# Patient Record
Sex: FEMALE | Race: WHITE | NOT HISPANIC OR LATINO
[De-identification: names, ages, dates, MRNs, and addresses within clinical notes are randomized per-mention and may not be internally consistent; named-entity substitution may affect disease eponyms.]

---

## 2017-07-18 ENCOUNTER — RESULT REVIEW (OUTPATIENT)
Age: 52
End: 2017-07-18

## 2017-07-20 ENCOUNTER — OUTPATIENT (OUTPATIENT)
Dept: OUTPATIENT SERVICES | Facility: HOSPITAL | Age: 52
LOS: 1 days | End: 2017-07-20
Payer: MEDICARE

## 2017-07-20 DIAGNOSIS — K08.9 DISORDER OF TEETH AND SUPPORTING STRUCTURES, UNSPECIFIED: ICD-10-CM

## 2017-07-20 PROCEDURE — D1110: CPT

## 2017-07-20 PROCEDURE — D0274: CPT

## 2017-07-25 DIAGNOSIS — Z01.20 ENCOUNTER FOR DENTAL EXAMINATION AND CLEANING WITHOUT ABNORMAL FINDINGS: ICD-10-CM

## 2017-08-22 ENCOUNTER — OUTPATIENT (OUTPATIENT)
Dept: OUTPATIENT SERVICES | Facility: HOSPITAL | Age: 52
LOS: 1 days | End: 2017-08-22
Payer: MEDICARE

## 2017-08-22 DIAGNOSIS — K08.9 DISORDER OF TEETH AND SUPPORTING STRUCTURES, UNSPECIFIED: ICD-10-CM

## 2017-08-22 PROCEDURE — D0150: CPT

## 2017-08-23 DIAGNOSIS — Z01.20 ENCOUNTER FOR DENTAL EXAMINATION AND CLEANING WITHOUT ABNORMAL FINDINGS: ICD-10-CM

## 2017-10-10 ENCOUNTER — INPATIENT (INPATIENT)
Facility: HOSPITAL | Age: 52
LOS: 27 days | Discharge: HOME | DRG: 885 | End: 2017-11-07
Attending: PSYCHIATRY & NEUROLOGY | Admitting: PSYCHIATRY & NEUROLOGY
Payer: MEDICARE

## 2017-10-10 VITALS
DIASTOLIC BLOOD PRESSURE: 75 MMHG | TEMPERATURE: 98 F | OXYGEN SATURATION: 98 % | WEIGHT: 214.07 LBS | SYSTOLIC BLOOD PRESSURE: 106 MMHG | HEIGHT: 63 IN | HEART RATE: 118 BPM | RESPIRATION RATE: 16 BRPM

## 2017-10-10 DIAGNOSIS — F32.3 MAJOR DEPRESSIVE DISORDER, SINGLE EPISODE, SEVERE WITH PSYCHOTIC FEATURES: ICD-10-CM

## 2017-10-10 DIAGNOSIS — F25.1 SCHIZOAFFECTIVE DISORDER, DEPRESSIVE TYPE: ICD-10-CM

## 2017-10-10 LAB
ALBUMIN SERPL ELPH-MCNC: 3.8 G/DL — SIGNIFICANT CHANGE UP (ref 3.3–5)
ALP SERPL-CCNC: 73 U/L — SIGNIFICANT CHANGE UP (ref 30–120)
ALT FLD-CCNC: 55 U/L DA — SIGNIFICANT CHANGE UP (ref 10–60)
AMPHET UR-MCNC: NEGATIVE — SIGNIFICANT CHANGE UP
ANION GAP SERPL CALC-SCNC: 9 MMOL/L — SIGNIFICANT CHANGE UP (ref 5–17)
APAP SERPL-MCNC: <1 UG/ML — LOW (ref 10–30)
APPEARANCE UR: CLEAR — SIGNIFICANT CHANGE UP
AST SERPL-CCNC: 25 U/L — SIGNIFICANT CHANGE UP (ref 10–40)
BARBITURATES UR SCN-MCNC: NEGATIVE — SIGNIFICANT CHANGE UP
BASOPHILS # BLD AUTO: 0.1 K/UL — SIGNIFICANT CHANGE UP (ref 0–0.2)
BASOPHILS NFR BLD AUTO: 1.3 % — SIGNIFICANT CHANGE UP (ref 0–2)
BENZODIAZ UR-MCNC: NEGATIVE — SIGNIFICANT CHANGE UP
BILIRUB SERPL-MCNC: 0.3 MG/DL — SIGNIFICANT CHANGE UP (ref 0.2–1.2)
BILIRUB UR-MCNC: NEGATIVE — SIGNIFICANT CHANGE UP
BUN SERPL-MCNC: 16 MG/DL — SIGNIFICANT CHANGE UP (ref 7–23)
CALCIUM SERPL-MCNC: 9.5 MG/DL — SIGNIFICANT CHANGE UP (ref 8.4–10.5)
CHLORIDE SERPL-SCNC: 101 MMOL/L — SIGNIFICANT CHANGE UP (ref 96–108)
CO2 SERPL-SCNC: 27 MMOL/L — SIGNIFICANT CHANGE UP (ref 22–31)
COCAINE METAB.OTHER UR-MCNC: NEGATIVE — SIGNIFICANT CHANGE UP
COLOR SPEC: YELLOW — SIGNIFICANT CHANGE UP
CREAT SERPL-MCNC: 1.03 MG/DL — SIGNIFICANT CHANGE UP (ref 0.5–1.3)
DIFF PNL FLD: ABNORMAL
EOSINOPHIL # BLD AUTO: 0 K/UL — SIGNIFICANT CHANGE UP (ref 0–0.5)
EOSINOPHIL NFR BLD AUTO: 0.5 % — SIGNIFICANT CHANGE UP (ref 0–6)
EPI CELLS # UR: SIGNIFICANT CHANGE UP
GLUCOSE BLDC GLUCOMTR-MCNC: 252 MG/DL — HIGH (ref 70–99)
GLUCOSE BLDC GLUCOMTR-MCNC: 291 MG/DL — HIGH (ref 70–99)
GLUCOSE SERPL-MCNC: 262 MG/DL — HIGH (ref 70–99)
GLUCOSE UR QL: 1000 MG/DL
HCG UR QL: NEGATIVE — SIGNIFICANT CHANGE UP
HCT VFR BLD CALC: 44.1 % — SIGNIFICANT CHANGE UP (ref 34.5–45)
HGB BLD-MCNC: 13.7 G/DL — SIGNIFICANT CHANGE UP (ref 11.5–15.5)
KETONES UR-MCNC: NEGATIVE — SIGNIFICANT CHANGE UP
LEUKOCYTE ESTERASE UR-ACNC: ABNORMAL
LYMPHOCYTES # BLD AUTO: 2.5 K/UL — SIGNIFICANT CHANGE UP (ref 1–3.3)
LYMPHOCYTES # BLD AUTO: 29.2 % — SIGNIFICANT CHANGE UP (ref 13–44)
MCHC RBC-ENTMCNC: 29.5 PG — SIGNIFICANT CHANGE UP (ref 27–34)
MCHC RBC-ENTMCNC: 31 GM/DL — LOW (ref 32–36)
MCV RBC AUTO: 95.1 FL — SIGNIFICANT CHANGE UP (ref 80–100)
METHADONE UR-MCNC: NEGATIVE — SIGNIFICANT CHANGE UP
MONOCYTES # BLD AUTO: 0.5 K/UL — SIGNIFICANT CHANGE UP (ref 0–0.9)
MONOCYTES NFR BLD AUTO: 5.8 % — SIGNIFICANT CHANGE UP (ref 2–14)
NEUTROPHILS # BLD AUTO: 5.5 K/UL — SIGNIFICANT CHANGE UP (ref 1.8–7.4)
NEUTROPHILS NFR BLD AUTO: 63.2 % — SIGNIFICANT CHANGE UP (ref 43–77)
NITRITE UR-MCNC: NEGATIVE — SIGNIFICANT CHANGE UP
OPIATES UR-MCNC: NEGATIVE — SIGNIFICANT CHANGE UP
PCP SPEC-MCNC: SIGNIFICANT CHANGE UP
PCP UR-MCNC: NEGATIVE — SIGNIFICANT CHANGE UP
PH UR: 6 — SIGNIFICANT CHANGE UP (ref 5–8)
PLATELET # BLD AUTO: 369 K/UL — SIGNIFICANT CHANGE UP (ref 150–400)
POTASSIUM SERPL-MCNC: 4.1 MMOL/L — SIGNIFICANT CHANGE UP (ref 3.5–5.3)
POTASSIUM SERPL-SCNC: 4.1 MMOL/L — SIGNIFICANT CHANGE UP (ref 3.5–5.3)
PROT SERPL-MCNC: 7.9 G/DL — SIGNIFICANT CHANGE UP (ref 6–8.3)
PROT UR-MCNC: 15 MG/DL
RBC # BLD: 4.64 M/UL — SIGNIFICANT CHANGE UP (ref 3.8–5.2)
RBC # FLD: 12.2 % — SIGNIFICANT CHANGE UP (ref 10.3–14.5)
RBC CASTS # UR COMP ASSIST: SIGNIFICANT CHANGE UP /HPF (ref 0–4)
SALICYLATES SERPL-MCNC: <3 MG/DL — SIGNIFICANT CHANGE UP (ref 2.8–20)
SODIUM SERPL-SCNC: 137 MMOL/L — SIGNIFICANT CHANGE UP (ref 135–145)
SP GR SPEC: 1 — LOW (ref 1.01–1.02)
THC UR QL: NEGATIVE — SIGNIFICANT CHANGE UP
UROBILINOGEN FLD QL: NEGATIVE MG/DL — SIGNIFICANT CHANGE UP
WBC # BLD: 8.7 K/UL — SIGNIFICANT CHANGE UP (ref 3.8–10.5)
WBC # FLD AUTO: 8.7 K/UL — SIGNIFICANT CHANGE UP (ref 3.8–10.5)
WBC UR QL: SIGNIFICANT CHANGE UP

## 2017-10-10 PROCEDURE — 93010 ELECTROCARDIOGRAM REPORT: CPT

## 2017-10-10 PROCEDURE — 99285 EMERGENCY DEPT VISIT HI MDM: CPT

## 2017-10-10 RX ORDER — DEXTROSE 50 % IN WATER 50 %
25 SYRINGE (ML) INTRAVENOUS ONCE
Qty: 0 | Refills: 0 | Status: DISCONTINUED | OUTPATIENT
Start: 2017-10-10 | End: 2017-11-07

## 2017-10-10 RX ORDER — DEXTROSE 50 % IN WATER 50 %
12.5 SYRINGE (ML) INTRAVENOUS ONCE
Qty: 0 | Refills: 0 | Status: DISCONTINUED | OUTPATIENT
Start: 2017-10-10 | End: 2017-11-07

## 2017-10-10 RX ORDER — ALBUTEROL 90 UG/1
2 AEROSOL, METERED ORAL EVERY 4 HOURS
Qty: 0 | Refills: 0 | Status: DISCONTINUED | OUTPATIENT
Start: 2017-10-10 | End: 2017-11-07

## 2017-10-10 RX ORDER — SODIUM CHLORIDE 9 MG/ML
1000 INJECTION, SOLUTION INTRAVENOUS
Qty: 0 | Refills: 0 | Status: DISCONTINUED | OUTPATIENT
Start: 2017-10-10 | End: 2017-11-07

## 2017-10-10 RX ORDER — DESVENLAFAXINE 50 MG/1
1 TABLET, EXTENDED RELEASE ORAL
Qty: 0 | Refills: 0 | COMMUNITY

## 2017-10-10 RX ORDER — GLUCAGON INJECTION, SOLUTION 0.5 MG/.1ML
1 INJECTION, SOLUTION SUBCUTANEOUS ONCE
Qty: 0 | Refills: 0 | Status: DISCONTINUED | OUTPATIENT
Start: 2017-10-10 | End: 2017-11-07

## 2017-10-10 RX ORDER — ATORVASTATIN CALCIUM 80 MG/1
40 TABLET, FILM COATED ORAL AT BEDTIME
Qty: 0 | Refills: 0 | Status: DISCONTINUED | OUTPATIENT
Start: 2017-10-10 | End: 2017-11-07

## 2017-10-10 RX ORDER — INSULIN LISPRO 100/ML
VIAL (ML) SUBCUTANEOUS AT BEDTIME
Qty: 0 | Refills: 0 | Status: DISCONTINUED | OUTPATIENT
Start: 2017-10-10 | End: 2017-11-07

## 2017-10-10 RX ORDER — ARIPIPRAZOLE 15 MG/1
5 TABLET ORAL ONCE
Qty: 0 | Refills: 0 | Status: COMPLETED | OUTPATIENT
Start: 2017-10-10 | End: 2017-10-10

## 2017-10-10 RX ORDER — DEXTROSE 50 % IN WATER 50 %
1 SYRINGE (ML) INTRAVENOUS ONCE
Qty: 0 | Refills: 0 | Status: DISCONTINUED | OUTPATIENT
Start: 2017-10-10 | End: 2017-11-07

## 2017-10-10 RX ORDER — VALSARTAN 80 MG/1
320 TABLET ORAL DAILY
Qty: 0 | Refills: 0 | Status: DISCONTINUED | OUTPATIENT
Start: 2017-10-10 | End: 2017-11-07

## 2017-10-10 RX ORDER — ARIPIPRAZOLE 15 MG/1
5 TABLET ORAL ONCE
Qty: 0 | Refills: 0 | Status: DISCONTINUED | OUTPATIENT
Start: 2017-10-10 | End: 2017-10-10

## 2017-10-10 RX ORDER — INSULIN LISPRO 100/ML
VIAL (ML) SUBCUTANEOUS
Qty: 0 | Refills: 0 | Status: DISCONTINUED | OUTPATIENT
Start: 2017-10-10 | End: 2017-11-07

## 2017-10-10 RX ADMIN — ATORVASTATIN CALCIUM 40 MILLIGRAM(S): 80 TABLET, FILM COATED ORAL at 21:47

## 2017-10-10 RX ADMIN — ARIPIPRAZOLE 5 MILLIGRAM(S): 15 TABLET ORAL at 16:24

## 2017-10-10 RX ADMIN — Medication: at 21:48

## 2017-10-10 NOTE — ED BEHAVIORAL HEALTH ASSESSMENT NOTE - HPI (INCLUDE ILLNESS QUALITY, SEVERITY, DURATION, TIMING, CONTEXT, MODIFYING FACTORS, ASSOCIATED SIGNS AND SYMPTOMS)
52 y/o SWF, with five prior psychiatric hospitalizations, history of schizoaffective disorder who presented to the ED on her own after she became increasingly depressed over the last few days. Patient admits being stressed out by a recent move to a new housing place and she feels overwhelmed. She reported worsening sleep, worsened interest, worsened energy, worsened concentration, worsened appetite and suicidal ideation - patient was thinking about overdose.

## 2017-10-10 NOTE — ED ADULT NURSE REASSESSMENT NOTE - NS ED NURSE REASSESS COMMENT FT1
1900 Report given to PERICO Velasco. To [address] via wheelchair.
[id number] Seen by Psych MD Sen, pt admitted, bed assignment pending. No agitation noted. Calm and cooperative. 1:1 Observation maintained.

## 2017-10-10 NOTE — ED BEHAVIORAL HEALTH ASSESSMENT NOTE - OTHER PAST PSYCHIATRIC HISTORY (INCLUDE DETAILS REGARDING ONSET, COURSE OF ILLNESS, INPATIENT/OUTPATIENT TREATMENT)
Five prior psychiatric hospitalizations, currently in [facility] under care of a therapist and NP

## 2017-10-10 NOTE — ED BEHAVIORAL HEALTH ASSESSMENT NOTE - SUICIDE PROTECTIVE FACTORS
Identifies reasons for living/Future oriented/Positive therapeutic relationships/Responsibility to family and others

## 2017-10-10 NOTE — ED PROVIDER NOTE - OBJECTIVE STATEMENT
50 y/o F pt w/ PMHx of schizoaffective disorder, asthma, DM and sleep apnea presents to the ED bib ambulance c/o depression, frustration and SI. Pt states she has been feeling depressed, waxing and waning, close to a year. Pt lives in a housing program and sees a NP for her depression... had an appointment this morning for medication adjustment where she expressed to the NP that she has been having intermittent SI for the past week and was sent to [facility] to be further evaluated. Pt states she does not have a plan to hurt herself and expresses that she currently has "a lot on her plate" and is feeling anxious, depressed and frustrated. Pt states that when she is feeling this way she is often non-compliant with her medication. Pt denies any somatic complaints at this time.

## 2017-10-10 NOTE — ED BEHAVIORAL HEALTH ASSESSMENT NOTE - DESCRIPTION
Patient at this time continues to express suicidal ideation and requests inpatient admission. Asthma, DM Lives in [facility] assisted housing

## 2017-10-11 LAB
GLUCOSE BLDC GLUCOMTR-MCNC: 245 MG/DL — HIGH (ref 70–99)
GLUCOSE BLDC GLUCOMTR-MCNC: 246 MG/DL — HIGH (ref 70–99)
GLUCOSE BLDC GLUCOMTR-MCNC: 282 MG/DL — HIGH (ref 70–99)
GLUCOSE BLDC GLUCOMTR-MCNC: 304 MG/DL — HIGH (ref 70–99)
TSH SERPL-MCNC: 0.74 UIU/ML — SIGNIFICANT CHANGE UP (ref 0.27–4.2)

## 2017-10-11 PROCEDURE — 99231 SBSQ HOSP IP/OBS SF/LOW 25: CPT

## 2017-10-11 RX ORDER — BENZTROPINE MESYLATE 1 MG
0.5 TABLET ORAL
Qty: 0 | Refills: 0 | Status: DISCONTINUED | OUTPATIENT
Start: 2017-10-11 | End: 2017-11-07

## 2017-10-11 RX ORDER — FLUPHENAZINE HYDROCHLORIDE 1 MG/1
5 TABLET, FILM COATED ORAL AT BEDTIME
Qty: 0 | Refills: 0 | Status: DISCONTINUED | OUTPATIENT
Start: 2017-10-11 | End: 2017-10-12

## 2017-10-11 RX ORDER — FLUPHENAZINE HYDROCHLORIDE 1 MG/1
2.5 TABLET, FILM COATED ORAL AT BEDTIME
Qty: 0 | Refills: 0 | Status: DISCONTINUED | OUTPATIENT
Start: 2017-10-11 | End: 2017-10-11

## 2017-10-11 RX ORDER — ACETAMINOPHEN 500 MG
650 TABLET ORAL EVERY 6 HOURS
Qty: 0 | Refills: 0 | Status: DISCONTINUED | OUTPATIENT
Start: 2017-10-11 | End: 2017-11-07

## 2017-10-11 RX ORDER — METFORMIN HYDROCHLORIDE 850 MG/1
1000 TABLET ORAL
Qty: 0 | Refills: 0 | Status: DISCONTINUED | OUTPATIENT
Start: 2017-10-11 | End: 2017-11-07

## 2017-10-11 RX ORDER — ASPIRIN/CALCIUM CARB/MAGNESIUM 324 MG
81 TABLET ORAL DAILY
Qty: 0 | Refills: 0 | Status: DISCONTINUED | OUTPATIENT
Start: 2017-10-11 | End: 2017-11-07

## 2017-10-11 RX ORDER — LAMOTRIGINE 25 MG/1
25 TABLET, ORALLY DISINTEGRATING ORAL DAILY
Qty: 0 | Refills: 0 | Status: DISCONTINUED | OUTPATIENT
Start: 2017-10-11 | End: 2017-10-23

## 2017-10-11 RX ORDER — BUDESONIDE AND FORMOTEROL FUMARATE DIHYDRATE 160; 4.5 UG/1; UG/1
2 AEROSOL RESPIRATORY (INHALATION)
Qty: 0 | Refills: 0 | Status: DISCONTINUED | OUTPATIENT
Start: 2017-10-11 | End: 2017-11-07

## 2017-10-11 RX ORDER — GABAPENTIN 400 MG/1
100 CAPSULE ORAL
Qty: 0 | Refills: 0 | Status: DISCONTINUED | OUTPATIENT
Start: 2017-10-11 | End: 2017-11-07

## 2017-10-11 RX ORDER — FLUOXETINE HCL 10 MG
10 CAPSULE ORAL DAILY
Qty: 0 | Refills: 0 | Status: DISCONTINUED | OUTPATIENT
Start: 2017-10-11 | End: 2017-10-17

## 2017-10-11 RX ORDER — GLIMEPIRIDE 1 MG
4 TABLET ORAL
Qty: 0 | Refills: 0 | Status: DISCONTINUED | OUTPATIENT
Start: 2017-10-11 | End: 2017-10-12

## 2017-10-11 RX ADMIN — BUDESONIDE AND FORMOTEROL FUMARATE DIHYDRATE 2 PUFF(S): 160; 4.5 AEROSOL RESPIRATORY (INHALATION) at 20:22

## 2017-10-11 RX ADMIN — Medication 4: at 17:24

## 2017-10-11 RX ADMIN — ATORVASTATIN CALCIUM 40 MILLIGRAM(S): 80 TABLET, FILM COATED ORAL at 20:19

## 2017-10-11 RX ADMIN — GABAPENTIN 100 MILLIGRAM(S): 400 CAPSULE ORAL at 20:19

## 2017-10-11 RX ADMIN — Medication 4: at 12:13

## 2017-10-11 RX ADMIN — METFORMIN HYDROCHLORIDE 1000 MILLIGRAM(S): 850 TABLET ORAL at 17:25

## 2017-10-11 RX ADMIN — Medication 2: at 20:18

## 2017-10-11 RX ADMIN — VALSARTAN 320 MILLIGRAM(S): 80 TABLET ORAL at 08:26

## 2017-10-11 RX ADMIN — Medication 8: at 08:26

## 2017-10-11 RX ADMIN — FLUPHENAZINE HYDROCHLORIDE 5 MILLIGRAM(S): 1 TABLET, FILM COATED ORAL at 20:19

## 2017-10-12 LAB
CHOLEST SERPL-MCNC: 152 MG/DL — SIGNIFICANT CHANGE UP (ref 10–199)
GLUCOSE BLDC GLUCOMTR-MCNC: 203 MG/DL — HIGH (ref 70–99)
GLUCOSE BLDC GLUCOMTR-MCNC: 237 MG/DL — HIGH (ref 70–99)
GLUCOSE BLDC GLUCOMTR-MCNC: 245 MG/DL — HIGH (ref 70–99)
GLUCOSE BLDC GLUCOMTR-MCNC: 300 MG/DL — HIGH (ref 70–99)
HBA1C BLD-MCNC: 9.4 % — HIGH (ref 4–5.6)
HDLC SERPL-MCNC: 46 MG/DL — SIGNIFICANT CHANGE UP (ref 40–125)
LIPID PNL WITH DIRECT LDL SERPL: 53 MG/DL — SIGNIFICANT CHANGE UP
T PALLIDUM AB TITR SER: NEGATIVE — SIGNIFICANT CHANGE UP
TOTAL CHOLESTEROL/HDL RATIO MEASUREMENT: 3.3 RATIO — SIGNIFICANT CHANGE UP (ref 3.3–7.1)
TRIGL SERPL-MCNC: 264 MG/DL — HIGH (ref 10–149)

## 2017-10-12 PROCEDURE — 99231 SBSQ HOSP IP/OBS SF/LOW 25: CPT

## 2017-10-12 RX ORDER — SODIUM CHLORIDE 0.65 %
1 AEROSOL, SPRAY (ML) NASAL
Qty: 0 | Refills: 0 | Status: DISCONTINUED | OUTPATIENT
Start: 2017-10-12 | End: 2017-11-07

## 2017-10-12 RX ORDER — GLIMEPIRIDE 1 MG
4 TABLET ORAL
Qty: 0 | Refills: 0 | Status: DISCONTINUED | OUTPATIENT
Start: 2017-10-12 | End: 2017-10-13

## 2017-10-12 RX ORDER — FLUPHENAZINE HYDROCHLORIDE 1 MG/1
5 TABLET, FILM COATED ORAL
Qty: 0 | Refills: 0 | Status: DISCONTINUED | OUTPATIENT
Start: 2017-10-12 | End: 2017-10-17

## 2017-10-12 RX ADMIN — Medication 10 MILLIGRAM(S): at 08:59

## 2017-10-12 RX ADMIN — Medication 4 MILLIGRAM(S): at 17:03

## 2017-10-12 RX ADMIN — Medication 81 MILLIGRAM(S): at 08:58

## 2017-10-12 RX ADMIN — Medication 0.5 MILLIGRAM(S): at 18:32

## 2017-10-12 RX ADMIN — BUDESONIDE AND FORMOTEROL FUMARATE DIHYDRATE 2 PUFF(S): 160; 4.5 AEROSOL RESPIRATORY (INHALATION) at 18:32

## 2017-10-12 RX ADMIN — METFORMIN HYDROCHLORIDE 1000 MILLIGRAM(S): 850 TABLET ORAL at 08:01

## 2017-10-12 RX ADMIN — LAMOTRIGINE 25 MILLIGRAM(S): 25 TABLET, ORALLY DISINTEGRATING ORAL at 08:58

## 2017-10-12 RX ADMIN — ATORVASTATIN CALCIUM 40 MILLIGRAM(S): 80 TABLET, FILM COATED ORAL at 20:22

## 2017-10-12 RX ADMIN — Medication 4: at 17:04

## 2017-10-12 RX ADMIN — FLUPHENAZINE HYDROCHLORIDE 5 MILLIGRAM(S): 1 TABLET, FILM COATED ORAL at 20:22

## 2017-10-12 RX ADMIN — METFORMIN HYDROCHLORIDE 1000 MILLIGRAM(S): 850 TABLET ORAL at 17:03

## 2017-10-12 RX ADMIN — Medication 4: at 12:21

## 2017-10-12 RX ADMIN — Medication 6: at 07:58

## 2017-10-12 RX ADMIN — BUDESONIDE AND FORMOTEROL FUMARATE DIHYDRATE 2 PUFF(S): 160; 4.5 AEROSOL RESPIRATORY (INHALATION) at 08:05

## 2017-10-12 RX ADMIN — Medication 4 MILLIGRAM(S): at 08:00

## 2017-10-12 RX ADMIN — GABAPENTIN 100 MILLIGRAM(S): 400 CAPSULE ORAL at 20:22

## 2017-10-12 RX ADMIN — VALSARTAN 320 MILLIGRAM(S): 80 TABLET ORAL at 08:58

## 2017-10-12 RX ADMIN — GABAPENTIN 100 MILLIGRAM(S): 400 CAPSULE ORAL at 08:59

## 2017-10-13 LAB
GLUCOSE BLDC GLUCOMTR-MCNC: 161 MG/DL — HIGH (ref 70–99)
GLUCOSE BLDC GLUCOMTR-MCNC: 241 MG/DL — HIGH (ref 70–99)
GLUCOSE BLDC GLUCOMTR-MCNC: 257 MG/DL — HIGH (ref 70–99)
GLUCOSE BLDC GLUCOMTR-MCNC: 301 MG/DL — HIGH (ref 70–99)

## 2017-10-13 PROCEDURE — 99231 SBSQ HOSP IP/OBS SF/LOW 25: CPT

## 2017-10-13 RX ORDER — INSULIN GLARGINE 100 [IU]/ML
10 INJECTION, SOLUTION SUBCUTANEOUS AT BEDTIME
Qty: 0 | Refills: 0 | Status: DISCONTINUED | OUTPATIENT
Start: 2017-10-13 | End: 2017-10-16

## 2017-10-13 RX ORDER — INSULIN LISPRO 100/ML
3 VIAL (ML) SUBCUTANEOUS ONCE
Qty: 0 | Refills: 0 | Status: COMPLETED | OUTPATIENT
Start: 2017-10-13 | End: 2017-10-13

## 2017-10-13 RX ORDER — INSULIN LISPRO 100/ML
3 VIAL (ML) SUBCUTANEOUS
Qty: 0 | Refills: 0 | Status: DISCONTINUED | OUTPATIENT
Start: 2017-10-13 | End: 2017-10-16

## 2017-10-13 RX ADMIN — FLUPHENAZINE HYDROCHLORIDE 5 MILLIGRAM(S): 1 TABLET, FILM COATED ORAL at 09:37

## 2017-10-13 RX ADMIN — METFORMIN HYDROCHLORIDE 1000 MILLIGRAM(S): 850 TABLET ORAL at 08:05

## 2017-10-13 RX ADMIN — Medication: at 12:30

## 2017-10-13 RX ADMIN — METFORMIN HYDROCHLORIDE 1000 MILLIGRAM(S): 850 TABLET ORAL at 18:21

## 2017-10-13 RX ADMIN — BUDESONIDE AND FORMOTEROL FUMARATE DIHYDRATE 2 PUFF(S): 160; 4.5 AEROSOL RESPIRATORY (INHALATION) at 21:09

## 2017-10-13 RX ADMIN — Medication 3 UNIT(S): at 12:30

## 2017-10-13 RX ADMIN — LAMOTRIGINE 25 MILLIGRAM(S): 25 TABLET, ORALLY DISINTEGRATING ORAL at 09:37

## 2017-10-13 RX ADMIN — INSULIN GLARGINE 10 UNIT(S): 100 INJECTION, SOLUTION SUBCUTANEOUS at 21:10

## 2017-10-13 RX ADMIN — Medication 81 MILLIGRAM(S): at 09:37

## 2017-10-13 RX ADMIN — GABAPENTIN 100 MILLIGRAM(S): 400 CAPSULE ORAL at 09:37

## 2017-10-13 RX ADMIN — Medication 6: at 08:08

## 2017-10-13 RX ADMIN — BUDESONIDE AND FORMOTEROL FUMARATE DIHYDRATE 2 PUFF(S): 160; 4.5 AEROSOL RESPIRATORY (INHALATION) at 09:37

## 2017-10-13 RX ADMIN — Medication 4 MILLIGRAM(S): at 08:06

## 2017-10-13 RX ADMIN — Medication 2: at 17:11

## 2017-10-13 RX ADMIN — Medication 3 UNIT(S): at 17:11

## 2017-10-13 RX ADMIN — Medication 10 MILLIGRAM(S): at 09:37

## 2017-10-13 RX ADMIN — Medication 1 SPRAY(S): at 21:09

## 2017-10-13 RX ADMIN — GABAPENTIN 100 MILLIGRAM(S): 400 CAPSULE ORAL at 21:14

## 2017-10-13 RX ADMIN — Medication 650 MILLIGRAM(S): at 09:37

## 2017-10-13 RX ADMIN — FLUPHENAZINE HYDROCHLORIDE 5 MILLIGRAM(S): 1 TABLET, FILM COATED ORAL at 21:13

## 2017-10-13 RX ADMIN — ATORVASTATIN CALCIUM 40 MILLIGRAM(S): 80 TABLET, FILM COATED ORAL at 21:13

## 2017-10-14 LAB
GLUCOSE BLDC GLUCOMTR-MCNC: 127 MG/DL — HIGH (ref 70–99)
GLUCOSE BLDC GLUCOMTR-MCNC: 222 MG/DL — HIGH (ref 70–99)
GLUCOSE BLDC GLUCOMTR-MCNC: 241 MG/DL — HIGH (ref 70–99)
GLUCOSE BLDC GLUCOMTR-MCNC: 271 MG/DL — HIGH (ref 70–99)

## 2017-10-14 RX ORDER — NYSTATIN/TRIAMCINOLONE ACET
1 OINTMENT (GRAM) TOPICAL
Qty: 0 | Refills: 0 | Status: COMPLETED | OUTPATIENT
Start: 2017-10-14 | End: 2017-10-19

## 2017-10-14 RX ADMIN — BUDESONIDE AND FORMOTEROL FUMARATE DIHYDRATE 2 PUFF(S): 160; 4.5 AEROSOL RESPIRATORY (INHALATION) at 21:10

## 2017-10-14 RX ADMIN — Medication 4: at 08:19

## 2017-10-14 RX ADMIN — Medication 81 MILLIGRAM(S): at 09:13

## 2017-10-14 RX ADMIN — Medication 4: at 12:20

## 2017-10-14 RX ADMIN — Medication 1 APPLICATION(S): at 21:02

## 2017-10-14 RX ADMIN — Medication 3 UNIT(S): at 17:06

## 2017-10-14 RX ADMIN — BUDESONIDE AND FORMOTEROL FUMARATE DIHYDRATE 2 PUFF(S): 160; 4.5 AEROSOL RESPIRATORY (INHALATION) at 08:23

## 2017-10-14 RX ADMIN — FLUPHENAZINE HYDROCHLORIDE 5 MILLIGRAM(S): 1 TABLET, FILM COATED ORAL at 21:01

## 2017-10-14 RX ADMIN — METFORMIN HYDROCHLORIDE 1000 MILLIGRAM(S): 850 TABLET ORAL at 17:05

## 2017-10-14 RX ADMIN — ATORVASTATIN CALCIUM 40 MILLIGRAM(S): 80 TABLET, FILM COATED ORAL at 21:02

## 2017-10-14 RX ADMIN — Medication 10 MILLIGRAM(S): at 09:13

## 2017-10-14 RX ADMIN — GABAPENTIN 100 MILLIGRAM(S): 400 CAPSULE ORAL at 21:02

## 2017-10-14 RX ADMIN — Medication 3 UNIT(S): at 12:21

## 2017-10-14 RX ADMIN — Medication 6: at 17:05

## 2017-10-14 RX ADMIN — VALSARTAN 320 MILLIGRAM(S): 80 TABLET ORAL at 09:13

## 2017-10-14 RX ADMIN — LAMOTRIGINE 25 MILLIGRAM(S): 25 TABLET, ORALLY DISINTEGRATING ORAL at 09:13

## 2017-10-14 RX ADMIN — GABAPENTIN 100 MILLIGRAM(S): 400 CAPSULE ORAL at 09:13

## 2017-10-14 RX ADMIN — Medication 3 UNIT(S): at 08:20

## 2017-10-14 RX ADMIN — METFORMIN HYDROCHLORIDE 1000 MILLIGRAM(S): 850 TABLET ORAL at 08:23

## 2017-10-14 RX ADMIN — INSULIN GLARGINE 10 UNIT(S): 100 INJECTION, SOLUTION SUBCUTANEOUS at 21:02

## 2017-10-14 RX ADMIN — FLUPHENAZINE HYDROCHLORIDE 5 MILLIGRAM(S): 1 TABLET, FILM COATED ORAL at 09:12

## 2017-10-15 LAB
GLUCOSE BLDC GLUCOMTR-MCNC: 183 MG/DL — HIGH (ref 70–99)
GLUCOSE BLDC GLUCOMTR-MCNC: 207 MG/DL — HIGH (ref 70–99)
GLUCOSE BLDC GLUCOMTR-MCNC: 225 MG/DL — HIGH (ref 70–99)
GLUCOSE BLDC GLUCOMTR-MCNC: 247 MG/DL — HIGH (ref 70–99)

## 2017-10-15 RX ADMIN — BUDESONIDE AND FORMOTEROL FUMARATE DIHYDRATE 2 PUFF(S): 160; 4.5 AEROSOL RESPIRATORY (INHALATION) at 21:28

## 2017-10-15 RX ADMIN — GABAPENTIN 100 MILLIGRAM(S): 400 CAPSULE ORAL at 08:30

## 2017-10-15 RX ADMIN — Medication 4: at 08:30

## 2017-10-15 RX ADMIN — Medication 1 APPLICATION(S): at 08:31

## 2017-10-15 RX ADMIN — LAMOTRIGINE 25 MILLIGRAM(S): 25 TABLET, ORALLY DISINTEGRATING ORAL at 08:30

## 2017-10-15 RX ADMIN — FLUPHENAZINE HYDROCHLORIDE 5 MILLIGRAM(S): 1 TABLET, FILM COATED ORAL at 21:28

## 2017-10-15 RX ADMIN — INSULIN GLARGINE 10 UNIT(S): 100 INJECTION, SOLUTION SUBCUTANEOUS at 21:28

## 2017-10-15 RX ADMIN — METFORMIN HYDROCHLORIDE 1000 MILLIGRAM(S): 850 TABLET ORAL at 17:26

## 2017-10-15 RX ADMIN — Medication 3 UNIT(S): at 17:26

## 2017-10-15 RX ADMIN — VALSARTAN 320 MILLIGRAM(S): 80 TABLET ORAL at 08:31

## 2017-10-15 RX ADMIN — Medication 3 UNIT(S): at 12:08

## 2017-10-15 RX ADMIN — Medication 4: at 12:07

## 2017-10-15 RX ADMIN — Medication 1 APPLICATION(S): at 21:27

## 2017-10-15 RX ADMIN — BUDESONIDE AND FORMOTEROL FUMARATE DIHYDRATE 2 PUFF(S): 160; 4.5 AEROSOL RESPIRATORY (INHALATION) at 08:31

## 2017-10-15 RX ADMIN — Medication 3 UNIT(S): at 08:00

## 2017-10-15 RX ADMIN — METFORMIN HYDROCHLORIDE 1000 MILLIGRAM(S): 850 TABLET ORAL at 08:30

## 2017-10-15 RX ADMIN — GABAPENTIN 100 MILLIGRAM(S): 400 CAPSULE ORAL at 21:29

## 2017-10-15 RX ADMIN — ATORVASTATIN CALCIUM 40 MILLIGRAM(S): 80 TABLET, FILM COATED ORAL at 21:32

## 2017-10-15 RX ADMIN — FLUPHENAZINE HYDROCHLORIDE 5 MILLIGRAM(S): 1 TABLET, FILM COATED ORAL at 08:30

## 2017-10-15 RX ADMIN — Medication 10 MILLIGRAM(S): at 08:30

## 2017-10-15 RX ADMIN — Medication 81 MILLIGRAM(S): at 08:30

## 2017-10-16 LAB
GLUCOSE BLDC GLUCOMTR-MCNC: 136 MG/DL — HIGH (ref 70–99)
GLUCOSE BLDC GLUCOMTR-MCNC: 171 MG/DL — HIGH (ref 70–99)
GLUCOSE BLDC GLUCOMTR-MCNC: 235 MG/DL — HIGH (ref 70–99)
GLUCOSE BLDC GLUCOMTR-MCNC: 256 MG/DL — HIGH (ref 70–99)

## 2017-10-16 PROCEDURE — 99231 SBSQ HOSP IP/OBS SF/LOW 25: CPT

## 2017-10-16 RX ORDER — INSULIN GLARGINE 100 [IU]/ML
15 INJECTION, SOLUTION SUBCUTANEOUS AT BEDTIME
Qty: 0 | Refills: 0 | Status: DISCONTINUED | OUTPATIENT
Start: 2017-10-16 | End: 2017-11-07

## 2017-10-16 RX ORDER — INSULIN LISPRO 100/ML
5 VIAL (ML) SUBCUTANEOUS
Qty: 0 | Refills: 0 | Status: DISCONTINUED | OUTPATIENT
Start: 2017-10-16 | End: 2017-11-07

## 2017-10-16 RX ADMIN — Medication 3 UNIT(S): at 08:14

## 2017-10-16 RX ADMIN — Medication 1 APPLICATION(S): at 20:45

## 2017-10-16 RX ADMIN — LAMOTRIGINE 25 MILLIGRAM(S): 25 TABLET, ORALLY DISINTEGRATING ORAL at 08:17

## 2017-10-16 RX ADMIN — GABAPENTIN 100 MILLIGRAM(S): 400 CAPSULE ORAL at 20:46

## 2017-10-16 RX ADMIN — BUDESONIDE AND FORMOTEROL FUMARATE DIHYDRATE 2 PUFF(S): 160; 4.5 AEROSOL RESPIRATORY (INHALATION) at 08:16

## 2017-10-16 RX ADMIN — Medication 2: at 17:19

## 2017-10-16 RX ADMIN — INSULIN GLARGINE 15 UNIT(S): 100 INJECTION, SOLUTION SUBCUTANEOUS at 20:45

## 2017-10-16 RX ADMIN — GABAPENTIN 100 MILLIGRAM(S): 400 CAPSULE ORAL at 08:17

## 2017-10-16 RX ADMIN — Medication 10 MILLIGRAM(S): at 08:17

## 2017-10-16 RX ADMIN — ATORVASTATIN CALCIUM 40 MILLIGRAM(S): 80 TABLET, FILM COATED ORAL at 20:46

## 2017-10-16 RX ADMIN — FLUPHENAZINE HYDROCHLORIDE 5 MILLIGRAM(S): 1 TABLET, FILM COATED ORAL at 08:16

## 2017-10-16 RX ADMIN — VALSARTAN 320 MILLIGRAM(S): 80 TABLET ORAL at 08:17

## 2017-10-16 RX ADMIN — Medication 0.5 MILLIGRAM(S): at 08:17

## 2017-10-16 RX ADMIN — Medication 5 UNIT(S): at 12:24

## 2017-10-16 RX ADMIN — Medication 5 UNIT(S): at 17:19

## 2017-10-16 RX ADMIN — Medication 81 MILLIGRAM(S): at 08:17

## 2017-10-16 RX ADMIN — BUDESONIDE AND FORMOTEROL FUMARATE DIHYDRATE 2 PUFF(S): 160; 4.5 AEROSOL RESPIRATORY (INHALATION) at 20:47

## 2017-10-16 RX ADMIN — FLUPHENAZINE HYDROCHLORIDE 5 MILLIGRAM(S): 1 TABLET, FILM COATED ORAL at 20:46

## 2017-10-16 RX ADMIN — METFORMIN HYDROCHLORIDE 1000 MILLIGRAM(S): 850 TABLET ORAL at 17:20

## 2017-10-16 RX ADMIN — Medication 1 APPLICATION(S): at 08:16

## 2017-10-16 RX ADMIN — Medication 4: at 08:14

## 2017-10-16 RX ADMIN — METFORMIN HYDROCHLORIDE 1000 MILLIGRAM(S): 850 TABLET ORAL at 08:14

## 2017-10-16 RX ADMIN — Medication 4: at 12:23

## 2017-10-17 LAB
GLUCOSE BLDC GLUCOMTR-MCNC: 174 MG/DL — HIGH (ref 70–99)
GLUCOSE BLDC GLUCOMTR-MCNC: 182 MG/DL — HIGH (ref 70–99)
GLUCOSE BLDC GLUCOMTR-MCNC: 198 MG/DL — HIGH (ref 70–99)
GLUCOSE BLDC GLUCOMTR-MCNC: 222 MG/DL — HIGH (ref 70–99)

## 2017-10-17 PROCEDURE — 99231 SBSQ HOSP IP/OBS SF/LOW 25: CPT

## 2017-10-17 RX ORDER — FLUOXETINE HCL 10 MG
20 CAPSULE ORAL DAILY
Qty: 0 | Refills: 0 | Status: DISCONTINUED | OUTPATIENT
Start: 2017-10-17 | End: 2017-10-25

## 2017-10-17 RX ORDER — FLUPHENAZINE HYDROCHLORIDE 1 MG/1
10 TABLET, FILM COATED ORAL AT BEDTIME
Qty: 0 | Refills: 0 | Status: DISCONTINUED | OUTPATIENT
Start: 2017-10-17 | End: 2017-11-07

## 2017-10-17 RX ORDER — FLUPHENAZINE HYDROCHLORIDE 1 MG/1
5 TABLET, FILM COATED ORAL DAILY
Qty: 0 | Refills: 0 | Status: DISCONTINUED | OUTPATIENT
Start: 2017-10-17 | End: 2017-11-07

## 2017-10-17 RX ADMIN — Medication 1 APPLICATION(S): at 08:28

## 2017-10-17 RX ADMIN — Medication 5 UNIT(S): at 16:56

## 2017-10-17 RX ADMIN — LAMOTRIGINE 25 MILLIGRAM(S): 25 TABLET, ORALLY DISINTEGRATING ORAL at 08:28

## 2017-10-17 RX ADMIN — FLUPHENAZINE HYDROCHLORIDE 5 MILLIGRAM(S): 1 TABLET, FILM COATED ORAL at 08:28

## 2017-10-17 RX ADMIN — VALSARTAN 320 MILLIGRAM(S): 80 TABLET ORAL at 08:28

## 2017-10-17 RX ADMIN — Medication 1 SPRAY(S): at 08:40

## 2017-10-17 RX ADMIN — Medication 81 MILLIGRAM(S): at 08:28

## 2017-10-17 RX ADMIN — FLUPHENAZINE HYDROCHLORIDE 10 MILLIGRAM(S): 1 TABLET, FILM COATED ORAL at 20:27

## 2017-10-17 RX ADMIN — ATORVASTATIN CALCIUM 40 MILLIGRAM(S): 80 TABLET, FILM COATED ORAL at 20:27

## 2017-10-17 RX ADMIN — Medication 10 MILLIGRAM(S): at 08:28

## 2017-10-17 RX ADMIN — METFORMIN HYDROCHLORIDE 1000 MILLIGRAM(S): 850 TABLET ORAL at 16:56

## 2017-10-17 RX ADMIN — Medication 2: at 16:56

## 2017-10-17 RX ADMIN — BUDESONIDE AND FORMOTEROL FUMARATE DIHYDRATE 2 PUFF(S): 160; 4.5 AEROSOL RESPIRATORY (INHALATION) at 08:06

## 2017-10-17 RX ADMIN — Medication 2: at 08:09

## 2017-10-17 RX ADMIN — Medication 5 UNIT(S): at 08:07

## 2017-10-17 RX ADMIN — Medication 4: at 12:25

## 2017-10-17 RX ADMIN — INSULIN GLARGINE 15 UNIT(S): 100 INJECTION, SOLUTION SUBCUTANEOUS at 20:27

## 2017-10-17 RX ADMIN — GABAPENTIN 100 MILLIGRAM(S): 400 CAPSULE ORAL at 20:27

## 2017-10-17 RX ADMIN — Medication 5 UNIT(S): at 12:26

## 2017-10-17 RX ADMIN — BUDESONIDE AND FORMOTEROL FUMARATE DIHYDRATE 2 PUFF(S): 160; 4.5 AEROSOL RESPIRATORY (INHALATION) at 20:26

## 2017-10-17 RX ADMIN — METFORMIN HYDROCHLORIDE 1000 MILLIGRAM(S): 850 TABLET ORAL at 08:06

## 2017-10-17 RX ADMIN — GABAPENTIN 100 MILLIGRAM(S): 400 CAPSULE ORAL at 08:28

## 2017-10-18 LAB
GLUCOSE BLDC GLUCOMTR-MCNC: 115 MG/DL — HIGH (ref 70–99)
GLUCOSE BLDC GLUCOMTR-MCNC: 168 MG/DL — HIGH (ref 70–99)
GLUCOSE BLDC GLUCOMTR-MCNC: 205 MG/DL — HIGH (ref 70–99)
GLUCOSE BLDC GLUCOMTR-MCNC: 205 MG/DL — HIGH (ref 70–99)

## 2017-10-18 PROCEDURE — 99231 SBSQ HOSP IP/OBS SF/LOW 25: CPT

## 2017-10-18 RX ADMIN — GABAPENTIN 100 MILLIGRAM(S): 400 CAPSULE ORAL at 20:15

## 2017-10-18 RX ADMIN — BUDESONIDE AND FORMOTEROL FUMARATE DIHYDRATE 2 PUFF(S): 160; 4.5 AEROSOL RESPIRATORY (INHALATION) at 20:15

## 2017-10-18 RX ADMIN — METFORMIN HYDROCHLORIDE 1000 MILLIGRAM(S): 850 TABLET ORAL at 16:59

## 2017-10-18 RX ADMIN — LAMOTRIGINE 25 MILLIGRAM(S): 25 TABLET, ORALLY DISINTEGRATING ORAL at 08:14

## 2017-10-18 RX ADMIN — Medication 4: at 16:59

## 2017-10-18 RX ADMIN — GABAPENTIN 100 MILLIGRAM(S): 400 CAPSULE ORAL at 08:14

## 2017-10-18 RX ADMIN — Medication 5 UNIT(S): at 17:00

## 2017-10-18 RX ADMIN — Medication 20 MILLIGRAM(S): at 08:13

## 2017-10-18 RX ADMIN — Medication 5 UNIT(S): at 08:12

## 2017-10-18 RX ADMIN — Medication 5 UNIT(S): at 12:20

## 2017-10-18 RX ADMIN — INSULIN GLARGINE 15 UNIT(S): 100 INJECTION, SOLUTION SUBCUTANEOUS at 20:15

## 2017-10-18 RX ADMIN — BUDESONIDE AND FORMOTEROL FUMARATE DIHYDRATE 2 PUFF(S): 160; 4.5 AEROSOL RESPIRATORY (INHALATION) at 08:13

## 2017-10-18 RX ADMIN — FLUPHENAZINE HYDROCHLORIDE 10 MILLIGRAM(S): 1 TABLET, FILM COATED ORAL at 20:15

## 2017-10-18 RX ADMIN — ATORVASTATIN CALCIUM 40 MILLIGRAM(S): 80 TABLET, FILM COATED ORAL at 20:15

## 2017-10-18 RX ADMIN — Medication 81 MILLIGRAM(S): at 08:13

## 2017-10-18 RX ADMIN — Medication 1 APPLICATION(S): at 08:13

## 2017-10-18 RX ADMIN — VALSARTAN 320 MILLIGRAM(S): 80 TABLET ORAL at 08:13

## 2017-10-18 RX ADMIN — FLUPHENAZINE HYDROCHLORIDE 5 MILLIGRAM(S): 1 TABLET, FILM COATED ORAL at 08:13

## 2017-10-18 RX ADMIN — Medication 4: at 08:12

## 2017-10-18 RX ADMIN — METFORMIN HYDROCHLORIDE 1000 MILLIGRAM(S): 850 TABLET ORAL at 08:13

## 2017-10-18 RX ADMIN — Medication 2: at 12:19

## 2017-10-19 LAB
GLUCOSE BLDC GLUCOMTR-MCNC: 115 MG/DL — HIGH (ref 70–99)
GLUCOSE BLDC GLUCOMTR-MCNC: 165 MG/DL — HIGH (ref 70–99)
GLUCOSE BLDC GLUCOMTR-MCNC: 173 MG/DL — HIGH (ref 70–99)
GLUCOSE BLDC GLUCOMTR-MCNC: 185 MG/DL — HIGH (ref 70–99)

## 2017-10-19 PROCEDURE — 99231 SBSQ HOSP IP/OBS SF/LOW 25: CPT

## 2017-10-19 RX ADMIN — Medication 81 MILLIGRAM(S): at 08:23

## 2017-10-19 RX ADMIN — FLUPHENAZINE HYDROCHLORIDE 5 MILLIGRAM(S): 1 TABLET, FILM COATED ORAL at 08:24

## 2017-10-19 RX ADMIN — VALSARTAN 320 MILLIGRAM(S): 80 TABLET ORAL at 08:23

## 2017-10-19 RX ADMIN — Medication 5 UNIT(S): at 12:37

## 2017-10-19 RX ADMIN — FLUPHENAZINE HYDROCHLORIDE 10 MILLIGRAM(S): 1 TABLET, FILM COATED ORAL at 20:27

## 2017-10-19 RX ADMIN — Medication 20 MILLIGRAM(S): at 08:24

## 2017-10-19 RX ADMIN — BUDESONIDE AND FORMOTEROL FUMARATE DIHYDRATE 2 PUFF(S): 160; 4.5 AEROSOL RESPIRATORY (INHALATION) at 08:27

## 2017-10-19 RX ADMIN — LAMOTRIGINE 25 MILLIGRAM(S): 25 TABLET, ORALLY DISINTEGRATING ORAL at 08:24

## 2017-10-19 RX ADMIN — METFORMIN HYDROCHLORIDE 1000 MILLIGRAM(S): 850 TABLET ORAL at 17:08

## 2017-10-19 RX ADMIN — BUDESONIDE AND FORMOTEROL FUMARATE DIHYDRATE 2 PUFF(S): 160; 4.5 AEROSOL RESPIRATORY (INHALATION) at 20:27

## 2017-10-19 RX ADMIN — Medication 2: at 08:23

## 2017-10-19 RX ADMIN — Medication 5 UNIT(S): at 17:08

## 2017-10-19 RX ADMIN — GABAPENTIN 100 MILLIGRAM(S): 400 CAPSULE ORAL at 08:24

## 2017-10-19 RX ADMIN — Medication 1 APPLICATION(S): at 08:26

## 2017-10-19 RX ADMIN — Medication 5 UNIT(S): at 08:23

## 2017-10-19 RX ADMIN — Medication 2: at 17:08

## 2017-10-19 RX ADMIN — GABAPENTIN 100 MILLIGRAM(S): 400 CAPSULE ORAL at 20:27

## 2017-10-19 RX ADMIN — ATORVASTATIN CALCIUM 40 MILLIGRAM(S): 80 TABLET, FILM COATED ORAL at 20:27

## 2017-10-19 RX ADMIN — INSULIN GLARGINE 15 UNIT(S): 100 INJECTION, SOLUTION SUBCUTANEOUS at 20:27

## 2017-10-19 RX ADMIN — METFORMIN HYDROCHLORIDE 1000 MILLIGRAM(S): 850 TABLET ORAL at 08:24

## 2017-10-19 RX ADMIN — Medication: at 12:36

## 2017-10-20 LAB
GLUCOSE BLDC GLUCOMTR-MCNC: 115 MG/DL — HIGH (ref 70–99)
GLUCOSE BLDC GLUCOMTR-MCNC: 204 MG/DL — HIGH (ref 70–99)
GLUCOSE BLDC GLUCOMTR-MCNC: 213 MG/DL — HIGH (ref 70–99)
GLUCOSE BLDC GLUCOMTR-MCNC: 235 MG/DL — HIGH (ref 70–99)

## 2017-10-20 PROCEDURE — 99231 SBSQ HOSP IP/OBS SF/LOW 25: CPT

## 2017-10-20 RX ADMIN — GABAPENTIN 100 MILLIGRAM(S): 400 CAPSULE ORAL at 08:55

## 2017-10-20 RX ADMIN — Medication 4: at 08:16

## 2017-10-20 RX ADMIN — Medication 5 UNIT(S): at 12:13

## 2017-10-20 RX ADMIN — Medication 4: at 12:12

## 2017-10-20 RX ADMIN — Medication 5 UNIT(S): at 08:17

## 2017-10-20 RX ADMIN — FLUPHENAZINE HYDROCHLORIDE 10 MILLIGRAM(S): 1 TABLET, FILM COATED ORAL at 20:29

## 2017-10-20 RX ADMIN — FLUPHENAZINE HYDROCHLORIDE 5 MILLIGRAM(S): 1 TABLET, FILM COATED ORAL at 08:55

## 2017-10-20 RX ADMIN — Medication 20 MILLIGRAM(S): at 08:55

## 2017-10-20 RX ADMIN — Medication 81 MILLIGRAM(S): at 08:55

## 2017-10-20 RX ADMIN — GABAPENTIN 100 MILLIGRAM(S): 400 CAPSULE ORAL at 20:29

## 2017-10-20 RX ADMIN — VALSARTAN 320 MILLIGRAM(S): 80 TABLET ORAL at 08:55

## 2017-10-20 RX ADMIN — ATORVASTATIN CALCIUM 40 MILLIGRAM(S): 80 TABLET, FILM COATED ORAL at 20:29

## 2017-10-20 RX ADMIN — METFORMIN HYDROCHLORIDE 1000 MILLIGRAM(S): 850 TABLET ORAL at 08:16

## 2017-10-20 RX ADMIN — INSULIN GLARGINE 15 UNIT(S): 100 INJECTION, SOLUTION SUBCUTANEOUS at 20:29

## 2017-10-20 RX ADMIN — BUDESONIDE AND FORMOTEROL FUMARATE DIHYDRATE 2 PUFF(S): 160; 4.5 AEROSOL RESPIRATORY (INHALATION) at 20:30

## 2017-10-20 RX ADMIN — LAMOTRIGINE 25 MILLIGRAM(S): 25 TABLET, ORALLY DISINTEGRATING ORAL at 08:55

## 2017-10-20 RX ADMIN — METFORMIN HYDROCHLORIDE 1000 MILLIGRAM(S): 850 TABLET ORAL at 16:57

## 2017-10-20 RX ADMIN — Medication 5 UNIT(S): at 16:57

## 2017-10-21 LAB
GLUCOSE BLDC GLUCOMTR-MCNC: 197 MG/DL — HIGH (ref 70–99)
GLUCOSE BLDC GLUCOMTR-MCNC: 206 MG/DL — HIGH (ref 70–99)
GLUCOSE BLDC GLUCOMTR-MCNC: 92 MG/DL — SIGNIFICANT CHANGE UP (ref 70–99)
GLUCOSE BLDC GLUCOMTR-MCNC: 95 MG/DL — SIGNIFICANT CHANGE UP (ref 70–99)

## 2017-10-21 RX ADMIN — BUDESONIDE AND FORMOTEROL FUMARATE DIHYDRATE 2 PUFF(S): 160; 4.5 AEROSOL RESPIRATORY (INHALATION) at 20:13

## 2017-10-21 RX ADMIN — GABAPENTIN 100 MILLIGRAM(S): 400 CAPSULE ORAL at 20:13

## 2017-10-21 RX ADMIN — Medication 5 UNIT(S): at 16:59

## 2017-10-21 RX ADMIN — FLUPHENAZINE HYDROCHLORIDE 5 MILLIGRAM(S): 1 TABLET, FILM COATED ORAL at 08:26

## 2017-10-21 RX ADMIN — Medication 81 MILLIGRAM(S): at 08:26

## 2017-10-21 RX ADMIN — Medication 5 UNIT(S): at 12:01

## 2017-10-21 RX ADMIN — METFORMIN HYDROCHLORIDE 1000 MILLIGRAM(S): 850 TABLET ORAL at 08:27

## 2017-10-21 RX ADMIN — METFORMIN HYDROCHLORIDE 1000 MILLIGRAM(S): 850 TABLET ORAL at 16:59

## 2017-10-21 RX ADMIN — Medication 2: at 12:01

## 2017-10-21 RX ADMIN — Medication 5 UNIT(S): at 08:28

## 2017-10-21 RX ADMIN — Medication 20 MILLIGRAM(S): at 08:27

## 2017-10-21 RX ADMIN — FLUPHENAZINE HYDROCHLORIDE 10 MILLIGRAM(S): 1 TABLET, FILM COATED ORAL at 20:13

## 2017-10-21 RX ADMIN — LAMOTRIGINE 25 MILLIGRAM(S): 25 TABLET, ORALLY DISINTEGRATING ORAL at 08:26

## 2017-10-21 RX ADMIN — VALSARTAN 320 MILLIGRAM(S): 80 TABLET ORAL at 08:26

## 2017-10-21 RX ADMIN — GABAPENTIN 100 MILLIGRAM(S): 400 CAPSULE ORAL at 08:26

## 2017-10-21 RX ADMIN — INSULIN GLARGINE 15 UNIT(S): 100 INJECTION, SOLUTION SUBCUTANEOUS at 20:13

## 2017-10-21 RX ADMIN — Medication 4: at 08:28

## 2017-10-21 RX ADMIN — ATORVASTATIN CALCIUM 40 MILLIGRAM(S): 80 TABLET, FILM COATED ORAL at 20:13

## 2017-10-22 LAB
GLUCOSE BLDC GLUCOMTR-MCNC: 107 MG/DL — HIGH (ref 70–99)
GLUCOSE BLDC GLUCOMTR-MCNC: 124 MG/DL — HIGH (ref 70–99)
GLUCOSE BLDC GLUCOMTR-MCNC: 158 MG/DL — HIGH (ref 70–99)
GLUCOSE BLDC GLUCOMTR-MCNC: 196 MG/DL — HIGH (ref 70–99)

## 2017-10-22 RX ADMIN — BUDESONIDE AND FORMOTEROL FUMARATE DIHYDRATE 2 PUFF(S): 160; 4.5 AEROSOL RESPIRATORY (INHALATION) at 20:43

## 2017-10-22 RX ADMIN — LAMOTRIGINE 25 MILLIGRAM(S): 25 TABLET, ORALLY DISINTEGRATING ORAL at 08:34

## 2017-10-22 RX ADMIN — Medication 650 MILLIGRAM(S): at 19:28

## 2017-10-22 RX ADMIN — VALSARTAN 320 MILLIGRAM(S): 80 TABLET ORAL at 08:33

## 2017-10-22 RX ADMIN — ATORVASTATIN CALCIUM 40 MILLIGRAM(S): 80 TABLET, FILM COATED ORAL at 20:44

## 2017-10-22 RX ADMIN — Medication 5 UNIT(S): at 12:14

## 2017-10-22 RX ADMIN — Medication 5 UNIT(S): at 08:35

## 2017-10-22 RX ADMIN — FLUPHENAZINE HYDROCHLORIDE 5 MILLIGRAM(S): 1 TABLET, FILM COATED ORAL at 08:33

## 2017-10-22 RX ADMIN — GABAPENTIN 100 MILLIGRAM(S): 400 CAPSULE ORAL at 20:44

## 2017-10-22 RX ADMIN — Medication 5 UNIT(S): at 16:00

## 2017-10-22 RX ADMIN — METFORMIN HYDROCHLORIDE 1000 MILLIGRAM(S): 850 TABLET ORAL at 17:13

## 2017-10-22 RX ADMIN — METFORMIN HYDROCHLORIDE 1000 MILLIGRAM(S): 850 TABLET ORAL at 08:32

## 2017-10-22 RX ADMIN — BUDESONIDE AND FORMOTEROL FUMARATE DIHYDRATE 2 PUFF(S): 160; 4.5 AEROSOL RESPIRATORY (INHALATION) at 08:35

## 2017-10-22 RX ADMIN — Medication 81 MILLIGRAM(S): at 12:14

## 2017-10-22 RX ADMIN — Medication 20 MILLIGRAM(S): at 08:33

## 2017-10-22 RX ADMIN — INSULIN GLARGINE 15 UNIT(S): 100 INJECTION, SOLUTION SUBCUTANEOUS at 20:44

## 2017-10-22 RX ADMIN — FLUPHENAZINE HYDROCHLORIDE 10 MILLIGRAM(S): 1 TABLET, FILM COATED ORAL at 20:43

## 2017-10-22 RX ADMIN — Medication 2: at 17:14

## 2017-10-22 RX ADMIN — GABAPENTIN 100 MILLIGRAM(S): 400 CAPSULE ORAL at 08:33

## 2017-10-23 LAB
GLUCOSE BLDC GLUCOMTR-MCNC: 116 MG/DL — HIGH (ref 70–99)
GLUCOSE BLDC GLUCOMTR-MCNC: 174 MG/DL — HIGH (ref 70–99)
GLUCOSE BLDC GLUCOMTR-MCNC: 197 MG/DL — HIGH (ref 70–99)
GLUCOSE BLDC GLUCOMTR-MCNC: 200 MG/DL — HIGH (ref 70–99)

## 2017-10-23 PROCEDURE — 99231 SBSQ HOSP IP/OBS SF/LOW 25: CPT

## 2017-10-23 RX ORDER — LAMOTRIGINE 25 MG/1
50 TABLET, ORALLY DISINTEGRATING ORAL DAILY
Qty: 0 | Refills: 0 | Status: DISCONTINUED | OUTPATIENT
Start: 2017-10-23 | End: 2017-11-07

## 2017-10-23 RX ADMIN — FLUPHENAZINE HYDROCHLORIDE 5 MILLIGRAM(S): 1 TABLET, FILM COATED ORAL at 08:31

## 2017-10-23 RX ADMIN — METFORMIN HYDROCHLORIDE 1000 MILLIGRAM(S): 850 TABLET ORAL at 17:14

## 2017-10-23 RX ADMIN — Medication 5 UNIT(S): at 12:28

## 2017-10-23 RX ADMIN — FLUPHENAZINE HYDROCHLORIDE 10 MILLIGRAM(S): 1 TABLET, FILM COATED ORAL at 20:27

## 2017-10-23 RX ADMIN — Medication 5 UNIT(S): at 17:13

## 2017-10-23 RX ADMIN — INSULIN GLARGINE 15 UNIT(S): 100 INJECTION, SOLUTION SUBCUTANEOUS at 20:27

## 2017-10-23 RX ADMIN — BUDESONIDE AND FORMOTEROL FUMARATE DIHYDRATE 2 PUFF(S): 160; 4.5 AEROSOL RESPIRATORY (INHALATION) at 20:27

## 2017-10-23 RX ADMIN — Medication 81 MILLIGRAM(S): at 08:30

## 2017-10-23 RX ADMIN — GABAPENTIN 100 MILLIGRAM(S): 400 CAPSULE ORAL at 08:31

## 2017-10-23 RX ADMIN — LAMOTRIGINE 25 MILLIGRAM(S): 25 TABLET, ORALLY DISINTEGRATING ORAL at 08:31

## 2017-10-23 RX ADMIN — VALSARTAN 320 MILLIGRAM(S): 80 TABLET ORAL at 08:30

## 2017-10-23 RX ADMIN — ATORVASTATIN CALCIUM 40 MILLIGRAM(S): 80 TABLET, FILM COATED ORAL at 20:27

## 2017-10-23 RX ADMIN — GABAPENTIN 100 MILLIGRAM(S): 400 CAPSULE ORAL at 20:27

## 2017-10-23 RX ADMIN — Medication 2: at 12:28

## 2017-10-23 RX ADMIN — Medication 5 UNIT(S): at 08:30

## 2017-10-23 RX ADMIN — Medication 20 MILLIGRAM(S): at 08:31

## 2017-10-23 RX ADMIN — METFORMIN HYDROCHLORIDE 1000 MILLIGRAM(S): 850 TABLET ORAL at 08:31

## 2017-10-23 RX ADMIN — Medication 2: at 08:30

## 2017-10-23 RX ADMIN — Medication 2: at 17:13

## 2017-10-23 RX ADMIN — BUDESONIDE AND FORMOTEROL FUMARATE DIHYDRATE 2 PUFF(S): 160; 4.5 AEROSOL RESPIRATORY (INHALATION) at 08:31

## 2017-10-24 LAB
GLUCOSE BLDC GLUCOMTR-MCNC: 150 MG/DL — HIGH (ref 70–99)
GLUCOSE BLDC GLUCOMTR-MCNC: 167 MG/DL — HIGH (ref 70–99)
GLUCOSE BLDC GLUCOMTR-MCNC: 190 MG/DL — HIGH (ref 70–99)
GLUCOSE BLDC GLUCOMTR-MCNC: 77 MG/DL — SIGNIFICANT CHANGE UP (ref 70–99)

## 2017-10-24 PROCEDURE — 99231 SBSQ HOSP IP/OBS SF/LOW 25: CPT

## 2017-10-24 RX ADMIN — Medication 5 UNIT(S): at 17:19

## 2017-10-24 RX ADMIN — GABAPENTIN 100 MILLIGRAM(S): 400 CAPSULE ORAL at 20:16

## 2017-10-24 RX ADMIN — Medication 20 MILLIGRAM(S): at 08:21

## 2017-10-24 RX ADMIN — METFORMIN HYDROCHLORIDE 1000 MILLIGRAM(S): 850 TABLET ORAL at 17:19

## 2017-10-24 RX ADMIN — FLUPHENAZINE HYDROCHLORIDE 10 MILLIGRAM(S): 1 TABLET, FILM COATED ORAL at 20:16

## 2017-10-24 RX ADMIN — LAMOTRIGINE 50 MILLIGRAM(S): 25 TABLET, ORALLY DISINTEGRATING ORAL at 08:22

## 2017-10-24 RX ADMIN — Medication 5 UNIT(S): at 08:21

## 2017-10-24 RX ADMIN — METFORMIN HYDROCHLORIDE 1000 MILLIGRAM(S): 850 TABLET ORAL at 08:21

## 2017-10-24 RX ADMIN — BUDESONIDE AND FORMOTEROL FUMARATE DIHYDRATE 2 PUFF(S): 160; 4.5 AEROSOL RESPIRATORY (INHALATION) at 20:16

## 2017-10-24 RX ADMIN — FLUPHENAZINE HYDROCHLORIDE 5 MILLIGRAM(S): 1 TABLET, FILM COATED ORAL at 08:22

## 2017-10-24 RX ADMIN — INSULIN GLARGINE 15 UNIT(S): 100 INJECTION, SOLUTION SUBCUTANEOUS at 20:17

## 2017-10-24 RX ADMIN — Medication 81 MILLIGRAM(S): at 08:22

## 2017-10-24 RX ADMIN — GABAPENTIN 100 MILLIGRAM(S): 400 CAPSULE ORAL at 08:21

## 2017-10-24 RX ADMIN — Medication 2: at 17:19

## 2017-10-24 RX ADMIN — ATORVASTATIN CALCIUM 40 MILLIGRAM(S): 80 TABLET, FILM COATED ORAL at 20:16

## 2017-10-24 RX ADMIN — Medication 2: at 12:17

## 2017-10-24 RX ADMIN — BUDESONIDE AND FORMOTEROL FUMARATE DIHYDRATE 2 PUFF(S): 160; 4.5 AEROSOL RESPIRATORY (INHALATION) at 08:21

## 2017-10-24 RX ADMIN — Medication 5 UNIT(S): at 12:18

## 2017-10-25 LAB
GLUCOSE BLDC GLUCOMTR-MCNC: 106 MG/DL — HIGH (ref 70–99)
GLUCOSE BLDC GLUCOMTR-MCNC: 171 MG/DL — HIGH (ref 70–99)
GLUCOSE BLDC GLUCOMTR-MCNC: 177 MG/DL — HIGH (ref 70–99)
GLUCOSE BLDC GLUCOMTR-MCNC: 240 MG/DL — HIGH (ref 70–99)

## 2017-10-25 PROCEDURE — 99231 SBSQ HOSP IP/OBS SF/LOW 25: CPT

## 2017-10-25 RX ORDER — FLUOXETINE HCL 10 MG
40 CAPSULE ORAL DAILY
Qty: 0 | Refills: 0 | Status: DISCONTINUED | OUTPATIENT
Start: 2017-10-25 | End: 2017-11-07

## 2017-10-25 RX ADMIN — Medication 81 MILLIGRAM(S): at 08:19

## 2017-10-25 RX ADMIN — INSULIN GLARGINE 15 UNIT(S): 100 INJECTION, SOLUTION SUBCUTANEOUS at 20:18

## 2017-10-25 RX ADMIN — ATORVASTATIN CALCIUM 40 MILLIGRAM(S): 80 TABLET, FILM COATED ORAL at 20:20

## 2017-10-25 RX ADMIN — Medication 20 MILLIGRAM(S): at 08:20

## 2017-10-25 RX ADMIN — Medication 1 SPRAY(S): at 20:22

## 2017-10-25 RX ADMIN — GABAPENTIN 100 MILLIGRAM(S): 400 CAPSULE ORAL at 08:19

## 2017-10-25 RX ADMIN — Medication 5 UNIT(S): at 12:57

## 2017-10-25 RX ADMIN — Medication 5 UNIT(S): at 08:16

## 2017-10-25 RX ADMIN — BUDESONIDE AND FORMOTEROL FUMARATE DIHYDRATE 2 PUFF(S): 160; 4.5 AEROSOL RESPIRATORY (INHALATION) at 08:19

## 2017-10-25 RX ADMIN — Medication 2: at 08:16

## 2017-10-25 RX ADMIN — LAMOTRIGINE 50 MILLIGRAM(S): 25 TABLET, ORALLY DISINTEGRATING ORAL at 08:19

## 2017-10-25 RX ADMIN — Medication 1 SPRAY(S): at 08:19

## 2017-10-25 RX ADMIN — BUDESONIDE AND FORMOTEROL FUMARATE DIHYDRATE 2 PUFF(S): 160; 4.5 AEROSOL RESPIRATORY (INHALATION) at 20:17

## 2017-10-25 RX ADMIN — Medication 650 MILLIGRAM(S): at 13:07

## 2017-10-25 RX ADMIN — FLUPHENAZINE HYDROCHLORIDE 5 MILLIGRAM(S): 1 TABLET, FILM COATED ORAL at 08:19

## 2017-10-25 RX ADMIN — FLUPHENAZINE HYDROCHLORIDE 10 MILLIGRAM(S): 1 TABLET, FILM COATED ORAL at 20:20

## 2017-10-25 RX ADMIN — METFORMIN HYDROCHLORIDE 1000 MILLIGRAM(S): 850 TABLET ORAL at 17:25

## 2017-10-25 RX ADMIN — Medication 5 UNIT(S): at 17:20

## 2017-10-25 RX ADMIN — GABAPENTIN 100 MILLIGRAM(S): 400 CAPSULE ORAL at 20:21

## 2017-10-25 RX ADMIN — METFORMIN HYDROCHLORIDE 1000 MILLIGRAM(S): 850 TABLET ORAL at 08:19

## 2017-10-25 RX ADMIN — Medication 4: at 17:19

## 2017-10-26 LAB
GLUCOSE BLDC GLUCOMTR-MCNC: 104 MG/DL — HIGH (ref 70–99)
GLUCOSE BLDC GLUCOMTR-MCNC: 147 MG/DL — HIGH (ref 70–99)
GLUCOSE BLDC GLUCOMTR-MCNC: 166 MG/DL — HIGH (ref 70–99)
GLUCOSE BLDC GLUCOMTR-MCNC: 82 MG/DL — SIGNIFICANT CHANGE UP (ref 70–99)

## 2017-10-26 PROCEDURE — 99231 SBSQ HOSP IP/OBS SF/LOW 25: CPT

## 2017-10-26 RX ADMIN — FLUPHENAZINE HYDROCHLORIDE 5 MILLIGRAM(S): 1 TABLET, FILM COATED ORAL at 09:07

## 2017-10-26 RX ADMIN — GABAPENTIN 100 MILLIGRAM(S): 400 CAPSULE ORAL at 20:36

## 2017-10-26 RX ADMIN — Medication 1 SPRAY(S): at 20:35

## 2017-10-26 RX ADMIN — METFORMIN HYDROCHLORIDE 1000 MILLIGRAM(S): 850 TABLET ORAL at 16:58

## 2017-10-26 RX ADMIN — INSULIN GLARGINE 15 UNIT(S): 100 INJECTION, SOLUTION SUBCUTANEOUS at 20:57

## 2017-10-26 RX ADMIN — LAMOTRIGINE 50 MILLIGRAM(S): 25 TABLET, ORALLY DISINTEGRATING ORAL at 09:08

## 2017-10-26 RX ADMIN — Medication 5 UNIT(S): at 12:59

## 2017-10-26 RX ADMIN — FLUPHENAZINE HYDROCHLORIDE 10 MILLIGRAM(S): 1 TABLET, FILM COATED ORAL at 20:36

## 2017-10-26 RX ADMIN — Medication 81 MILLIGRAM(S): at 09:07

## 2017-10-26 RX ADMIN — Medication 5 UNIT(S): at 08:17

## 2017-10-26 RX ADMIN — GABAPENTIN 100 MILLIGRAM(S): 400 CAPSULE ORAL at 09:09

## 2017-10-26 RX ADMIN — Medication 40 MILLIGRAM(S): at 09:08

## 2017-10-26 RX ADMIN — Medication 2: at 08:17

## 2017-10-26 RX ADMIN — Medication 5 UNIT(S): at 16:59

## 2017-10-26 RX ADMIN — METFORMIN HYDROCHLORIDE 1000 MILLIGRAM(S): 850 TABLET ORAL at 08:16

## 2017-10-26 RX ADMIN — ATORVASTATIN CALCIUM 40 MILLIGRAM(S): 80 TABLET, FILM COATED ORAL at 20:36

## 2017-10-26 RX ADMIN — BUDESONIDE AND FORMOTEROL FUMARATE DIHYDRATE 2 PUFF(S): 160; 4.5 AEROSOL RESPIRATORY (INHALATION) at 20:35

## 2017-10-26 RX ADMIN — VALSARTAN 320 MILLIGRAM(S): 80 TABLET ORAL at 09:07

## 2017-10-27 LAB
GLUCOSE BLDC GLUCOMTR-MCNC: 137 MG/DL — HIGH (ref 70–99)
GLUCOSE BLDC GLUCOMTR-MCNC: 165 MG/DL — HIGH (ref 70–99)
GLUCOSE BLDC GLUCOMTR-MCNC: 182 MG/DL — HIGH (ref 70–99)
GLUCOSE BLDC GLUCOMTR-MCNC: 216 MG/DL — HIGH (ref 70–99)
GLUCOSE BLDC GLUCOMTR-MCNC: 90 MG/DL — SIGNIFICANT CHANGE UP (ref 70–99)

## 2017-10-27 PROCEDURE — 99231 SBSQ HOSP IP/OBS SF/LOW 25: CPT

## 2017-10-27 RX ADMIN — METFORMIN HYDROCHLORIDE 1000 MILLIGRAM(S): 850 TABLET ORAL at 17:11

## 2017-10-27 RX ADMIN — GABAPENTIN 100 MILLIGRAM(S): 400 CAPSULE ORAL at 20:21

## 2017-10-27 RX ADMIN — INSULIN GLARGINE 15 UNIT(S): 100 INJECTION, SOLUTION SUBCUTANEOUS at 20:21

## 2017-10-27 RX ADMIN — BUDESONIDE AND FORMOTEROL FUMARATE DIHYDRATE 2 PUFF(S): 160; 4.5 AEROSOL RESPIRATORY (INHALATION) at 20:20

## 2017-10-27 RX ADMIN — BUDESONIDE AND FORMOTEROL FUMARATE DIHYDRATE 2 PUFF(S): 160; 4.5 AEROSOL RESPIRATORY (INHALATION) at 09:47

## 2017-10-27 RX ADMIN — Medication 5 UNIT(S): at 17:12

## 2017-10-27 RX ADMIN — LAMOTRIGINE 50 MILLIGRAM(S): 25 TABLET, ORALLY DISINTEGRATING ORAL at 09:40

## 2017-10-27 RX ADMIN — Medication 5 UNIT(S): at 08:21

## 2017-10-27 RX ADMIN — FLUPHENAZINE HYDROCHLORIDE 10 MILLIGRAM(S): 1 TABLET, FILM COATED ORAL at 20:20

## 2017-10-27 RX ADMIN — Medication 81 MILLIGRAM(S): at 09:41

## 2017-10-27 RX ADMIN — Medication 5 UNIT(S): at 13:01

## 2017-10-27 RX ADMIN — FLUPHENAZINE HYDROCHLORIDE 5 MILLIGRAM(S): 1 TABLET, FILM COATED ORAL at 09:41

## 2017-10-27 RX ADMIN — GABAPENTIN 100 MILLIGRAM(S): 400 CAPSULE ORAL at 09:41

## 2017-10-27 RX ADMIN — Medication 2: at 08:20

## 2017-10-27 RX ADMIN — ATORVASTATIN CALCIUM 40 MILLIGRAM(S): 80 TABLET, FILM COATED ORAL at 20:20

## 2017-10-27 RX ADMIN — METFORMIN HYDROCHLORIDE 1000 MILLIGRAM(S): 850 TABLET ORAL at 08:33

## 2017-10-27 RX ADMIN — Medication 4: at 17:12

## 2017-10-27 RX ADMIN — Medication 40 MILLIGRAM(S): at 09:41

## 2017-10-28 LAB
GLUCOSE BLDC GLUCOMTR-MCNC: 151 MG/DL — HIGH (ref 70–99)
GLUCOSE BLDC GLUCOMTR-MCNC: 157 MG/DL — HIGH (ref 70–99)
GLUCOSE BLDC GLUCOMTR-MCNC: 176 MG/DL — HIGH (ref 70–99)
GLUCOSE BLDC GLUCOMTR-MCNC: 203 MG/DL — HIGH (ref 70–99)

## 2017-10-28 RX ADMIN — BUDESONIDE AND FORMOTEROL FUMARATE DIHYDRATE 2 PUFF(S): 160; 4.5 AEROSOL RESPIRATORY (INHALATION) at 09:15

## 2017-10-28 RX ADMIN — METFORMIN HYDROCHLORIDE 1000 MILLIGRAM(S): 850 TABLET ORAL at 09:13

## 2017-10-28 RX ADMIN — FLUPHENAZINE HYDROCHLORIDE 10 MILLIGRAM(S): 1 TABLET, FILM COATED ORAL at 20:17

## 2017-10-28 RX ADMIN — Medication 5 UNIT(S): at 16:55

## 2017-10-28 RX ADMIN — Medication 5 UNIT(S): at 08:20

## 2017-10-28 RX ADMIN — Medication 4: at 16:54

## 2017-10-28 RX ADMIN — VALSARTAN 320 MILLIGRAM(S): 80 TABLET ORAL at 09:13

## 2017-10-28 RX ADMIN — Medication 2: at 08:19

## 2017-10-28 RX ADMIN — Medication 5 UNIT(S): at 12:07

## 2017-10-28 RX ADMIN — Medication 40 MILLIGRAM(S): at 09:13

## 2017-10-28 RX ADMIN — ATORVASTATIN CALCIUM 40 MILLIGRAM(S): 80 TABLET, FILM COATED ORAL at 20:17

## 2017-10-28 RX ADMIN — Medication 81 MILLIGRAM(S): at 09:13

## 2017-10-28 RX ADMIN — METFORMIN HYDROCHLORIDE 1000 MILLIGRAM(S): 850 TABLET ORAL at 16:55

## 2017-10-28 RX ADMIN — GABAPENTIN 100 MILLIGRAM(S): 400 CAPSULE ORAL at 20:17

## 2017-10-28 RX ADMIN — LAMOTRIGINE 50 MILLIGRAM(S): 25 TABLET, ORALLY DISINTEGRATING ORAL at 09:12

## 2017-10-28 RX ADMIN — GABAPENTIN 100 MILLIGRAM(S): 400 CAPSULE ORAL at 09:13

## 2017-10-28 RX ADMIN — BUDESONIDE AND FORMOTEROL FUMARATE DIHYDRATE 2 PUFF(S): 160; 4.5 AEROSOL RESPIRATORY (INHALATION) at 20:20

## 2017-10-28 RX ADMIN — FLUPHENAZINE HYDROCHLORIDE 5 MILLIGRAM(S): 1 TABLET, FILM COATED ORAL at 09:13

## 2017-10-28 RX ADMIN — INSULIN GLARGINE 15 UNIT(S): 100 INJECTION, SOLUTION SUBCUTANEOUS at 20:17

## 2017-10-29 LAB
GLUCOSE BLDC GLUCOMTR-MCNC: 140 MG/DL — HIGH (ref 70–99)
GLUCOSE BLDC GLUCOMTR-MCNC: 154 MG/DL — HIGH (ref 70–99)
GLUCOSE BLDC GLUCOMTR-MCNC: 176 MG/DL — HIGH (ref 70–99)
GLUCOSE BLDC GLUCOMTR-MCNC: 97 MG/DL — SIGNIFICANT CHANGE UP (ref 70–99)

## 2017-10-29 RX ADMIN — Medication 6: at 12:14

## 2017-10-29 RX ADMIN — Medication 5 UNIT(S): at 07:11

## 2017-10-29 RX ADMIN — GABAPENTIN 100 MILLIGRAM(S): 400 CAPSULE ORAL at 08:24

## 2017-10-29 RX ADMIN — VALSARTAN 320 MILLIGRAM(S): 80 TABLET ORAL at 08:24

## 2017-10-29 RX ADMIN — Medication 40 MILLIGRAM(S): at 08:24

## 2017-10-29 RX ADMIN — Medication 650 MILLIGRAM(S): at 17:23

## 2017-10-29 RX ADMIN — INSULIN GLARGINE 15 UNIT(S): 100 INJECTION, SOLUTION SUBCUTANEOUS at 20:03

## 2017-10-29 RX ADMIN — METFORMIN HYDROCHLORIDE 1000 MILLIGRAM(S): 850 TABLET ORAL at 08:12

## 2017-10-29 RX ADMIN — Medication 5 UNIT(S): at 16:17

## 2017-10-29 RX ADMIN — Medication 5 UNIT(S): at 12:14

## 2017-10-29 RX ADMIN — GABAPENTIN 100 MILLIGRAM(S): 400 CAPSULE ORAL at 20:02

## 2017-10-29 RX ADMIN — Medication 81 MILLIGRAM(S): at 08:24

## 2017-10-29 RX ADMIN — LAMOTRIGINE 50 MILLIGRAM(S): 25 TABLET, ORALLY DISINTEGRATING ORAL at 08:24

## 2017-10-29 RX ADMIN — FLUPHENAZINE HYDROCHLORIDE 5 MILLIGRAM(S): 1 TABLET, FILM COATED ORAL at 08:24

## 2017-10-29 RX ADMIN — Medication: at 16:16

## 2017-10-29 RX ADMIN — BUDESONIDE AND FORMOTEROL FUMARATE DIHYDRATE 2 PUFF(S): 160; 4.5 AEROSOL RESPIRATORY (INHALATION) at 20:02

## 2017-10-29 RX ADMIN — FLUPHENAZINE HYDROCHLORIDE 10 MILLIGRAM(S): 1 TABLET, FILM COATED ORAL at 20:02

## 2017-10-29 RX ADMIN — ATORVASTATIN CALCIUM 40 MILLIGRAM(S): 80 TABLET, FILM COATED ORAL at 20:02

## 2017-10-29 RX ADMIN — METFORMIN HYDROCHLORIDE 1000 MILLIGRAM(S): 850 TABLET ORAL at 16:17

## 2017-10-30 LAB
GLUCOSE BLDC GLUCOMTR-MCNC: 193 MG/DL — HIGH (ref 70–99)
GLUCOSE BLDC GLUCOMTR-MCNC: 199 MG/DL — HIGH (ref 70–99)
GLUCOSE BLDC GLUCOMTR-MCNC: 207 MG/DL — HIGH (ref 70–99)
GLUCOSE BLDC GLUCOMTR-MCNC: 80 MG/DL — SIGNIFICANT CHANGE UP (ref 70–99)

## 2017-10-30 PROCEDURE — 99231 SBSQ HOSP IP/OBS SF/LOW 25: CPT

## 2017-10-30 RX ADMIN — ATORVASTATIN CALCIUM 40 MILLIGRAM(S): 80 TABLET, FILM COATED ORAL at 20:28

## 2017-10-30 RX ADMIN — FLUPHENAZINE HYDROCHLORIDE 5 MILLIGRAM(S): 1 TABLET, FILM COATED ORAL at 08:35

## 2017-10-30 RX ADMIN — Medication 5 UNIT(S): at 16:54

## 2017-10-30 RX ADMIN — METFORMIN HYDROCHLORIDE 1000 MILLIGRAM(S): 850 TABLET ORAL at 08:35

## 2017-10-30 RX ADMIN — METFORMIN HYDROCHLORIDE 1000 MILLIGRAM(S): 850 TABLET ORAL at 16:54

## 2017-10-30 RX ADMIN — GABAPENTIN 100 MILLIGRAM(S): 400 CAPSULE ORAL at 20:28

## 2017-10-30 RX ADMIN — Medication 2: at 12:30

## 2017-10-30 RX ADMIN — Medication 4: at 16:53

## 2017-10-30 RX ADMIN — Medication 2: at 08:34

## 2017-10-30 RX ADMIN — Medication 5 UNIT(S): at 12:30

## 2017-10-30 RX ADMIN — FLUPHENAZINE HYDROCHLORIDE 10 MILLIGRAM(S): 1 TABLET, FILM COATED ORAL at 20:28

## 2017-10-30 RX ADMIN — BUDESONIDE AND FORMOTEROL FUMARATE DIHYDRATE 2 PUFF(S): 160; 4.5 AEROSOL RESPIRATORY (INHALATION) at 08:33

## 2017-10-30 RX ADMIN — Medication 81 MILLIGRAM(S): at 08:35

## 2017-10-30 RX ADMIN — LAMOTRIGINE 50 MILLIGRAM(S): 25 TABLET, ORALLY DISINTEGRATING ORAL at 08:35

## 2017-10-30 RX ADMIN — GABAPENTIN 100 MILLIGRAM(S): 400 CAPSULE ORAL at 08:35

## 2017-10-30 RX ADMIN — INSULIN GLARGINE 15 UNIT(S): 100 INJECTION, SOLUTION SUBCUTANEOUS at 20:28

## 2017-10-30 RX ADMIN — Medication 40 MILLIGRAM(S): at 08:35

## 2017-10-30 RX ADMIN — Medication 5 UNIT(S): at 08:34

## 2017-10-31 LAB
GLUCOSE BLDC GLUCOMTR-MCNC: 112 MG/DL — HIGH (ref 70–99)
GLUCOSE BLDC GLUCOMTR-MCNC: 164 MG/DL — HIGH (ref 70–99)
GLUCOSE BLDC GLUCOMTR-MCNC: 178 MG/DL — HIGH (ref 70–99)
GLUCOSE BLDC GLUCOMTR-MCNC: 215 MG/DL — HIGH (ref 70–99)

## 2017-10-31 PROCEDURE — 99231 SBSQ HOSP IP/OBS SF/LOW 25: CPT

## 2017-10-31 RX ORDER — INFLUENZA VIRUS VACCINE 15; 15; 15; 15 UG/.5ML; UG/.5ML; UG/.5ML; UG/.5ML
0.5 SUSPENSION INTRAMUSCULAR ONCE
Qty: 0 | Refills: 0 | Status: COMPLETED | OUTPATIENT
Start: 2017-10-31 | End: 2017-10-31

## 2017-10-31 RX ADMIN — Medication 40 MILLIGRAM(S): at 08:25

## 2017-10-31 RX ADMIN — METFORMIN HYDROCHLORIDE 1000 MILLIGRAM(S): 850 TABLET ORAL at 17:08

## 2017-10-31 RX ADMIN — INSULIN GLARGINE 15 UNIT(S): 100 INJECTION, SOLUTION SUBCUTANEOUS at 21:12

## 2017-10-31 RX ADMIN — Medication 2: at 08:02

## 2017-10-31 RX ADMIN — BUDESONIDE AND FORMOTEROL FUMARATE DIHYDRATE 2 PUFF(S): 160; 4.5 AEROSOL RESPIRATORY (INHALATION) at 21:12

## 2017-10-31 RX ADMIN — Medication 5 UNIT(S): at 17:09

## 2017-10-31 RX ADMIN — Medication 81 MILLIGRAM(S): at 08:25

## 2017-10-31 RX ADMIN — ATORVASTATIN CALCIUM 40 MILLIGRAM(S): 80 TABLET, FILM COATED ORAL at 21:12

## 2017-10-31 RX ADMIN — Medication 4: at 17:08

## 2017-10-31 RX ADMIN — INFLUENZA VIRUS VACCINE 0.5 MILLILITER(S): 15; 15; 15; 15 SUSPENSION INTRAMUSCULAR at 11:48

## 2017-10-31 RX ADMIN — GABAPENTIN 100 MILLIGRAM(S): 400 CAPSULE ORAL at 08:25

## 2017-10-31 RX ADMIN — GABAPENTIN 100 MILLIGRAM(S): 400 CAPSULE ORAL at 21:12

## 2017-10-31 RX ADMIN — FLUPHENAZINE HYDROCHLORIDE 10 MILLIGRAM(S): 1 TABLET, FILM COATED ORAL at 21:12

## 2017-10-31 RX ADMIN — VALSARTAN 320 MILLIGRAM(S): 80 TABLET ORAL at 08:25

## 2017-10-31 RX ADMIN — FLUPHENAZINE HYDROCHLORIDE 5 MILLIGRAM(S): 1 TABLET, FILM COATED ORAL at 08:25

## 2017-10-31 RX ADMIN — BUDESONIDE AND FORMOTEROL FUMARATE DIHYDRATE 2 PUFF(S): 160; 4.5 AEROSOL RESPIRATORY (INHALATION) at 07:48

## 2017-10-31 RX ADMIN — Medication 5 UNIT(S): at 08:02

## 2017-10-31 RX ADMIN — METFORMIN HYDROCHLORIDE 1000 MILLIGRAM(S): 850 TABLET ORAL at 07:46

## 2017-10-31 RX ADMIN — Medication 5 UNIT(S): at 12:24

## 2017-10-31 RX ADMIN — LAMOTRIGINE 50 MILLIGRAM(S): 25 TABLET, ORALLY DISINTEGRATING ORAL at 08:25

## 2017-11-01 LAB
GLUCOSE BLDC GLUCOMTR-MCNC: 155 MG/DL — HIGH (ref 70–99)
GLUCOSE BLDC GLUCOMTR-MCNC: 156 MG/DL — HIGH (ref 70–99)
GLUCOSE BLDC GLUCOMTR-MCNC: 161 MG/DL — HIGH (ref 70–99)
GLUCOSE BLDC GLUCOMTR-MCNC: 97 MG/DL — SIGNIFICANT CHANGE UP (ref 70–99)

## 2017-11-01 PROCEDURE — 99231 SBSQ HOSP IP/OBS SF/LOW 25: CPT

## 2017-11-01 RX ORDER — TRAZODONE HCL 50 MG
50 TABLET ORAL AT BEDTIME
Qty: 0 | Refills: 0 | Status: DISCONTINUED | OUTPATIENT
Start: 2017-11-01 | End: 2017-11-07

## 2017-11-01 RX ADMIN — BUDESONIDE AND FORMOTEROL FUMARATE DIHYDRATE 2 PUFF(S): 160; 4.5 AEROSOL RESPIRATORY (INHALATION) at 09:08

## 2017-11-01 RX ADMIN — Medication 5 UNIT(S): at 08:28

## 2017-11-01 RX ADMIN — LAMOTRIGINE 50 MILLIGRAM(S): 25 TABLET, ORALLY DISINTEGRATING ORAL at 08:27

## 2017-11-01 RX ADMIN — Medication 81 MILLIGRAM(S): at 08:27

## 2017-11-01 RX ADMIN — BUDESONIDE AND FORMOTEROL FUMARATE DIHYDRATE 2 PUFF(S): 160; 4.5 AEROSOL RESPIRATORY (INHALATION) at 20:16

## 2017-11-01 RX ADMIN — ATORVASTATIN CALCIUM 40 MILLIGRAM(S): 80 TABLET, FILM COATED ORAL at 20:17

## 2017-11-01 RX ADMIN — METFORMIN HYDROCHLORIDE 1000 MILLIGRAM(S): 850 TABLET ORAL at 08:27

## 2017-11-01 RX ADMIN — Medication 5 UNIT(S): at 16:59

## 2017-11-01 RX ADMIN — GABAPENTIN 100 MILLIGRAM(S): 400 CAPSULE ORAL at 08:32

## 2017-11-01 RX ADMIN — GABAPENTIN 100 MILLIGRAM(S): 400 CAPSULE ORAL at 20:17

## 2017-11-01 RX ADMIN — Medication 5 UNIT(S): at 13:38

## 2017-11-01 RX ADMIN — METFORMIN HYDROCHLORIDE 1000 MILLIGRAM(S): 850 TABLET ORAL at 16:58

## 2017-11-01 RX ADMIN — Medication 2: at 08:32

## 2017-11-01 RX ADMIN — FLUPHENAZINE HYDROCHLORIDE 10 MILLIGRAM(S): 1 TABLET, FILM COATED ORAL at 20:17

## 2017-11-01 RX ADMIN — INSULIN GLARGINE 15 UNIT(S): 100 INJECTION, SOLUTION SUBCUTANEOUS at 20:16

## 2017-11-01 RX ADMIN — Medication 2: at 16:58

## 2017-11-01 RX ADMIN — FLUPHENAZINE HYDROCHLORIDE 5 MILLIGRAM(S): 1 TABLET, FILM COATED ORAL at 08:26

## 2017-11-01 RX ADMIN — Medication 40 MILLIGRAM(S): at 08:26

## 2017-11-02 LAB
GLUCOSE BLDC GLUCOMTR-MCNC: 171 MG/DL — HIGH (ref 70–99)
GLUCOSE BLDC GLUCOMTR-MCNC: 206 MG/DL — HIGH (ref 70–99)
GLUCOSE BLDC GLUCOMTR-MCNC: 65 MG/DL — LOW (ref 70–99)
GLUCOSE BLDC GLUCOMTR-MCNC: 75 MG/DL — SIGNIFICANT CHANGE UP (ref 70–99)
GLUCOSE BLDC GLUCOMTR-MCNC: 99 MG/DL — SIGNIFICANT CHANGE UP (ref 70–99)

## 2017-11-02 PROCEDURE — 99231 SBSQ HOSP IP/OBS SF/LOW 25: CPT

## 2017-11-02 RX ADMIN — Medication 40 MILLIGRAM(S): at 08:30

## 2017-11-02 RX ADMIN — GABAPENTIN 100 MILLIGRAM(S): 400 CAPSULE ORAL at 20:05

## 2017-11-02 RX ADMIN — FLUPHENAZINE HYDROCHLORIDE 5 MILLIGRAM(S): 1 TABLET, FILM COATED ORAL at 08:30

## 2017-11-02 RX ADMIN — Medication 2: at 08:30

## 2017-11-02 RX ADMIN — ATORVASTATIN CALCIUM 40 MILLIGRAM(S): 80 TABLET, FILM COATED ORAL at 20:05

## 2017-11-02 RX ADMIN — Medication 5 UNIT(S): at 08:31

## 2017-11-02 RX ADMIN — BUDESONIDE AND FORMOTEROL FUMARATE DIHYDRATE 2 PUFF(S): 160; 4.5 AEROSOL RESPIRATORY (INHALATION) at 20:03

## 2017-11-02 RX ADMIN — Medication 4: at 17:09

## 2017-11-02 RX ADMIN — METFORMIN HYDROCHLORIDE 1000 MILLIGRAM(S): 850 TABLET ORAL at 08:29

## 2017-11-02 RX ADMIN — FLUPHENAZINE HYDROCHLORIDE 10 MILLIGRAM(S): 1 TABLET, FILM COATED ORAL at 20:04

## 2017-11-02 RX ADMIN — Medication 5 UNIT(S): at 17:30

## 2017-11-02 RX ADMIN — GABAPENTIN 100 MILLIGRAM(S): 400 CAPSULE ORAL at 08:30

## 2017-11-02 RX ADMIN — Medication 81 MILLIGRAM(S): at 08:29

## 2017-11-02 RX ADMIN — Medication 1 SPRAY(S): at 12:50

## 2017-11-02 RX ADMIN — Medication 5 UNIT(S): at 12:02

## 2017-11-02 RX ADMIN — METFORMIN HYDROCHLORIDE 1000 MILLIGRAM(S): 850 TABLET ORAL at 17:30

## 2017-11-02 RX ADMIN — INSULIN GLARGINE 15 UNIT(S): 100 INJECTION, SOLUTION SUBCUTANEOUS at 20:03

## 2017-11-02 RX ADMIN — Medication 650 MILLIGRAM(S): at 08:50

## 2017-11-02 RX ADMIN — LAMOTRIGINE 50 MILLIGRAM(S): 25 TABLET, ORALLY DISINTEGRATING ORAL at 08:30

## 2017-11-03 DIAGNOSIS — F25.9 SCHIZOAFFECTIVE DISORDER, UNSPECIFIED: ICD-10-CM

## 2017-11-03 LAB
GLUCOSE BLDC GLUCOMTR-MCNC: 126 MG/DL — HIGH (ref 70–99)
GLUCOSE BLDC GLUCOMTR-MCNC: 132 MG/DL — HIGH (ref 70–99)
GLUCOSE BLDC GLUCOMTR-MCNC: 147 MG/DL — HIGH (ref 70–99)
GLUCOSE BLDC GLUCOMTR-MCNC: 161 MG/DL — HIGH (ref 70–99)

## 2017-11-03 PROCEDURE — 99233 SBSQ HOSP IP/OBS HIGH 50: CPT

## 2017-11-03 RX ADMIN — FLUPHENAZINE HYDROCHLORIDE 10 MILLIGRAM(S): 1 TABLET, FILM COATED ORAL at 20:20

## 2017-11-03 RX ADMIN — Medication 5 UNIT(S): at 17:00

## 2017-11-03 RX ADMIN — BUDESONIDE AND FORMOTEROL FUMARATE DIHYDRATE 2 PUFF(S): 160; 4.5 AEROSOL RESPIRATORY (INHALATION) at 08:28

## 2017-11-03 RX ADMIN — Medication 5 UNIT(S): at 12:27

## 2017-11-03 RX ADMIN — GABAPENTIN 100 MILLIGRAM(S): 400 CAPSULE ORAL at 20:20

## 2017-11-03 RX ADMIN — Medication 650 MILLIGRAM(S): at 19:09

## 2017-11-03 RX ADMIN — GABAPENTIN 100 MILLIGRAM(S): 400 CAPSULE ORAL at 08:25

## 2017-11-03 RX ADMIN — METFORMIN HYDROCHLORIDE 1000 MILLIGRAM(S): 850 TABLET ORAL at 17:00

## 2017-11-03 RX ADMIN — FLUPHENAZINE HYDROCHLORIDE 5 MILLIGRAM(S): 1 TABLET, FILM COATED ORAL at 08:25

## 2017-11-03 RX ADMIN — LAMOTRIGINE 50 MILLIGRAM(S): 25 TABLET, ORALLY DISINTEGRATING ORAL at 08:25

## 2017-11-03 RX ADMIN — Medication 1 SPRAY(S): at 20:19

## 2017-11-03 RX ADMIN — Medication 2: at 08:26

## 2017-11-03 RX ADMIN — Medication 650 MILLIGRAM(S): at 20:09

## 2017-11-03 RX ADMIN — METFORMIN HYDROCHLORIDE 1000 MILLIGRAM(S): 850 TABLET ORAL at 08:26

## 2017-11-03 RX ADMIN — Medication 5 UNIT(S): at 08:26

## 2017-11-03 RX ADMIN — Medication 40 MILLIGRAM(S): at 08:25

## 2017-11-03 RX ADMIN — Medication 81 MILLIGRAM(S): at 08:25

## 2017-11-03 RX ADMIN — BUDESONIDE AND FORMOTEROL FUMARATE DIHYDRATE 2 PUFF(S): 160; 4.5 AEROSOL RESPIRATORY (INHALATION) at 20:18

## 2017-11-03 RX ADMIN — INSULIN GLARGINE 15 UNIT(S): 100 INJECTION, SOLUTION SUBCUTANEOUS at 20:17

## 2017-11-03 RX ADMIN — ATORVASTATIN CALCIUM 40 MILLIGRAM(S): 80 TABLET, FILM COATED ORAL at 20:20

## 2017-11-04 LAB
GLUCOSE BLDC GLUCOMTR-MCNC: 129 MG/DL — HIGH (ref 70–99)
GLUCOSE BLDC GLUCOMTR-MCNC: 146 MG/DL — HIGH (ref 70–99)
GLUCOSE BLDC GLUCOMTR-MCNC: 169 MG/DL — HIGH (ref 70–99)
GLUCOSE BLDC GLUCOMTR-MCNC: 205 MG/DL — HIGH (ref 70–99)
GLUCOSE BLDC GLUCOMTR-MCNC: 66 MG/DL — LOW (ref 70–99)
GLUCOSE BLDC GLUCOMTR-MCNC: 67 MG/DL — LOW (ref 70–99)
GLUCOSE BLDC GLUCOMTR-MCNC: 94 MG/DL — SIGNIFICANT CHANGE UP (ref 70–99)

## 2017-11-04 RX ADMIN — Medication 5 UNIT(S): at 11:30

## 2017-11-04 RX ADMIN — Medication 81 MILLIGRAM(S): at 08:15

## 2017-11-04 RX ADMIN — Medication 4: at 17:13

## 2017-11-04 RX ADMIN — Medication 40 MILLIGRAM(S): at 08:15

## 2017-11-04 RX ADMIN — METFORMIN HYDROCHLORIDE 1000 MILLIGRAM(S): 850 TABLET ORAL at 08:15

## 2017-11-04 RX ADMIN — GABAPENTIN 100 MILLIGRAM(S): 400 CAPSULE ORAL at 21:06

## 2017-11-04 RX ADMIN — GABAPENTIN 100 MILLIGRAM(S): 400 CAPSULE ORAL at 08:15

## 2017-11-04 RX ADMIN — Medication: at 08:19

## 2017-11-04 RX ADMIN — VALSARTAN 320 MILLIGRAM(S): 80 TABLET ORAL at 08:15

## 2017-11-04 RX ADMIN — METFORMIN HYDROCHLORIDE 1000 MILLIGRAM(S): 850 TABLET ORAL at 17:15

## 2017-11-04 RX ADMIN — BUDESONIDE AND FORMOTEROL FUMARATE DIHYDRATE 2 PUFF(S): 160; 4.5 AEROSOL RESPIRATORY (INHALATION) at 21:05

## 2017-11-04 RX ADMIN — Medication 5 UNIT(S): at 17:14

## 2017-11-04 RX ADMIN — BUDESONIDE AND FORMOTEROL FUMARATE DIHYDRATE 2 PUFF(S): 160; 4.5 AEROSOL RESPIRATORY (INHALATION) at 08:14

## 2017-11-04 RX ADMIN — INSULIN GLARGINE 15 UNIT(S): 100 INJECTION, SOLUTION SUBCUTANEOUS at 21:06

## 2017-11-04 RX ADMIN — ATORVASTATIN CALCIUM 40 MILLIGRAM(S): 80 TABLET, FILM COATED ORAL at 21:06

## 2017-11-04 RX ADMIN — Medication 5 UNIT(S): at 08:16

## 2017-11-04 RX ADMIN — LAMOTRIGINE 50 MILLIGRAM(S): 25 TABLET, ORALLY DISINTEGRATING ORAL at 08:15

## 2017-11-04 RX ADMIN — FLUPHENAZINE HYDROCHLORIDE 10 MILLIGRAM(S): 1 TABLET, FILM COATED ORAL at 21:06

## 2017-11-04 RX ADMIN — FLUPHENAZINE HYDROCHLORIDE 5 MILLIGRAM(S): 1 TABLET, FILM COATED ORAL at 08:15

## 2017-11-05 LAB
GLUCOSE BLDC GLUCOMTR-MCNC: 108 MG/DL — HIGH (ref 70–99)
GLUCOSE BLDC GLUCOMTR-MCNC: 147 MG/DL — HIGH (ref 70–99)
GLUCOSE BLDC GLUCOMTR-MCNC: 172 MG/DL — HIGH (ref 70–99)
GLUCOSE BLDC GLUCOMTR-MCNC: 174 MG/DL — HIGH (ref 70–99)

## 2017-11-05 RX ADMIN — INSULIN GLARGINE 15 UNIT(S): 100 INJECTION, SOLUTION SUBCUTANEOUS at 20:31

## 2017-11-05 RX ADMIN — GABAPENTIN 100 MILLIGRAM(S): 400 CAPSULE ORAL at 08:25

## 2017-11-05 RX ADMIN — BUDESONIDE AND FORMOTEROL FUMARATE DIHYDRATE 2 PUFF(S): 160; 4.5 AEROSOL RESPIRATORY (INHALATION) at 20:31

## 2017-11-05 RX ADMIN — Medication 40 MILLIGRAM(S): at 08:25

## 2017-11-05 RX ADMIN — Medication 5 UNIT(S): at 17:03

## 2017-11-05 RX ADMIN — LAMOTRIGINE 50 MILLIGRAM(S): 25 TABLET, ORALLY DISINTEGRATING ORAL at 08:25

## 2017-11-05 RX ADMIN — FLUPHENAZINE HYDROCHLORIDE 10 MILLIGRAM(S): 1 TABLET, FILM COATED ORAL at 20:32

## 2017-11-05 RX ADMIN — Medication 5 UNIT(S): at 12:27

## 2017-11-05 RX ADMIN — Medication 81 MILLIGRAM(S): at 08:25

## 2017-11-05 RX ADMIN — METFORMIN HYDROCHLORIDE 1000 MILLIGRAM(S): 850 TABLET ORAL at 08:25

## 2017-11-05 RX ADMIN — Medication 5 UNIT(S): at 08:27

## 2017-11-05 RX ADMIN — GABAPENTIN 100 MILLIGRAM(S): 400 CAPSULE ORAL at 20:32

## 2017-11-05 RX ADMIN — Medication 2: at 08:26

## 2017-11-05 RX ADMIN — Medication 650 MILLIGRAM(S): at 09:25

## 2017-11-05 RX ADMIN — FLUPHENAZINE HYDROCHLORIDE 5 MILLIGRAM(S): 1 TABLET, FILM COATED ORAL at 08:25

## 2017-11-05 RX ADMIN — VALSARTAN 320 MILLIGRAM(S): 80 TABLET ORAL at 08:26

## 2017-11-05 RX ADMIN — Medication: at 17:05

## 2017-11-05 RX ADMIN — METFORMIN HYDROCHLORIDE 1000 MILLIGRAM(S): 850 TABLET ORAL at 17:03

## 2017-11-05 RX ADMIN — ATORVASTATIN CALCIUM 40 MILLIGRAM(S): 80 TABLET, FILM COATED ORAL at 20:32

## 2017-11-05 RX ADMIN — BUDESONIDE AND FORMOTEROL FUMARATE DIHYDRATE 2 PUFF(S): 160; 4.5 AEROSOL RESPIRATORY (INHALATION) at 08:26

## 2017-11-06 VITALS — OXYGEN SATURATION: 96 % | HEART RATE: 85 BPM

## 2017-11-06 LAB
GLUCOSE BLDC GLUCOMTR-MCNC: 106 MG/DL — HIGH (ref 70–99)
GLUCOSE BLDC GLUCOMTR-MCNC: 122 MG/DL — HIGH (ref 70–99)
GLUCOSE BLDC GLUCOMTR-MCNC: 153 MG/DL — HIGH (ref 70–99)
GLUCOSE BLDC GLUCOMTR-MCNC: 158 MG/DL — HIGH (ref 70–99)

## 2017-11-06 PROCEDURE — 99232 SBSQ HOSP IP/OBS MODERATE 35: CPT

## 2017-11-06 RX ADMIN — METFORMIN HYDROCHLORIDE 1000 MILLIGRAM(S): 850 TABLET ORAL at 16:54

## 2017-11-06 RX ADMIN — FLUPHENAZINE HYDROCHLORIDE 10 MILLIGRAM(S): 1 TABLET, FILM COATED ORAL at 20:23

## 2017-11-06 RX ADMIN — Medication 81 MILLIGRAM(S): at 08:08

## 2017-11-06 RX ADMIN — GABAPENTIN 100 MILLIGRAM(S): 400 CAPSULE ORAL at 08:08

## 2017-11-06 RX ADMIN — BUDESONIDE AND FORMOTEROL FUMARATE DIHYDRATE 2 PUFF(S): 160; 4.5 AEROSOL RESPIRATORY (INHALATION) at 20:23

## 2017-11-06 RX ADMIN — METFORMIN HYDROCHLORIDE 1000 MILLIGRAM(S): 850 TABLET ORAL at 08:08

## 2017-11-06 RX ADMIN — Medication 40 MILLIGRAM(S): at 08:08

## 2017-11-06 RX ADMIN — GABAPENTIN 100 MILLIGRAM(S): 400 CAPSULE ORAL at 20:25

## 2017-11-06 RX ADMIN — Medication 5 UNIT(S): at 12:14

## 2017-11-06 RX ADMIN — Medication 650 MILLIGRAM(S): at 21:48

## 2017-11-06 RX ADMIN — LAMOTRIGINE 50 MILLIGRAM(S): 25 TABLET, ORALLY DISINTEGRATING ORAL at 08:08

## 2017-11-06 RX ADMIN — BUDESONIDE AND FORMOTEROL FUMARATE DIHYDRATE 2 PUFF(S): 160; 4.5 AEROSOL RESPIRATORY (INHALATION) at 08:07

## 2017-11-06 RX ADMIN — Medication 650 MILLIGRAM(S): at 16:54

## 2017-11-06 RX ADMIN — INSULIN GLARGINE 15 UNIT(S): 100 INJECTION, SOLUTION SUBCUTANEOUS at 20:22

## 2017-11-06 RX ADMIN — Medication 1 SPRAY(S): at 12:52

## 2017-11-06 RX ADMIN — Medication 5 UNIT(S): at 16:54

## 2017-11-06 RX ADMIN — Medication 2: at 12:13

## 2017-11-06 RX ADMIN — FLUPHENAZINE HYDROCHLORIDE 5 MILLIGRAM(S): 1 TABLET, FILM COATED ORAL at 08:08

## 2017-11-06 RX ADMIN — Medication 2: at 08:07

## 2017-11-06 RX ADMIN — Medication 5 UNIT(S): at 08:08

## 2017-11-06 RX ADMIN — ATORVASTATIN CALCIUM 40 MILLIGRAM(S): 80 TABLET, FILM COATED ORAL at 20:23

## 2017-11-07 LAB
GLUCOSE BLDC GLUCOMTR-MCNC: 126 MG/DL — HIGH (ref 70–99)
GLUCOSE BLDC GLUCOMTR-MCNC: 155 MG/DL — HIGH (ref 70–99)
GLUCOSE BLDC GLUCOMTR-MCNC: 198 MG/DL — HIGH (ref 70–99)

## 2017-11-07 PROCEDURE — 99233 SBSQ HOSP IP/OBS HIGH 50: CPT

## 2017-11-07 RX ORDER — VALSARTAN 80 MG/1
1 TABLET ORAL
Qty: 0 | Refills: 0 | COMMUNITY
Start: 2017-11-07

## 2017-11-07 RX ORDER — ASPIRIN/CALCIUM CARB/MAGNESIUM 324 MG
1 TABLET ORAL
Qty: 0 | Refills: 0 | COMMUNITY
Start: 2017-11-07

## 2017-11-07 RX ORDER — GABAPENTIN 400 MG/1
1 CAPSULE ORAL
Qty: 0 | Refills: 0 | COMMUNITY
Start: 2017-11-07

## 2017-11-07 RX ORDER — FLUPHENAZINE HYDROCHLORIDE 1 MG/1
1 TABLET, FILM COATED ORAL
Qty: 30 | Refills: 0 | OUTPATIENT
Start: 2017-11-07 | End: 2017-12-07

## 2017-11-07 RX ORDER — BUDESONIDE AND FORMOTEROL FUMARATE DIHYDRATE 160; 4.5 UG/1; UG/1
1 AEROSOL RESPIRATORY (INHALATION)
Qty: 0 | Refills: 0 | COMMUNITY
Start: 2017-11-07

## 2017-11-07 RX ORDER — GABAPENTIN 400 MG/1
0 CAPSULE ORAL
Qty: 0 | Refills: 0 | COMMUNITY

## 2017-11-07 RX ORDER — LAMOTRIGINE 25 MG/1
0 TABLET, ORALLY DISINTEGRATING ORAL
Qty: 0 | Refills: 0 | COMMUNITY

## 2017-11-07 RX ORDER — LAMOTRIGINE 25 MG/1
2 TABLET, ORALLY DISINTEGRATING ORAL
Qty: 0 | Refills: 0 | COMMUNITY
Start: 2017-11-07

## 2017-11-07 RX ORDER — ARIPIPRAZOLE 15 MG/1
0 TABLET ORAL
Qty: 0 | Refills: 0 | COMMUNITY

## 2017-11-07 RX ORDER — ROSUVASTATIN CALCIUM 5 MG/1
1 TABLET ORAL
Qty: 0 | Refills: 0 | COMMUNITY

## 2017-11-07 RX ORDER — FLUPHENAZINE HYDROCHLORIDE 1 MG/1
1 TABLET, FILM COATED ORAL
Qty: 0 | Refills: 0 | COMMUNITY
Start: 2017-11-07

## 2017-11-07 RX ORDER — ATORVASTATIN CALCIUM 80 MG/1
1 TABLET, FILM COATED ORAL
Qty: 0 | Refills: 0 | COMMUNITY
Start: 2017-11-07

## 2017-11-07 RX ORDER — DULAGLUTIDE 4.5 MG/.5ML
0 INJECTION, SOLUTION SUBCUTANEOUS
Qty: 0 | Refills: 0 | COMMUNITY

## 2017-11-07 RX ORDER — ALBUTEROL 90 UG/1
2 AEROSOL, METERED ORAL
Qty: 1 | Refills: 0 | OUTPATIENT
Start: 2017-11-07 | End: 2017-12-07

## 2017-11-07 RX ORDER — FLUOXETINE HCL 10 MG
1 CAPSULE ORAL
Qty: 0 | Refills: 0 | COMMUNITY
Start: 2017-11-07

## 2017-11-07 RX ORDER — BUDESONIDE AND FORMOTEROL FUMARATE DIHYDRATE 160; 4.5 UG/1; UG/1
2 AEROSOL RESPIRATORY (INHALATION)
Qty: 1 | Refills: 0 | OUTPATIENT
Start: 2017-11-07 | End: 2017-12-07

## 2017-11-07 RX ORDER — INSULIN GLARGINE 100 [IU]/ML
15 INJECTION, SOLUTION SUBCUTANEOUS
Qty: 0 | Refills: 0 | COMMUNITY
Start: 2017-11-07

## 2017-11-07 RX ORDER — ALBUTEROL 90 UG/1
2 AEROSOL, METERED ORAL
Qty: 0 | Refills: 0 | COMMUNITY
Start: 2017-11-07

## 2017-11-07 RX ORDER — FLUOXETINE HCL 10 MG
1 CAPSULE ORAL
Qty: 30 | Refills: 0 | OUTPATIENT
Start: 2017-11-07 | End: 2017-12-07

## 2017-11-07 RX ORDER — GLIMEPIRIDE 1 MG
32 TABLET ORAL
Qty: 0 | Refills: 0 | COMMUNITY

## 2017-11-07 RX ORDER — METFORMIN HYDROCHLORIDE 850 MG/1
1 TABLET ORAL
Qty: 60 | Refills: 0 | OUTPATIENT
Start: 2017-11-07 | End: 2017-12-07

## 2017-11-07 RX ORDER — ASPIRIN/CALCIUM CARB/MAGNESIUM 324 MG
1 TABLET ORAL
Qty: 30 | Refills: 0 | OUTPATIENT
Start: 2017-11-07 | End: 2017-12-07

## 2017-11-07 RX ORDER — LAMOTRIGINE 25 MG/1
2 TABLET, ORALLY DISINTEGRATING ORAL
Qty: 60 | Refills: 0 | OUTPATIENT
Start: 2017-11-07 | End: 2017-12-07

## 2017-11-07 RX ORDER — DESVENLAFAXINE 50 MG/1
0 TABLET, EXTENDED RELEASE ORAL
Qty: 0 | Refills: 0 | COMMUNITY

## 2017-11-07 RX ORDER — ATORVASTATIN CALCIUM 80 MG/1
1 TABLET, FILM COATED ORAL
Qty: 30 | Refills: 0 | OUTPATIENT
Start: 2017-11-07 | End: 2017-12-07

## 2017-11-07 RX ORDER — GABAPENTIN 400 MG/1
1 CAPSULE ORAL
Qty: 60 | Refills: 0 | OUTPATIENT
Start: 2017-11-07 | End: 2017-12-07

## 2017-11-07 RX ORDER — EZETIMIBE 10 MG/1
1 TABLET ORAL
Qty: 0 | Refills: 0 | COMMUNITY

## 2017-11-07 RX ORDER — ENOXAPARIN SODIUM 100 MG/ML
25 INJECTION SUBCUTANEOUS
Qty: 0 | Refills: 0 | COMMUNITY
Start: 2017-11-07 | End: 2017-12-07

## 2017-11-07 RX ORDER — METFORMIN HYDROCHLORIDE 850 MG/1
0 TABLET ORAL
Qty: 0 | Refills: 0 | COMMUNITY

## 2017-11-07 RX ORDER — METFORMIN HYDROCHLORIDE 850 MG/1
1 TABLET ORAL
Qty: 0 | Refills: 0 | COMMUNITY
Start: 2017-11-07

## 2017-11-07 RX ORDER — INSULIN LISPRO 100/ML
5 VIAL (ML) SUBCUTANEOUS
Qty: 0 | Refills: 0 | COMMUNITY
Start: 2017-11-07

## 2017-11-07 RX ORDER — ARIPIPRAZOLE 15 MG/1
1 TABLET ORAL
Qty: 0 | Refills: 0 | COMMUNITY

## 2017-11-07 RX ORDER — INSULIN LISPRO 100/ML
0 VIAL (ML) SUBCUTANEOUS
Qty: 0 | Refills: 0 | COMMUNITY
Start: 2017-11-07

## 2017-11-07 RX ORDER — ALBUTEROL 90 UG/1
2 AEROSOL, METERED ORAL
Qty: 0 | Refills: 0 | COMMUNITY

## 2017-11-07 RX ORDER — ENOXAPARIN SODIUM 100 MG/ML
15 INJECTION SUBCUTANEOUS
Qty: 4 | Refills: 0 | OUTPATIENT
Start: 2017-11-07 | End: 2017-12-07

## 2017-11-07 RX ORDER — DULAGLUTIDE 4.5 MG/.5ML
1.5 INJECTION, SOLUTION SUBCUTANEOUS
Qty: 4 | Refills: 0
Start: 2017-11-07 | End: 2017-12-07

## 2017-11-07 RX ORDER — VALSARTAN 80 MG/1
1 TABLET ORAL
Qty: 30 | Refills: 0 | OUTPATIENT
Start: 2017-11-07 | End: 2017-12-07

## 2017-11-07 RX ORDER — GABAPENTIN 400 MG/1
1 CAPSULE ORAL
Qty: 0 | Refills: 0 | COMMUNITY
Start: 2017-11-07 | End: 2017-12-07

## 2017-11-07 RX ORDER — BUDESONIDE AND FORMOTEROL FUMARATE DIHYDRATE 160; 4.5 UG/1; UG/1
2 AEROSOL RESPIRATORY (INHALATION)
Qty: 0 | Refills: 0 | COMMUNITY
Start: 2017-11-07

## 2017-11-07 RX ORDER — VALSARTAN 80 MG/1
1 TABLET ORAL
Qty: 0 | Refills: 0 | COMMUNITY

## 2017-11-07 RX ADMIN — GABAPENTIN 100 MILLIGRAM(S): 400 CAPSULE ORAL at 08:38

## 2017-11-07 RX ADMIN — METFORMIN HYDROCHLORIDE 1000 MILLIGRAM(S): 850 TABLET ORAL at 16:53

## 2017-11-07 RX ADMIN — FLUPHENAZINE HYDROCHLORIDE 5 MILLIGRAM(S): 1 TABLET, FILM COATED ORAL at 08:38

## 2017-11-07 RX ADMIN — Medication 2: at 08:36

## 2017-11-07 RX ADMIN — Medication 2: at 12:22

## 2017-11-07 RX ADMIN — Medication 40 MILLIGRAM(S): at 08:38

## 2017-11-07 RX ADMIN — Medication 5 UNIT(S): at 12:23

## 2017-11-07 RX ADMIN — METFORMIN HYDROCHLORIDE 1000 MILLIGRAM(S): 850 TABLET ORAL at 08:38

## 2017-11-07 RX ADMIN — BUDESONIDE AND FORMOTEROL FUMARATE DIHYDRATE 2 PUFF(S): 160; 4.5 AEROSOL RESPIRATORY (INHALATION) at 08:37

## 2017-11-07 RX ADMIN — LAMOTRIGINE 50 MILLIGRAM(S): 25 TABLET, ORALLY DISINTEGRATING ORAL at 08:38

## 2017-11-07 RX ADMIN — Medication 81 MILLIGRAM(S): at 08:38

## 2017-11-07 RX ADMIN — Medication 5 UNIT(S): at 08:37

## 2017-11-07 NOTE — PROGRESS NOTE BEHAVIORAL HEALTH - NSBHADMITCOUNSEL_PSY_A_CORE
diagnostic results/impressions and/or recommended studies/importance of adherence to chosen treatment
instructions for management, treatment and follow up/risk factor reduction/risks and benefits of treatment options/importance of adherence to chosen treatment
risk factor reduction/diagnostic results/impressions and/or recommended studies/risks and benefits of treatment options

## 2017-11-07 NOTE — PROGRESS NOTE BEHAVIORAL HEALTH - NSBHFUPINTERVALHXFT_PSY_A_CORE
Patient looks forward to discharge next week and reports that she feels much better since initial admission. Patient's only complaint is feeling like the BiPAP machine is different then the one she has at home in that she has less sleep on it and feels more tired during the day. This has been ongoing and it is not overtly bothersome. No other complaints. Patient reports medication compliance and denies adverse side effects.
No significant interval events over the weekend. Patient reports euthymic mood, good appetite and regular sleep. takes her medications, denies adverse side effects and has no complaints. She looks forward to discharge this week.
Patient remains calm, cooperative, polite and pleasant. Medication and treatment complaint, attends groups and participates. Patient does her ADLs well, sleeping and eating well, and is visible on the Unit including socializing with peers. Patient endorses stable euthymic mood, denies any symptoms of hypomania/carla/psychosis/major depression/ anxiety/panic. Denies any active or passive suicidal or homicidal ideation. Names protective factors (philomena; family; hope for future). Endorses medication compliance. Denies adverse medication side effects. Denies access to weapons at home. She plans on continuing her medications at home and plans on attending day program.

## 2017-11-07 NOTE — PROGRESS NOTE BEHAVIORAL HEALTH - RISK ASSESSMENT
Chronic risk factors: single, primary psychotic disorder. Protective factors: age < 65yrs, female gender, no chronic pain; medication and treatment compliant; no hx of suicide attempts; no hx of self-injurious behavior; no hx of aggression/violence; no legal issues; motivated for help; denies substance/drugs use, able top verbalize needs. access to health services. No acute risk factors identified - mood stabilized; target symptoms subsided, denies suicidal ideation.
Chronic risk factors: single, primary psychotic disorder. Protective factors: age < 65yrs, female gender, no chronic pain; medication and treatment compliant; no hx of suicide attempts; no hx of self-injurious behavior; no hx of aggression/violence; no legal issues; motivated for help; denies substance/drugs use, able top verbalize needs. access to health services. No acute risk factors identified - mood stabilized; target symptoms subsided

## 2017-11-07 NOTE — PROGRESS NOTE BEHAVIORAL HEALTH - SUMMARY
50 yo female with Schizoaffective Disorder, multiple prior inpatient admissions, no hx of substance use, no history of suicide attempts/aggression/violence/legal issues who was admitted for a depressive episode in the context of psychosocial stressors (recent move to a new housing place, felt overwhelmed)  and suicidal ideation (was thinking about overdose without intent) who responded well to treatment, with target symptoms reduction and is now appropriate for less restrictive environment (ie. outpatient level of care). Patient is hopeful, future oriented, motivated to continue her treatment, able to contract for safety, able to participate in safety planning, denies access to weapons, denies using drugs/substances, denies any active or passive suicidal or homicidal ideation. Names protective factors (philomena; family; hope for future). Discharge.
52 yo female with Schizoaffective Disorder admitted for a depressive episode, responding well to treatment with planned discharge this week.
50 yo female with Schizoaffective Disorder admitted for a depressive episode, responding well to treatment with planned discharge next week.

## 2017-12-19 PROCEDURE — 81001 URINALYSIS AUTO W/SCOPE: CPT

## 2017-12-19 PROCEDURE — 82962 GLUCOSE BLOOD TEST: CPT

## 2017-12-19 PROCEDURE — 83036 HEMOGLOBIN GLYCOSYLATED A1C: CPT

## 2017-12-19 PROCEDURE — 85027 COMPLETE CBC AUTOMATED: CPT

## 2017-12-19 PROCEDURE — 93005 ELECTROCARDIOGRAM TRACING: CPT

## 2017-12-19 PROCEDURE — 86780 TREPONEMA PALLIDUM: CPT

## 2017-12-19 PROCEDURE — 99285 EMERGENCY DEPT VISIT HI MDM: CPT | Mod: 25

## 2017-12-19 PROCEDURE — 36415 COLL VENOUS BLD VENIPUNCTURE: CPT

## 2017-12-19 PROCEDURE — 80061 LIPID PANEL: CPT

## 2017-12-19 PROCEDURE — 81025 URINE PREGNANCY TEST: CPT

## 2017-12-19 PROCEDURE — 80053 COMPREHEN METABOLIC PANEL: CPT

## 2017-12-19 PROCEDURE — 84443 ASSAY THYROID STIM HORMONE: CPT

## 2017-12-19 PROCEDURE — 80307 DRUG TEST PRSMV CHEM ANLYZR: CPT

## 2017-12-19 PROCEDURE — 90686 IIV4 VACC NO PRSV 0.5 ML IM: CPT

## 2017-12-19 PROCEDURE — 94660 CPAP INITIATION&MGMT: CPT

## 2017-12-19 PROCEDURE — 94640 AIRWAY INHALATION TREATMENT: CPT

## 2018-07-26 ENCOUNTER — APPOINTMENT (OUTPATIENT)
Dept: RHEUMATOLOGY | Facility: CLINIC | Age: 53
End: 2018-07-26
Payer: MEDICARE

## 2018-07-26 ENCOUNTER — LABORATORY RESULT (OUTPATIENT)
Age: 53
End: 2018-07-26

## 2018-07-26 VITALS
TEMPERATURE: 97.3 F | WEIGHT: 230 LBS | SYSTOLIC BLOOD PRESSURE: 115 MMHG | HEART RATE: 94 BPM | HEIGHT: 63 IN | BODY MASS INDEX: 40.75 KG/M2 | DIASTOLIC BLOOD PRESSURE: 75 MMHG | OXYGEN SATURATION: 98 %

## 2018-07-26 DIAGNOSIS — Z86.39 PERSONAL HISTORY OF OTHER ENDOCRINE, NUTRITIONAL AND METABOLIC DISEASE: ICD-10-CM

## 2018-07-26 DIAGNOSIS — Z82.61 FAMILY HISTORY OF ARTHRITIS: ICD-10-CM

## 2018-07-26 DIAGNOSIS — Z82.0 FAMILY HISTORY OF EPILEPSY AND OTHER DISEASES OF THE NERVOUS SYSTEM: ICD-10-CM

## 2018-07-26 DIAGNOSIS — Z86.69 PERSONAL HISTORY OF OTHER DISEASES OF THE NERVOUS SYSTEM AND SENSE ORGANS: ICD-10-CM

## 2018-07-26 DIAGNOSIS — Z87.09 PERSONAL HISTORY OF OTHER DISEASES OF THE RESPIRATORY SYSTEM: ICD-10-CM

## 2018-07-26 DIAGNOSIS — Z78.9 OTHER SPECIFIED HEALTH STATUS: ICD-10-CM

## 2018-07-26 LAB — ERYTHROCYTE [SEDIMENTATION RATE] IN BLOOD BY WESTERGREN METHOD: 49 MM/HR

## 2018-07-26 PROCEDURE — 99204 OFFICE O/P NEW MOD 45 MIN: CPT

## 2018-07-26 RX ORDER — FLUOXETINE HCL 10 MG
TABLET ORAL
Refills: 0 | Status: ACTIVE | COMMUNITY

## 2018-07-26 RX ORDER — MULTIVITAMIN
TABLET ORAL
Refills: 0 | Status: ACTIVE | COMMUNITY

## 2018-07-26 RX ORDER — BUDESONIDE AND FORMOTEROL FUMARATE DIHYDRATE 160; 4.5 UG/1; UG/1
AEROSOL RESPIRATORY (INHALATION)
Refills: 0 | Status: ACTIVE | COMMUNITY

## 2018-07-26 RX ORDER — VITAMIN E (DL,TOCOPHERYL ACET) 180 MG
CAPSULE ORAL
Refills: 0 | Status: ACTIVE | COMMUNITY

## 2018-07-27 LAB
CRP SERPL-MCNC: 0.49 MG/DL
DEPRECATED KAPPA LC FREE/LAMBDA SER: 1.04 RATIO
ENA SS-A AB SER IA-ACNC: <0.2 AL
ENA SS-B AB SER IA-ACNC: <0.2 AL
FERRITIN SERPL-MCNC: 25 NG/ML
HCYS SERPL-MCNC: 5.5 UMOL/L
IGA SER QL IEP: 194 MG/DL
IGG SER QL IEP: 856 MG/DL
IGM SER QL IEP: 48 MG/DL
KAPPA LC CSF-MCNC: 1.41 MG/DL
KAPPA LC SERPL-MCNC: 1.47 MG/DL
MPO AB + PR3 PNL SER: NORMAL
RHEUMATOID FACT SER QL: <10 IU/ML

## 2018-07-30 LAB
ANA SER IF-ACNC: NEGATIVE
CCP AB SER IA-ACNC: <8 UNITS
RF+CCP IGG SER-IMP: NEGATIVE

## 2018-08-01 ENCOUNTER — RESULT REVIEW (OUTPATIENT)
Age: 53
End: 2018-08-01

## 2018-09-04 ENCOUNTER — RESULT REVIEW (OUTPATIENT)
Age: 53
End: 2018-09-04

## 2018-10-11 ENCOUNTER — OUTPATIENT (OUTPATIENT)
Dept: OUTPATIENT SERVICES | Facility: HOSPITAL | Age: 53
LOS: 1 days | End: 2018-10-11
Payer: MEDICARE

## 2018-10-11 VITALS
RESPIRATION RATE: 20 BRPM | HEART RATE: 87 BPM | DIASTOLIC BLOOD PRESSURE: 83 MMHG | SYSTOLIC BLOOD PRESSURE: 123 MMHG | OXYGEN SATURATION: 97 % | WEIGHT: 225.97 LBS | HEIGHT: 63 IN | TEMPERATURE: 98 F

## 2018-10-11 DIAGNOSIS — Z01.818 ENCOUNTER FOR OTHER PREPROCEDURAL EXAMINATION: ICD-10-CM

## 2018-10-11 DIAGNOSIS — N84.0 POLYP OF CORPUS UTERI: ICD-10-CM

## 2018-10-11 DIAGNOSIS — Z98.890 OTHER SPECIFIED POSTPROCEDURAL STATES: Chronic | ICD-10-CM

## 2018-10-11 DIAGNOSIS — E11.9 TYPE 2 DIABETES MELLITUS WITHOUT COMPLICATIONS: ICD-10-CM

## 2018-10-11 LAB
ANION GAP SERPL CALC-SCNC: 14 MMOL/L — SIGNIFICANT CHANGE UP (ref 5–17)
BUN SERPL-MCNC: 10 MG/DL — SIGNIFICANT CHANGE UP (ref 7–23)
CALCIUM SERPL-MCNC: 10 MG/DL — SIGNIFICANT CHANGE UP (ref 8.4–10.5)
CHLORIDE SERPL-SCNC: 99 MMOL/L — SIGNIFICANT CHANGE UP (ref 96–108)
CO2 SERPL-SCNC: 23 MMOL/L — SIGNIFICANT CHANGE UP (ref 22–31)
CREAT SERPL-MCNC: 1 MG/DL — SIGNIFICANT CHANGE UP (ref 0.5–1.3)
GLUCOSE SERPL-MCNC: 153 MG/DL — HIGH (ref 70–99)
HBA1C BLD-MCNC: 8.8 % — HIGH (ref 4–5.6)
HCT VFR BLD CALC: 38.1 % — SIGNIFICANT CHANGE UP (ref 34.5–45)
HGB BLD-MCNC: 12.8 G/DL — SIGNIFICANT CHANGE UP (ref 11.5–15.5)
MCHC RBC-ENTMCNC: 29.9 PG — SIGNIFICANT CHANGE UP (ref 27–34)
MCHC RBC-ENTMCNC: 33.6 GM/DL — SIGNIFICANT CHANGE UP (ref 32–36)
MCV RBC AUTO: 89 FL — SIGNIFICANT CHANGE UP (ref 80–100)
PLATELET # BLD AUTO: 368 K/UL — SIGNIFICANT CHANGE UP (ref 150–400)
POTASSIUM SERPL-MCNC: 4.3 MMOL/L — SIGNIFICANT CHANGE UP (ref 3.5–5.3)
POTASSIUM SERPL-SCNC: 4.3 MMOL/L — SIGNIFICANT CHANGE UP (ref 3.5–5.3)
RBC # BLD: 4.28 M/UL — SIGNIFICANT CHANGE UP (ref 3.8–5.2)
RBC # FLD: 13.5 % — SIGNIFICANT CHANGE UP (ref 10.3–14.5)
SODIUM SERPL-SCNC: 136 MMOL/L — SIGNIFICANT CHANGE UP (ref 135–145)
WBC # BLD: 7.65 K/UL — SIGNIFICANT CHANGE UP (ref 3.8–10.5)
WBC # FLD AUTO: 7.65 K/UL — SIGNIFICANT CHANGE UP (ref 3.8–10.5)

## 2018-10-11 PROCEDURE — 80048 BASIC METABOLIC PNL TOTAL CA: CPT

## 2018-10-11 PROCEDURE — 85027 COMPLETE CBC AUTOMATED: CPT

## 2018-10-11 PROCEDURE — G0463: CPT

## 2018-10-11 PROCEDURE — 83036 HEMOGLOBIN GLYCOSYLATED A1C: CPT

## 2018-10-11 RX ORDER — SODIUM CHLORIDE 9 MG/ML
3 INJECTION INTRAMUSCULAR; INTRAVENOUS; SUBCUTANEOUS EVERY 8 HOURS
Qty: 0 | Refills: 0 | Status: DISCONTINUED | OUTPATIENT
Start: 2018-10-22 | End: 2018-11-06

## 2018-10-11 RX ORDER — ACETAMINOPHEN 500 MG
1000 TABLET ORAL ONCE
Qty: 0 | Refills: 0 | Status: COMPLETED | OUTPATIENT
Start: 2018-10-22 | End: 2018-10-22

## 2018-10-11 RX ORDER — CELECOXIB 200 MG/1
200 CAPSULE ORAL ONCE
Qty: 0 | Refills: 0 | Status: COMPLETED | OUTPATIENT
Start: 2018-10-22 | End: 2018-10-22

## 2018-10-11 RX ORDER — LIDOCAINE HCL 20 MG/ML
0.2 VIAL (ML) INJECTION ONCE
Qty: 0 | Refills: 0 | Status: DISCONTINUED | OUTPATIENT
Start: 2018-10-22 | End: 2018-11-06

## 2018-10-11 NOTE — H&P PST ADULT - PROBLEM SELECTOR PLAN 2
Instructed to hold glimepiride the morning of surgery, half the dose of Lantus the night before and take Trulicity as usual

## 2018-10-11 NOTE — H&P PST ADULT - HISTORY OF PRESENT ILLNESS
51 y/o F PMH found to have uterine polyp on routine pelvic exam, denies any abnormal bleeding or pain.  Presents today for dilation and curettage, operative hysteroscopy, polypectomy. 53 y/o F PMH well controlled asthma, no recent steroid use, found to have uterine polyp on routine pelvic exam, denies any abnormal bleeding or pain.  Presents today for dilation and curettage, operative hysteroscopy, polypectomy.

## 2018-10-11 NOTE — H&P PST ADULT - ATTENDING COMMENTS
Pt histories reviewed by me. NO change in medical histories other than a medication change (instead of Atorvastatin pt is now on Iberstatin).  The risks and benefits of the procedure were reviewed with the patient including but not limited to bleeding (she is accepting of blood products), infection, injury to surrounding organs (bowel, bladder, uterus, tubes, ovaries, cervix, vagina), incomplete removal of the polyp and need for repeat procedure or further procedure. Pt verbally expressed understanding. Consents were reviewed and signed.     Strict post op precautions were reviewed with patient as well.      LEO rFias MD   [phone]

## 2018-10-11 NOTE — H&P PST ADULT - PMH
No pertinent past medical history Anxiety    Asthma    Depression    Diabetes    HLD (hyperlipidemia)    HTN (hypertension)    Migraine

## 2018-10-21 ENCOUNTER — TRANSCRIPTION ENCOUNTER (OUTPATIENT)
Age: 53
End: 2018-10-21

## 2018-10-22 ENCOUNTER — RESULT REVIEW (OUTPATIENT)
Age: 53
End: 2018-10-22

## 2018-10-22 ENCOUNTER — OUTPATIENT (OUTPATIENT)
Dept: OUTPATIENT SERVICES | Facility: HOSPITAL | Age: 53
LOS: 1 days | End: 2018-10-22
Payer: MEDICARE

## 2018-10-22 VITALS
DIASTOLIC BLOOD PRESSURE: 64 MMHG | TEMPERATURE: 99 F | OXYGEN SATURATION: 97 % | SYSTOLIC BLOOD PRESSURE: 109 MMHG | HEART RATE: 86 BPM | RESPIRATION RATE: 18 BRPM

## 2018-10-22 VITALS
RESPIRATION RATE: 16 BRPM | WEIGHT: 225.97 LBS | OXYGEN SATURATION: 97 % | SYSTOLIC BLOOD PRESSURE: 99 MMHG | DIASTOLIC BLOOD PRESSURE: 66 MMHG | HEIGHT: 63 IN | TEMPERATURE: 98 F | HEART RATE: 84 BPM

## 2018-10-22 DIAGNOSIS — N84.0 POLYP OF CORPUS UTERI: ICD-10-CM

## 2018-10-22 DIAGNOSIS — Z98.890 OTHER SPECIFIED POSTPROCEDURAL STATES: Chronic | ICD-10-CM

## 2018-10-22 PROCEDURE — 88305 TISSUE EXAM BY PATHOLOGIST: CPT

## 2018-10-22 PROCEDURE — 58558 HYSTEROSCOPY BIOPSY: CPT

## 2018-10-22 PROCEDURE — 82962 GLUCOSE BLOOD TEST: CPT

## 2018-10-22 PROCEDURE — 88305 TISSUE EXAM BY PATHOLOGIST: CPT | Mod: 26

## 2018-10-22 RX ORDER — SODIUM CHLORIDE 9 MG/ML
1000 INJECTION, SOLUTION INTRAVENOUS
Qty: 0 | Refills: 0 | Status: DISCONTINUED | OUTPATIENT
Start: 2018-10-22 | End: 2018-10-26

## 2018-10-22 RX ORDER — CELECOXIB 200 MG/1
200 CAPSULE ORAL ONCE
Qty: 0 | Refills: 0 | Status: COMPLETED | OUTPATIENT
Start: 2018-10-22 | End: 2018-10-22

## 2018-10-22 RX ORDER — OXYCODONE HYDROCHLORIDE 5 MG/1
5 TABLET ORAL ONCE
Qty: 0 | Refills: 0 | Status: DISCONTINUED | OUTPATIENT
Start: 2018-10-22 | End: 2018-10-22

## 2018-10-22 RX ORDER — ONDANSETRON 8 MG/1
4 TABLET, FILM COATED ORAL ONCE
Qty: 0 | Refills: 0 | Status: COMPLETED | OUTPATIENT
Start: 2018-10-22 | End: 2018-10-22

## 2018-10-22 RX ADMIN — CELECOXIB 200 MILLIGRAM(S): 200 CAPSULE ORAL at 11:17

## 2018-10-22 RX ADMIN — Medication 1000 MILLIGRAM(S): at 08:10

## 2018-10-22 RX ADMIN — CELECOXIB 200 MILLIGRAM(S): 200 CAPSULE ORAL at 08:11

## 2018-10-22 RX ADMIN — ONDANSETRON 4 MILLIGRAM(S): 8 TABLET, FILM COATED ORAL at 11:17

## 2018-10-22 RX ADMIN — OXYCODONE HYDROCHLORIDE 5 MILLIGRAM(S): 5 TABLET ORAL at 11:17

## 2018-10-22 NOTE — ASU PATIENT PROFILE, ADULT - PMH
Anxiety    Asthma    Depression    Diabetes    HLD (hyperlipidemia)    HTN (hypertension)    Migraine

## 2018-10-22 NOTE — ASU DISCHARGE PLAN (ADULT/PEDIATRIC). - NOTIFY
Unable to Urinate/Pain not relieved by Medications/Persistent Nausea and Vomiting/Excessive Diarrhea/Inability to Tolerate Liquids or Foods/Bleeding that does not stop/GYN Fever>100.4

## 2018-10-22 NOTE — BRIEF OPERATIVE NOTE - PROCEDURE
<<-----Click on this checkbox to enter Procedure Hysteroscopy with biopsy or polypectomy  10/22/2018  Also fractional D&C  Active  [id number]

## 2018-10-22 NOTE — PRE-ANESTHESIA EVALUATION ADULT - NSANTHASARD_GEN_ALL_CORE
1When: *** Where: *** Reason for visit: ***. Have you been to the ER, urgent care clinic since your last visit? Hospitalized since your last visit? No    2. Have you seen or consulted any other health care providers outside of the [address] [place] [address] since your last visit? Include 12- pap smears or colon screening. Yes When: 12- Where:  [facility] Reason for visit: cough 3

## 2018-10-22 NOTE — ASU DISCHARGE PLAN (ADULT/PEDIATRIC). - ACTIVITY LEVEL
no douching/no intercourse/quiet play/nothing per rectum/no tampons/no sports/gym/no tub baths/weight bearing as tolerated/no heavy lifting/nothing per vagina
head injury/Other:

## 2018-10-22 NOTE — BRIEF OPERATIVE NOTE - POST-OP DX
Endocervical polyp  10/22/2018    Active  Lainey Frias  Endometrium, polyp  10/22/2018    Active  Lainey Frias

## 2018-10-22 NOTE — ASU DISCHARGE PLAN (ADULT/PEDIATRIC). - ASU FOLLOWUP
[facility] ([facility]): 911 or go to the nearest Emergency Room

## 2018-12-10 ENCOUNTER — OUTPATIENT (OUTPATIENT)
Dept: OUTPATIENT SERVICES | Facility: HOSPITAL | Age: 53
LOS: 1 days | End: 2018-12-10
Payer: MEDICARE

## 2018-12-10 DIAGNOSIS — Z98.890 OTHER SPECIFIED POSTPROCEDURAL STATES: Chronic | ICD-10-CM

## 2018-12-10 DIAGNOSIS — K08.9 DISORDER OF TEETH AND SUPPORTING STRUCTURES, UNSPECIFIED: ICD-10-CM

## 2018-12-10 PROBLEM — G43.909 MIGRAINE, UNSPECIFIED, NOT INTRACTABLE, WITHOUT STATUS MIGRAINOSUS: Chronic | Status: ACTIVE | Noted: 2018-10-11

## 2018-12-10 PROBLEM — I10 ESSENTIAL (PRIMARY) HYPERTENSION: Chronic | Status: ACTIVE | Noted: 2018-10-11

## 2018-12-10 PROBLEM — E78.5 HYPERLIPIDEMIA, UNSPECIFIED: Chronic | Status: ACTIVE | Noted: 2018-10-11

## 2018-12-10 PROBLEM — F41.9 ANXIETY DISORDER, UNSPECIFIED: Chronic | Status: ACTIVE | Noted: 2018-10-11

## 2018-12-10 PROBLEM — E11.9 TYPE 2 DIABETES MELLITUS WITHOUT COMPLICATIONS: Chronic | Status: ACTIVE | Noted: 2018-10-11

## 2018-12-10 PROBLEM — F32.9 MAJOR DEPRESSIVE DISORDER, SINGLE EPISODE, UNSPECIFIED: Chronic | Status: ACTIVE | Noted: 2018-10-11

## 2018-12-10 PROBLEM — J45.909 UNSPECIFIED ASTHMA, UNCOMPLICATED: Chronic | Status: ACTIVE | Noted: 2018-10-11

## 2018-12-10 PROCEDURE — D0210: CPT

## 2018-12-10 PROCEDURE — D0120: CPT

## 2018-12-11 DIAGNOSIS — Z01.20 ENCOUNTER FOR DENTAL EXAMINATION AND CLEANING WITHOUT ABNORMAL FINDINGS: ICD-10-CM

## 2019-01-14 ENCOUNTER — OUTPATIENT (OUTPATIENT)
Dept: OUTPATIENT SERVICES | Facility: HOSPITAL | Age: 54
LOS: 1 days | End: 2019-01-14

## 2019-01-14 DIAGNOSIS — Z98.890 OTHER SPECIFIED POSTPROCEDURAL STATES: Chronic | ICD-10-CM

## 2019-01-14 DIAGNOSIS — K08.9 DISORDER OF TEETH AND SUPPORTING STRUCTURES, UNSPECIFIED: ICD-10-CM

## 2019-04-08 ENCOUNTER — OUTPATIENT (OUTPATIENT)
Dept: OUTPATIENT SERVICES | Facility: HOSPITAL | Age: 54
LOS: 1 days | End: 2019-04-08

## 2019-04-08 DIAGNOSIS — K08.9 DISORDER OF TEETH AND SUPPORTING STRUCTURES, UNSPECIFIED: ICD-10-CM

## 2019-04-08 DIAGNOSIS — Z98.890 OTHER SPECIFIED POSTPROCEDURAL STATES: Chronic | ICD-10-CM

## 2019-04-15 ENCOUNTER — INPATIENT (INPATIENT)
Facility: HOSPITAL | Age: 54
LOS: 15 days | Discharge: HOME | DRG: 885 | End: 2019-05-01
Attending: PSYCHIATRY & NEUROLOGY | Admitting: PSYCHIATRY & NEUROLOGY
Payer: MEDICARE

## 2019-04-15 DIAGNOSIS — Z98.890 OTHER SPECIFIED POSTPROCEDURAL STATES: Chronic | ICD-10-CM

## 2019-04-15 DIAGNOSIS — F25.1 SCHIZOAFFECTIVE DISORDER, DEPRESSIVE TYPE: ICD-10-CM

## 2019-04-15 LAB — GLUCOSE BLDC GLUCOMTR-MCNC: 167 MG/DL — HIGH (ref 70–99)

## 2019-04-15 PROCEDURE — 76705 ECHO EXAM OF ABDOMEN: CPT | Mod: 26

## 2019-04-15 RX ORDER — PANTOPRAZOLE SODIUM 20 MG/1
40 TABLET, DELAYED RELEASE ORAL
Qty: 0 | Refills: 0 | Status: DISCONTINUED | OUTPATIENT
Start: 2019-04-15 | End: 2019-05-01

## 2019-04-15 RX ORDER — FLUOXETINE HCL 10 MG
40 CAPSULE ORAL DAILY
Qty: 0 | Refills: 0 | Status: DISCONTINUED | OUTPATIENT
Start: 2019-04-15 | End: 2019-05-01

## 2019-04-15 RX ORDER — LAMOTRIGINE 25 MG/1
150 TABLET, ORALLY DISINTEGRATING ORAL
Qty: 0 | Refills: 0 | Status: DISCONTINUED | OUTPATIENT
Start: 2019-04-15 | End: 2019-04-18

## 2019-04-15 RX ORDER — GABAPENTIN 400 MG/1
100 CAPSULE ORAL THREE TIMES A DAY
Qty: 0 | Refills: 0 | Status: DISCONTINUED | OUTPATIENT
Start: 2019-04-15 | End: 2019-04-20

## 2019-04-15 RX ORDER — GLUCAGON INJECTION, SOLUTION 0.5 MG/.1ML
1 INJECTION, SOLUTION SUBCUTANEOUS ONCE
Qty: 0 | Refills: 0 | Status: DISCONTINUED | OUTPATIENT
Start: 2019-04-15 | End: 2019-05-01

## 2019-04-15 RX ORDER — GLIMEPIRIDE 1 MG
4 TABLET ORAL
Qty: 0 | Refills: 0 | Status: DISCONTINUED | OUTPATIENT
Start: 2019-04-15 | End: 2019-05-01

## 2019-04-15 RX ORDER — INSULIN LISPRO 100/ML
VIAL (ML) SUBCUTANEOUS
Qty: 0 | Refills: 0 | Status: DISCONTINUED | OUTPATIENT
Start: 2019-04-15 | End: 2019-05-01

## 2019-04-15 RX ORDER — DEXTROSE 50 % IN WATER 50 %
25 SYRINGE (ML) INTRAVENOUS ONCE
Qty: 0 | Refills: 0 | Status: DISCONTINUED | OUTPATIENT
Start: 2019-04-15 | End: 2019-05-01

## 2019-04-15 RX ORDER — ATORVASTATIN CALCIUM 80 MG/1
80 TABLET, FILM COATED ORAL AT BEDTIME
Qty: 0 | Refills: 0 | Status: DISCONTINUED | OUTPATIENT
Start: 2019-04-15 | End: 2019-05-01

## 2019-04-15 RX ORDER — DEXTROSE 50 % IN WATER 50 %
12.5 SYRINGE (ML) INTRAVENOUS ONCE
Qty: 0 | Refills: 0 | Status: DISCONTINUED | OUTPATIENT
Start: 2019-04-15 | End: 2019-05-01

## 2019-04-15 RX ORDER — MONTELUKAST 4 MG/1
10 TABLET, CHEWABLE ORAL DAILY
Qty: 0 | Refills: 0 | Status: DISCONTINUED | OUTPATIENT
Start: 2019-04-15 | End: 2019-05-01

## 2019-04-15 RX ORDER — LOSARTAN POTASSIUM 100 MG/1
100 TABLET, FILM COATED ORAL DAILY
Qty: 0 | Refills: 0 | Status: DISCONTINUED | OUTPATIENT
Start: 2019-04-15 | End: 2019-05-01

## 2019-04-15 RX ORDER — ARIPIPRAZOLE 15 MG/1
15 TABLET ORAL DAILY
Qty: 0 | Refills: 0 | Status: DISCONTINUED | OUTPATIENT
Start: 2019-04-15 | End: 2019-04-22

## 2019-04-15 RX ORDER — SODIUM CHLORIDE 9 MG/ML
1000 INJECTION, SOLUTION INTRAVENOUS
Qty: 0 | Refills: 0 | Status: DISCONTINUED | OUTPATIENT
Start: 2019-04-15 | End: 2019-05-01

## 2019-04-15 RX ORDER — DEXTROSE 50 % IN WATER 50 %
15 SYRINGE (ML) INTRAVENOUS ONCE
Qty: 0 | Refills: 0 | Status: DISCONTINUED | OUTPATIENT
Start: 2019-04-15 | End: 2019-05-01

## 2019-04-15 RX ADMIN — ATORVASTATIN CALCIUM 80 MILLIGRAM(S): 80 TABLET, FILM COATED ORAL at 21:30

## 2019-04-15 RX ADMIN — GABAPENTIN 100 MILLIGRAM(S): 400 CAPSULE ORAL at 21:30

## 2019-04-15 RX ADMIN — LAMOTRIGINE 150 MILLIGRAM(S): 25 TABLET, ORALLY DISINTEGRATING ORAL at 21:31

## 2019-04-15 RX ADMIN — Medication 1 MILLIGRAM(S): at 23:06

## 2019-04-15 RX ADMIN — MONTELUKAST 10 MILLIGRAM(S): 4 TABLET, CHEWABLE ORAL at 21:31

## 2019-04-16 LAB
GLUCOSE BLDC GLUCOMTR-MCNC: 194 MG/DL — HIGH (ref 70–99)
GLUCOSE BLDC GLUCOMTR-MCNC: 202 MG/DL — HIGH (ref 70–99)
GLUCOSE BLDC GLUCOMTR-MCNC: 225 MG/DL — HIGH (ref 70–99)
GLUCOSE BLDC GLUCOMTR-MCNC: 301 MG/DL — HIGH (ref 70–99)

## 2019-04-16 PROCEDURE — 99222 1ST HOSP IP/OBS MODERATE 55: CPT

## 2019-04-16 RX ORDER — NYSTATIN/TRIAMCINOLONE ACET
1 OINTMENT (GRAM) TOPICAL
Qty: 0 | Refills: 0 | Status: DISCONTINUED | OUTPATIENT
Start: 2019-04-16 | End: 2019-04-18

## 2019-04-16 RX ORDER — ASPIRIN/CALCIUM CARB/MAGNESIUM 324 MG
81 TABLET ORAL DAILY
Qty: 0 | Refills: 0 | Status: DISCONTINUED | OUTPATIENT
Start: 2019-04-16 | End: 2019-05-01

## 2019-04-16 RX ORDER — ACETAMINOPHEN 500 MG
650 TABLET ORAL EVERY 6 HOURS
Qty: 0 | Refills: 0 | Status: DISCONTINUED | OUTPATIENT
Start: 2019-04-16 | End: 2019-05-01

## 2019-04-16 RX ORDER — BENZOCAINE AND MENTHOL 5; 1 G/100ML; G/100ML
1 LIQUID ORAL EVERY 4 HOURS
Qty: 0 | Refills: 0 | Status: DISCONTINUED | OUTPATIENT
Start: 2019-04-16 | End: 2019-05-01

## 2019-04-16 RX ADMIN — GABAPENTIN 100 MILLIGRAM(S): 400 CAPSULE ORAL at 10:40

## 2019-04-16 RX ADMIN — LOSARTAN POTASSIUM 100 MILLIGRAM(S): 100 TABLET, FILM COATED ORAL at 10:42

## 2019-04-16 RX ADMIN — Medication 4 MILLIGRAM(S): at 09:37

## 2019-04-16 RX ADMIN — Medication 40 MILLIGRAM(S): at 10:50

## 2019-04-16 RX ADMIN — LAMOTRIGINE 150 MILLIGRAM(S): 25 TABLET, ORALLY DISINTEGRATING ORAL at 20:44

## 2019-04-16 RX ADMIN — PANTOPRAZOLE SODIUM 40 MILLIGRAM(S): 20 TABLET, DELAYED RELEASE ORAL at 09:37

## 2019-04-16 RX ADMIN — ARIPIPRAZOLE 15 MILLIGRAM(S): 15 TABLET ORAL at 10:41

## 2019-04-16 RX ADMIN — ATORVASTATIN CALCIUM 80 MILLIGRAM(S): 80 TABLET, FILM COATED ORAL at 20:43

## 2019-04-16 RX ADMIN — MONTELUKAST 10 MILLIGRAM(S): 4 TABLET, CHEWABLE ORAL at 10:42

## 2019-04-16 RX ADMIN — Medication 1 MILLIGRAM(S): at 03:29

## 2019-04-16 RX ADMIN — Medication 4: at 12:23

## 2019-04-16 RX ADMIN — GABAPENTIN 100 MILLIGRAM(S): 400 CAPSULE ORAL at 20:43

## 2019-04-16 RX ADMIN — LAMOTRIGINE 150 MILLIGRAM(S): 25 TABLET, ORALLY DISINTEGRATING ORAL at 10:42

## 2019-04-16 RX ADMIN — GABAPENTIN 100 MILLIGRAM(S): 400 CAPSULE ORAL at 13:58

## 2019-04-16 RX ADMIN — Medication 2: at 08:25

## 2019-04-16 RX ADMIN — Medication 1: at 17:20

## 2019-04-16 RX ADMIN — Medication 1 APPLICATION(S): at 20:44

## 2019-04-16 NOTE — BEHAVIORAL HEALTH ASSESSMENT NOTE - DETAILS
Grandmother and aunt had psychiatric problems HTN DM; Obese See HPI [facility] Dr. Guerrero Al

## 2019-04-16 NOTE — BEHAVIORAL HEALTH ASSESSMENT NOTE - HPI (INCLUDE ILLNESS QUALITY, SEVERITY, DURATION, TIMING, CONTEXT, MODIFYING FACTORS, ASSOCIATED SIGNS AND SYMPTOMS)
54 y/o single  female, no children,, with multiple prior psychiatric hospitalizations, history of schizoaffective disorder, no prior SA, denied drug/legal issues, who presented to the ED at [facility] BIB/EMS activated by her HHA after she became increasingly depressed/disorganized over the last few days.    Patient was admitted at [facility], but endorsed that she was at [facility] in the past and would like to have her treatment at [facility].  Attending decided to discharge and admit the patient at [facility] for further treatment, and was later admitted for stability at [facility]. Patient has trouble expressing herself, she starts and half-way through her answer stops, feels anxious, huffs, and then tries to start again. She added that I'm stressed and continues to do so for each and every question. She was not able to have a single complete conversation. She later admitted that she stopped taking her meds, was on Abilify, does not remember her dosages. She endorsed that she hears voices, voices of a man telling her to do something or commit suicide or hurt herself. She later added that she is stressed due to her sister does not listen to her, and plans to sold the house. She remains confused, disorganized, and inappropriate and also delusional that she was raped by staff in the ambulance. She is also depressed, with increased sleep, poor appetite.    Medically has Sleep Apnea and uses BIPAP Machine; DM; Obese and HTn.

## 2019-04-16 NOTE — BEHAVIORAL HEALTH ASSESSMENT NOTE - RISK ASSESSMENT
At this time patient unable to contract for safety, will be admitted to an inpatient psychiatric hospital

## 2019-04-17 LAB
ALBUMIN SERPL ELPH-MCNC: 3.1 G/DL — LOW (ref 3.3–5)
ALP SERPL-CCNC: 74 U/L — SIGNIFICANT CHANGE UP (ref 30–120)
ALT FLD-CCNC: 474 U/L DA — HIGH (ref 10–60)
AMPHET UR-MCNC: NEGATIVE — SIGNIFICANT CHANGE UP
ANION GAP SERPL CALC-SCNC: 10 MMOL/L — SIGNIFICANT CHANGE UP (ref 5–17)
APPEARANCE UR: CLEAR — SIGNIFICANT CHANGE UP
APPEARANCE UR: CLEAR — SIGNIFICANT CHANGE UP
AST SERPL-CCNC: 493 U/L — HIGH (ref 10–40)
BACTERIA # UR AUTO: ABNORMAL
BARBITURATES UR SCN-MCNC: NEGATIVE — SIGNIFICANT CHANGE UP
BENZODIAZ UR-MCNC: NEGATIVE — SIGNIFICANT CHANGE UP
BILIRUB SERPL-MCNC: 0.5 MG/DL — SIGNIFICANT CHANGE UP (ref 0.2–1.2)
BILIRUB UR-MCNC: NEGATIVE — SIGNIFICANT CHANGE UP
BILIRUB UR-MCNC: NEGATIVE — SIGNIFICANT CHANGE UP
BUN SERPL-MCNC: 23 MG/DL — SIGNIFICANT CHANGE UP (ref 7–23)
CALCIUM SERPL-MCNC: 9.3 MG/DL — SIGNIFICANT CHANGE UP (ref 8.4–10.5)
CHLORIDE SERPL-SCNC: 103 MMOL/L — SIGNIFICANT CHANGE UP (ref 96–108)
CHOLEST SERPL-MCNC: 202 MG/DL — HIGH (ref 10–199)
CHOLEST SERPL-MCNC: 202 MG/DL — HIGH (ref 10–199)
CO2 SERPL-SCNC: 29 MMOL/L — SIGNIFICANT CHANGE UP (ref 22–31)
COCAINE METAB.OTHER UR-MCNC: NEGATIVE — SIGNIFICANT CHANGE UP
COLOR SPEC: YELLOW — SIGNIFICANT CHANGE UP
COLOR SPEC: YELLOW — SIGNIFICANT CHANGE UP
CREAT SERPL-MCNC: 0.92 MG/DL — SIGNIFICANT CHANGE UP (ref 0.5–1.3)
DIFF PNL FLD: ABNORMAL
DIFF PNL FLD: ABNORMAL
EPI CELLS # UR: ABNORMAL
GLUCOSE BLDC GLUCOMTR-MCNC: 204 MG/DL — HIGH (ref 70–99)
GLUCOSE BLDC GLUCOMTR-MCNC: 206 MG/DL — HIGH (ref 70–99)
GLUCOSE BLDC GLUCOMTR-MCNC: 226 MG/DL — HIGH (ref 70–99)
GLUCOSE BLDC GLUCOMTR-MCNC: 242 MG/DL — HIGH (ref 70–99)
GLUCOSE SERPL-MCNC: 210 MG/DL — HIGH (ref 70–99)
GLUCOSE UR QL: 1000 MG/DL
GLUCOSE UR QL: 1000 MG/DL
HAV IGM SER-ACNC: SIGNIFICANT CHANGE UP
HBA1C BLD-MCNC: 8.7 % — HIGH (ref 4–5.6)
HBV CORE AB SER-ACNC: SIGNIFICANT CHANGE UP
HBV SURFACE AB SER-ACNC: SIGNIFICANT CHANGE UP
HBV SURFACE AG SER-ACNC: SIGNIFICANT CHANGE UP
HCT VFR BLD CALC: 38.5 % — SIGNIFICANT CHANGE UP (ref 34.5–45)
HCV AB S/CO SERPL IA: 0.17 S/CO — SIGNIFICANT CHANGE UP (ref 0–0.99)
HCV AB SERPL-IMP: SIGNIFICANT CHANGE UP
HDLC SERPL-MCNC: 49 MG/DL — LOW
HDLC SERPL-MCNC: 52 MG/DL — SIGNIFICANT CHANGE UP
HGB BLD-MCNC: 12.6 G/DL — SIGNIFICANT CHANGE UP (ref 11.5–15.5)
KETONES UR-MCNC: ABNORMAL
KETONES UR-MCNC: NEGATIVE — SIGNIFICANT CHANGE UP
LEUKOCYTE ESTERASE UR-ACNC: ABNORMAL
LEUKOCYTE ESTERASE UR-ACNC: ABNORMAL
LIPID PNL WITH DIRECT LDL SERPL: 108 MG/DL — HIGH
LIPID PNL WITH DIRECT LDL SERPL: 113 MG/DL — HIGH
MCHC RBC-ENTMCNC: 30.2 PG — SIGNIFICANT CHANGE UP (ref 27–34)
MCHC RBC-ENTMCNC: 32.7 GM/DL — SIGNIFICANT CHANGE UP (ref 32–36)
MCV RBC AUTO: 92.3 FL — SIGNIFICANT CHANGE UP (ref 80–100)
METHADONE UR-MCNC: NEGATIVE — SIGNIFICANT CHANGE UP
NITRITE UR-MCNC: NEGATIVE — SIGNIFICANT CHANGE UP
NITRITE UR-MCNC: NEGATIVE — SIGNIFICANT CHANGE UP
NRBC # BLD: 0 /100 WBCS — SIGNIFICANT CHANGE UP (ref 0–0)
OPIATES UR-MCNC: NEGATIVE — SIGNIFICANT CHANGE UP
PCP SPEC-MCNC: SIGNIFICANT CHANGE UP
PCP UR-MCNC: NEGATIVE — SIGNIFICANT CHANGE UP
PH UR: 6 — SIGNIFICANT CHANGE UP (ref 5–8)
PH UR: 6 — SIGNIFICANT CHANGE UP (ref 5–8)
PLATELET # BLD AUTO: 346 K/UL — SIGNIFICANT CHANGE UP (ref 150–400)
POTASSIUM SERPL-MCNC: 3.7 MMOL/L — SIGNIFICANT CHANGE UP (ref 3.5–5.3)
POTASSIUM SERPL-SCNC: 3.7 MMOL/L — SIGNIFICANT CHANGE UP (ref 3.5–5.3)
PROT SERPL-MCNC: 7.1 G/DL — SIGNIFICANT CHANGE UP (ref 6–8.3)
PROT UR-MCNC: 100 MG/DL
PROT UR-MCNC: 30 MG/DL
RBC # BLD: 4.17 M/UL — SIGNIFICANT CHANGE UP (ref 3.8–5.2)
RBC # FLD: 12.4 % — SIGNIFICANT CHANGE UP (ref 10.3–14.5)
RBC CASTS # UR COMP ASSIST: ABNORMAL /HPF (ref 0–4)
SODIUM SERPL-SCNC: 142 MMOL/L — SIGNIFICANT CHANGE UP (ref 135–145)
SP GR SPEC: 1.01 — SIGNIFICANT CHANGE UP (ref 1.01–1.02)
SP GR SPEC: 1.01 — SIGNIFICANT CHANGE UP (ref 1.01–1.02)
T PALLIDUM AB TITR SER: NEGATIVE — SIGNIFICANT CHANGE UP
THC UR QL: NEGATIVE — SIGNIFICANT CHANGE UP
TOTAL CHOLESTEROL/HDL RATIO MEASUREMENT: 3.9 RATIO — SIGNIFICANT CHANGE UP (ref 3.3–7.1)
TOTAL CHOLESTEROL/HDL RATIO MEASUREMENT: 4.1 RATIO — SIGNIFICANT CHANGE UP (ref 3.3–7.1)
TRIGL SERPL-MCNC: 202 MG/DL — HIGH (ref 10–149)
TRIGL SERPL-MCNC: 211 MG/DL — HIGH (ref 10–149)
TSH SERPL-MCNC: 1.54 UIU/ML — SIGNIFICANT CHANGE UP (ref 0.27–4.2)
UROBILINOGEN FLD QL: NEGATIVE MG/DL — SIGNIFICANT CHANGE UP
UROBILINOGEN FLD QL: NEGATIVE MG/DL — SIGNIFICANT CHANGE UP
WBC # BLD: 6.75 K/UL — SIGNIFICANT CHANGE UP (ref 3.8–10.5)
WBC # FLD AUTO: 6.75 K/UL — SIGNIFICANT CHANGE UP (ref 3.8–10.5)
WBC UR QL: ABNORMAL

## 2019-04-17 PROCEDURE — 99231 SBSQ HOSP IP/OBS SF/LOW 25: CPT

## 2019-04-17 PROCEDURE — 93010 ELECTROCARDIOGRAM REPORT: CPT

## 2019-04-17 RX ORDER — ALBUTEROL 90 UG/1
2 AEROSOL, METERED ORAL EVERY 6 HOURS
Qty: 0 | Refills: 0 | Status: DISCONTINUED | OUTPATIENT
Start: 2019-04-17 | End: 2019-05-01

## 2019-04-17 RX ORDER — FLUTICASONE PROPIONATE 50 MCG
1 SPRAY, SUSPENSION NASAL
Qty: 0 | Refills: 0 | Status: DISCONTINUED | OUTPATIENT
Start: 2019-04-17 | End: 2019-05-01

## 2019-04-17 RX ADMIN — ARIPIPRAZOLE 15 MILLIGRAM(S): 15 TABLET ORAL at 09:12

## 2019-04-17 RX ADMIN — GABAPENTIN 100 MILLIGRAM(S): 400 CAPSULE ORAL at 13:07

## 2019-04-17 RX ADMIN — Medication 4 MILLIGRAM(S): at 09:12

## 2019-04-17 RX ADMIN — LAMOTRIGINE 150 MILLIGRAM(S): 25 TABLET, ORALLY DISINTEGRATING ORAL at 09:13

## 2019-04-17 RX ADMIN — Medication 1 APPLICATION(S): at 20:44

## 2019-04-17 RX ADMIN — Medication 2: at 12:59

## 2019-04-17 RX ADMIN — BENZOCAINE AND MENTHOL 1 LOZENGE: 5; 1 LIQUID ORAL at 21:34

## 2019-04-17 RX ADMIN — PANTOPRAZOLE SODIUM 40 MILLIGRAM(S): 20 TABLET, DELAYED RELEASE ORAL at 06:32

## 2019-04-17 RX ADMIN — ATORVASTATIN CALCIUM 80 MILLIGRAM(S): 80 TABLET, FILM COATED ORAL at 20:45

## 2019-04-17 RX ADMIN — Medication 40 MILLIGRAM(S): at 09:13

## 2019-04-17 RX ADMIN — Medication 1 APPLICATION(S): at 15:33

## 2019-04-17 RX ADMIN — LAMOTRIGINE 150 MILLIGRAM(S): 25 TABLET, ORALLY DISINTEGRATING ORAL at 20:45

## 2019-04-17 RX ADMIN — Medication 81 MILLIGRAM(S): at 09:12

## 2019-04-17 RX ADMIN — GABAPENTIN 100 MILLIGRAM(S): 400 CAPSULE ORAL at 20:44

## 2019-04-17 RX ADMIN — MONTELUKAST 10 MILLIGRAM(S): 4 TABLET, CHEWABLE ORAL at 09:13

## 2019-04-17 RX ADMIN — LOSARTAN POTASSIUM 100 MILLIGRAM(S): 100 TABLET, FILM COATED ORAL at 09:13

## 2019-04-17 RX ADMIN — Medication 1 SPRAY(S): at 20:45

## 2019-04-17 RX ADMIN — Medication 1 MILLIGRAM(S): at 01:27

## 2019-04-17 RX ADMIN — GABAPENTIN 100 MILLIGRAM(S): 400 CAPSULE ORAL at 09:12

## 2019-04-18 LAB
ALBUMIN SERPL ELPH-MCNC: 3.1 G/DL — LOW (ref 3.3–5)
ALP SERPL-CCNC: 77 U/L — SIGNIFICANT CHANGE UP (ref 30–120)
ALT FLD-CCNC: 407 U/L DA — HIGH (ref 10–60)
AST SERPL-CCNC: 257 U/L — HIGH (ref 10–40)
BILIRUB DIRECT SERPL-MCNC: 0.1 MG/DL — SIGNIFICANT CHANGE UP (ref 0–0.2)
BILIRUB INDIRECT FLD-MCNC: 0.3 MG/DL — SIGNIFICANT CHANGE UP (ref 0.2–1)
BILIRUB SERPL-MCNC: 0.4 MG/DL — SIGNIFICANT CHANGE UP (ref 0.2–1.2)
FERRITIN SERPL-MCNC: 210 NG/ML — HIGH (ref 15–150)
GLUCOSE BLDC GLUCOMTR-MCNC: 197 MG/DL — HIGH (ref 70–99)
GLUCOSE BLDC GLUCOMTR-MCNC: 217 MG/DL — HIGH (ref 70–99)
GLUCOSE BLDC GLUCOMTR-MCNC: 237 MG/DL — HIGH (ref 70–99)
GLUCOSE BLDC GLUCOMTR-MCNC: 251 MG/DL — HIGH (ref 70–99)
PROT SERPL-MCNC: 7 G/DL — SIGNIFICANT CHANGE UP (ref 6–8.3)

## 2019-04-18 PROCEDURE — 99231 SBSQ HOSP IP/OBS SF/LOW 25: CPT

## 2019-04-18 RX ORDER — ACYCLOVIR 50 MG/G
1 OINTMENT TOPICAL
Qty: 0 | Refills: 0 | Status: COMPLETED | OUTPATIENT
Start: 2019-04-18 | End: 2019-04-23

## 2019-04-18 RX ORDER — NYSTATIN/TRIAMCINOLONE ACET
1 OINTMENT (GRAM) TOPICAL
Qty: 0 | Refills: 0 | Status: DISCONTINUED | OUTPATIENT
Start: 2019-04-18 | End: 2019-05-01

## 2019-04-18 RX ORDER — LAMOTRIGINE 25 MG/1
150 TABLET, ORALLY DISINTEGRATING ORAL
Qty: 0 | Refills: 0 | Status: DISCONTINUED | OUTPATIENT
Start: 2019-04-18 | End: 2019-05-01

## 2019-04-18 RX ADMIN — MONTELUKAST 10 MILLIGRAM(S): 4 TABLET, CHEWABLE ORAL at 10:10

## 2019-04-18 RX ADMIN — Medication 1: at 17:13

## 2019-04-18 RX ADMIN — LOSARTAN POTASSIUM 100 MILLIGRAM(S): 100 TABLET, FILM COATED ORAL at 09:06

## 2019-04-18 RX ADMIN — Medication 4 MILLIGRAM(S): at 09:06

## 2019-04-18 RX ADMIN — BENZOCAINE AND MENTHOL 1 LOZENGE: 5; 1 LIQUID ORAL at 12:27

## 2019-04-18 RX ADMIN — LAMOTRIGINE 150 MILLIGRAM(S): 25 TABLET, ORALLY DISINTEGRATING ORAL at 09:07

## 2019-04-18 RX ADMIN — LAMOTRIGINE 150 MILLIGRAM(S): 25 TABLET, ORALLY DISINTEGRATING ORAL at 20:35

## 2019-04-18 RX ADMIN — Medication 2: at 08:00

## 2019-04-18 RX ADMIN — Medication 1 SPRAY(S): at 20:25

## 2019-04-18 RX ADMIN — Medication 1 APPLICATION(S): at 20:27

## 2019-04-18 RX ADMIN — Medication 1 APPLICATION(S): at 09:10

## 2019-04-18 RX ADMIN — ATORVASTATIN CALCIUM 80 MILLIGRAM(S): 80 TABLET, FILM COATED ORAL at 20:26

## 2019-04-18 RX ADMIN — Medication 81 MILLIGRAM(S): at 09:06

## 2019-04-18 RX ADMIN — Medication 1 SPRAY(S): at 12:07

## 2019-04-18 RX ADMIN — GABAPENTIN 100 MILLIGRAM(S): 400 CAPSULE ORAL at 09:06

## 2019-04-18 RX ADMIN — Medication 40 MILLIGRAM(S): at 09:07

## 2019-04-18 RX ADMIN — GABAPENTIN 100 MILLIGRAM(S): 400 CAPSULE ORAL at 20:27

## 2019-04-18 RX ADMIN — GABAPENTIN 100 MILLIGRAM(S): 400 CAPSULE ORAL at 12:29

## 2019-04-18 RX ADMIN — PANTOPRAZOLE SODIUM 40 MILLIGRAM(S): 20 TABLET, DELAYED RELEASE ORAL at 09:07

## 2019-04-18 RX ADMIN — Medication 2: at 14:37

## 2019-04-18 RX ADMIN — Medication 2: at 12:24

## 2019-04-18 RX ADMIN — ARIPIPRAZOLE 15 MILLIGRAM(S): 15 TABLET ORAL at 09:07

## 2019-04-18 RX ADMIN — ACYCLOVIR 1 APPLICATION(S): 50 OINTMENT TOPICAL at 20:28

## 2019-04-18 NOTE — PROGRESS NOTE ADULT - ASSESSMENT
Lft elevation w/o nausea -vomiting or post prandial pain   ruq sono   send metabolic and autoimmune markers for liver inflammation   avoid hepatotoxic medications   further recommendations to follow

## 2019-04-18 NOTE — PROGRESS NOTE ADULT - SUBJECTIVE AND OBJECTIVE BOX
Chief Complaint:  Patient is a 53y old  Female who presents with a chief complaint of   Asthma  Anxiety  Depression  Migraine  HLD (hyperlipidemia)  HTN (hypertension)  Diabetes  No pertinent past medical history  H/O oral surgery  No significant past surgical history     HPI:    asked to evaluate re lft elevation. alt/ast in 400 range   normal bili and alk phos   no reported chronic liver dz. no pain or n/v  No Known Allergies      acetaminophen   Tablet .. 650 milliGRAM(s) Oral every 6 hours PRN  ALBUTerol    90 MICROgram(s) HFA Inhaler 2 Puff(s) Inhalation every 6 hours PRN  ARIPiprazole 15 milliGRAM(s) Oral daily  aspirin enteric coated 81 milliGRAM(s) Oral daily  atorvastatin 80 milliGRAM(s) Oral at bedtime  benzocaine 15 mG/menthol 3.6 mG Lozenge 1 Lozenge Oral every 4 hours PRN  dextrose 40% Gel 15 Gram(s) Oral once PRN  dextrose 5%. 1000 milliLiter(s) IV Continuous <Continuous>  dextrose 50% Injectable 12.5 Gram(s) IV Push once  dextrose 50% Injectable 25 Gram(s) IV Push once  dextrose 50% Injectable 25 Gram(s) IV Push once  FLUoxetine 40 milliGRAM(s) Oral daily  fluticasone propionate 50 MICROgram(s)/spray Nasal Spray 1 Spray(s) Both Nostrils two times a day  gabapentin 100 milliGRAM(s) Oral three times a day  glimepiride. 4 milliGRAM(s) Oral with breakfast  glucagon  Injectable 1 milliGRAM(s) IntraMuscular once PRN  insulin lispro (HumaLOG) corrective regimen sliding scale   SubCutaneous three times a day before meals  lamoTRIgine 150 milliGRAM(s) Oral two times a day  LORazepam     Tablet 1 milliGRAM(s) Oral every 4 hours PRN  losartan 100 milliGRAM(s) Oral daily  montelukast 10 milliGRAM(s) Oral daily  nystatin/triamcinolone Cream 1 Application(s) Topical two times a day  pantoprazole    Tablet 40 milliGRAM(s) Oral before breakfast          Review of Systems:    General:  No wt loss, fevers, chills, night sweats,fatigue,   Eyes:  Good vision, no reported pain  ENT:  No sore throat, pain, runny nose, dysphagia  CV:  No pain, palpitatioins, hypo/hypertension  Resp:  No dyspnea, cough, tachypnea, wheezing  GI:  No pain, No nausea, No vomiting, No diarrhea, No constipatiion, No weight loss, No fever, No pruritis, No rectal bleeding, No tarry stools, No dysphagia,  :  No pain, bleeding, incontinence, nocturia  Endocrine:  No polyuria, polydypsia, cold/heat intolerance  Heme:  No petechiae, ecchymosis, easy bruisability  Skin:  No rash, tattoos, scars, ed      Physical Exam:      General:  Appears stated age, well-groomed, well-nourished, no distress  HEENT:  NC/AT,  conjunctivae clear and pink, no thyromegaly, nodules, adenopathy, no JVD  Chest:  Full & symmetric excursion, no increased effort, breath sounds clear  Cardiovascular:  Regular rhythm, S1, S2, no murmur/rub/S3/S4, no abdominal bruit, no edema  Abdomen:  Soft, non-tender, non-distended, normoactive bowel sounds,  no masses ,no hepatosplenomeagaly, no signs of chronic liver disease  Extremities:  no cyanosis,clubbing or edema  Skin:  No rash/erythema/ecchymoses/petechiae/wounds/abscess/warm/dry  Neuro/Psych:  Alert, oriented, no asterixis, no tremor, no encephalopathy    Laboratory:                            12.6   6.75  )-----------( 346      ( 2019 08:26 )             38.5     -    142  |  103  |  23  ----------------------------<  210<H>  3.7   |  29  |  0.92    Ca    9.3      2019 08:29    TPro  7.0  /  Alb  3.1<L>  /  TBili  0.4  /  DBili  0.1  /  AST  257<H>  /  ALT  407<H>  /  AlkPhos  77  04-18    LIVER FUNCTIONS - ( 2019 07:32 )  Alb: 3.1 g/dL / Pro: 7.0 g/dL / ALK PHOS: 77 U/L / ALT: 407 U/L DA / AST: 257 U/L / GGT: x             Urinalysis Basic - ( 2019 08:27 )    Color: Yellow / Appearance: Clear / S.015 / pH: x  Gluc: x / Ketone: Moderate  / Bili: Negative / Urobili: Negative mg/dL   Blood: x / Protein: 100 mg/dL / Nitrite: Negative   Leuk Esterase: Small / RBC: 6-10 /HPF / WBC 6-10   Sq Epi: x / Non Sq Epi: Moderate / Bacteria: Moderate        Imaging:      Assessment:      Plan:

## 2019-04-19 LAB
CULTURE RESULTS: SIGNIFICANT CHANGE UP
GLUCOSE BLDC GLUCOMTR-MCNC: 151 MG/DL — HIGH (ref 70–99)
GLUCOSE BLDC GLUCOMTR-MCNC: 229 MG/DL — HIGH (ref 70–99)
GLUCOSE BLDC GLUCOMTR-MCNC: 238 MG/DL — HIGH (ref 70–99)
GLUCOSE BLDC GLUCOMTR-MCNC: 241 MG/DL — HIGH (ref 70–99)
GLUCOSE BLDC GLUCOMTR-MCNC: 346 MG/DL — HIGH (ref 70–99)
MITOCHONDRIA AB SER-ACNC: SIGNIFICANT CHANGE UP
SMOOTH MUSCLE AB SER-ACNC: SIGNIFICANT CHANGE UP
SPECIMEN SOURCE: SIGNIFICANT CHANGE UP

## 2019-04-19 PROCEDURE — 99232 SBSQ HOSP IP/OBS MODERATE 35: CPT

## 2019-04-19 RX ADMIN — LOSARTAN POTASSIUM 100 MILLIGRAM(S): 100 TABLET, FILM COATED ORAL at 09:27

## 2019-04-19 RX ADMIN — ATORVASTATIN CALCIUM 80 MILLIGRAM(S): 80 TABLET, FILM COATED ORAL at 20:53

## 2019-04-19 RX ADMIN — Medication 650 MILLIGRAM(S): at 17:59

## 2019-04-19 RX ADMIN — ACYCLOVIR 1 APPLICATION(S): 50 OINTMENT TOPICAL at 20:53

## 2019-04-19 RX ADMIN — ARIPIPRAZOLE 15 MILLIGRAM(S): 15 TABLET ORAL at 09:35

## 2019-04-19 RX ADMIN — BENZOCAINE AND MENTHOL 1 LOZENGE: 5; 1 LIQUID ORAL at 14:47

## 2019-04-19 RX ADMIN — ACYCLOVIR 1 APPLICATION(S): 50 OINTMENT TOPICAL at 09:35

## 2019-04-19 RX ADMIN — Medication 40 MILLIGRAM(S): at 09:26

## 2019-04-19 RX ADMIN — Medication 1 APPLICATION(S): at 20:53

## 2019-04-19 RX ADMIN — Medication 4 MILLIGRAM(S): at 09:27

## 2019-04-19 RX ADMIN — Medication 2: at 17:00

## 2019-04-19 RX ADMIN — PANTOPRAZOLE SODIUM 40 MILLIGRAM(S): 20 TABLET, DELAYED RELEASE ORAL at 09:27

## 2019-04-19 RX ADMIN — Medication 1 APPLICATION(S): at 09:37

## 2019-04-19 RX ADMIN — Medication 4: at 09:41

## 2019-04-19 RX ADMIN — Medication 2: at 12:28

## 2019-04-19 RX ADMIN — MONTELUKAST 10 MILLIGRAM(S): 4 TABLET, CHEWABLE ORAL at 09:27

## 2019-04-19 RX ADMIN — LAMOTRIGINE 25 MILLIGRAM(S): 25 TABLET, ORALLY DISINTEGRATING ORAL at 09:21

## 2019-04-19 RX ADMIN — GABAPENTIN 100 MILLIGRAM(S): 400 CAPSULE ORAL at 13:16

## 2019-04-19 RX ADMIN — Medication 1 SPRAY(S): at 20:51

## 2019-04-19 RX ADMIN — GABAPENTIN 100 MILLIGRAM(S): 400 CAPSULE ORAL at 09:35

## 2019-04-19 RX ADMIN — GABAPENTIN 100 MILLIGRAM(S): 400 CAPSULE ORAL at 20:53

## 2019-04-19 RX ADMIN — Medication 81 MILLIGRAM(S): at 09:27

## 2019-04-19 RX ADMIN — LAMOTRIGINE 150 MILLIGRAM(S): 25 TABLET, ORALLY DISINTEGRATING ORAL at 20:52

## 2019-04-19 RX ADMIN — Medication 1 SPRAY(S): at 09:35

## 2019-04-20 LAB
GLUCOSE BLDC GLUCOMTR-MCNC: 181 MG/DL — HIGH (ref 70–99)
GLUCOSE BLDC GLUCOMTR-MCNC: 201 MG/DL — HIGH (ref 70–99)
GLUCOSE BLDC GLUCOMTR-MCNC: 254 MG/DL — HIGH (ref 70–99)
GLUCOSE BLDC GLUCOMTR-MCNC: 269 MG/DL — HIGH (ref 70–99)

## 2019-04-20 PROCEDURE — 99232 SBSQ HOSP IP/OBS MODERATE 35: CPT

## 2019-04-20 RX ORDER — GABAPENTIN 400 MG/1
300 CAPSULE ORAL THREE TIMES A DAY
Qty: 0 | Refills: 0 | Status: DISCONTINUED | OUTPATIENT
Start: 2019-04-20 | End: 2019-05-01

## 2019-04-20 RX ORDER — DOCUSATE SODIUM 100 MG
100 CAPSULE ORAL THREE TIMES A DAY
Qty: 0 | Refills: 0 | Status: DISCONTINUED | OUTPATIENT
Start: 2019-04-20 | End: 2019-05-01

## 2019-04-20 RX ORDER — GABAPENTIN 400 MG/1
100 CAPSULE ORAL ONCE
Qty: 0 | Refills: 0 | Status: COMPLETED | OUTPATIENT
Start: 2019-04-20 | End: 2019-04-20

## 2019-04-20 RX ADMIN — LAMOTRIGINE 150 MILLIGRAM(S): 25 TABLET, ORALLY DISINTEGRATING ORAL at 08:40

## 2019-04-20 RX ADMIN — Medication 1 SPRAY(S): at 20:22

## 2019-04-20 RX ADMIN — GABAPENTIN 100 MILLIGRAM(S): 400 CAPSULE ORAL at 10:00

## 2019-04-20 RX ADMIN — ARIPIPRAZOLE 15 MILLIGRAM(S): 15 TABLET ORAL at 09:59

## 2019-04-20 RX ADMIN — ACYCLOVIR 1 APPLICATION(S): 50 OINTMENT TOPICAL at 08:41

## 2019-04-20 RX ADMIN — Medication 4 MILLIGRAM(S): at 08:39

## 2019-04-20 RX ADMIN — Medication 3: at 12:07

## 2019-04-20 RX ADMIN — ATORVASTATIN CALCIUM 80 MILLIGRAM(S): 80 TABLET, FILM COATED ORAL at 20:23

## 2019-04-20 RX ADMIN — Medication 1 APPLICATION(S): at 08:42

## 2019-04-20 RX ADMIN — Medication 100 MILLIGRAM(S): at 12:58

## 2019-04-20 RX ADMIN — GABAPENTIN 300 MILLIGRAM(S): 400 CAPSULE ORAL at 20:23

## 2019-04-20 RX ADMIN — LAMOTRIGINE 150 MILLIGRAM(S): 25 TABLET, ORALLY DISINTEGRATING ORAL at 20:23

## 2019-04-20 RX ADMIN — ACYCLOVIR 1 APPLICATION(S): 50 OINTMENT TOPICAL at 20:22

## 2019-04-20 RX ADMIN — Medication 100 MILLIGRAM(S): at 20:23

## 2019-04-20 RX ADMIN — Medication 1 APPLICATION(S): at 20:22

## 2019-04-20 RX ADMIN — Medication 3: at 08:16

## 2019-04-20 RX ADMIN — Medication 2: at 17:12

## 2019-04-20 RX ADMIN — PANTOPRAZOLE SODIUM 40 MILLIGRAM(S): 20 TABLET, DELAYED RELEASE ORAL at 08:17

## 2019-04-20 RX ADMIN — GABAPENTIN 300 MILLIGRAM(S): 400 CAPSULE ORAL at 12:58

## 2019-04-20 RX ADMIN — GABAPENTIN 100 MILLIGRAM(S): 400 CAPSULE ORAL at 08:40

## 2019-04-20 RX ADMIN — Medication 1 SPRAY(S): at 08:41

## 2019-04-20 RX ADMIN — MONTELUKAST 10 MILLIGRAM(S): 4 TABLET, CHEWABLE ORAL at 08:39

## 2019-04-20 RX ADMIN — LOSARTAN POTASSIUM 100 MILLIGRAM(S): 100 TABLET, FILM COATED ORAL at 08:40

## 2019-04-20 RX ADMIN — Medication 81 MILLIGRAM(S): at 08:39

## 2019-04-20 RX ADMIN — Medication 40 MILLIGRAM(S): at 10:00

## 2019-04-21 LAB
GLUCOSE BLDC GLUCOMTR-MCNC: 182 MG/DL — HIGH (ref 70–99)
GLUCOSE BLDC GLUCOMTR-MCNC: 228 MG/DL — HIGH (ref 70–99)
GLUCOSE BLDC GLUCOMTR-MCNC: 240 MG/DL — HIGH (ref 70–99)
GLUCOSE BLDC GLUCOMTR-MCNC: 247 MG/DL — HIGH (ref 70–99)

## 2019-04-21 PROCEDURE — 99232 SBSQ HOSP IP/OBS MODERATE 35: CPT

## 2019-04-21 RX ADMIN — GABAPENTIN 300 MILLIGRAM(S): 400 CAPSULE ORAL at 12:25

## 2019-04-21 RX ADMIN — Medication 81 MILLIGRAM(S): at 09:27

## 2019-04-21 RX ADMIN — LOSARTAN POTASSIUM 100 MILLIGRAM(S): 100 TABLET, FILM COATED ORAL at 09:26

## 2019-04-21 RX ADMIN — MONTELUKAST 10 MILLIGRAM(S): 4 TABLET, CHEWABLE ORAL at 09:27

## 2019-04-21 RX ADMIN — Medication 1: at 17:15

## 2019-04-21 RX ADMIN — Medication 100 MILLIGRAM(S): at 09:27

## 2019-04-21 RX ADMIN — Medication 2: at 08:13

## 2019-04-21 RX ADMIN — LAMOTRIGINE 150 MILLIGRAM(S): 25 TABLET, ORALLY DISINTEGRATING ORAL at 09:27

## 2019-04-21 RX ADMIN — GABAPENTIN 300 MILLIGRAM(S): 400 CAPSULE ORAL at 21:41

## 2019-04-21 RX ADMIN — Medication 100 MILLIGRAM(S): at 12:25

## 2019-04-21 RX ADMIN — ATORVASTATIN CALCIUM 80 MILLIGRAM(S): 80 TABLET, FILM COATED ORAL at 21:41

## 2019-04-21 RX ADMIN — ARIPIPRAZOLE 15 MILLIGRAM(S): 15 TABLET ORAL at 09:27

## 2019-04-21 RX ADMIN — LAMOTRIGINE 150 MILLIGRAM(S): 25 TABLET, ORALLY DISINTEGRATING ORAL at 21:41

## 2019-04-21 RX ADMIN — Medication 4 MILLIGRAM(S): at 08:14

## 2019-04-21 RX ADMIN — Medication 1 APPLICATION(S): at 09:27

## 2019-04-21 RX ADMIN — ACYCLOVIR 1 APPLICATION(S): 50 OINTMENT TOPICAL at 09:28

## 2019-04-21 RX ADMIN — GABAPENTIN 300 MILLIGRAM(S): 400 CAPSULE ORAL at 09:27

## 2019-04-21 RX ADMIN — Medication 2: at 12:25

## 2019-04-21 RX ADMIN — Medication 1 APPLICATION(S): at 21:42

## 2019-04-21 RX ADMIN — Medication 1 SPRAY(S): at 09:27

## 2019-04-21 RX ADMIN — Medication 1 SPRAY(S): at 21:42

## 2019-04-21 RX ADMIN — Medication 40 MILLIGRAM(S): at 09:26

## 2019-04-21 RX ADMIN — Medication 100 MILLIGRAM(S): at 21:41

## 2019-04-21 RX ADMIN — ACYCLOVIR 1 APPLICATION(S): 50 OINTMENT TOPICAL at 21:42

## 2019-04-21 RX ADMIN — PANTOPRAZOLE SODIUM 40 MILLIGRAM(S): 20 TABLET, DELAYED RELEASE ORAL at 08:14

## 2019-04-22 LAB
GLUCOSE BLDC GLUCOMTR-MCNC: 191 MG/DL — HIGH (ref 70–99)
GLUCOSE BLDC GLUCOMTR-MCNC: 244 MG/DL — HIGH (ref 70–99)
GLUCOSE BLDC GLUCOMTR-MCNC: 251 MG/DL — HIGH (ref 70–99)
GLUCOSE BLDC GLUCOMTR-MCNC: 269 MG/DL — HIGH (ref 70–99)

## 2019-04-22 PROCEDURE — 99231 SBSQ HOSP IP/OBS SF/LOW 25: CPT

## 2019-04-22 RX ORDER — ARIPIPRAZOLE 15 MG/1
20 TABLET ORAL DAILY
Qty: 0 | Refills: 0 | Status: DISCONTINUED | OUTPATIENT
Start: 2019-04-23 | End: 2019-04-25

## 2019-04-22 RX ADMIN — Medication 2: at 07:53

## 2019-04-22 RX ADMIN — LOSARTAN POTASSIUM 100 MILLIGRAM(S): 100 TABLET, FILM COATED ORAL at 08:41

## 2019-04-22 RX ADMIN — GABAPENTIN 300 MILLIGRAM(S): 400 CAPSULE ORAL at 08:40

## 2019-04-22 RX ADMIN — Medication 1 SPRAY(S): at 08:40

## 2019-04-22 RX ADMIN — Medication 4 MILLIGRAM(S): at 07:52

## 2019-04-22 RX ADMIN — Medication 1 MILLIGRAM(S): at 02:29

## 2019-04-22 RX ADMIN — Medication 40 MILLIGRAM(S): at 08:39

## 2019-04-22 RX ADMIN — MONTELUKAST 10 MILLIGRAM(S): 4 TABLET, CHEWABLE ORAL at 08:41

## 2019-04-22 RX ADMIN — ATORVASTATIN CALCIUM 80 MILLIGRAM(S): 80 TABLET, FILM COATED ORAL at 20:33

## 2019-04-22 RX ADMIN — Medication 100 MILLIGRAM(S): at 20:33

## 2019-04-22 RX ADMIN — ARIPIPRAZOLE 15 MILLIGRAM(S): 15 TABLET ORAL at 08:38

## 2019-04-22 RX ADMIN — Medication 1 APPLICATION(S): at 09:31

## 2019-04-22 RX ADMIN — Medication 100 MILLIGRAM(S): at 08:39

## 2019-04-22 RX ADMIN — GABAPENTIN 300 MILLIGRAM(S): 400 CAPSULE ORAL at 20:33

## 2019-04-22 RX ADMIN — Medication 100 MILLIGRAM(S): at 14:23

## 2019-04-22 RX ADMIN — Medication 81 MILLIGRAM(S): at 08:39

## 2019-04-22 RX ADMIN — Medication 1 APPLICATION(S): at 20:34

## 2019-04-22 RX ADMIN — Medication 3: at 12:40

## 2019-04-22 RX ADMIN — LAMOTRIGINE 150 MILLIGRAM(S): 25 TABLET, ORALLY DISINTEGRATING ORAL at 08:41

## 2019-04-22 RX ADMIN — PANTOPRAZOLE SODIUM 40 MILLIGRAM(S): 20 TABLET, DELAYED RELEASE ORAL at 07:52

## 2019-04-22 RX ADMIN — ACYCLOVIR 1 APPLICATION(S): 50 OINTMENT TOPICAL at 08:39

## 2019-04-22 RX ADMIN — LAMOTRIGINE 150 MILLIGRAM(S): 25 TABLET, ORALLY DISINTEGRATING ORAL at 20:33

## 2019-04-22 RX ADMIN — GABAPENTIN 300 MILLIGRAM(S): 400 CAPSULE ORAL at 14:24

## 2019-04-22 RX ADMIN — Medication 1 SPRAY(S): at 20:34

## 2019-04-23 VITALS — HEIGHT: 63 IN | WEIGHT: 214.29 LBS

## 2019-04-23 DIAGNOSIS — E11.65 TYPE 2 DIABETES MELLITUS WITH HYPERGLYCEMIA: ICD-10-CM

## 2019-04-23 LAB
ENDOMYSIUM IGA TITR SER IF: NEGATIVE — SIGNIFICANT CHANGE UP
ENDOMYSIUM IGA TITR SER: SIGNIFICANT CHANGE UP
GLIADIN PEPTIDE IGA SER-ACNC: 5.2 UNITS — SIGNIFICANT CHANGE UP
GLIADIN PEPTIDE IGA SER-ACNC: NEGATIVE — SIGNIFICANT CHANGE UP
GLIADIN PEPTIDE IGG SER-ACNC: <5 UNITS — SIGNIFICANT CHANGE UP
GLIADIN PEPTIDE IGG SER-ACNC: NEGATIVE — SIGNIFICANT CHANGE UP
GLUCOSE BLDC GLUCOMTR-MCNC: 159 MG/DL — HIGH (ref 70–99)
GLUCOSE BLDC GLUCOMTR-MCNC: 260 MG/DL — HIGH (ref 70–99)
GLUCOSE BLDC GLUCOMTR-MCNC: 270 MG/DL — HIGH (ref 70–99)
GLUCOSE BLDC GLUCOMTR-MCNC: 271 MG/DL — HIGH (ref 70–99)
LAB BILL ONLY ITEM: SIGNIFICANT CHANGE UP
TTG IGA SER-ACNC: <1.2 U/ML — SIGNIFICANT CHANGE UP
TTG IGA SER-ACNC: NEGATIVE — SIGNIFICANT CHANGE UP

## 2019-04-23 PROCEDURE — 99231 SBSQ HOSP IP/OBS SF/LOW 25: CPT

## 2019-04-23 RX ORDER — INSULIN GLARGINE 100 [IU]/ML
15 INJECTION, SOLUTION SUBCUTANEOUS AT BEDTIME
Qty: 0 | Refills: 0 | Status: DISCONTINUED | OUTPATIENT
Start: 2019-04-23 | End: 2019-05-01

## 2019-04-23 RX ORDER — METFORMIN HYDROCHLORIDE 850 MG/1
1000 TABLET ORAL DAILY
Qty: 0 | Refills: 0 | Status: DISCONTINUED | OUTPATIENT
Start: 2019-04-24 | End: 2019-05-01

## 2019-04-23 RX ADMIN — Medication 1 SPRAY(S): at 20:22

## 2019-04-23 RX ADMIN — LAMOTRIGINE 150 MILLIGRAM(S): 25 TABLET, ORALLY DISINTEGRATING ORAL at 08:53

## 2019-04-23 RX ADMIN — Medication 3: at 12:26

## 2019-04-23 RX ADMIN — Medication 1 SPRAY(S): at 08:53

## 2019-04-23 RX ADMIN — LAMOTRIGINE 150 MILLIGRAM(S): 25 TABLET, ORALLY DISINTEGRATING ORAL at 20:21

## 2019-04-23 RX ADMIN — Medication 1 APPLICATION(S): at 20:22

## 2019-04-23 RX ADMIN — LOSARTAN POTASSIUM 100 MILLIGRAM(S): 100 TABLET, FILM COATED ORAL at 08:53

## 2019-04-23 RX ADMIN — INSULIN GLARGINE 15 UNIT(S): 100 INJECTION, SOLUTION SUBCUTANEOUS at 20:21

## 2019-04-23 RX ADMIN — GABAPENTIN 300 MILLIGRAM(S): 400 CAPSULE ORAL at 20:21

## 2019-04-23 RX ADMIN — Medication 81 MILLIGRAM(S): at 08:53

## 2019-04-23 RX ADMIN — BENZOCAINE AND MENTHOL 1 LOZENGE: 5; 1 LIQUID ORAL at 17:55

## 2019-04-23 RX ADMIN — ACYCLOVIR 1 APPLICATION(S): 50 OINTMENT TOPICAL at 08:54

## 2019-04-23 RX ADMIN — GABAPENTIN 300 MILLIGRAM(S): 400 CAPSULE ORAL at 12:26

## 2019-04-23 RX ADMIN — Medication 100 MILLIGRAM(S): at 20:21

## 2019-04-23 RX ADMIN — GABAPENTIN 300 MILLIGRAM(S): 400 CAPSULE ORAL at 08:53

## 2019-04-23 RX ADMIN — Medication 3: at 08:15

## 2019-04-23 RX ADMIN — ATORVASTATIN CALCIUM 80 MILLIGRAM(S): 80 TABLET, FILM COATED ORAL at 20:21

## 2019-04-23 RX ADMIN — PANTOPRAZOLE SODIUM 40 MILLIGRAM(S): 20 TABLET, DELAYED RELEASE ORAL at 08:17

## 2019-04-23 RX ADMIN — Medication 1: at 17:32

## 2019-04-23 RX ADMIN — Medication 40 MILLIGRAM(S): at 08:53

## 2019-04-23 RX ADMIN — Medication 100 MILLIGRAM(S): at 12:26

## 2019-04-23 RX ADMIN — MONTELUKAST 10 MILLIGRAM(S): 4 TABLET, CHEWABLE ORAL at 08:53

## 2019-04-23 RX ADMIN — ARIPIPRAZOLE 20 MILLIGRAM(S): 15 TABLET ORAL at 12:27

## 2019-04-23 RX ADMIN — Medication 1 APPLICATION(S): at 08:54

## 2019-04-23 RX ADMIN — Medication 4 MILLIGRAM(S): at 08:17

## 2019-04-23 RX ADMIN — Medication 100 MILLIGRAM(S): at 08:53

## 2019-04-23 NOTE — CONSULT NOTE ADULT - PROBLEM SELECTOR RECOMMENDATION 9
discontinue Glimiperide  add lantus  add lispro 5 units ac  monitor glucose trends  Goal range 100 to 180mg/dl

## 2019-04-23 NOTE — CONSULT NOTE ADULT - SUBJECTIVE AND OBJECTIVE BOX
Patient is a 53y old  Female who presents with a chief complaint of 1n (18 Apr 2019 09:27)      ROS:  Cardiovascular: No chest Pain, palpitations  Respiratory No SOB, No cough  GI: No nausea,Vomiting,abdominal pain  Endocrine: no polyuria,polydipsia    Daily     Daily     Vital Signs Last 24 Hrs    T(F): --98.2  HR: --104  BP: --128/82  RR: --17    Physical Exam:  General:NA well developed  HEENT: normocephalic,   Cardiovascular Regular rate and rhythm  REspiratory: easy and unlabored  Psych: Alert and oriented x3 inability to focus        PAST MEDICAL & SURGICAL HISTORY:  Asthma  Anxiety  Depression  Migraine  HLD (hyperlipidemia)  HTN (hypertension)  Diabetes Type 2  H/O oral surgery        No Known Allergies      MEDICATIONS  (STANDING):  ARIPiprazole 20 milliGRAM(s) Oral daily  aspirin enteric coated 81 milliGRAM(s) Oral daily  atorvastatin 80 milliGRAM(s) Oral at bedtime  dextrose 5%. 1000 milliLiter(s) (50 mL/Hr) IV Continuous <Continuous>  dextrose 50% Injectable 12.5 Gram(s) IV Push once  dextrose 50% Injectable 25 Gram(s) IV Push once  dextrose 50% Injectable 25 Gram(s) IV Push once  docusate sodium 100 milliGRAM(s) Oral three times a day  FLUoxetine 40 milliGRAM(s) Oral daily  fluticasone propionate 50 MICROgram(s)/spray Nasal Spray 1 Spray(s) Both Nostrils two times a day  gabapentin 300 milliGRAM(s) Oral three times a day  glimepiride. 4 milliGRAM(s) Oral with breakfast  insulin lispro (HumaLOG) corrective regimen sliding scale   SubCutaneous three times a day before meals  lamoTRIgine 150 milliGRAM(s) Oral two times a day  losartan 100 milliGRAM(s) Oral daily  montelukast 10 milliGRAM(s) Oral daily  nystatin/triamcinolone Cream 1 Application(s) Topical two times a day  pantoprazole    Tablet 40 milliGRAM(s) Oral before breakfast      Home Medications:  albuterol 90 mcg/inh inhalation aerosol: 2 puff(s) inhaled every 4 hours, As needed, Shortness of Breath and/or Wheezing (22 Oct 2018 07:59)  aspirin 81 mg oral delayed release tablet: 1 tab(s) orally once a day (22 Oct 2018 07:59)  budesonide-formoterol 80 mcg-4.5 mcg/inh inhalation aerosol: 1 puff(s) inhaled 2 times a day (22 Oct 2018 07:59)  FLUoxetine 20 mg oral capsule: 2 cap(s) orally 2 times a day (22 Oct 2018 07:59)  gabapentin 100 mg oral capsule: 1 cap(s) orally once a day (at bedtime) (22 Oct 2018 07:59)  lamoTRIgine 25 mg oral tablet: 2 tab(s) orally once a day (22 Oct 2018 07:59)  Lantus Solostar Pen 100 units/mL subcutaneous solution: 25 unit(s) subcutaneous once a day (at bedtime) (22 Oct 2018 07:59)  valsartan 320 mg oral tablet: 1 tab(s) orally once a day (22 Oct 2018 07:59)      Diet: Consistant Carbohydrates    A1c: 8.7%    POCT Blood Glucose.: 271 mg/dL (23 Apr 2019 07:47)  POCT Blood Glucose.: 191 mg/dL (22 Apr 2019 20:14)  POCT Blood Glucose.: 251 mg/dL (22 Apr 2019 16:46)  POCT Blood Glucose.: 269 mg/dL (22 Apr 2019 12:35)  POCT Blood Glucose.: 244 mg/dL (22 Apr 2019 07:24)  POCT Blood Glucose.: 247 mg/dL (21 Apr 2019 19:59)  POCT Blood Glucose.: 182 mg/dL (21 Apr 2019 16:50)  POCT Blood Glucose.: 228 mg/dL (21 Apr 2019 12:12)

## 2019-04-23 NOTE — CONSULT NOTE ADULT - ASSESSMENT
53 yr old female with history of type 2 DM, on  Lantus 28 units sq hs , Invokana , glimepiride and  Trulicity at home,  follows with  Dr LUISA Oliver at home and has improved her A1c from the > 9% to 8.7%.  Patient has diabetes support and education in the past  unable to manage her diabetes when  she becomes psychologically unstable.  She  has a home glucose monitor and diabetes medication supplies, needs  test strips at discharge.

## 2019-04-24 LAB
GLUCOSE BLDC GLUCOMTR-MCNC: 139 MG/DL — HIGH (ref 70–99)
GLUCOSE BLDC GLUCOMTR-MCNC: 185 MG/DL — HIGH (ref 70–99)
GLUCOSE BLDC GLUCOMTR-MCNC: 245 MG/DL — HIGH (ref 70–99)

## 2019-04-24 PROCEDURE — 99231 SBSQ HOSP IP/OBS SF/LOW 25: CPT

## 2019-04-24 RX ORDER — INSULIN LISPRO 100/ML
5 VIAL (ML) SUBCUTANEOUS
Qty: 0 | Refills: 0 | Status: DISCONTINUED | OUTPATIENT
Start: 2019-04-24 | End: 2019-05-01

## 2019-04-24 RX ADMIN — GABAPENTIN 300 MILLIGRAM(S): 400 CAPSULE ORAL at 13:08

## 2019-04-24 RX ADMIN — Medication 100 MILLIGRAM(S): at 13:08

## 2019-04-24 RX ADMIN — Medication 4 MILLIGRAM(S): at 08:05

## 2019-04-24 RX ADMIN — Medication 2: at 08:04

## 2019-04-24 RX ADMIN — Medication 40 MILLIGRAM(S): at 08:34

## 2019-04-24 RX ADMIN — Medication 1 APPLICATION(S): at 08:33

## 2019-04-24 RX ADMIN — Medication 81 MILLIGRAM(S): at 08:35

## 2019-04-24 RX ADMIN — LOSARTAN POTASSIUM 100 MILLIGRAM(S): 100 TABLET, FILM COATED ORAL at 08:37

## 2019-04-24 RX ADMIN — INSULIN GLARGINE 15 UNIT(S): 100 INJECTION, SOLUTION SUBCUTANEOUS at 20:42

## 2019-04-24 RX ADMIN — METFORMIN HYDROCHLORIDE 1000 MILLIGRAM(S): 850 TABLET ORAL at 08:34

## 2019-04-24 RX ADMIN — Medication 100 MILLIGRAM(S): at 20:43

## 2019-04-24 RX ADMIN — Medication 5 UNIT(S): at 16:55

## 2019-04-24 RX ADMIN — PANTOPRAZOLE SODIUM 40 MILLIGRAM(S): 20 TABLET, DELAYED RELEASE ORAL at 08:05

## 2019-04-24 RX ADMIN — Medication 1 SPRAY(S): at 08:38

## 2019-04-24 RX ADMIN — GABAPENTIN 300 MILLIGRAM(S): 400 CAPSULE ORAL at 20:43

## 2019-04-24 RX ADMIN — Medication 100 MILLIGRAM(S): at 08:34

## 2019-04-24 RX ADMIN — Medication 1 SPRAY(S): at 20:42

## 2019-04-24 RX ADMIN — ARIPIPRAZOLE 20 MILLIGRAM(S): 15 TABLET ORAL at 08:35

## 2019-04-24 RX ADMIN — LAMOTRIGINE 150 MILLIGRAM(S): 25 TABLET, ORALLY DISINTEGRATING ORAL at 08:36

## 2019-04-24 RX ADMIN — GABAPENTIN 300 MILLIGRAM(S): 400 CAPSULE ORAL at 08:34

## 2019-04-24 RX ADMIN — ATORVASTATIN CALCIUM 80 MILLIGRAM(S): 80 TABLET, FILM COATED ORAL at 20:43

## 2019-04-24 RX ADMIN — MONTELUKAST 10 MILLIGRAM(S): 4 TABLET, CHEWABLE ORAL at 08:35

## 2019-04-24 RX ADMIN — Medication 1 APPLICATION(S): at 21:17

## 2019-04-24 RX ADMIN — LAMOTRIGINE 150 MILLIGRAM(S): 25 TABLET, ORALLY DISINTEGRATING ORAL at 20:43

## 2019-04-25 LAB
GLUCOSE BLDC GLUCOMTR-MCNC: 123 MG/DL — HIGH (ref 70–99)
GLUCOSE BLDC GLUCOMTR-MCNC: 148 MG/DL — HIGH (ref 70–99)
GLUCOSE BLDC GLUCOMTR-MCNC: 154 MG/DL — HIGH (ref 70–99)
GLUCOSE BLDC GLUCOMTR-MCNC: 270 MG/DL — HIGH (ref 70–99)

## 2019-04-25 PROCEDURE — 99231 SBSQ HOSP IP/OBS SF/LOW 25: CPT

## 2019-04-25 RX ORDER — ARIPIPRAZOLE 15 MG/1
25 TABLET ORAL DAILY
Qty: 0 | Refills: 0 | Status: DISCONTINUED | OUTPATIENT
Start: 2019-04-25 | End: 2019-04-26

## 2019-04-25 RX ORDER — BUDESONIDE AND FORMOTEROL FUMARATE DIHYDRATE 160; 4.5 UG/1; UG/1
2 AEROSOL RESPIRATORY (INHALATION)
Qty: 0 | Refills: 0 | Status: DISCONTINUED | OUTPATIENT
Start: 2019-04-25 | End: 2019-05-01

## 2019-04-25 RX ADMIN — Medication 81 MILLIGRAM(S): at 08:53

## 2019-04-25 RX ADMIN — Medication 4 MILLIGRAM(S): at 08:05

## 2019-04-25 RX ADMIN — GABAPENTIN 300 MILLIGRAM(S): 400 CAPSULE ORAL at 08:53

## 2019-04-25 RX ADMIN — Medication 1 SPRAY(S): at 08:55

## 2019-04-25 RX ADMIN — BUDESONIDE AND FORMOTEROL FUMARATE DIHYDRATE 2 PUFF(S): 160; 4.5 AEROSOL RESPIRATORY (INHALATION) at 20:28

## 2019-04-25 RX ADMIN — Medication 1 SPRAY(S): at 20:28

## 2019-04-25 RX ADMIN — Medication 1 APPLICATION(S): at 20:29

## 2019-04-25 RX ADMIN — LAMOTRIGINE 150 MILLIGRAM(S): 25 TABLET, ORALLY DISINTEGRATING ORAL at 08:54

## 2019-04-25 RX ADMIN — Medication 40 MILLIGRAM(S): at 08:54

## 2019-04-25 RX ADMIN — Medication 100 MILLIGRAM(S): at 08:53

## 2019-04-25 RX ADMIN — LOSARTAN POTASSIUM 100 MILLIGRAM(S): 100 TABLET, FILM COATED ORAL at 08:53

## 2019-04-25 RX ADMIN — Medication 3: at 08:06

## 2019-04-25 RX ADMIN — INSULIN GLARGINE 15 UNIT(S): 100 INJECTION, SOLUTION SUBCUTANEOUS at 20:29

## 2019-04-25 RX ADMIN — Medication 100 MILLIGRAM(S): at 20:30

## 2019-04-25 RX ADMIN — LAMOTRIGINE 150 MILLIGRAM(S): 25 TABLET, ORALLY DISINTEGRATING ORAL at 20:30

## 2019-04-25 RX ADMIN — Medication 5 UNIT(S): at 08:06

## 2019-04-25 RX ADMIN — GABAPENTIN 300 MILLIGRAM(S): 400 CAPSULE ORAL at 20:30

## 2019-04-25 RX ADMIN — Medication 1 APPLICATION(S): at 08:52

## 2019-04-25 RX ADMIN — METFORMIN HYDROCHLORIDE 1000 MILLIGRAM(S): 850 TABLET ORAL at 08:05

## 2019-04-25 RX ADMIN — PANTOPRAZOLE SODIUM 40 MILLIGRAM(S): 20 TABLET, DELAYED RELEASE ORAL at 08:05

## 2019-04-25 RX ADMIN — ATORVASTATIN CALCIUM 80 MILLIGRAM(S): 80 TABLET, FILM COATED ORAL at 20:30

## 2019-04-25 RX ADMIN — Medication 5 UNIT(S): at 12:20

## 2019-04-25 RX ADMIN — Medication 100 MILLIGRAM(S): at 13:21

## 2019-04-25 RX ADMIN — GABAPENTIN 300 MILLIGRAM(S): 400 CAPSULE ORAL at 13:21

## 2019-04-25 RX ADMIN — ARIPIPRAZOLE 20 MILLIGRAM(S): 15 TABLET ORAL at 08:54

## 2019-04-25 RX ADMIN — MONTELUKAST 10 MILLIGRAM(S): 4 TABLET, CHEWABLE ORAL at 08:55

## 2019-04-26 LAB
GLUCOSE BLDC GLUCOMTR-MCNC: 129 MG/DL — HIGH (ref 70–99)
GLUCOSE BLDC GLUCOMTR-MCNC: 164 MG/DL — HIGH (ref 70–99)
GLUCOSE BLDC GLUCOMTR-MCNC: 190 MG/DL — HIGH (ref 70–99)
GLUCOSE BLDC GLUCOMTR-MCNC: 232 MG/DL — HIGH (ref 70–99)
GLUCOSE BLDC GLUCOMTR-MCNC: 88 MG/DL — SIGNIFICANT CHANGE UP (ref 70–99)

## 2019-04-26 PROCEDURE — 99231 SBSQ HOSP IP/OBS SF/LOW 25: CPT

## 2019-04-26 RX ORDER — ARIPIPRAZOLE 15 MG/1
30 TABLET ORAL DAILY
Qty: 0 | Refills: 0 | Status: DISCONTINUED | OUTPATIENT
Start: 2019-04-27 | End: 2019-05-01

## 2019-04-26 RX ADMIN — Medication 5 UNIT(S): at 17:12

## 2019-04-26 RX ADMIN — GABAPENTIN 300 MILLIGRAM(S): 400 CAPSULE ORAL at 09:12

## 2019-04-26 RX ADMIN — Medication 4 MILLIGRAM(S): at 07:54

## 2019-04-26 RX ADMIN — Medication 40 MILLIGRAM(S): at 09:12

## 2019-04-26 RX ADMIN — INSULIN GLARGINE 15 UNIT(S): 100 INJECTION, SOLUTION SUBCUTANEOUS at 20:32

## 2019-04-26 RX ADMIN — MONTELUKAST 10 MILLIGRAM(S): 4 TABLET, CHEWABLE ORAL at 09:12

## 2019-04-26 RX ADMIN — GABAPENTIN 300 MILLIGRAM(S): 400 CAPSULE ORAL at 12:52

## 2019-04-26 RX ADMIN — Medication 100 MILLIGRAM(S): at 09:12

## 2019-04-26 RX ADMIN — ATORVASTATIN CALCIUM 80 MILLIGRAM(S): 80 TABLET, FILM COATED ORAL at 20:34

## 2019-04-26 RX ADMIN — Medication 1: at 12:23

## 2019-04-26 RX ADMIN — GABAPENTIN 300 MILLIGRAM(S): 400 CAPSULE ORAL at 20:34

## 2019-04-26 RX ADMIN — LOSARTAN POTASSIUM 100 MILLIGRAM(S): 100 TABLET, FILM COATED ORAL at 09:12

## 2019-04-26 RX ADMIN — Medication 81 MILLIGRAM(S): at 09:12

## 2019-04-26 RX ADMIN — Medication 5 UNIT(S): at 12:24

## 2019-04-26 RX ADMIN — Medication 1 SPRAY(S): at 09:17

## 2019-04-26 RX ADMIN — BUDESONIDE AND FORMOTEROL FUMARATE DIHYDRATE 2 PUFF(S): 160; 4.5 AEROSOL RESPIRATORY (INHALATION) at 20:32

## 2019-04-26 RX ADMIN — ARIPIPRAZOLE 25 MILLIGRAM(S): 15 TABLET ORAL at 09:12

## 2019-04-26 RX ADMIN — LAMOTRIGINE 150 MILLIGRAM(S): 25 TABLET, ORALLY DISINTEGRATING ORAL at 20:34

## 2019-04-26 RX ADMIN — Medication 2: at 07:54

## 2019-04-26 RX ADMIN — METFORMIN HYDROCHLORIDE 1000 MILLIGRAM(S): 850 TABLET ORAL at 09:12

## 2019-04-26 RX ADMIN — Medication 100 MILLIGRAM(S): at 20:34

## 2019-04-26 RX ADMIN — Medication 1 APPLICATION(S): at 20:34

## 2019-04-26 RX ADMIN — PANTOPRAZOLE SODIUM 40 MILLIGRAM(S): 20 TABLET, DELAYED RELEASE ORAL at 07:54

## 2019-04-26 RX ADMIN — LAMOTRIGINE 150 MILLIGRAM(S): 25 TABLET, ORALLY DISINTEGRATING ORAL at 09:13

## 2019-04-26 RX ADMIN — Medication 5 UNIT(S): at 07:55

## 2019-04-26 RX ADMIN — BUDESONIDE AND FORMOTEROL FUMARATE DIHYDRATE 2 PUFF(S): 160; 4.5 AEROSOL RESPIRATORY (INHALATION) at 07:54

## 2019-04-26 RX ADMIN — Medication 1 SPRAY(S): at 20:31

## 2019-04-26 RX ADMIN — Medication 100 MILLIGRAM(S): at 12:52

## 2019-04-27 LAB
GLUCOSE BLDC GLUCOMTR-MCNC: 141 MG/DL — HIGH (ref 70–99)
GLUCOSE BLDC GLUCOMTR-MCNC: 164 MG/DL — HIGH (ref 70–99)
GLUCOSE BLDC GLUCOMTR-MCNC: 233 MG/DL — HIGH (ref 70–99)
GLUCOSE BLDC GLUCOMTR-MCNC: 78 MG/DL — SIGNIFICANT CHANGE UP (ref 70–99)

## 2019-04-27 RX ADMIN — GABAPENTIN 300 MILLIGRAM(S): 400 CAPSULE ORAL at 20:35

## 2019-04-27 RX ADMIN — Medication 2: at 07:53

## 2019-04-27 RX ADMIN — Medication 1 APPLICATION(S): at 20:36

## 2019-04-27 RX ADMIN — METFORMIN HYDROCHLORIDE 1000 MILLIGRAM(S): 850 TABLET ORAL at 08:25

## 2019-04-27 RX ADMIN — BUDESONIDE AND FORMOTEROL FUMARATE DIHYDRATE 2 PUFF(S): 160; 4.5 AEROSOL RESPIRATORY (INHALATION) at 06:29

## 2019-04-27 RX ADMIN — Medication 5 UNIT(S): at 07:54

## 2019-04-27 RX ADMIN — GABAPENTIN 300 MILLIGRAM(S): 400 CAPSULE ORAL at 12:50

## 2019-04-27 RX ADMIN — LAMOTRIGINE 150 MILLIGRAM(S): 25 TABLET, ORALLY DISINTEGRATING ORAL at 08:26

## 2019-04-27 RX ADMIN — Medication 1 SPRAY(S): at 20:35

## 2019-04-27 RX ADMIN — ATORVASTATIN CALCIUM 80 MILLIGRAM(S): 80 TABLET, FILM COATED ORAL at 20:35

## 2019-04-27 RX ADMIN — PANTOPRAZOLE SODIUM 40 MILLIGRAM(S): 20 TABLET, DELAYED RELEASE ORAL at 06:27

## 2019-04-27 RX ADMIN — Medication 100 MILLIGRAM(S): at 20:35

## 2019-04-27 RX ADMIN — ARIPIPRAZOLE 30 MILLIGRAM(S): 15 TABLET ORAL at 08:25

## 2019-04-27 RX ADMIN — Medication 81 MILLIGRAM(S): at 08:26

## 2019-04-27 RX ADMIN — GABAPENTIN 300 MILLIGRAM(S): 400 CAPSULE ORAL at 08:26

## 2019-04-27 RX ADMIN — Medication 100 MILLIGRAM(S): at 12:50

## 2019-04-27 RX ADMIN — INSULIN GLARGINE 15 UNIT(S): 100 INJECTION, SOLUTION SUBCUTANEOUS at 20:34

## 2019-04-27 RX ADMIN — LOSARTAN POTASSIUM 100 MILLIGRAM(S): 100 TABLET, FILM COATED ORAL at 08:25

## 2019-04-27 RX ADMIN — Medication 5 UNIT(S): at 12:23

## 2019-04-27 RX ADMIN — Medication 5 UNIT(S): at 17:06

## 2019-04-27 RX ADMIN — Medication 100 MILLIGRAM(S): at 08:25

## 2019-04-27 RX ADMIN — Medication 4 MILLIGRAM(S): at 08:26

## 2019-04-27 RX ADMIN — MONTELUKAST 10 MILLIGRAM(S): 4 TABLET, CHEWABLE ORAL at 08:25

## 2019-04-27 RX ADMIN — Medication 40 MILLIGRAM(S): at 08:26

## 2019-04-27 RX ADMIN — Medication 1 APPLICATION(S): at 08:26

## 2019-04-27 RX ADMIN — LAMOTRIGINE 150 MILLIGRAM(S): 25 TABLET, ORALLY DISINTEGRATING ORAL at 20:35

## 2019-04-27 RX ADMIN — Medication 1 SPRAY(S): at 08:24

## 2019-04-28 LAB
GLUCOSE BLDC GLUCOMTR-MCNC: 154 MG/DL — HIGH (ref 70–99)
GLUCOSE BLDC GLUCOMTR-MCNC: 189 MG/DL — HIGH (ref 70–99)
GLUCOSE BLDC GLUCOMTR-MCNC: 204 MG/DL — HIGH (ref 70–99)
GLUCOSE BLDC GLUCOMTR-MCNC: 229 MG/DL — HIGH (ref 70–99)
GLUCOSE BLDC GLUCOMTR-MCNC: 73 MG/DL — SIGNIFICANT CHANGE UP (ref 70–99)

## 2019-04-28 RX ADMIN — Medication 100 MILLIGRAM(S): at 09:16

## 2019-04-28 RX ADMIN — Medication 100 MILLIGRAM(S): at 12:23

## 2019-04-28 RX ADMIN — Medication 1 SPRAY(S): at 20:25

## 2019-04-28 RX ADMIN — Medication 100 MILLIGRAM(S): at 20:26

## 2019-04-28 RX ADMIN — Medication 2: at 08:14

## 2019-04-28 RX ADMIN — INSULIN GLARGINE 15 UNIT(S): 100 INJECTION, SOLUTION SUBCUTANEOUS at 20:26

## 2019-04-28 RX ADMIN — GABAPENTIN 300 MILLIGRAM(S): 400 CAPSULE ORAL at 20:26

## 2019-04-28 RX ADMIN — PANTOPRAZOLE SODIUM 40 MILLIGRAM(S): 20 TABLET, DELAYED RELEASE ORAL at 08:14

## 2019-04-28 RX ADMIN — Medication 5 UNIT(S): at 17:15

## 2019-04-28 RX ADMIN — Medication 40 MILLIGRAM(S): at 09:14

## 2019-04-28 RX ADMIN — ATORVASTATIN CALCIUM 80 MILLIGRAM(S): 80 TABLET, FILM COATED ORAL at 20:26

## 2019-04-28 RX ADMIN — Medication 5 UNIT(S): at 08:15

## 2019-04-28 RX ADMIN — Medication 81 MILLIGRAM(S): at 09:16

## 2019-04-28 RX ADMIN — Medication 5 UNIT(S): at 12:23

## 2019-04-28 RX ADMIN — MONTELUKAST 10 MILLIGRAM(S): 4 TABLET, CHEWABLE ORAL at 09:14

## 2019-04-28 RX ADMIN — GABAPENTIN 300 MILLIGRAM(S): 400 CAPSULE ORAL at 09:16

## 2019-04-28 RX ADMIN — ARIPIPRAZOLE 30 MILLIGRAM(S): 15 TABLET ORAL at 09:14

## 2019-04-28 RX ADMIN — Medication 1 MILLIGRAM(S): at 03:58

## 2019-04-28 RX ADMIN — LOSARTAN POTASSIUM 100 MILLIGRAM(S): 100 TABLET, FILM COATED ORAL at 09:16

## 2019-04-28 RX ADMIN — LAMOTRIGINE 150 MILLIGRAM(S): 25 TABLET, ORALLY DISINTEGRATING ORAL at 09:14

## 2019-04-28 RX ADMIN — BUDESONIDE AND FORMOTEROL FUMARATE DIHYDRATE 2 PUFF(S): 160; 4.5 AEROSOL RESPIRATORY (INHALATION) at 09:12

## 2019-04-28 RX ADMIN — LAMOTRIGINE 150 MILLIGRAM(S): 25 TABLET, ORALLY DISINTEGRATING ORAL at 20:26

## 2019-04-28 RX ADMIN — Medication 2: at 17:15

## 2019-04-28 RX ADMIN — METFORMIN HYDROCHLORIDE 1000 MILLIGRAM(S): 850 TABLET ORAL at 09:16

## 2019-04-28 RX ADMIN — GABAPENTIN 300 MILLIGRAM(S): 400 CAPSULE ORAL at 12:22

## 2019-04-28 RX ADMIN — Medication 1: at 12:23

## 2019-04-28 RX ADMIN — Medication 4 MILLIGRAM(S): at 08:14

## 2019-04-28 RX ADMIN — Medication 1 SPRAY(S): at 09:13

## 2019-04-29 LAB
GLUCOSE BLDC GLUCOMTR-MCNC: 119 MG/DL — HIGH (ref 70–99)
GLUCOSE BLDC GLUCOMTR-MCNC: 169 MG/DL — HIGH (ref 70–99)
GLUCOSE BLDC GLUCOMTR-MCNC: 171 MG/DL — HIGH (ref 70–99)
GLUCOSE BLDC GLUCOMTR-MCNC: 203 MG/DL — HIGH (ref 70–99)

## 2019-04-29 PROCEDURE — 99231 SBSQ HOSP IP/OBS SF/LOW 25: CPT

## 2019-04-29 RX ORDER — FLUPHENAZINE HYDROCHLORIDE 1 MG/1
2.5 TABLET, FILM COATED ORAL AT BEDTIME
Qty: 0 | Refills: 0 | Status: DISCONTINUED | OUTPATIENT
Start: 2019-04-29 | End: 2019-04-30

## 2019-04-29 RX ADMIN — FLUPHENAZINE HYDROCHLORIDE 2.5 MILLIGRAM(S): 1 TABLET, FILM COATED ORAL at 20:39

## 2019-04-29 RX ADMIN — Medication 81 MILLIGRAM(S): at 08:10

## 2019-04-29 RX ADMIN — ARIPIPRAZOLE 30 MILLIGRAM(S): 15 TABLET ORAL at 08:10

## 2019-04-29 RX ADMIN — MONTELUKAST 10 MILLIGRAM(S): 4 TABLET, CHEWABLE ORAL at 08:10

## 2019-04-29 RX ADMIN — Medication 2: at 08:05

## 2019-04-29 RX ADMIN — GABAPENTIN 300 MILLIGRAM(S): 400 CAPSULE ORAL at 12:25

## 2019-04-29 RX ADMIN — PANTOPRAZOLE SODIUM 40 MILLIGRAM(S): 20 TABLET, DELAYED RELEASE ORAL at 08:08

## 2019-04-29 RX ADMIN — INSULIN GLARGINE 15 UNIT(S): 100 INJECTION, SOLUTION SUBCUTANEOUS at 20:41

## 2019-04-29 RX ADMIN — Medication 5 UNIT(S): at 17:09

## 2019-04-29 RX ADMIN — GABAPENTIN 300 MILLIGRAM(S): 400 CAPSULE ORAL at 08:10

## 2019-04-29 RX ADMIN — Medication 1 SPRAY(S): at 08:08

## 2019-04-29 RX ADMIN — Medication 5 UNIT(S): at 12:26

## 2019-04-29 RX ADMIN — Medication 40 MILLIGRAM(S): at 08:10

## 2019-04-29 RX ADMIN — Medication 1: at 12:26

## 2019-04-29 RX ADMIN — Medication 100 MILLIGRAM(S): at 08:13

## 2019-04-29 RX ADMIN — Medication 100 MILLIGRAM(S): at 20:39

## 2019-04-29 RX ADMIN — BUDESONIDE AND FORMOTEROL FUMARATE DIHYDRATE 2 PUFF(S): 160; 4.5 AEROSOL RESPIRATORY (INHALATION) at 19:10

## 2019-04-29 RX ADMIN — LOSARTAN POTASSIUM 100 MILLIGRAM(S): 100 TABLET, FILM COATED ORAL at 08:09

## 2019-04-29 RX ADMIN — Medication 100 MILLIGRAM(S): at 12:25

## 2019-04-29 RX ADMIN — GABAPENTIN 300 MILLIGRAM(S): 400 CAPSULE ORAL at 20:40

## 2019-04-29 RX ADMIN — METFORMIN HYDROCHLORIDE 1000 MILLIGRAM(S): 850 TABLET ORAL at 08:09

## 2019-04-29 RX ADMIN — Medication 5 UNIT(S): at 08:05

## 2019-04-29 RX ADMIN — BUDESONIDE AND FORMOTEROL FUMARATE DIHYDRATE 2 PUFF(S): 160; 4.5 AEROSOL RESPIRATORY (INHALATION) at 08:09

## 2019-04-29 RX ADMIN — Medication 1 APPLICATION(S): at 20:40

## 2019-04-29 RX ADMIN — Medication 1 APPLICATION(S): at 09:37

## 2019-04-29 RX ADMIN — LAMOTRIGINE 150 MILLIGRAM(S): 25 TABLET, ORALLY DISINTEGRATING ORAL at 20:39

## 2019-04-29 RX ADMIN — LAMOTRIGINE 150 MILLIGRAM(S): 25 TABLET, ORALLY DISINTEGRATING ORAL at 08:09

## 2019-04-29 RX ADMIN — Medication 4 MILLIGRAM(S): at 08:09

## 2019-04-29 RX ADMIN — Medication 1 SPRAY(S): at 20:40

## 2019-04-29 RX ADMIN — ATORVASTATIN CALCIUM 80 MILLIGRAM(S): 80 TABLET, FILM COATED ORAL at 20:40

## 2019-04-30 LAB
GLUCOSE BLDC GLUCOMTR-MCNC: 196 MG/DL — HIGH (ref 70–99)
GLUCOSE BLDC GLUCOMTR-MCNC: 210 MG/DL — HIGH (ref 70–99)
GLUCOSE BLDC GLUCOMTR-MCNC: 80 MG/DL — SIGNIFICANT CHANGE UP (ref 70–99)
GLUCOSE BLDC GLUCOMTR-MCNC: 95 MG/DL — SIGNIFICANT CHANGE UP (ref 70–99)

## 2019-04-30 PROCEDURE — 99231 SBSQ HOSP IP/OBS SF/LOW 25: CPT

## 2019-04-30 RX ORDER — FLUPHENAZINE HYDROCHLORIDE 1 MG/1
5 TABLET, FILM COATED ORAL AT BEDTIME
Qty: 0 | Refills: 0 | Status: DISCONTINUED | OUTPATIENT
Start: 2019-04-30 | End: 2019-05-01

## 2019-04-30 RX ADMIN — Medication 2: at 08:00

## 2019-04-30 RX ADMIN — Medication 100 MILLIGRAM(S): at 21:06

## 2019-04-30 RX ADMIN — BUDESONIDE AND FORMOTEROL FUMARATE DIHYDRATE 2 PUFF(S): 160; 4.5 AEROSOL RESPIRATORY (INHALATION) at 21:04

## 2019-04-30 RX ADMIN — Medication 4 MILLIGRAM(S): at 08:26

## 2019-04-30 RX ADMIN — GABAPENTIN 300 MILLIGRAM(S): 400 CAPSULE ORAL at 13:11

## 2019-04-30 RX ADMIN — Medication 1 APPLICATION(S): at 21:06

## 2019-04-30 RX ADMIN — Medication 100 MILLIGRAM(S): at 08:25

## 2019-04-30 RX ADMIN — ARIPIPRAZOLE 30 MILLIGRAM(S): 15 TABLET ORAL at 08:35

## 2019-04-30 RX ADMIN — GABAPENTIN 300 MILLIGRAM(S): 400 CAPSULE ORAL at 21:07

## 2019-04-30 RX ADMIN — Medication 5 UNIT(S): at 17:17

## 2019-04-30 RX ADMIN — LOSARTAN POTASSIUM 100 MILLIGRAM(S): 100 TABLET, FILM COATED ORAL at 08:25

## 2019-04-30 RX ADMIN — Medication 1: at 12:24

## 2019-04-30 RX ADMIN — Medication 1 APPLICATION(S): at 08:39

## 2019-04-30 RX ADMIN — ATORVASTATIN CALCIUM 80 MILLIGRAM(S): 80 TABLET, FILM COATED ORAL at 21:06

## 2019-04-30 RX ADMIN — INSULIN GLARGINE 15 UNIT(S): 100 INJECTION, SOLUTION SUBCUTANEOUS at 21:05

## 2019-04-30 RX ADMIN — Medication 100 MILLIGRAM(S): at 13:11

## 2019-04-30 RX ADMIN — PANTOPRAZOLE SODIUM 40 MILLIGRAM(S): 20 TABLET, DELAYED RELEASE ORAL at 06:28

## 2019-04-30 RX ADMIN — Medication 1 SPRAY(S): at 08:35

## 2019-04-30 RX ADMIN — LAMOTRIGINE 150 MILLIGRAM(S): 25 TABLET, ORALLY DISINTEGRATING ORAL at 21:07

## 2019-04-30 RX ADMIN — BUDESONIDE AND FORMOTEROL FUMARATE DIHYDRATE 2 PUFF(S): 160; 4.5 AEROSOL RESPIRATORY (INHALATION) at 06:28

## 2019-04-30 RX ADMIN — Medication 5 UNIT(S): at 12:24

## 2019-04-30 RX ADMIN — Medication 81 MILLIGRAM(S): at 08:26

## 2019-04-30 RX ADMIN — MONTELUKAST 10 MILLIGRAM(S): 4 TABLET, CHEWABLE ORAL at 08:25

## 2019-04-30 RX ADMIN — GABAPENTIN 300 MILLIGRAM(S): 400 CAPSULE ORAL at 08:25

## 2019-04-30 RX ADMIN — Medication 5 UNIT(S): at 08:00

## 2019-04-30 RX ADMIN — LAMOTRIGINE 150 MILLIGRAM(S): 25 TABLET, ORALLY DISINTEGRATING ORAL at 08:25

## 2019-04-30 RX ADMIN — FLUPHENAZINE HYDROCHLORIDE 5 MILLIGRAM(S): 1 TABLET, FILM COATED ORAL at 21:07

## 2019-04-30 RX ADMIN — METFORMIN HYDROCHLORIDE 1000 MILLIGRAM(S): 850 TABLET ORAL at 08:39

## 2019-04-30 RX ADMIN — Medication 1 SPRAY(S): at 21:03

## 2019-04-30 RX ADMIN — Medication 40 MILLIGRAM(S): at 08:35

## 2019-04-30 NOTE — PROGRESS NOTE BEHAVIORAL HEALTH - MOOD
Depressed/Anxious
Anxious/Depressed
Depressed/Anxious
Anxious/Depressed
Anxious/Depressed
Depressed/Anxious
Anxious/Depressed
Depressed/Anxious

## 2019-04-30 NOTE — PROGRESS NOTE BEHAVIORAL HEALTH - NSBHCHARTREVIEWLAB_PSY_A_CORE FT
TPro  7.0  /  Alb  3.1<L>  /  TBili  0.4  /  DBili  0.1  /  AST  257<H>  /  ALT  407<H>  /  AlkPhos  77  04-18

## 2019-04-30 NOTE — PROGRESS NOTE BEHAVIORAL HEALTH - NSBHPTASSESSDT_PSY_A_CORE
17-Apr-2019 16:05
18-Apr-2019 14:18
20-Apr-2019 12:03
22-Apr-2019 15:12
23-Apr-2019 15:09
24-Apr-2019 16:24
25-Apr-2019 14:19
30-Apr-2019 14:42
29-Apr-2019 14:24
19-Apr-2019 17:28
26-Apr-2019 14:13
21-Apr-2019 14:06

## 2019-04-30 NOTE — PROGRESS NOTE BEHAVIORAL HEALTH - NSBHCHARTREVIEWINVESTIGATE_PSY_A_CORE FT
Ventricular Rate 94 BPM  Atrial Rate 94 BPM  P-R Interval 122 ms    QRS Duration 82 ms    Q-T Interval 382 ms    QTC Calculation(Bezet) 477 ms    P Axis 44 degrees    R Axis 8 degrees    T Axis 24 degrees    Diagnosis Line Normal sinus rhythm  Low voltage QRS  Incomplete right bundle branch block  Borderline ECG  When compared with ECG of 17-APR-2019 10:14, (Unconfirmed)  No significant change was found  Confirmed by Palla MD, Nitin ([id number]) on 4/18/2019 1:19:44 PM

## 2019-04-30 NOTE — PROGRESS NOTE BEHAVIORAL HEALTH - NSBHADMITMEDEDUDETAILS_A_CORE FT
Discussed risks/benefits/s-e

## 2019-04-30 NOTE — PROGRESS NOTE BEHAVIORAL HEALTH - THOUGHT CONTENT
Suicidality/Delusions
Delusions/Suicidality
Suicidality/Delusions
Suicidality/Delusions
Delusions/Suicidality
Suicidality/Delusions
Delusions/Suicidality
Suicidality/Delusions

## 2019-04-30 NOTE — PROGRESS NOTE BEHAVIORAL HEALTH - AFFECT RANGE
Constricted/Blunted
Blunted/Constricted
Constricted/Blunted
Constricted/Blunted
Blunted/Constricted
Constricted/Blunted

## 2019-04-30 NOTE — PROGRESS NOTE BEHAVIORAL HEALTH - NSBHFUPINTERVALHXFT_PSY_A_CORE
Patient a 52 y/o single  female, no children,, with multiple prior psychiatric hospitalizations, history of schizoaffective disorder, no prior SA, denied drug/legal issues, who presented to the ED at [facility] BIB/EMS activated by her HHA after she became increasingly depressed/disorganized over the last few days. Patient mentions that she hears voices, on a lower intensity, scores 3/4 for her AH, with 10 being the highest sore. Her sleep/appetite improved. Agreed to have further increased dosage of Abilify to 25 mg. If no improvement, to increase to 30 mg of Abilify and later to add Prolixin as she took Prolixin in the past with good effect.
Patient a 52 y/o single  female, no children,, with multiple prior psychiatric hospitalizations, history of schizoaffective disorder, no prior SA, denied drug/legal issues, who presented to the ED at [facility] BIB/EMS activated by her HHA after she became increasingly depressed/disorganized over the last few days. Patient was seen, and endorsed lower intensity of A/H, currently rating 2 on a scale of 10. Her affect is getting brighter and has fair to good sleep/appetite. Prolixin 5 mg added at HS for further stability.   Discharge planning to go home tomorrow.
Patient a 52 y/o single  female, no children,, with multiple prior psychiatric hospitalizations, history of schizoaffective disorder, no prior SA, denied drug/legal issues, who presented to the ED at [facility] BIB/EMS activated by her HHA after she became increasingly depressed/disorganized over the last few days.  Pt continues to have difficulty with concentrating and focusing on the conversation, though she is trying to attend meetings and groups on the unit. She is asking for medication to help with anxiety but does not want Ativan.  She agreed to increase her Neurontin to help with her anxiety/distress.    Patient is compliant with meds, overall doing well, improved, but has residual A/H with some queer feelings, was advised to continue the meds as ordered as things will improve as time passes. No PRN meds needed, fair to good sleep/appetite.
Patient a 52 y/o single  female, no children,, with multiple prior psychiatric hospitalizations, history of schizoaffective disorder, no prior SA, denied drug/legal issues, who presented to the ED at [facility] BIB/EMS activated by her HHA after she became increasingly depressed/disorganized over the last few days.    Patient is compliant with meds, overall doing well, improved, but has residual A/H with some queer feelings, was advised to continue the meds as ordered as things will improve as time passes. No PRN meds needed, fair to good sleep/appetite.
Patient a 52 y/o single  female, no children,, with multiple prior psychiatric hospitalizations, history of schizoaffective disorder, no prior SA, denied drug/legal issues, who presented to the ED at [facility] BIB/EMS activated by her HHA after she became increasingly depressed/disorganized over the last few days. Patient continues to have residual A/H, at times and seems annoyed with it, has trouble focusing or concentrating and seen or more visible in unit. Not S/H and agreed to have Abilify dosage increased to 20 mg for better effect overall.
Patient a 54 y/o single  female, no children,, with multiple prior psychiatric hospitalizations, history of schizoaffective disorder, no prior SA, denied drug/legal issues, who presented to the ED at [facility] BIB/EMS activated by her HHA after she became increasingly depressed/disorganized over the last few days.    Patient is compliant with meds now, on Abilify 15 mg daily now, had some sleep last night, endorses that her voices are less and feels a little better. To speak with her NP tomorrow. To continue meds as ordered. Not S/H at this time. Still in hospital gown, looks disheveled, but less anxious and able to finish her sentences today.
Patient a 54 y/o single  female, no children,, with multiple prior psychiatric hospitalizations, history of schizoaffective disorder, no prior SA, denied drug/legal issues, who presented to the ED at [facility] BIB/EMS activated by her HHA after she became increasingly depressed/disorganized over the last few days. Patient on Abilify 20 mg/day, mood seems elevated, had a smile today AM, limited A/h at times, still has trouble focusing and concentrating. Had a bad day yesterday. Today still looks good. To continue meds as ordered.
Patient a 52 y/o single  female, no children,, with multiple prior psychiatric hospitalizations, history of schizoaffective disorder, no prior SA, denied drug/legal issues, who presented to the ED at [facility] BIB/EMS activated by her HHA after she became increasingly depressed/disorganized over the last few days. Patient continues to have A/H on a lower intensity, Abilify increased to 30 mg daily and she continues to endorse her A/h with the same intensity as last week. She agreed to have some Prolixin 2.5 mg for further benefit for stability. Her affect looks brighter and to be discharged later this week.
Patient a 52 y/o single  female, no children,, with multiple prior psychiatric hospitalizations, history of schizoaffective disorder, no prior SA, denied drug/legal issues, who presented to the ED at [facility] BIB/EMS activated by her HHA after she became increasingly depressed/disorganized over the last few days.  Pt continues to have difficulty with concentrating and focusing on the conversation, though she is trying to attend meetings and groups on the unit.     Patient is compliant with meds, overall doing well, improved, but has residual A/H with some queer feelings, was advised to continue the meds as ordered as things will improve as time passes. No PRN meds needed, fair to good sleep/appetite.
Patient a 52 y/o single  female, no children,, with multiple prior psychiatric hospitalizations, history of schizoaffective disorder, no prior SA, denied drug/legal issues, who presented to the ED at [facility] BIB/EMS activated by her HHA after she became increasingly depressed/disorganized over the last few days. To continue with Abilify 20 mg for now, still c/o A/H, but was not able to clarify. To continue with meds as ordered for now, more visible in community and able to participate in groups and other unit activities.
Patient a 52 y/o single  female, no children,, with multiple prior psychiatric hospitalizations, history of schizoaffective disorder, no prior SA, denied drug/legal issues, who presented to the ED at [facility] BIB/EMS activated by her HHA after she became increasingly depressed/disorganized over the last few days. Patient has A/H, and endorsing that her voices are less intensive than in the past, she is able to tolerate the voices to some extent. She received increasing dosages of Abilify, current dosages of Abilify was 25 mg and she still heard the voices in a lower intensity. Abilify further increased to Abilify 30 mg for now. To F/U on Monday for Increasing dosages of Abilify and A/H connection
Patient a 54 y/o single  female, no children,, with multiple prior psychiatric hospitalizations, history of schizoaffective disorder, no prior SA, denied drug/legal issues, who presented to the ED at [facility] BIB/EMS activated by her HHA after she became increasingly depressed/disorganized over the last few days.  Pt continues to have difficulty with concentrating and focusing on the conversation, though she is trying to attend meetings and groups on the unit. She is asking for medication to help with anxiety but does not want Ativan.  She agreed to increase her Neurontin to help with her anxiety/distress.    Patient is compliant with meds, overall doing well, improved, and says she knows that she has been under much stress since both her mother passed away two years ago and father passed away last month.  She is aware that the AH are her own illness and not real.  She is able to speak more clearly without much thought blocking.

## 2019-04-30 NOTE — PROGRESS NOTE BEHAVIORAL HEALTH - AFFECT QUALITY
Irritable/Depressed
Depressed/Irritable
Depressed/Irritable
Irritable/Depressed
Depressed/Irritable
Irritable/Depressed
Depressed/Irritable
Depressed/Irritable

## 2019-04-30 NOTE — PROGRESS NOTE BEHAVIORAL HEALTH - SUMMARY
54 y/o single  female, no children,, with multiple prior psychiatric hospitalizations, history of schizoaffective disorder, no prior SA, denied drug/legal issues, who presented to the ED at [facility] BIB/EMS activated by her HHA after she became increasingly depressed/disorganized over the last few days.    Patient was admitted at [facility], but endorsed that she was at [facility] in the past and would like to have her treatment at [facility].  Attending decided to discharge and admit the patient at [facility] for further treatment, and was later admitted for stability at [facility]. She later admitted that she stopped taking her meds, was on Abilify, does not remember her dosages. She endorsed that she hears voices, voices of a man telling her to do something or commit suicide or hurt herself.  She remains confused, disorganized, and inappropriate and also delusional that she was raped by staff in the ambulance. She is also depressed, with increased sleep, poor appetite.  Medically has Sleep Apnea and uses BIPAP Machine; DM; Obese and HTN.  Plan:   May increase the Neurontin to 300mg TID   Continue  Abilify 15mg daily  Lamictal 150mg po BID  Prozac 40mg daily
54 y/o single  female, no children,, with multiple prior psychiatric hospitalizations, history of schizoaffective disorder, no prior SA, denied drug/legal issues, who presented to the ED at [facility] BIB/EMS activated by her HHA after she became increasingly depressed/disorganized over the last few days.    Patient was admitted at [facility], but endorsed that she was at [facility] in the past and would like to have her treatment at [facility].  Attending decided to discharge and admit the patient at [facility] for further treatment, and was later admitted for stability at [facility]. She later admitted that she stopped taking her meds, was on Abilify, does not remember her dosages. She endorsed that she hears voices, voices of a man telling her to do something or commit suicide or hurt herself.  She remains confused, disorganized, and inappropriate and also delusional that she was raped by staff in the ambulance. She is also depressed, with increased sleep, poor appetite.  Medically has Sleep Apnea and uses BIPAP Machine; DM; Obese and HTN.  Plan:   May increase the Neurontin to 300mg TID   Continue  Abilify 15mg daily  Lamictal 150mg po BID  Prozac 40mg daily
52 y/o single  female, no children,, with multiple prior psychiatric hospitalizations, history of schizoaffective disorder, no prior SA, denied drug/legal issues, who presented to the ED at [facility] BIB/EMS activated by her HHA after she became increasingly depressed/disorganized over the last few days.    Patient was admitted at [facility], but endorsed that she was at [facility] in the past and would like to have her treatment at [facility].  Attending decided to discharge and admit the patient at [facility] for further treatment, and was later admitted for stability at [facility]. She later admitted that she stopped taking her meds, was on Abilify, does not remember her dosages. She endorsed that she hears voices, voices of a man telling her to do something or commit suicide or hurt herself.  She remains confused, disorganized, and inappropriate and also delusional that she was raped by staff in the ambulance. She is also depressed, with increased sleep, poor appetite.  Medically has Sleep Apnea and uses BIPAP Machine; DM; Obese and HTN.  Plan:   May increase the Neurontin to 300mg TID   Continue  Abilify 15mg daily  Lamictal 150mg po BID  Prozac 40mg daily
52 y/o single  female, no children,, with multiple prior psychiatric hospitalizations, history of schizoaffective disorder, no prior SA, denied drug/legal issues, who presented to the ED at [facility] BIB/EMS activated by her HHA after she became increasingly depressed/disorganized over the last few days.    Patient was admitted at [facility], but endorsed that she was at [facility] in the past and would like to have her treatment at [facility].  Attending decided to discharge and admit the patient at [facility] for further treatment, and was later admitted for stability at [facility]. She later admitted that she stopped taking her meds, was on Abilify, does not remember her dosages. She endorsed that she hears voices, voices of a man telling her to do something or commit suicide or hurt herself.  She remains confused, disorganized, and inappropriate and also delusional that she was raped by staff in the ambulance. She is also depressed, with increased sleep, poor appetite.  Medically has Sleep Apnea and uses BIPAP Machine; DM; Obese and HTN.  Plan:   May increase the Neurontin to 300mg TID   Continue  Abilify 15mg daily  Lamictal 150mg po BID  Prozac 40mg daily
54 y/o single  female, no children,, with multiple prior psychiatric hospitalizations, history of schizoaffective disorder, no prior SA, denied drug/legal issues, who presented to the ED at [facility] BIB/EMS activated by her HHA after she became increasingly depressed/disorganized over the last few days.    Patient was admitted at [facility], but endorsed that she was at [facility] in the past and would like to have her treatment at [facility].  Attending decided to discharge and admit the patient at [facility] for further treatment, and was later admitted for stability at [facility]. She later admitted that she stopped taking her meds, was on Abilify, does not remember her dosages. She endorsed that she hears voices, voices of a man telling her to do something or commit suicide or hurt herself.  She remains confused, disorganized, and inappropriate and also delusional that she was raped by staff in the ambulance. She is also depressed, with increased sleep, poor appetite.  Medically has Sleep Apnea and uses BIPAP Machine; DM; Obese and HTN.  Plan:   May increase the Neurontin to 300mg TID   Continue  Abilify 15mg daily  Lamictal 150mg po BID  Prozac 40mg daily
52 y/o single  female, no children,, with multiple prior psychiatric hospitalizations, history of schizoaffective disorder, no prior SA, denied drug/legal issues, who presented to the ED at [facility] BIB/EMS activated by her HHA after she became increasingly depressed/disorganized over the last few days.    Patient was admitted at [facility], but endorsed that she was at [facility] in the past and would like to have her treatment at [facility].  Attending decided to discharge and admit the patient at [facility] for further treatment, and was later admitted for stability at [facility]. Patient has trouble expressing herself, she starts and half-way through her answer stops, feels anxious, huffs, and then tries to start again. She added that I'm stressed and continues to do so for each and every question. She was not able to have a single complete conversation. She later admitted that she stopped taking her meds, was on Abilify, does not remember her dosages. She endorsed that she hears voices, voices of a man telling her to do something or commit suicide or hurt herself. She later added that she is stressed due to her sister does not listen to her, and plans to sold the house. She remains confused, disorganized, and inappropriate and also delusional that she was raped by staff in the ambulance. She is also depressed, with increased sleep, poor appetite.    Medically has Sleep Apnea and uses BIPAP Machine; DM; Obese and HTN.    Plan: Needs In-patient hospitalization for stability and meds adjustment
54 y/o single  female, no children,, with multiple prior psychiatric hospitalizations, history of schizoaffective disorder, no prior SA, denied drug/legal issues, who presented to the ED at [facility] BIB/EMS activated by her HHA after she became increasingly depressed/disorganized over the last few days.    Patient was admitted at [facility], but endorsed that she was at [facility] in the past and would like to have her treatment at [facility].  Attending decided to discharge and admit the patient at [facility] for further treatment, and was later admitted for stability at [facility]. Patient has trouble expressing herself, she starts and half-way through her answer stops, feels anxious, huffs, and then tries to start again. She added that I'm stressed and continues to do so for each and every question. She was not able to have a single complete conversation. She later admitted that she stopped taking her meds, was on Abilify, does not remember her dosages. She endorsed that she hears voices, voices of a man telling her to do something or commit suicide or hurt herself. She later added that she is stressed due to her sister does not listen to her, and plans to sold the house. She remains confused, disorganized, and inappropriate and also delusional that she was raped by staff in the ambulance. She is also depressed, with increased sleep, poor appetite.    Medically has Sleep Apnea and uses BIPAP Machine; DM; Obese and HTN.    Plan: Needs In-patient hospitalization for stability and meds adjustment
52 y/o single  female, no children,, with multiple prior psychiatric hospitalizations, history of schizoaffective disorder, no prior SA, denied drug/legal issues, who presented to the ED at [facility] BIB/EMS activated by her HHA after she became increasingly depressed/disorganized over the last few days.    Patient was admitted at [facility], but endorsed that she was at [facility] in the past and would like to have her treatment at [facility].  Attending decided to discharge and admit the patient at [facility] for further treatment, and was later admitted for stability at [facility]. Patient has trouble expressing herself, she starts and half-way through her answer stops, feels anxious, huffs, and then tries to start again. She added that I'm stressed and continues to do so for each and every question. She was not able to have a single complete conversation. She later admitted that she stopped taking her meds, was on Abilify, does not remember her dosages. She endorsed that she hears voices, voices of a man telling her to do something or commit suicide or hurt herself. She later added that she is stressed due to her sister does not listen to her, and plans to sold the house. She remains confused, disorganized, and inappropriate and also delusional that she was raped by staff in the ambulance. She is also depressed, with increased sleep, poor appetite.    Medically has Sleep Apnea and uses BIPAP Machine; DM; Obese and HTN.  Plan:   Continue  Abilify 15mg daily  Lamictal 150mg po BID  Prozac 40mg daily
52 y/o single  female, no children,, with multiple prior psychiatric hospitalizations, history of schizoaffective disorder, no prior SA, denied drug/legal issues, who presented to the ED at [facility] BIB/EMS activated by her HHA after she became increasingly depressed/disorganized over the last few days.    Patient was admitted at [facility], but endorsed that she was at [facility] in the past and would like to have her treatment at [facility].  Attending decided to discharge and admit the patient at [facility] for further treatment, and was later admitted for stability at [facility]. She later admitted that she stopped taking her meds, was on Abilify, does not remember her dosages. She endorsed that she hears voices, voices of a man telling her to do something or commit suicide or hurt herself.  She remains confused, disorganized, and inappropriate and also delusional that she was raped by staff in the ambulance. She is also depressed, with increased sleep, poor appetite.  Medically has Sleep Apnea and uses BIPAP Machine; DM; Obese and HTN.  Plan:   May increase the Neurontin to 300mg TID   Continue  Abilify 15mg daily  Lamictal 150mg po BID  Prozac 40mg daily
54 y/o single  female, no children,, with multiple prior psychiatric hospitalizations, history of schizoaffective disorder, no prior SA, denied drug/legal issues, who presented to the ED at [facility] BIB/EMS activated by her HHA after she became increasingly depressed/disorganized over the last few days.    Patient was admitted at [facility], but endorsed that she was at [facility] in the past and would like to have her treatment at [facility].  Attending decided to discharge and admit the patient at [facility] for further treatment, and was later admitted for stability at [facility]. She later admitted that she stopped taking her meds, was on Abilify, does not remember her dosages. She endorsed that she hears voices, voices of a man telling her to do something or commit suicide or hurt herself.  She remains confused, disorganized, and inappropriate and also delusional that she was raped by staff in the ambulance. She is also depressed, with increased sleep, poor appetite.  Medically has Sleep Apnea and uses BIPAP Machine; DM; Obese and HTN.  Plan:   May increase the Neurontin to 300mg TID   Continue  Abilify 15mg daily  Lamictal 150mg po BID  Prozac 40mg daily
54 y/o single  female, no children,, with multiple prior psychiatric hospitalizations, history of schizoaffective disorder, no prior SA, denied drug/legal issues, who presented to the ED at [facility] BIB/EMS activated by her HHA after she became increasingly depressed/disorganized over the last few days.    Patient was admitted at [facility], but endorsed that she was at [facility] in the past and would like to have her treatment at [facility].  Attending decided to discharge and admit the patient at [facility] for further treatment, and was later admitted for stability at [facility]. She later admitted that she stopped taking her meds, was on Abilify, does not remember her dosages. She endorsed that she hears voices, voices of a man telling her to do something or commit suicide or hurt herself.  She remains confused, disorganized, and inappropriate and also delusional that she was raped by staff in the ambulance. She is also depressed, with increased sleep, poor appetite.  Medically has Sleep Apnea and uses BIPAP Machine; DM; Obese and HTN.  Plan:   May increase the Neurontin to 300mg TID   Continue  Abilify 15mg daily  Lamictal 150mg po BID  Prozac 40mg daily
54 y/o single  female, no children,, with multiple prior psychiatric hospitalizations, history of schizoaffective disorder, no prior SA, denied drug/legal issues, who presented to the ED at [facility] BIB/EMS activated by her HHA after she became increasingly depressed/disorganized over the last few days.    Patient was admitted at [facility], but endorsed that she was at [facility] in the past and would like to have her treatment at [facility].  Attending decided to discharge and admit the patient at [facility] for further treatment, and was later admitted for stability at [facility]. She later admitted that she stopped taking her meds, was on Abilify, does not remember her dosages. She endorsed that she hears voices, voices of a man telling her to do something or commit suicide or hurt herself.  She remains confused, disorganized, and inappropriate and also delusional that she was raped by staff in the ambulance. She is also depressed, with increased sleep, poor appetite.  Medically has Sleep Apnea and uses BIPAP Machine; DM; Obese and HTN.  Plan:   May increase the Neurontin to 300mg TID   Continue  Abilify 15mg daily  Lamictal 150mg po BID  Prozac 40mg daily

## 2019-04-30 NOTE — PROGRESS NOTE BEHAVIORAL HEALTH - BODY HABITUS
Overweight

## 2019-04-30 NOTE — PROGRESS NOTE BEHAVIORAL HEALTH - PRIMARY DX
Schizoaffective disorder, depressive type

## 2019-04-30 NOTE — PROGRESS NOTE BEHAVIORAL HEALTH - THOUGHT PROCESS
Illogical/Disorganized/Circumstantial/Overinclusive
Illogical/Overinclusive/Circumstantial/Disorganized
Overinclusive/Circumstantial/Disorganized/Illogical
Illogical/Disorganized/Circumstantial/Overinclusive
Circumstantial/Disorganized/Overinclusive/Illogical
Disorganized/Overinclusive/Circumstantial/Illogical
Disorganized/Overinclusive/Circumstantial/Illogical
Overinclusive/Disorganized/Circumstantial/Illogical
Overinclusive/Illogical/Circumstantial/Disorganized
Overinclusive/Illogical/Disorganized/Circumstantial
Disorganized/Overinclusive/Circumstantial/Illogical
Disorganized/Overinclusive/Circumstantial/Illogical

## 2019-04-30 NOTE — PROGRESS NOTE BEHAVIORAL HEALTH - NSBHADMITIPOBSFT_PSY_A_CORE
Routine Checks

## 2019-04-30 NOTE — PROGRESS NOTE BEHAVIORAL HEALTH - NSBHFUPINTERVALCCFT_PSY_A_CORE
" She c/o hearing voices on a lower intensity and able to tolerate to some extent "
" She still c/o some residual A/H, and no change since the last time meds were increased ."
" She still c/o some residual A/H, but was not clear "."
"I feel focused, but has limited voices still now."
"I feel focused, but has limited voices still now."
"I need something for anxiety."
I'm still distressed "
I'm still distressed "
" She c/o hearing voices on a lower intensity and able to tolerate to some extent "
I'm still having trouble with concentration."
" She c/o hearing voices on a lower intensity and able to tolerate to some extent "
"I feel more focused now and I know that the voices I'm hearing are not real."

## 2019-04-30 NOTE — PROGRESS NOTE BEHAVIORAL HEALTH - PERCEPTIONS
Auditory hallucinations

## 2019-04-30 NOTE — PROGRESS NOTE BEHAVIORAL HEALTH - AXIS III
DM, Asthma

## 2019-05-01 ENCOUNTER — TRANSCRIPTION ENCOUNTER (OUTPATIENT)
Age: 54
End: 2019-05-01

## 2019-05-01 LAB
ALBUMIN SERPL ELPH-MCNC: 3.3 G/DL — SIGNIFICANT CHANGE UP (ref 3.3–5)
ALP SERPL-CCNC: 101 U/L — SIGNIFICANT CHANGE UP (ref 30–120)
ALT FLD-CCNC: 74 U/L DA — HIGH (ref 10–60)
AST SERPL-CCNC: 26 U/L — SIGNIFICANT CHANGE UP (ref 10–40)
BILIRUB DIRECT SERPL-MCNC: 0 MG/DL — SIGNIFICANT CHANGE UP (ref 0–0.2)
BILIRUB INDIRECT FLD-MCNC: 0.2 MG/DL — SIGNIFICANT CHANGE UP (ref 0.2–1)
BILIRUB SERPL-MCNC: 0.2 MG/DL — SIGNIFICANT CHANGE UP (ref 0.2–1.2)
GLUCOSE BLDC GLUCOMTR-MCNC: 102 MG/DL — HIGH (ref 70–99)
GLUCOSE BLDC GLUCOMTR-MCNC: 136 MG/DL — HIGH (ref 70–99)
GLUCOSE BLDC GLUCOMTR-MCNC: 248 MG/DL — HIGH (ref 70–99)
PROT SERPL-MCNC: 7.1 G/DL — SIGNIFICANT CHANGE UP (ref 6–8.3)

## 2019-05-01 PROCEDURE — 80053 COMPREHEN METABOLIC PANEL: CPT

## 2019-05-01 PROCEDURE — 86255 FLUORESCENT ANTIBODY SCREEN: CPT

## 2019-05-01 PROCEDURE — 80061 LIPID PANEL: CPT

## 2019-05-01 PROCEDURE — 36415 COLL VENOUS BLD VENIPUNCTURE: CPT

## 2019-05-01 PROCEDURE — 86256 FLUORESCENT ANTIBODY TITER: CPT

## 2019-05-01 PROCEDURE — 86381 MITOCHONDRIAL ANTIBODY EACH: CPT

## 2019-05-01 PROCEDURE — 81001 URINALYSIS AUTO W/SCOPE: CPT

## 2019-05-01 PROCEDURE — 85027 COMPLETE CBC AUTOMATED: CPT

## 2019-05-01 PROCEDURE — 99239 HOSP IP/OBS DSCHRG MGMT >30: CPT

## 2019-05-01 PROCEDURE — 86231 EMA EACH IG CLASS: CPT

## 2019-05-01 PROCEDURE — 80307 DRUG TEST PRSMV CHEM ANLYZR: CPT

## 2019-05-01 PROCEDURE — 86709 HEPATITIS A IGM ANTIBODY: CPT

## 2019-05-01 PROCEDURE — 87086 URINE CULTURE/COLONY COUNT: CPT

## 2019-05-01 PROCEDURE — 80076 HEPATIC FUNCTION PANEL: CPT

## 2019-05-01 PROCEDURE — 86258 DGP ANTIBODY EACH IG CLASS: CPT

## 2019-05-01 PROCEDURE — 93005 ELECTROCARDIOGRAM TRACING: CPT

## 2019-05-01 PROCEDURE — 86364 TISS TRNSGLTMNASE EA IG CLAS: CPT

## 2019-05-01 PROCEDURE — 86704 HEP B CORE ANTIBODY TOTAL: CPT

## 2019-05-01 PROCEDURE — 94640 AIRWAY INHALATION TREATMENT: CPT

## 2019-05-01 PROCEDURE — 82728 ASSAY OF FERRITIN: CPT

## 2019-05-01 PROCEDURE — 83036 HEMOGLOBIN GLYCOSYLATED A1C: CPT

## 2019-05-01 PROCEDURE — 86706 HEP B SURFACE ANTIBODY: CPT

## 2019-05-01 PROCEDURE — 86780 TREPONEMA PALLIDUM: CPT

## 2019-05-01 PROCEDURE — 76705 ECHO EXAM OF ABDOMEN: CPT

## 2019-05-01 PROCEDURE — 84443 ASSAY THYROID STIM HORMONE: CPT

## 2019-05-01 PROCEDURE — 87340 HEPATITIS B SURFACE AG IA: CPT

## 2019-05-01 PROCEDURE — 82962 GLUCOSE BLOOD TEST: CPT

## 2019-05-01 PROCEDURE — 86803 HEPATITIS C AB TEST: CPT

## 2019-05-01 RX ORDER — PANTOPRAZOLE SODIUM 20 MG/1
1 TABLET, DELAYED RELEASE ORAL
Qty: 30 | Refills: 0
Start: 2019-05-01 | End: 2019-05-30

## 2019-05-01 RX ORDER — ALBUTEROL 90 UG/1
2 AEROSOL, METERED ORAL
Qty: 1 | Refills: 0
Start: 2019-05-01 | End: 2019-05-30

## 2019-05-01 RX ORDER — GLIMEPIRIDE 1 MG
1 TABLET ORAL
Qty: 30 | Refills: 0
Start: 2019-05-01 | End: 2019-05-30

## 2019-05-01 RX ORDER — FLUOXETINE HCL 10 MG
2 CAPSULE ORAL
Qty: 0 | Refills: 0 | COMMUNITY

## 2019-05-01 RX ORDER — LAMOTRIGINE 25 MG/1
1 TABLET, ORALLY DISINTEGRATING ORAL
Qty: 60 | Refills: 0
Start: 2019-05-01 | End: 2019-05-30

## 2019-05-01 RX ORDER — GABAPENTIN 400 MG/1
1 CAPSULE ORAL
Qty: 90 | Refills: 0
Start: 2019-05-01 | End: 2019-05-30

## 2019-05-01 RX ORDER — METFORMIN HYDROCHLORIDE 850 MG/1
1 TABLET ORAL
Qty: 30 | Refills: 0
Start: 2019-05-01 | End: 2019-05-30

## 2019-05-01 RX ORDER — FLUOXETINE HCL 10 MG
1 CAPSULE ORAL
Qty: 30 | Refills: 0
Start: 2019-05-01 | End: 2019-05-30

## 2019-05-01 RX ORDER — MONTELUKAST 4 MG/1
1 TABLET, CHEWABLE ORAL
Qty: 30 | Refills: 0
Start: 2019-05-01 | End: 2019-05-30

## 2019-05-01 RX ORDER — ARIPIPRAZOLE 15 MG/1
1 TABLET ORAL
Qty: 30 | Refills: 0
Start: 2019-05-01 | End: 2019-05-30

## 2019-05-01 RX ORDER — NYSTATIN/TRIAMCINOLONE ACET
1 OINTMENT (GRAM) TOPICAL
Qty: 1 | Refills: 0
Start: 2019-05-01 | End: 2019-05-10

## 2019-05-01 RX ORDER — DOCUSATE SODIUM 100 MG
1 CAPSULE ORAL
Qty: 90 | Refills: 0
Start: 2019-05-01 | End: 2019-05-30

## 2019-05-01 RX ORDER — FLUTICASONE PROPIONATE 50 MCG
1 SPRAY, SUSPENSION NASAL
Qty: 1 | Refills: 0
Start: 2019-05-01 | End: 2019-05-10

## 2019-05-01 RX ORDER — FLUPHENAZINE HYDROCHLORIDE 1 MG/1
1 TABLET, FILM COATED ORAL
Qty: 30 | Refills: 0
Start: 2019-05-01 | End: 2019-05-30

## 2019-05-01 RX ORDER — ASPIRIN/CALCIUM CARB/MAGNESIUM 324 MG
1 TABLET ORAL
Qty: 30 | Refills: 0
Start: 2019-05-01 | End: 2019-05-30

## 2019-05-01 RX ORDER — BUDESONIDE AND FORMOTEROL FUMARATE DIHYDRATE 160; 4.5 UG/1; UG/1
2 AEROSOL RESPIRATORY (INHALATION)
Qty: 1 | Refills: 0
Start: 2019-05-01 | End: 2019-05-30

## 2019-05-01 RX ORDER — INSULIN GLARGINE 100 [IU]/ML
15 INJECTION, SOLUTION SUBCUTANEOUS
Qty: 0 | Refills: 0 | DISCHARGE
Start: 2019-05-01

## 2019-05-01 RX ORDER — LOSARTAN POTASSIUM 100 MG/1
1 TABLET, FILM COATED ORAL
Qty: 30 | Refills: 0
Start: 2019-05-01 | End: 2019-05-30

## 2019-05-01 RX ORDER — ATORVASTATIN CALCIUM 80 MG/1
1 TABLET, FILM COATED ORAL
Qty: 30 | Refills: 0
Start: 2019-05-01 | End: 2019-05-30

## 2019-05-01 RX ADMIN — Medication 4 MILLIGRAM(S): at 08:51

## 2019-05-01 RX ADMIN — GABAPENTIN 300 MILLIGRAM(S): 400 CAPSULE ORAL at 08:51

## 2019-05-01 RX ADMIN — Medication 40 MILLIGRAM(S): at 08:51

## 2019-05-01 RX ADMIN — Medication 100 MILLIGRAM(S): at 08:51

## 2019-05-01 RX ADMIN — LOSARTAN POTASSIUM 100 MILLIGRAM(S): 100 TABLET, FILM COATED ORAL at 08:51

## 2019-05-01 RX ADMIN — GABAPENTIN 300 MILLIGRAM(S): 400 CAPSULE ORAL at 13:22

## 2019-05-01 RX ADMIN — MONTELUKAST 10 MILLIGRAM(S): 4 TABLET, CHEWABLE ORAL at 08:50

## 2019-05-01 RX ADMIN — ARIPIPRAZOLE 30 MILLIGRAM(S): 15 TABLET ORAL at 08:50

## 2019-05-01 RX ADMIN — Medication 100 MILLIGRAM(S): at 13:22

## 2019-05-01 RX ADMIN — Medication 2: at 08:14

## 2019-05-01 RX ADMIN — Medication 81 MILLIGRAM(S): at 08:51

## 2019-05-01 RX ADMIN — PANTOPRAZOLE SODIUM 40 MILLIGRAM(S): 20 TABLET, DELAYED RELEASE ORAL at 06:46

## 2019-05-01 RX ADMIN — LAMOTRIGINE 150 MILLIGRAM(S): 25 TABLET, ORALLY DISINTEGRATING ORAL at 08:51

## 2019-05-01 RX ADMIN — METFORMIN HYDROCHLORIDE 1000 MILLIGRAM(S): 850 TABLET ORAL at 08:50

## 2019-05-01 RX ADMIN — Medication 5 UNIT(S): at 08:17

## 2019-05-01 RX ADMIN — BUDESONIDE AND FORMOTEROL FUMARATE DIHYDRATE 2 PUFF(S): 160; 4.5 AEROSOL RESPIRATORY (INHALATION) at 06:46

## 2019-05-01 RX ADMIN — Medication 1 SPRAY(S): at 08:49

## 2019-05-01 RX ADMIN — Medication 1 APPLICATION(S): at 08:51

## 2019-05-01 RX ADMIN — Medication 5 UNIT(S): at 12:16

## 2019-05-01 NOTE — DISCHARGE NOTE PROVIDER - PROVIDER TOKENS
FREE:[LAST:[[facility]],FIRST:[Partial Program],PHONE:[phone]],FAX:[phone]   [phone]    -],ADDRESS:[[address]  Tomorrow--05/02/2019 @ 8:00AM]]

## 2019-05-01 NOTE — DISCHARGE NOTE PROVIDER - HOSPITAL COURSE
Patient a 54 y/o female, domiciled with roommate in a community residence, past psychiatric Hx of Schizoaffective D/O with multiple past Psychiatric admissions, medically has DM; COPD and obesity, was transferred from [facility] as patient expressed to come here for treatment fo her current situation.        She endorsed stopping her meds for an unknown period, has poor sleep, with A/h, and her A/H were telling her to commit suicide. She was initially admitted at [facility], later was discharged from [facility], and was admitted at [facility]. She endorsed that her NP started her on Abilify for her mood and A/h, but was not sure of her dosages. She was started on Abilify 5 mg with eventual titration to Abilify 30 mg, for her voices. Initially she was a wreck, later as time passed, her symptoms was resolving, she continued to have low intensity of A/h, and was rating her A/H @ 3, on a scale of 10 (10 being the highest-Always has A/h). She also endorsed that she was on Prolixin  for her voices, but was not sure pf her dosages of Prolixin. She was given Prolixin 2.5 mg HS initially, the next morning her symptoms was even lower, She was given additional dosages of  5 mg of Prolixin HS. She endorsed that she has limited voices or no voices. She can cope with her symptoms now, and endorsed that her voices are almost gone today. We opted to continue Prolixin 5 mg HS with Abilify 30 mg daily for effective control of voices. She is not S/H at the time of discharge        Medically has DM, HTN, Obese and GERD, but was controlled effectively. Her LFT was higher significantly. As per Hospitalist we repeated S. levels of Hepatic Enzymes and results showed LFT trending down. WE repeated LFT again before discharge. All LFT enzymes are WNL at this time. Hepatitis profile was also checked which came out negative. She received 30 days supply of meds before discharge. LFT trending down, may do better after repeating for LFT once with PCP.

## 2019-05-01 NOTE — DISCHARGE NOTE PROVIDER - NSDCFUADDAPPT_GEN_ALL_CORE_FT
[facility] Hospitalization Program [address] [place] [address] [phone].  Appointment is on Thursday 5/2 at 8am with Tony.

## 2019-05-01 NOTE — DISCHARGE NOTE PROVIDER - CARE PROVIDER_API CALL
[place], Partial Program  [address] [facility]  Tomorrow--05/02/2019 @ 8:00AM  Phone: [phone]  Fax: (   )    -  Follow Up Time:

## 2019-05-01 NOTE — DISCHARGE NOTE NURSING/CASE MANAGEMENT/SOCIAL WORK - NSDCDPATPORTLINK_GEN_ALL_CORE
You can access the [facility], offered by [facility], by registering with the following website: [email or web address]

## 2019-05-01 NOTE — DISCHARGE NOTE NURSING/CASE MANAGEMENT/SOCIAL WORK - NSDCFUADDAPPT_GEN_ALL_CORE_FT
[facility] Hospitalization Program [address] [place] [address] [phone].  Appointment is on Thursday 5/2 at 8am with Tony.

## 2019-05-01 NOTE — DISCHARGE NOTE PROVIDER - NSDCCPCAREPLAN_GEN_ALL_CORE_FT
PRINCIPAL DISCHARGE DIAGNOSIS  Diagnosis: Schizoaffective disorder, depressive type  Assessment and Plan of Treatment: Stable now and to F/U with [facility], Partial Program

## 2019-05-01 NOTE — DISCHARGE NOTE PROVIDER - REASON FOR ADMISSION
Patient stopped taking her meds for an unknown period, she started to Hallucinate, endorsing her to commit suicide and was also depressed and disorganized

## 2019-05-04 ENCOUNTER — TRANSCRIPTION ENCOUNTER (OUTPATIENT)
Age: 54
End: 2019-05-04

## 2019-05-14 ENCOUNTER — OUTPATIENT (OUTPATIENT)
Dept: OUTPATIENT SERVICES | Facility: HOSPITAL | Age: 54
LOS: 1 days | End: 2019-05-14
Payer: MEDICARE

## 2019-05-14 DIAGNOSIS — K08.9 DISORDER OF TEETH AND SUPPORTING STRUCTURES, UNSPECIFIED: ICD-10-CM

## 2019-05-14 DIAGNOSIS — Z98.890 OTHER SPECIFIED POSTPROCEDURAL STATES: Chronic | ICD-10-CM

## 2019-05-14 PROCEDURE — D4341: CPT

## 2019-05-16 DIAGNOSIS — K05.30 CHRONIC PERIODONTITIS, UNSPECIFIED: ICD-10-CM

## 2019-05-16 NOTE — ED PROVIDER NOTE - PSYCHIATRIC [+], MLM
SW received a voice mail message from  RN Case Manager through [facility] (pt's insurance), Yaquelin Bateman [phone], [phone], indicating that she spoke with Admissions at [facility] and [facility] and pt's admit was authorized and requesting that this SW fax her a copy of pt's discharge orders and summary to [phone].  Pt signed a release of information and SW faxed requested documents to Yaquelin Bateman.    SHIVA Aldana  Social Work, 6A  Phone:  [phone]  Pager:  [phone]  5/16/2019       + depression, + frustration, + SI/ANXIETY

## 2019-07-01 ENCOUNTER — OUTPATIENT (OUTPATIENT)
Dept: OUTPATIENT SERVICES | Facility: HOSPITAL | Age: 54
LOS: 1 days | End: 2019-07-01
Payer: MEDICARE

## 2019-07-01 DIAGNOSIS — Z98.890 OTHER SPECIFIED POSTPROCEDURAL STATES: Chronic | ICD-10-CM

## 2019-07-01 DIAGNOSIS — K08.9 DISORDER OF TEETH AND SUPPORTING STRUCTURES, UNSPECIFIED: ICD-10-CM

## 2019-07-01 PROCEDURE — D0220: CPT

## 2019-07-01 PROCEDURE — D0140: CPT

## 2019-07-02 DIAGNOSIS — Z01.20 ENCOUNTER FOR DENTAL EXAMINATION AND CLEANING WITHOUT ABNORMAL FINDINGS: ICD-10-CM

## 2019-07-16 ENCOUNTER — OUTPATIENT (OUTPATIENT)
Dept: OUTPATIENT SERVICES | Facility: HOSPITAL | Age: 54
LOS: 1 days | End: 2019-07-16
Payer: MEDICARE

## 2019-07-16 DIAGNOSIS — K08.9 DISORDER OF TEETH AND SUPPORTING STRUCTURES, UNSPECIFIED: ICD-10-CM

## 2019-07-16 DIAGNOSIS — Z98.890 OTHER SPECIFIED POSTPROCEDURAL STATES: Chronic | ICD-10-CM

## 2019-07-16 PROCEDURE — D4341: CPT

## 2019-07-30 ENCOUNTER — OUTPATIENT (OUTPATIENT)
Dept: OUTPATIENT SERVICES | Facility: HOSPITAL | Age: 54
LOS: 1 days | End: 2019-07-30

## 2019-07-30 DIAGNOSIS — K05.30 CHRONIC PERIODONTITIS, UNSPECIFIED: ICD-10-CM

## 2019-07-30 DIAGNOSIS — Z98.890 OTHER SPECIFIED POSTPROCEDURAL STATES: Chronic | ICD-10-CM

## 2019-08-07 DIAGNOSIS — K08.9 DISORDER OF TEETH AND SUPPORTING STRUCTURES, UNSPECIFIED: ICD-10-CM

## 2019-09-09 ENCOUNTER — OUTPATIENT (OUTPATIENT)
Dept: OUTPATIENT SERVICES | Facility: HOSPITAL | Age: 54
LOS: 1 days | End: 2019-09-09

## 2019-09-09 DIAGNOSIS — Z98.890 OTHER SPECIFIED POSTPROCEDURAL STATES: Chronic | ICD-10-CM

## 2019-09-09 DIAGNOSIS — K08.9 DISORDER OF TEETH AND SUPPORTING STRUCTURES, UNSPECIFIED: ICD-10-CM

## 2019-10-11 ENCOUNTER — TRANSCRIPTION ENCOUNTER (OUTPATIENT)
Age: 54
End: 2019-10-11

## 2019-10-14 ENCOUNTER — OUTPATIENT (OUTPATIENT)
Dept: OUTPATIENT SERVICES | Facility: HOSPITAL | Age: 54
LOS: 1 days | End: 2019-10-14

## 2019-10-14 DIAGNOSIS — Z98.890 OTHER SPECIFIED POSTPROCEDURAL STATES: Chronic | ICD-10-CM

## 2019-10-14 DIAGNOSIS — K08.9 DISORDER OF TEETH AND SUPPORTING STRUCTURES, UNSPECIFIED: ICD-10-CM

## 2019-11-11 ENCOUNTER — OUTPATIENT (OUTPATIENT)
Dept: OUTPATIENT SERVICES | Facility: HOSPITAL | Age: 54
LOS: 1 days | End: 2019-11-11

## 2019-11-11 DIAGNOSIS — Z98.890 OTHER SPECIFIED POSTPROCEDURAL STATES: Chronic | ICD-10-CM

## 2019-11-11 DIAGNOSIS — K08.9 DISORDER OF TEETH AND SUPPORTING STRUCTURES, UNSPECIFIED: ICD-10-CM

## 2020-01-14 ENCOUNTER — TRANSCRIPTION ENCOUNTER (OUTPATIENT)
Age: 55
End: 2020-01-14

## 2020-02-04 ENCOUNTER — OUTPATIENT (OUTPATIENT)
Dept: OUTPATIENT SERVICES | Facility: HOSPITAL | Age: 55
LOS: 1 days | End: 2020-02-04
Payer: MEDICARE

## 2020-02-04 DIAGNOSIS — Z98.890 OTHER SPECIFIED POSTPROCEDURAL STATES: Chronic | ICD-10-CM

## 2020-02-04 DIAGNOSIS — K08.9 DISORDER OF TEETH AND SUPPORTING STRUCTURES, UNSPECIFIED: ICD-10-CM

## 2020-02-04 PROCEDURE — D0230: CPT

## 2020-02-04 PROCEDURE — D0120: CPT

## 2020-02-04 PROCEDURE — D4910: CPT

## 2020-02-04 PROCEDURE — D0274: CPT

## 2020-02-04 PROCEDURE — D0220: CPT

## 2020-02-21 DIAGNOSIS — Z01.20 ENCOUNTER FOR DENTAL EXAMINATION AND CLEANING WITHOUT ABNORMAL FINDINGS: ICD-10-CM

## 2020-05-29 ENCOUNTER — TRANSCRIPTION ENCOUNTER (OUTPATIENT)
Age: 55
End: 2020-05-29

## 2020-08-31 ENCOUNTER — RESULT REVIEW (OUTPATIENT)
Age: 55
End: 2020-08-31

## 2020-09-02 NOTE — INPATIENT CERTIFICATION FOR MEDICARE PATIENTS - CURRENT MEDICAL NEEDS AND CARE PLANS
Possible Home

## 2020-11-03 ENCOUNTER — TRANSCRIPTION ENCOUNTER (OUTPATIENT)
Age: 55
End: 2020-11-03

## 2020-11-24 ENCOUNTER — OUTPATIENT (OUTPATIENT)
Dept: OUTPATIENT SERVICES | Facility: HOSPITAL | Age: 55
LOS: 1 days | End: 2020-11-24
Payer: MEDICARE

## 2020-11-24 DIAGNOSIS — Z98.890 OTHER SPECIFIED POSTPROCEDURAL STATES: Chronic | ICD-10-CM

## 2020-11-24 DIAGNOSIS — K08.9 DISORDER OF TEETH AND SUPPORTING STRUCTURES, UNSPECIFIED: ICD-10-CM

## 2020-11-24 PROCEDURE — D0274: CPT

## 2020-11-24 PROCEDURE — D4910: CPT

## 2020-11-24 PROCEDURE — D0120: CPT

## 2020-11-24 PROCEDURE — D0230: CPT

## 2020-11-24 PROCEDURE — D0220: CPT

## 2020-12-02 DIAGNOSIS — Z01.20 ENCOUNTER FOR DENTAL EXAMINATION AND CLEANING WITHOUT ABNORMAL FINDINGS: ICD-10-CM

## 2021-02-03 ENCOUNTER — APPOINTMENT (OUTPATIENT)
Dept: OBGYN | Facility: CLINIC | Age: 56
End: 2021-02-03

## 2021-04-13 ENCOUNTER — OUTPATIENT (OUTPATIENT)
Dept: OUTPATIENT SERVICES | Facility: HOSPITAL | Age: 56
LOS: 1 days | End: 2021-04-13

## 2021-04-13 DIAGNOSIS — Z98.890 OTHER SPECIFIED POSTPROCEDURAL STATES: Chronic | ICD-10-CM

## 2021-04-13 DIAGNOSIS — K08.9 DISORDER OF TEETH AND SUPPORTING STRUCTURES, UNSPECIFIED: ICD-10-CM

## 2021-04-14 ENCOUNTER — APPOINTMENT (OUTPATIENT)
Dept: OBGYN | Facility: CLINIC | Age: 56
End: 2021-04-14
Payer: MEDICARE

## 2021-04-14 VITALS
DIASTOLIC BLOOD PRESSURE: 76 MMHG | SYSTOLIC BLOOD PRESSURE: 138 MMHG | HEIGHT: 62 IN | BODY MASS INDEX: 43.79 KG/M2 | WEIGHT: 238 LBS

## 2021-04-14 PROCEDURE — 99213 OFFICE O/P EST LOW 20 MIN: CPT | Mod: 25

## 2021-04-15 LAB
C TRACH RRNA SPEC QL NAA+PROBE: NOT DETECTED
CANDIDA VAG CYTO: NOT DETECTED
G VAGINALIS+PREV SP MTYP VAG QL MICRO: NOT DETECTED
N GONORRHOEA RRNA SPEC QL NAA+PROBE: NOT DETECTED
SOURCE AMPLIFICATION: NORMAL
T VAGINALIS VAG QL WET PREP: NOT DETECTED

## 2021-04-27 ENCOUNTER — OUTPATIENT (OUTPATIENT)
Dept: OUTPATIENT SERVICES | Facility: HOSPITAL | Age: 56
LOS: 1 days | End: 2021-04-27

## 2021-04-27 DIAGNOSIS — K08.9 DISORDER OF TEETH AND SUPPORTING STRUCTURES, UNSPECIFIED: ICD-10-CM

## 2021-04-27 DIAGNOSIS — Z98.890 OTHER SPECIFIED POSTPROCEDURAL STATES: Chronic | ICD-10-CM

## 2021-05-05 ENCOUNTER — APPOINTMENT (OUTPATIENT)
Dept: OBGYN | Facility: CLINIC | Age: 56
End: 2021-05-05
Payer: MEDICARE

## 2021-05-05 VITALS — SYSTOLIC BLOOD PRESSURE: 126 MMHG | DIASTOLIC BLOOD PRESSURE: 82 MMHG

## 2021-05-05 PROCEDURE — 99212 OFFICE O/P EST SF 10 MIN: CPT

## 2021-05-05 NOTE — HISTORY OF PRESENT ILLNESS
[FreeTextEntry1] : 05/05/2021. ELVI MAYFIELD 55 year old female presents for a follow-up visit for vulvitis. Pt was seen in our office on 4/14/21 for vulvitis and to r/o yeast vulvitis. Pt finished Lotrisone and Diflucan treatment. Reports all vulvitis sxs resolved. Vaginal cultures on 4/14/21 were negative.

## 2021-05-05 NOTE — PHYSICAL EXAM
[Vulvar Atrophy] : vulvar atrophy [Labia Majora] : normal [Labia Minora] : normal [Atrophy] : atrophy [No Bleeding] : There was no active vaginal bleeding [Normal] : normal [Uterine Adnexae] : normal [FreeTextEntry1] : no whitening areas, no suspicious areas [FreeTextEntry4] : no blood present, no active bleeding

## 2021-05-11 ENCOUNTER — OUTPATIENT (OUTPATIENT)
Dept: OUTPATIENT SERVICES | Facility: HOSPITAL | Age: 56
LOS: 1 days | End: 2021-05-11

## 2021-05-11 DIAGNOSIS — Z98.890 OTHER SPECIFIED POSTPROCEDURAL STATES: Chronic | ICD-10-CM

## 2021-05-11 DIAGNOSIS — K08.9 DISORDER OF TEETH AND SUPPORTING STRUCTURES, UNSPECIFIED: ICD-10-CM

## 2021-05-26 ENCOUNTER — ASOB RESULT (OUTPATIENT)
Age: 56
End: 2021-05-26

## 2021-05-26 ENCOUNTER — APPOINTMENT (OUTPATIENT)
Dept: OBGYN | Facility: CLINIC | Age: 56
End: 2021-05-26
Payer: MEDICARE

## 2021-05-26 PROCEDURE — 76830 TRANSVAGINAL US NON-OB: CPT

## 2021-06-01 ENCOUNTER — NON-APPOINTMENT (OUTPATIENT)
Age: 56
End: 2021-06-01

## 2021-06-01 ENCOUNTER — APPOINTMENT (OUTPATIENT)
Dept: OPHTHALMOLOGY | Facility: CLINIC | Age: 56
End: 2021-06-01
Payer: MEDICARE

## 2021-06-01 PROCEDURE — 92015 DETERMINE REFRACTIVE STATE: CPT | Mod: NC

## 2021-06-01 PROCEDURE — 92004 COMPRE OPH EXAM NEW PT 1/>: CPT

## 2021-07-12 ENCOUNTER — OUTPATIENT (OUTPATIENT)
Dept: OUTPATIENT SERVICES | Facility: HOSPITAL | Age: 56
LOS: 1 days | End: 2021-07-12

## 2021-07-12 DIAGNOSIS — Z98.890 OTHER SPECIFIED POSTPROCEDURAL STATES: Chronic | ICD-10-CM

## 2021-07-12 DIAGNOSIS — K08.9 DISORDER OF TEETH AND SUPPORTING STRUCTURES, UNSPECIFIED: ICD-10-CM

## 2021-08-02 ENCOUNTER — APPOINTMENT (OUTPATIENT)
Dept: ULTRASOUND IMAGING | Facility: CLINIC | Age: 56
End: 2021-08-02
Payer: MEDICARE

## 2021-08-02 ENCOUNTER — RESULT REVIEW (OUTPATIENT)
Age: 56
End: 2021-08-02

## 2021-08-02 ENCOUNTER — APPOINTMENT (OUTPATIENT)
Dept: MAMMOGRAPHY | Facility: CLINIC | Age: 56
End: 2021-08-02
Payer: MEDICARE

## 2021-08-02 PROCEDURE — 76641 ULTRASOUND BREAST COMPLETE: CPT | Mod: 50

## 2021-08-02 PROCEDURE — 77063 BREAST TOMOSYNTHESIS BI: CPT

## 2021-08-02 PROCEDURE — 77067 SCR MAMMO BI INCL CAD: CPT

## 2021-10-20 ENCOUNTER — APPOINTMENT (OUTPATIENT)
Dept: OBGYN | Facility: CLINIC | Age: 56
End: 2021-10-20
Payer: MEDICARE

## 2021-10-20 VITALS
DIASTOLIC BLOOD PRESSURE: 80 MMHG | HEIGHT: 62 IN | SYSTOLIC BLOOD PRESSURE: 124 MMHG | BODY MASS INDEX: 44.16 KG/M2 | WEIGHT: 240 LBS

## 2021-10-20 DIAGNOSIS — N76.2 ACUTE VULVITIS: ICD-10-CM

## 2021-10-20 DIAGNOSIS — N95.2 POSTMENOPAUSAL ATROPHIC VAGINITIS: ICD-10-CM

## 2021-10-20 PROCEDURE — G0101: CPT | Mod: GA

## 2021-10-20 PROCEDURE — G0328 FECAL BLOOD SCRN IMMUNOASSAY: CPT | Mod: QW

## 2021-10-20 NOTE — PHYSICAL EXAM
[Appropriately responsive] : appropriately responsive [Alert] : alert [No Acute Distress] : no acute distress [No Lymphadenopathy] : no lymphadenopathy [Regular Rate Rhythm] : regular rate rhythm [No Murmurs] : no murmurs [Clear to Auscultation B/L] : clear to auscultation bilaterally [Soft] : soft [Non-tender] : non-tender [Non-distended] : non-distended [No HSM] : No HSM [No Lesions] : no lesions [No Mass] : no mass [Oriented x3] : oriented x3 [Examination Of The Breasts] : a normal appearance [No Masses] : no breast masses were palpable [Vulvar Atrophy] : vulvar atrophy [Labia Majora Erythema] : erythema [Labia Minora Erythema] : erythema [Atrophy] : atrophy [Normal] : normal [Uterine Adnexae] : normal [FreeTextEntry1] : Erythema [FreeTextEntry9] : No masses. Guaiac negative

## 2021-10-20 NOTE — HISTORY OF PRESENT ILLNESS
[Patient reported mammogram was normal] : Patient reported mammogram was normal [Patient reported breast sonogram was normal] : Patient reported breast sonogram was normal [Patient reported PAP Smear was normal] : Patient reported PAP Smear was normal [Patient reported bone density results were normal] : Patient reported bone density results were normal [Patient reported colonoscopy was normal] : Patient reported colonoscopy was normal [postmenopausal] : postmenopausal [N] : Patient denies prior pregnancies [FreeTextEntry1] : ELVI MAYFIELD  55 year  old female G0 PM presents for an annual gyn exam, PMH HTN, DM, reflux, asthma, anxiety, depression, fibroid, obesity, sleep apnea\par \par She feels well and offers no complaints. She had f/u visit in 05/2021 for yeast vulvitis, which she was prescribed Lotrisone and Diflucan. Sxs have improved but not completely resolved. She denies VB or abnormal discharge. No urinary complaints. She has normal BM, no bloody stool. She denies abdominal or pelvic pain.\par \par No new medical conditions, medications or surgeries.\par \par Denies FHx of breast, ovarian, uterine or colon cancer.\par  [TextBox_4] : last pelvic US in 05/2021 - normal [Mammogramdate] : 08/21 [BreastSonogramDate] : 08/21 [PapSmeardate] : 08/20 [TextBox_31] : Done today [BoneDensityDate] : 08/20 [ColonoscopyDate] : 2020

## 2021-10-20 NOTE — REVIEW OF SYSTEMS
[Patient Intake Form Reviewed] : Patient intake form was reviewed [Genital Rash/Irritation] : genital rash/irritation [Negative] : Heme/Lymph

## 2021-11-16 ENCOUNTER — OUTPATIENT (OUTPATIENT)
Dept: OUTPATIENT SERVICES | Facility: HOSPITAL | Age: 56
LOS: 1 days | End: 2021-11-16

## 2021-11-16 DIAGNOSIS — Z98.890 OTHER SPECIFIED POSTPROCEDURAL STATES: Chronic | ICD-10-CM

## 2021-11-16 DIAGNOSIS — K08.9 DISORDER OF TEETH AND SUPPORTING STRUCTURES, UNSPECIFIED: ICD-10-CM

## 2021-12-16 ENCOUNTER — OUTPATIENT (OUTPATIENT)
Dept: OUTPATIENT SERVICES | Facility: HOSPITAL | Age: 56
LOS: 1 days | End: 2021-12-16

## 2021-12-16 DIAGNOSIS — K08.9 DISORDER OF TEETH AND SUPPORTING STRUCTURES, UNSPECIFIED: ICD-10-CM

## 2021-12-16 DIAGNOSIS — Z98.890 OTHER SPECIFIED POSTPROCEDURAL STATES: Chronic | ICD-10-CM

## 2022-01-14 ENCOUNTER — OUTPATIENT (OUTPATIENT)
Dept: OUTPATIENT SERVICES | Facility: HOSPITAL | Age: 57
LOS: 1 days | End: 2022-01-14
Payer: MEDICARE

## 2022-01-14 DIAGNOSIS — K08.9 DISORDER OF TEETH AND SUPPORTING STRUCTURES, UNSPECIFIED: ICD-10-CM

## 2022-01-14 DIAGNOSIS — Z98.890 OTHER SPECIFIED POSTPROCEDURAL STATES: Chronic | ICD-10-CM

## 2022-01-14 PROCEDURE — D0220: CPT

## 2022-01-14 PROCEDURE — D0230: CPT

## 2022-01-14 PROCEDURE — D0274: CPT

## 2022-01-14 PROCEDURE — D0120: CPT

## 2022-01-26 DIAGNOSIS — Z01.20 ENCOUNTER FOR DENTAL EXAMINATION AND CLEANING WITHOUT ABNORMAL FINDINGS: ICD-10-CM

## 2022-01-31 ENCOUNTER — OUTPATIENT (OUTPATIENT)
Dept: OUTPATIENT SERVICES | Facility: HOSPITAL | Age: 57
LOS: 1 days | End: 2022-01-31
Payer: MEDICARE

## 2022-01-31 DIAGNOSIS — Z98.890 OTHER SPECIFIED POSTPROCEDURAL STATES: Chronic | ICD-10-CM

## 2022-01-31 PROCEDURE — D1110: CPT

## 2022-02-01 ENCOUNTER — TRANSCRIPTION ENCOUNTER (OUTPATIENT)
Age: 57
End: 2022-02-01

## 2022-02-08 DIAGNOSIS — Z01.20 ENCOUNTER FOR DENTAL EXAMINATION AND CLEANING WITHOUT ABNORMAL FINDINGS: ICD-10-CM

## 2022-02-08 DIAGNOSIS — K08.9 DISORDER OF TEETH AND SUPPORTING STRUCTURES, UNSPECIFIED: ICD-10-CM

## 2022-02-15 ENCOUNTER — OUTPATIENT (OUTPATIENT)
Dept: OUTPATIENT SERVICES | Facility: HOSPITAL | Age: 57
LOS: 1 days | End: 2022-02-15

## 2022-02-15 DIAGNOSIS — K08.9 DISORDER OF TEETH AND SUPPORTING STRUCTURES, UNSPECIFIED: ICD-10-CM

## 2022-02-15 DIAGNOSIS — Z98.890 OTHER SPECIFIED POSTPROCEDURAL STATES: Chronic | ICD-10-CM

## 2022-03-21 NOTE — ED BEHAVIORAL HEALTH ASSESSMENT NOTE - FAMILY HISTORY OF PSYCHIATRIC ILLNESS / SUICIDALITY
Health Maintenance Due   Topic Date Due   • Colorectal Cancer Screen-  Never done   • Shingles Vaccine (1 of 2) Never done   • COVID-19 Vaccine (3 - Booster for Pfizer series) 07/01/2021       Patient is due for topics as listed above patient will check with insurance regarding shingles vaccine coverage,will discuss colonoscopy with provider   Yes

## 2022-03-24 NOTE — PLAN
[FreeTextEntry1] : 55 year old female pt presents for routine gyn exam\par Breast and pelvic exam performed\par \par \par Vaginal atrophy\par Rx ordered Premarin. Samples given\par \par Vulvitis\par Rx ordered Lotrisone\par \par RTO in 1 year or PRN\par 
(0) indicator not present

## 2022-04-26 ENCOUNTER — EMERGENCY (EMERGENCY)
Facility: HOSPITAL | Age: 57
LOS: 1 days | Discharge: HOME | End: 2022-04-26
Attending: EMERGENCY MEDICINE | Admitting: EMERGENCY MEDICINE
Payer: MEDICARE

## 2022-04-26 VITALS
DIASTOLIC BLOOD PRESSURE: 85 MMHG | SYSTOLIC BLOOD PRESSURE: 147 MMHG | WEIGHT: 240.08 LBS | OXYGEN SATURATION: 100 % | TEMPERATURE: 99 F | HEIGHT: 63 IN | RESPIRATION RATE: 18 BRPM | HEART RATE: 94 BPM

## 2022-04-26 VITALS
RESPIRATION RATE: 18 BRPM | SYSTOLIC BLOOD PRESSURE: 135 MMHG | DIASTOLIC BLOOD PRESSURE: 78 MMHG | OXYGEN SATURATION: 99 % | HEART RATE: 87 BPM

## 2022-04-26 DIAGNOSIS — Z98.890 OTHER SPECIFIED POSTPROCEDURAL STATES: Chronic | ICD-10-CM

## 2022-04-26 LAB
ALBUMIN SERPL ELPH-MCNC: 3.4 G/DL — SIGNIFICANT CHANGE UP (ref 3.3–5)
ALP SERPL-CCNC: 99 U/L — SIGNIFICANT CHANGE UP (ref 40–120)
ALT FLD-CCNC: 42 U/L — SIGNIFICANT CHANGE UP (ref 10–45)
ANION GAP SERPL CALC-SCNC: 7 MMOL/L — SIGNIFICANT CHANGE UP (ref 5–17)
APPEARANCE UR: CLEAR — SIGNIFICANT CHANGE UP
AST SERPL-CCNC: 18 U/L — SIGNIFICANT CHANGE UP (ref 10–40)
BASOPHILS # BLD AUTO: 0.05 K/UL — SIGNIFICANT CHANGE UP (ref 0–0.2)
BASOPHILS NFR BLD AUTO: 0.4 % — SIGNIFICANT CHANGE UP (ref 0–2)
BILIRUB SERPL-MCNC: 0.4 MG/DL — SIGNIFICANT CHANGE UP (ref 0.2–1.2)
BILIRUB UR-MCNC: NEGATIVE — SIGNIFICANT CHANGE UP
BUN SERPL-MCNC: 13 MG/DL — SIGNIFICANT CHANGE UP (ref 7–23)
CALCIUM SERPL-MCNC: 9 MG/DL — SIGNIFICANT CHANGE UP (ref 8.4–10.5)
CHLORIDE SERPL-SCNC: 104 MMOL/L — SIGNIFICANT CHANGE UP (ref 96–108)
CO2 SERPL-SCNC: 27 MMOL/L — SIGNIFICANT CHANGE UP (ref 22–31)
COLOR SPEC: YELLOW — SIGNIFICANT CHANGE UP
CREAT SERPL-MCNC: 1.1 MG/DL — SIGNIFICANT CHANGE UP (ref 0.5–1.3)
DIFF PNL FLD: ABNORMAL
EGFR: 59 ML/MIN/1.73M2 — LOW
EOSINOPHIL # BLD AUTO: 0.04 K/UL — SIGNIFICANT CHANGE UP (ref 0–0.5)
EOSINOPHIL NFR BLD AUTO: 0.3 % — SIGNIFICANT CHANGE UP (ref 0–6)
GLUCOSE SERPL-MCNC: 122 MG/DL — HIGH (ref 70–99)
GLUCOSE UR QL: 1000 MG/DL
HCT VFR BLD CALC: 38 % — SIGNIFICANT CHANGE UP (ref 34.5–45)
HGB BLD-MCNC: 12.6 G/DL — SIGNIFICANT CHANGE UP (ref 11.5–15.5)
IMM GRANULOCYTES NFR BLD AUTO: 0.3 % — SIGNIFICANT CHANGE UP (ref 0–1.5)
KETONES UR-MCNC: NEGATIVE — SIGNIFICANT CHANGE UP
LEUKOCYTE ESTERASE UR-ACNC: ABNORMAL
LIDOCAIN IGE QN: 119 U/L — SIGNIFICANT CHANGE UP (ref 73–393)
LYMPHOCYTES # BLD AUTO: 2.47 K/UL — SIGNIFICANT CHANGE UP (ref 1–3.3)
LYMPHOCYTES # BLD AUTO: 20.9 % — SIGNIFICANT CHANGE UP (ref 13–44)
MCHC RBC-ENTMCNC: 29.4 PG — SIGNIFICANT CHANGE UP (ref 27–34)
MCHC RBC-ENTMCNC: 33.2 GM/DL — SIGNIFICANT CHANGE UP (ref 32–36)
MCV RBC AUTO: 88.8 FL — SIGNIFICANT CHANGE UP (ref 80–100)
MONOCYTES # BLD AUTO: 0.84 K/UL — SIGNIFICANT CHANGE UP (ref 0–0.9)
MONOCYTES NFR BLD AUTO: 7.1 % — SIGNIFICANT CHANGE UP (ref 2–14)
NEUTROPHILS # BLD AUTO: 8.37 K/UL — HIGH (ref 1.8–7.4)
NEUTROPHILS NFR BLD AUTO: 71 % — SIGNIFICANT CHANGE UP (ref 43–77)
NITRITE UR-MCNC: POSITIVE
NRBC # BLD: 0 /100 WBCS — SIGNIFICANT CHANGE UP (ref 0–0)
PH UR: 6.5 — SIGNIFICANT CHANGE UP (ref 5–8)
PLATELET # BLD AUTO: 325 K/UL — SIGNIFICANT CHANGE UP (ref 150–400)
POTASSIUM SERPL-MCNC: 3.9 MMOL/L — SIGNIFICANT CHANGE UP (ref 3.5–5.3)
POTASSIUM SERPL-SCNC: 3.9 MMOL/L — SIGNIFICANT CHANGE UP (ref 3.5–5.3)
PROT SERPL-MCNC: 7.5 G/DL — SIGNIFICANT CHANGE UP (ref 6–8.3)
PROT UR-MCNC: 30 MG/DL
RBC # BLD: 4.28 M/UL — SIGNIFICANT CHANGE UP (ref 3.8–5.2)
RBC # FLD: 13.1 % — SIGNIFICANT CHANGE UP (ref 10.3–14.5)
SARS-COV-2 RNA SPEC QL NAA+PROBE: SIGNIFICANT CHANGE UP
SODIUM SERPL-SCNC: 138 MMOL/L — SIGNIFICANT CHANGE UP (ref 135–145)
SP GR SPEC: 1.01 — SIGNIFICANT CHANGE UP (ref 1.01–1.02)
UROBILINOGEN FLD QL: NEGATIVE — SIGNIFICANT CHANGE UP
WBC # BLD: 11.8 K/UL — HIGH (ref 3.8–10.5)
WBC # FLD AUTO: 11.8 K/UL — HIGH (ref 3.8–10.5)

## 2022-04-26 PROCEDURE — 99284 EMERGENCY DEPT VISIT MOD MDM: CPT

## 2022-04-26 PROCEDURE — 83690 ASSAY OF LIPASE: CPT

## 2022-04-26 PROCEDURE — 96367 TX/PROPH/DG ADDL SEQ IV INF: CPT

## 2022-04-26 PROCEDURE — 87635 SARS-COV-2 COVID-19 AMP PRB: CPT

## 2022-04-26 PROCEDURE — 81001 URINALYSIS AUTO W/SCOPE: CPT

## 2022-04-26 PROCEDURE — 99284 EMERGENCY DEPT VISIT MOD MDM: CPT | Mod: 25

## 2022-04-26 PROCEDURE — 96365 THER/PROPH/DIAG IV INF INIT: CPT | Mod: XU

## 2022-04-26 PROCEDURE — 74177 CT ABD & PELVIS W/CONTRAST: CPT | Mod: MA

## 2022-04-26 PROCEDURE — 85025 COMPLETE CBC W/AUTO DIFF WBC: CPT

## 2022-04-26 PROCEDURE — 74177 CT ABD & PELVIS W/CONTRAST: CPT | Mod: 26,MA

## 2022-04-26 PROCEDURE — 96375 TX/PRO/DX INJ NEW DRUG ADDON: CPT

## 2022-04-26 PROCEDURE — 36415 COLL VENOUS BLD VENIPUNCTURE: CPT

## 2022-04-26 PROCEDURE — 87086 URINE CULTURE/COLONY COUNT: CPT

## 2022-04-26 PROCEDURE — 82962 GLUCOSE BLOOD TEST: CPT

## 2022-04-26 PROCEDURE — 80053 COMPREHEN METABOLIC PANEL: CPT

## 2022-04-26 RX ORDER — SODIUM CHLORIDE 9 MG/ML
1000 INJECTION INTRAMUSCULAR; INTRAVENOUS; SUBCUTANEOUS ONCE
Refills: 0 | Status: COMPLETED | OUTPATIENT
Start: 2022-04-26 | End: 2022-04-26

## 2022-04-26 RX ORDER — CEFUROXIME AXETIL 250 MG
1 TABLET ORAL
Qty: 20 | Refills: 0
Start: 2022-04-26 | End: 2022-05-05

## 2022-04-26 RX ORDER — ONDANSETRON 8 MG/1
1 TABLET, FILM COATED ORAL
Qty: 20 | Refills: 0
Start: 2022-04-26

## 2022-04-26 RX ORDER — KETOROLAC TROMETHAMINE 30 MG/ML
30 SYRINGE (ML) INJECTION ONCE
Refills: 0 | Status: DISCONTINUED | OUTPATIENT
Start: 2022-04-26 | End: 2022-04-26

## 2022-04-26 RX ORDER — ONDANSETRON 8 MG/1
4 TABLET, FILM COATED ORAL ONCE
Refills: 0 | Status: COMPLETED | OUTPATIENT
Start: 2022-04-26 | End: 2022-04-26

## 2022-04-26 RX ORDER — FAMOTIDINE 10 MG/ML
20 INJECTION INTRAVENOUS DAILY
Refills: 0 | Status: DISCONTINUED | OUTPATIENT
Start: 2022-04-26 | End: 2022-04-29

## 2022-04-26 RX ORDER — CEFTRIAXONE 500 MG/1
1000 INJECTION, POWDER, FOR SOLUTION INTRAMUSCULAR; INTRAVENOUS ONCE
Refills: 0 | Status: COMPLETED | OUTPATIENT
Start: 2022-04-26 | End: 2022-04-26

## 2022-04-26 RX ADMIN — CEFTRIAXONE 100 MILLIGRAM(S): 500 INJECTION, POWDER, FOR SOLUTION INTRAMUSCULAR; INTRAVENOUS at 04:23

## 2022-04-26 RX ADMIN — CEFTRIAXONE 1000 MILLIGRAM(S): 500 INJECTION, POWDER, FOR SOLUTION INTRAMUSCULAR; INTRAVENOUS at 04:53

## 2022-04-26 RX ADMIN — SODIUM CHLORIDE 1000 MILLILITER(S): 9 INJECTION INTRAMUSCULAR; INTRAVENOUS; SUBCUTANEOUS at 01:43

## 2022-04-26 RX ADMIN — FAMOTIDINE 20 MILLIGRAM(S): 10 INJECTION INTRAVENOUS at 02:20

## 2022-04-26 RX ADMIN — ONDANSETRON 4 MILLIGRAM(S): 8 TABLET, FILM COATED ORAL at 01:43

## 2022-04-26 RX ADMIN — SODIUM CHLORIDE 1000 MILLILITER(S): 9 INJECTION INTRAMUSCULAR; INTRAVENOUS; SUBCUTANEOUS at 02:37

## 2022-04-26 RX ADMIN — FAMOTIDINE 200 MILLIGRAM(S): 10 INJECTION INTRAVENOUS at 01:43

## 2022-04-26 RX ADMIN — Medication 30 MILLIGRAM(S): at 01:43

## 2022-04-26 RX ADMIN — Medication 30 MILLIGRAM(S): at 02:15

## 2022-04-26 NOTE — ED ADULT NURSE REASSESSMENT NOTE - NS ED NURSE REASSESS COMMENT FT1
pt resting comfortably, pain rated 3/10. Adv pt NPO sts as per MD. Pt understood, all other needs met.

## 2022-04-26 NOTE — ED PROVIDER NOTE - NSICDXPASTMEDICALHX_GEN_ALL_CORE_FT
PAST MEDICAL HISTORY:  Anxiety     Asthma     Depression     Diabetes     HLD (hyperlipidemia)     HTN (hypertension)     Migraine

## 2022-04-26 NOTE — ED ADULT NURSE NOTE - OBJECTIVE STATEMENT
Pt BIB EMS for abd pain beginning 4/25 around 1630 after eating [employer]. Pt reports having 2 episodes of vomiting 15 minutes apart around 1830. Pt reports no blood in vomit. Pt also reports nausea but has since subsided throughout the day. Pt denies f/c, dysuria, flank pain, diarrhea. Pt had non invasive procedure earlier in the day. Pt unable to tolerate foods due to fear of vomiting. Pain reported 8/10 as per pt.

## 2022-04-26 NOTE — ED ADULT TRIAGE NOTE - CHIEF COMPLAINT QUOTE
Patient comes to ER for sudden onset of abdominal pain, localized to RLQ. Several episodes of vomiting and nausea. Patient reports feeling better but that episodes are intermittent. Possible fever/chills at home.

## 2022-04-26 NOTE — ED PROVIDER NOTE - CLINICAL SUMMARY MEDICAL DECISION MAKING FREE TEXT BOX
55yo F c n/v epigastric pain today with mild RLQ ttp on exam. concern for appendicitis. possible viral gastroenteritis, gastritis, food poisoning. check labs, ct. zofran, toradol, pepcid for sxs. reassess 55yo F c n/v epigastric pain today with mild RLQ ttp on exam. concern for appendicitis. possible viral gastroenteritis, gastritis, food poisoning. check labs, ct. zofran, toradol, pepcid for sxs. reassess    ct neg for appendicitis. pt feeling better. states pain improved. CT shows R renal lesion, told pt to f/u c pmd. also shows ureteritis /cystitis and possible pyelonephritis. pt now notes mild dysuria. no cvat. no fever. clinically not a significant pyelonephritis, or mild upper uti. ua/ucx sent. will start rocephin. jose armando zaragoza ceftin. 57yo F c n/v epigastric pain today with mild RLQ ttp on exam. concern for appendicitis. possible viral gastroenteritis, gastritis, food poisoning. check labs, ct. zofran, toradol, pepcid for sxs. reassess    ct neg for appendicitis. pt feeling better. states pain improved. CT shows R renal lesion, told pt to f/u c pmd. also shows ureteritis /cystitis and possible pyelonephritis. pt now notes mild dysuria. no cvat. no fever. mild upper uti. ua/ucx sent. will start rocephin. jose armando zaragoza ceftin.

## 2022-04-26 NOTE — ED PROVIDER NOTE - NSFOLLOWUPINSTRUCTIONS_ED_ALL_ED_FT
- take cefuroxime antibiotic twice daily for 10 days  - take zofran as directed for nausea if needed  - take motrin for fever or pain  - see your doctor this week regarding right kidney abnormality seen on catscan    Follow Up in 1-3 Days with your own doctor or with  [address] [address]  Phone: [phone]    Urinary Tract Infection in Women    WHAT YOU NEED TO KNOW:    A urinary tract infection (UTI) is caused by bacteria that get inside your urinary tract. Most bacteria that enter your urinary tract come out when you urinate. If the bacteria stay in your urinary tract, you may get an infection. Your urinary tract includes your kidneys, ureters, bladder, and urethra. Urine is made in your kidneys, and it flows from the ureters to the bladder. Urine leaves the bladder through the urethra. A UTI is more common in your lower urinary tract, which includes your bladder and urethra. Female Urinary System         DISCHARGE INSTRUCTIONS:    Return to the emergency department if:     You are urinating very little or not at all.    You have a high fever with shaking chills.     You have side or back pain that gets worse.    Call your doctor if:     You have a fever.    You do not feel better after 2 days of taking antibiotics.    You are vomiting.     You have questions or concerns about your condition or care.    Medicines:     Antibiotics help fight a bacterial infection. If you have UTIs often (called recurrent UTIs), you may be given antibiotics to take regularly. You will be given directions for when and how to use antibiotics. The goal is to prevent UTIs but not cause antibiotic resistance by using antibiotics too often.    Medicines may be given to decrease pain and burning when you urinate. They will also help decrease the feeling that you need to urinate often. These medicines will make your urine orange or red.    Take your medicine as directed. Contact your healthcare provider if you think your medicine is not helping or if you have side effects. Tell him or her if you are allergic to any medicine. Keep a list of the medicines, vitamins, and herbs you take. Include the amounts, and when and why you take them. Bring the list or the pill bottles to follow-up visits. Carry your medicine list with you in case of an emergency.    Follow up with your healthcare provider as directed: Write down your questions so you remember to ask them during your visits.     Prevent another UTI:     Empty your bladder often. Urinate and empty your bladder as soon as you feel the need. Do not hold your urine for long periods of time.    Wipe from front to back after you urinate or have a bowel movement. This will help prevent germs from getting into your urinary tract through your urethra.    Drink liquids as directed. Ask how much liquid to drink each day and which liquids are best for you. You may need to drink more liquids than usual to help flush out the bacteria. Do not drink alcohol, caffeine, or citrus juices. These can irritate your bladder and increase your symptoms. Your healthcare provider may recommend cranberry juice to help prevent a UTI.    Urinate after you have sex. This can help flush out bacteria passed during sex.    Do not douche or use feminine deodorants. These can change the chemical balance in your vagina.      Change sanitary pads or tampons often. This will help prevent germs from getting into your urinary tract.     Talk to your healthcare provider about your birth control method. You may need to change your method if it is increasing your risk for UTIs.    Wear cotton underwear and clothes that are loose. Tight pants and nylon underwear can trap moisture and cause bacteria to grow.    Vaginal estrogen may be recommended. This medicine helps prevent UTIs in women who have gone through menopause or are in casimiro-menopause.    Do pelvic muscle exercises often. Pelvic muscle exercises may help you start and stop urinating. Strong pelvic muscles may help you empty your bladder easier. Squeeze these muscles tightly for 5 seconds like you are trying to hold back urine. Then relax for 5 seconds. Gradually work up to squeezing for 10 seconds. Do 3 sets of 15 repetitions a day, or as directed.

## 2022-04-26 NOTE — ED PROVIDER NOTE - PATIENT PORTAL LINK FT
You can access the FollowMyHealth Patient Portal offered by [facility] by registering at the following website: [email or web address]. By joining [facility]’s FollowMyHealth portal, you will also be able to view your health information using other applications (apps) compatible with our system.

## 2022-04-26 NOTE — ED PROVIDER NOTE - OBJECTIVE STATEMENT
57yo F p/w vomiting, nbnb, severe since 4pm today after eating burger [employer]. 55yo F p/w vomiting, nbnb, moderate, since 6pm today. assoc c epigastric pain, intermittent. no diarrhea. had normal bm. no n/v now. came to ER because pain got little worse. no fever. no dysuria.

## 2022-04-30 LAB
CULTURE RESULTS: NO GROWTH — SIGNIFICANT CHANGE UP
SPECIMEN SOURCE: SIGNIFICANT CHANGE UP

## 2022-06-06 ENCOUNTER — RESULT CHARGE (OUTPATIENT)
Age: 57
End: 2022-06-06

## 2022-06-06 ENCOUNTER — APPOINTMENT (OUTPATIENT)
Dept: OBGYN | Facility: CLINIC | Age: 57
End: 2022-06-06
Payer: MEDICARE

## 2022-06-06 VITALS — SYSTOLIC BLOOD PRESSURE: 147 MMHG | DIASTOLIC BLOOD PRESSURE: 83 MMHG

## 2022-06-06 DIAGNOSIS — R39.9 UNSPECIFIED SYMPTOMS AND SIGNS INVOLVING THE GENITOURINARY SYSTEM: ICD-10-CM

## 2022-06-06 DIAGNOSIS — N76.0 ACUTE VAGINITIS: ICD-10-CM

## 2022-06-06 LAB
BILIRUB UR QL STRIP: NORMAL
CLARITY UR: CLEAR
COLLECTION METHOD: NORMAL
GLUCOSE UR-MCNC: NORMAL
HCG UR QL: NORMAL EU/DL
HGB UR QL STRIP.AUTO: NORMAL
KETONES UR-MCNC: NORMAL
LEUKOCYTE ESTERASE UR QL STRIP: NORMAL
NITRITE UR QL STRIP: NORMAL
PH UR STRIP: NORMAL
PROT UR STRIP-MCNC: NORMAL
SP GR UR STRIP: NORMAL

## 2022-06-06 PROCEDURE — 99213 OFFICE O/P EST LOW 20 MIN: CPT | Mod: 25

## 2022-06-06 PROCEDURE — 81003 URINALYSIS AUTO W/O SCOPE: CPT | Mod: QW

## 2022-06-06 RX ORDER — ERENUMAB-AOOE 140 MG/ML
140 INJECTION, SOLUTION SUBCUTANEOUS
Qty: 1 | Refills: 0 | Status: COMPLETED | COMMUNITY
Start: 2022-04-15

## 2022-06-06 RX ORDER — ALBUTEROL SULFATE 90 UG/1
108 (90 BASE) INHALANT RESPIRATORY (INHALATION)
Qty: 20 | Refills: 0 | Status: ACTIVE | COMMUNITY
Start: 2022-05-30

## 2022-06-07 DIAGNOSIS — N39.0 URINARY TRACT INFECTION, SITE NOT SPECIFIED: ICD-10-CM

## 2022-06-09 LAB — BACTERIA UR CULT: ABNORMAL

## 2022-06-13 ENCOUNTER — NON-APPOINTMENT (OUTPATIENT)
Age: 57
End: 2022-06-13

## 2022-07-11 ENCOUNTER — NON-APPOINTMENT (OUTPATIENT)
Age: 57
End: 2022-07-11

## 2022-08-02 ENCOUNTER — APPOINTMENT (OUTPATIENT)
Dept: OPHTHALMOLOGY | Facility: CLINIC | Age: 57
End: 2022-08-02

## 2022-09-13 ENCOUNTER — APPOINTMENT (OUTPATIENT)
Dept: MAMMOGRAPHY | Facility: CLINIC | Age: 57
End: 2022-09-13

## 2022-09-13 ENCOUNTER — RESULT REVIEW (OUTPATIENT)
Age: 57
End: 2022-09-13

## 2022-09-13 ENCOUNTER — APPOINTMENT (OUTPATIENT)
Dept: ULTRASOUND IMAGING | Facility: CLINIC | Age: 57
End: 2022-09-13

## 2022-09-13 PROCEDURE — 77063 BREAST TOMOSYNTHESIS BI: CPT

## 2022-09-13 PROCEDURE — 77067 SCR MAMMO BI INCL CAD: CPT

## 2022-09-13 PROCEDURE — 76641 ULTRASOUND BREAST COMPLETE: CPT | Mod: 50

## 2022-09-16 ENCOUNTER — NON-APPOINTMENT (OUTPATIENT)
Age: 57
End: 2022-09-16

## 2022-09-16 DIAGNOSIS — N63.0 UNSPECIFIED LUMP IN UNSPECIFIED BREAST: ICD-10-CM

## 2022-09-16 DIAGNOSIS — R92.2 INCONCLUSIVE MAMMOGRAM: ICD-10-CM

## 2022-09-19 ENCOUNTER — APPOINTMENT (OUTPATIENT)
Dept: MAMMOGRAPHY | Facility: CLINIC | Age: 57
End: 2022-09-19

## 2022-09-28 ENCOUNTER — RESULT REVIEW (OUTPATIENT)
Age: 57
End: 2022-09-28

## 2022-09-28 ENCOUNTER — APPOINTMENT (OUTPATIENT)
Dept: MAMMOGRAPHY | Facility: CLINIC | Age: 57
End: 2022-09-28

## 2022-09-28 PROCEDURE — G0279: CPT

## 2022-09-28 PROCEDURE — 77066 DX MAMMO INCL CAD BI: CPT

## 2022-10-02 ENCOUNTER — NON-APPOINTMENT (OUTPATIENT)
Age: 57
End: 2022-10-02

## 2022-10-06 ENCOUNTER — NON-APPOINTMENT (OUTPATIENT)
Age: 57
End: 2022-10-06

## 2022-10-20 ENCOUNTER — APPOINTMENT (OUTPATIENT)
Dept: NEUROLOGY | Facility: CLINIC | Age: 57
End: 2022-10-20

## 2022-10-20 VITALS
SYSTOLIC BLOOD PRESSURE: 144 MMHG | DIASTOLIC BLOOD PRESSURE: 78 MMHG | BODY MASS INDEX: 44.16 KG/M2 | HEART RATE: 92 BPM | WEIGHT: 240 LBS | HEIGHT: 62 IN

## 2022-10-20 DIAGNOSIS — G56.03 CARPAL TUNNEL SYNDROM,BILATERAL UPPER LIMBS: ICD-10-CM

## 2022-10-20 DIAGNOSIS — Z86.39 PERSONAL HISTORY OF OTHER ENDOCRINE, NUTRITIONAL AND METABOLIC DISEASE: ICD-10-CM

## 2022-10-20 DIAGNOSIS — J45.909 UNSPECIFIED ASTHMA, UNCOMPLICATED: ICD-10-CM

## 2022-10-20 DIAGNOSIS — E11.9 TYPE 2 DIABETES MELLITUS W/OUT COMPLICATIONS: ICD-10-CM

## 2022-10-20 DIAGNOSIS — G47.33 OBSTRUCTIVE SLEEP APNEA (ADULT) (PEDIATRIC): ICD-10-CM

## 2022-10-20 DIAGNOSIS — F25.9 SCHIZOAFFECTIVE DISORDER, UNSPECIFIED: ICD-10-CM

## 2022-10-20 PROCEDURE — 99204 OFFICE O/P NEW MOD 45 MIN: CPT

## 2022-10-20 RX ORDER — FLUOXETINE HYDROCHLORIDE 20 MG/1
20 CAPSULE ORAL
Qty: 90 | Refills: 0 | Status: ACTIVE | COMMUNITY
Start: 2022-08-24

## 2022-10-20 RX ORDER — CHOLECALCIFEROL (VITAMIN D3) 10(400)/ML
32G X 4 MM DROPS ORAL
Qty: 400 | Refills: 0 | Status: ACTIVE | COMMUNITY
Start: 2022-09-23

## 2022-10-20 RX ORDER — INSULIN GLARGINE 100 [IU]/ML
100 INJECTION, SOLUTION SUBCUTANEOUS
Qty: 15 | Refills: 0 | Status: ACTIVE | COMMUNITY
Start: 2022-05-06

## 2022-10-20 RX ORDER — PANTOPRAZOLE 40 MG/1
40 TABLET, DELAYED RELEASE ORAL
Qty: 30 | Refills: 0 | Status: ACTIVE | COMMUNITY
Start: 2022-05-19

## 2022-10-20 RX ORDER — INSULIN ASPART 100 [IU]/ML
100 INJECTION, SOLUTION INTRAVENOUS; SUBCUTANEOUS
Qty: 50 | Refills: 0 | Status: ACTIVE | COMMUNITY
Start: 2022-06-14

## 2022-10-20 RX ORDER — INSULIN PUMP CONTROLLER
EACH MISCELLANEOUS
Qty: 30 | Refills: 0 | Status: ACTIVE | COMMUNITY
Start: 2022-02-14

## 2022-10-20 RX ORDER — DOCUSATE SODIUM 100 MG/1
100 CAPSULE, LIQUID FILLED ORAL
Qty: 90 | Refills: 0 | Status: ACTIVE | COMMUNITY
Start: 2022-08-03

## 2022-10-20 RX ORDER — MUPIROCIN 20 MG/G
2 OINTMENT TOPICAL
Qty: 22 | Refills: 0 | Status: ACTIVE | COMMUNITY
Start: 2022-09-12

## 2022-10-20 RX ORDER — MONTELUKAST 10 MG/1
10 TABLET, FILM COATED ORAL
Qty: 30 | Refills: 0 | Status: ACTIVE | COMMUNITY
Start: 2022-05-11

## 2022-10-20 RX ORDER — ROSUVASTATIN CALCIUM 40 MG/1
40 TABLET, FILM COATED ORAL
Qty: 30 | Refills: 0 | Status: ACTIVE | COMMUNITY
Start: 2022-09-12

## 2022-10-20 RX ORDER — BLOOD SUGAR DIAGNOSTIC
STRIP MISCELLANEOUS
Qty: 400 | Refills: 0 | Status: ACTIVE | COMMUNITY
Start: 2022-06-23

## 2022-10-20 RX ORDER — TIRZEPATIDE 10 MG/.5ML
10 INJECTION, SOLUTION SUBCUTANEOUS
Qty: 2 | Refills: 0 | Status: ACTIVE | COMMUNITY
Start: 2022-09-29

## 2022-10-20 RX ORDER — INSULIN ASPART 100 [IU]/ML
100 INJECTION, SOLUTION INTRAVENOUS; SUBCUTANEOUS
Qty: 15 | Refills: 0 | Status: ACTIVE | COMMUNITY
Start: 2022-09-12

## 2022-10-20 RX ORDER — TIRZEPATIDE 5 MG/.5ML
5 INJECTION, SOLUTION SUBCUTANEOUS
Qty: 6 | Refills: 0 | Status: ACTIVE | COMMUNITY
Start: 2022-07-25

## 2022-10-20 RX ORDER — FLUPHENAZINE HYDROCHLORIDE 5 MG/1
5 TABLET, FILM COATED ORAL
Qty: 30 | Refills: 0 | Status: ACTIVE | COMMUNITY
Start: 2022-08-24

## 2022-10-20 RX ORDER — CANAGLIFLOZIN 100 MG/1
100 TABLET, FILM COATED ORAL
Qty: 30 | Refills: 0 | Status: ACTIVE | COMMUNITY
Start: 2022-05-06

## 2022-10-20 NOTE — ASSESSMENT
[FreeTextEntry1] : Impression: This 56-year-old right-handed female with a history of schizoaffective disorder diabetes hyperlipidemia MARCO ANTONIO asthma presents with chronic migraine headache disorder right greater than left bilateral carpal tunnel syndrome.  Today's neurological exam is unremarkable in this regard.\par \par Recommendations: Nurtec 75 mg ODT to be taken as needed once daily for migraine.  Use of bilateral nighttime wrist splints for carpal tunnel syndrome.  Multiple medications have been reviewed for other medical comorbidities.  Office follow-up in 6 months or else as needed.\par

## 2022-10-20 NOTE — REASON FOR VISIT
[Initial Evaluation] : an initial evaluation [FreeTextEntry1] : Migraine and right greater than left sided carpal tunnel syndrome

## 2022-10-20 NOTE — HISTORY OF PRESENT ILLNESS
[FreeTextEntry1] : This patient is seen for an office consultation.  She is a 56-year-old right-handed female who has a long history of migraine headache disorder.  She has migraine approximately 1 day a month which can be bifrontal and severe with sensitivity to light and sound.  She gets benefit from Nurtec 75 mg ODT.  In addition she describes a daily bifrontal and posterior low-grade headache for which she is much milder than her migraine and she will take Tylenol as needed. \par \par She also has a history of bilateral predominantly right carpal tunnel syndrome is affecting predominantly the right hand third and fourth fingers.  She uses nighttime bilateral wrist splints.  She has not had any surgery.  There is no motor weakness or loss of dexterity.\par \par She has a significant past history of schizoaffective disorder diabetes hyperlipidemia sleep apnea asthma.

## 2022-10-20 NOTE — PHYSICAL EXAM
[FreeTextEntry1] : Head:  Normocephalic Neck: Supple nontender no carotid bruits.  \par \par Mental Status:  Alert Oriented X3 Speech normal and no aphasia or dysarthria.\par \par Cranial Nerves:  PERRL, Visual Fields full  EOMI no diplopia no ptosis no nystagmus, V through XII intact.\par \par Motor:  No drift, normal strength tone and coordination and no focal atrophy. No abnormal movements. No dysmetria.  Normal rapid alternating movements. \par \par DTRs: Symmetric and 2+.  Plantars flexor.  No Clonus.\par \par Sensory:  Normal testing with pin light touch and  Joint position sense.  \par \par Gait:  Normal including tandem walking heel toe walking and Rhomberg.\par \par Tinel's sign negative both wrists.\par

## 2023-03-14 ENCOUNTER — RESULT REVIEW (OUTPATIENT)
Age: 58
End: 2023-03-14

## 2023-03-20 ENCOUNTER — EMERGENCY (EMERGENCY)
Facility: HOSPITAL | Age: 58
LOS: 1 days | Discharge: HOME | End: 2023-03-20
Attending: EMERGENCY MEDICINE | Admitting: EMERGENCY MEDICINE
Payer: MEDICARE

## 2023-03-20 VITALS
TEMPERATURE: 98 F | OXYGEN SATURATION: 96 % | DIASTOLIC BLOOD PRESSURE: 81 MMHG | SYSTOLIC BLOOD PRESSURE: 122 MMHG | HEIGHT: 63 IN | HEART RATE: 95 BPM | WEIGHT: 233.03 LBS | RESPIRATION RATE: 18 BRPM

## 2023-03-20 DIAGNOSIS — Z98.890 OTHER SPECIFIED POSTPROCEDURAL STATES: Chronic | ICD-10-CM

## 2023-03-20 LAB
GLUCOSE BLDC GLUCOMTR-MCNC: 206 MG/DL — HIGH (ref 70–99)
GLUCOSE BLDC GLUCOMTR-MCNC: 219 MG/DL — HIGH (ref 70–99)

## 2023-03-20 PROCEDURE — 99283 EMERGENCY DEPT VISIT LOW MDM: CPT

## 2023-03-20 PROCEDURE — 99284 EMERGENCY DEPT VISIT MOD MDM: CPT | Mod: FS

## 2023-03-20 PROCEDURE — 82962 GLUCOSE BLOOD TEST: CPT

## 2023-03-20 NOTE — ED ADULT NURSE NOTE - OBJECTIVE STATEMENT
Patient presents to ED complaining of an episode of hypoglycemia. Patient states the PMD part of her insulin pump has been having battery issues for 2 weeks, so her doctor placed her on a flexpen (insulin). Patient states since she has been on the flex pen her sugars have been up and down frequently. Today, patient was feeling "off" at 11am, patient took sugar and was 37. Patient had OJ, candy, and cottage cheese. Patient states it went up to 50. Then at 1230pm patient are lunch (sandwich thin with ham and cheese). Patient on arrival to ED had blood sugar of 206. Patient states feeling anxious, headache at this time.

## 2023-03-20 NOTE — ED PROVIDER NOTE - CLINICAL SUMMARY MEDICAL DECISION MAKING FREE TEXT BOX
58 y/o F with PMH of DM, GERD, HTN presents to the ED with c/o hypoglycemia intermittently x 2 weeks.  Pt took her glimepiride 4mg, invokana 300mg, and Flexpen novolog 60U this morning at 10am. Her FS was 150 prior to taking these meds. Reports she had a light breakfast and when she checked her FS at 1130am it was 37, she ate a sandwich for lunch and rechecked her FS at 1pm and it was 54.  She ate chocolate, orange juice and cottage cheese PTA. Her FS in the ED is 204.  Pt reports she is normally on Omnipod instead of the flexpen, however due to insurance issues she could not get her omnipods the last 2 weeks so shes been taking the flexpen 60U in the mornings. Since using the Flexpen she reports her FS drop low over the past 2 weeks.   She was able to schedule an appt while in the ED with her diabetic educator tomorrow morning at 930am.  She denies f/c, n/v/d, abd pain, urinary sympts, URI sympts, CP, SOB or all other complaints. PE as noted above. Will continue to monitor FS.  Pts hypoglycemia is likely from too much insulin in the mornings. Pt was instructed to stop the flexpen until she sees her diabetes educator tomorrow 58 y/o F with PMH of DM, GERD, HTN presents to the ED with c/o hypoglycemia intermittently x 2 weeks.  Pt took her glimepiride 4mg, invokana 300mg, and Flexpen novolog 60U this morning at 10am. Her FS was 150 prior to taking these meds. Reports she had a light breakfast and when she checked her FS at 1130am it was 37, she ate a sandwich for lunch and rechecked her FS at 1pm and it was 54.  She ate chocolate, orange juice and cottage cheese PTA. Her FS in the ED is 204.  Pt reports she is normally on Omnipod instead of the flexpen, however due to insurance issues she could not get her omnipods the last 2 weeks so shes been taking the flexpen 60U in the mornings. Since using the Flexpen she reports her FS drop low over the past 2 weeks.   She was able to schedule an appt while in the ED with her diabetic educator tomorrow morning at 930am.  She denies f/c, n/v/d, abd pain, urinary sympts, URI sympts, CP, SOB or all other complaints. PE as noted above. Will continue to monitor FS.  Pts hypoglycemia is likely from too much insulin in the mornings. Pt was instructed to stop the flexpen until she sees her diabetes educator tomorrow    550pm: Pts FS 1 hour after the first one was 206.  Pt asking for a sandwich, Rpt FS after eating is  . Pt instructed to skip her lantus 43U tonight and go to her appt tomorrow morning. 56 y/o F with PMH of DM, GERD, HTN presents to the ED with c/o hypoglycemia intermittently x 2 weeks.  Pt took her glimepiride 4mg, invokana 300mg, and Flexpen novolog 60U this morning at 10am. Her FS was 150 prior to taking these meds. Reports she had a light breakfast and when she checked her FS at 1130am it was 37, she ate a sandwich for lunch and rechecked her FS at 1pm and it was 54.  She ate chocolate, orange juice and cottage cheese PTA. Her FS in the ED is 204.  Pt reports she is normally on Omnipod instead of the flexpen, however due to insurance issues she could not get her omnipods the last 2 weeks so shes been taking the flexpen 60U in the mornings. Since using the Flexpen she reports her FS drop low over the past 2 weeks.   She was able to schedule an appt while in the ED with her diabetic educator tomorrow morning at 930am.  She denies f/c, n/v/d, abd pain, urinary sympts, URI sympts, CP, SOB or all other complaints. PE as noted above. Will continue to monitor FS.  Pts hypoglycemia is likely from too much insulin in the mornings. Pt was instructed to stop the flexpen until she sees her diabetes educator tomorrow    550pm: Pts FS 1 hour after the first one was 206.  Pt asking for a sandwich, Rpt FS after eating is 216  . Pt instructed to skip her lantus 43U tonight and go to her appt tomorrow morning.

## 2023-03-20 NOTE — ED ADULT TRIAGE NOTE - CHIEF COMPLAINT QUOTE
Pt. to ED for hypoglycemia.  Pt. states she is a diabetic on insulin and around 11am "felt off".  Pt. states she checked her sugar and it was 37.   Pt. states that then she ate and it went up to 50, then had juice and ate again and it went up to 80.  Pt. states she was dizzy and confused, and felt like her hypoglycemic.  Pt. fingerstick in waiting room was 204.  Pt. denies any pain or discomfort.  Pt. ambulating with steady gait.

## 2023-03-20 NOTE — ED ADULT TRIAGE NOTE - GLASGOW COMA SCALE: BEST MOTOR RESPONSE, MLM
[facility]                              COLONOSCOPY REPORT    PATIENT NAME: Vinay Hernandez                 :         1956  MED REC NO:   [id number]                          ROOM:  ACCOUNT NO:   [de-identified]                          ADMIT DATE:  2017  PROVIDER:     Nancy Calixto MD    DATE OF PROCEDURE:  2017    REFERRING PROVIDER:  Kelby Encinas MD    PATIENT HISTORY:  The patient is a 59-year-old gentleman with a  history of adenomatous polyps, who presents today for surveillance  examination. His most recent examination in  demonstrated  diverticula as well as adenomatous polyps. His father and grandfather  have had a history of colon cancer. MONITORING:  Oxygen saturation, blood pressure, and heart rate were  monitored continuously. MEDICATIONS OF PROCEDURE:  Fentanyl 100 mcg and Versed 7 mg. DESCRIPTION OF PROCEDURE:  Informed consent was obtained. The  patient's past medical history, medication list and allergies were  reviewed. Risks, benefits, and alternatives were discussed. Specific  risks discussed those of bleeding, perforation, splenic injury,  aspiration pneumonia, side effects of sedative medication, and  possibility of missed lesion. The patient was then placed in left lateral decubitus position. IV  sedation was started in sequential fashion until the appropriate level  of consciousness was achieved. Digital rectal examination excluding  the prostate was within normal limits. Colonoscope was then advanced  from the anus to the cecum. Appendiceal orifice and ileocecal valve  were visualized. Cecum was photographed. Colonoscope was then slowly  withdrawn. Each colonic segment was carefully evaluated. Care was  taken to inspect the proximal aspects of the IC valve, haustral folds  and flexures.   Diverticula noted in the ascending, transverse,  descending and sigmoid colon. In the distal ascending, a 5 mm sessile  polyp was removed with cold snare polypectomy. In the distal  transverse, another 5 mm sessile polyp was removed with cold snare. Retroflex view of the rectum was unremarkable. A photograph was  obtained in this position. The colonoscope was then withdrawn from  the patient. The procedure was well tolerated. There were no  immediate complications. The prep was adequate. POSTPROCEDURE DIAGNOSIS:  Two polyps, diverticulosis. According to current colon cancer screening guidelines if either of  these polyps are adenomatous, I recommend a colonoscopy in three  years. If both of these polyps are noted to be hyperplastic in  nature, his next examination should take place in five years. Blas Allen MD    D: 11/06/2017 14:06:01       T: 11/06/2017 14:19:34     [email or web address]  Job#: [id number]     Doc#: [id number]    CC:   Pauline Almanza MD ([id number]) obeys commands

## 2023-03-20 NOTE — ED PROVIDER NOTE - ATTENDING APP SHARED VISIT CONTRIBUTION OF CARE
58yo female with episodes of hypoglycemia, has had changes in her meds, no dizziness or lightheadedness now  exam: lungs cta, rrr, neuro intact  plan: FS, observe  agree with assessment and plan of PA

## 2023-03-20 NOTE — ED PROVIDER NOTE - PATIENT PORTAL LINK FT
You can access the FollowMyHealth Patient Portal offered by [facility] by registering at the following website: [email or web address]. By joining [employer]’s FollowMyHealth portal, you will also be able to view your health information using other applications (apps) compatible with our system.

## 2023-03-20 NOTE — ED PROVIDER NOTE - OBJECTIVE STATEMENT
58 y/o F with PMH of DM, GERD, HTN presents to the ED with c/o hypoglycemia intermittently x 2 weeks.  Pt took her glimepiride 4mg, invokana 300mg, and Flexpen novolog 60U this morning at 10am. Her FS was 150 prior to taking these meds. Reports she had a light breakfast and when she checked her FS at 1130am it was 37, she ate a sandwich for lunch and rechecked her FS at 1pm and it was 54.  She ate chocolate, orange juice and cottage cheese PTA. Her FS in the ED is 204.  Pt reports she is normally on Omnipod instead of the flexpen, however due to insurance issues she could not get her omnipods the last 2 weeks so shes been taking the flexpen 60U in the mornings. Since using the Flexpen she reports her FS drop low over the past 2 weeks.   She was able to schedule an appt while in the ED with her diabetic educator tomorrow morning at 930am.  She denies f/c, n/v/d, abd pain, urinary sympts, URI sympts, CP, SOB or all other complaints

## 2023-03-20 NOTE — ED PROVIDER NOTE - NS ED ATTENDING STATEMENT MOD
This was a shared visit with the BHANU. I reviewed and verified the documentation and independently performed the documented:

## 2023-03-20 NOTE — ED PROVIDER NOTE - NSFOLLOWUPINSTRUCTIONS_ED_ALL_ED_FT
Follow up with your Diabetes Educator tomorrow morning at 930am as scheduled.     Skip your LANTUS dose tonight.      Stay hydrated    Return to the ER if your symptoms worsen or for any other medical emergencies  *************      Hypoglycemia      Hypoglycemia is when the sugar (glucose) level in your blood is too low. Low blood sugar can happen to people who have diabetes and people who do not have diabetes. Low blood sugar can happen quickly, and it can be an emergency.      What are the causes?    This condition happens most often in people who have diabetes. It may be caused by:  •Diabetes medicine.      • Not eating enough, or not eating often enough.      •Doing more physical activity.      • Drinking alcohol on an empty stomach.      If you do not have diabetes, this condition may be caused by:  •A tumor in the pancreas.      •Not eating enough, or not eating for long periods at a time (fasting).      •A very bad infection or illness.      •Problems after having weight loss (bariatric) surgery.      •Kidney failure or liver failure.      • Certain medicines.        What increases the risk?    This condition is more likely to develop in people who:  •Have diabetes and take medicines to lower their blood sugar.      • Abuse alcohol.      •Have a very bad illness.        What are the signs or symptoms?    Mild     •Hunger.      •Sweating and feeling clammy.      •Feeling dizzy or light-headed.      •Being sleepy or having trouble sleeping.      •Feeling like you may vomit (nauseous).      •A fast heartbeat.      •A headache.      •Blurry vision.    •Mood changes, such as:   •Being grouchy.      •Feeling worried or nervous (anxious).        •Tingling or loss of feeling (numbness) around your mouth, lips, or tongue.      Moderate     •Confusion and poor judgment.       •Behavior changes.       •Weakness.       •Uneven heartbeat.      •Trouble with moving (coordination).      Very low     Very low blood sugar (severe hypoglycemia) is a medical emergency. It can cause:  •Fainting.      •Seizures.      •Loss of consciousness (coma).      •Death.        How is this treated?    Treating low blood sugar     Low blood sugar is often treated by eating or drinking something that has sugar in it right away. The food or drink should contain 15 grams of a fast-acting carb (carbohydrate). Options include:  •4 oz (120 mL) of fruit juice.      •4 oz (120 mL) of regular soda (not diet soda).      •A few pieces of hard candy. Check food labels to see how many pieces to eat for 15 grams.      •1 Tbsp (15 mL) of sugar or honey.      •4 glucose tablets.      •1 tube of glucose gel.        Treating low blood sugar if you have diabetes  Hands showing right hand using lancet pen on left ring finger, with glucometer in background.  If you can think clearly and swallow safely, follow the 15:15 rule:  •Take 15 grams of a fast-acting carb. Talk with your doctor about how much you should take.    •Always keep a source of fast-acting carb with you, such as:  •Glucose tablets (take 4 tablets).      •A few pieces of hard candy. Check food labels to see how many pieces to eat for 15 grams.      •4 oz (120 mL) of fruit juice.      •4 oz (120 mL) of regular soda (not diet soda).      •1 Tbsp (15 mL) of honey or sugar.      •1 tube of glucose gel.        •Check your blood sugar 15 minutes after you take the carb.      •If your blood sugar is still at or below 70 mg/dL (3.9 mmol/L), take 15 grams of a carb again.      •If your blood sugar does not go above 70 mg/dL (3.9 mmol/L) after 3 tries, get help right away.      •After your blood sugar goes back to normal, eat a meal or a snack within 1 hour.      Treating very low blood sugar    If your blood sugar is below 54 mg/dL (3 mmol/L), you have very low blood sugar, or severe hypoglycemia. This is an emergency. Get medical help right away.    If you have very low blood sugar and you cannot eat or drink, you will need to be given a hormone called glucagon. A family member or friend should learn how to check your blood sugar and how to give you glucagon. Ask your doctor if you need to have an emergency glucagon kit at home.    Very low blood sugar may also need to be treated in a hospital.      Follow these instructions at home:    General instructions     •Take over-the-counter and prescription medicines only as told by your doctor.      •Stay aware of your blood sugar as told by your doctor.    •If you drink alcohol: •Limit how much you have to:  •0–1 drink a day for women who are not pregnant.      •0–2 drinks a day for men.         •Know how much alcohol is in your drink. In the [place] one drink equals one 12 oz bottle of beer (355 mL), one 5 oz glass of wine (148 mL), or one 1½ oz glass of hard liquor (44 mL).      •Be sure to eat food when you drink alcohol.      •Know that your body absorbs alcohol quickly. This may lead to low blood sugar later. Be sure to keep checking your blood sugar.        •Keep all follow-up visits.        If you have diabetes:   Medical alert bracelet.   •Always have a fast-acting carb (15 grams) with you to treat low blood sugar.    •Follow your diabetes care plan as told by your doctor. Make sure you:  •Know the symptoms of low blood sugar.      •Check your blood sugar as often as told. Always check it before and after exercise.      •Always check your blood sugar before you drive.      •Take your medicines as told.      •Follow your meal plan.      •Eat on time. Do not skip meals.      •Share your diabetes care plan with:  •Your work or school.      •People you live with.        •Carry a card or wear jewelry that says you have diabetes.        Where to find more information    •American Diabetes Association: www.diabetes.org        Contact a doctor if:    •You have trouble keeping your blood sugar in your target range.      •You have low blood sugar often.        Get help right away if:    •You still have symptoms after you eat or drink something that contains 15 grams of fast-acting carb, and you cannot get your blood sugar above 70 mg/dL by following the 15:15 rule.      •Your blood sugar is below 54 mg/dL (3 mmol/L).      •You have a seizure.      •You faint.      These symptoms may be an emergency. Get help right away. Call your local emergency services (911 in the [place]   • Do not wait to see if the symptoms will go away.       • Do not drive yourself to the hospital.         Summary    •Hypoglycemia happens when the level of sugar (glucose) in your blood is too low.      •Low blood sugar can happen to people who have diabetes and people who do not have diabetes. Low blood sugar can happen quickly, and it can be an emergency.      •Make sure you know the symptoms of low blood sugar and know how to treat it.      •Always keep a source of sugar (fast-acting carb) with you to treat low blood sugar.      This information is not intended to replace advice given to you by your health care provider. Make sure you discuss any questions you have with your health care provider. Follow up with your Diabetes Educator tomorrow morning at 930am as scheduled.     Skip your LANTUS dose tonight.  Check your glucose at 8pm and 10pm tonight     Stay hydrated    Return to the ER if your symptoms worsen or for any other medical emergencies  *************      Hypoglycemia      Hypoglycemia is when the sugar (glucose) level in your blood is too low. Low blood sugar can happen to people who have diabetes and people who do not have diabetes. Low blood sugar can happen quickly, and it can be an emergency.      What are the causes?    This condition happens most often in people who have diabetes. It may be caused by:  •Diabetes medicine.      • Not eating enough, or not eating often enough.      •Doing more physical activity.      • Drinking alcohol on an empty stomach.      If you do not have diabetes, this condition may be caused by:  •A tumor in the pancreas.      •Not eating enough, or not eating for long periods at a time (fasting).      •A very bad infection or illness.      •Problems after having weight loss (bariatric) surgery.      •Kidney failure or liver failure.      • Certain medicines.        What increases the risk?    This condition is more likely to develop in people who:  •Have diabetes and take medicines to lower their blood sugar.      • Abuse alcohol.      •Have a very bad illness.        What are the signs or symptoms?    Mild     •Hunger.      •Sweating and feeling clammy.      •Feeling dizzy or light-headed.      •Being sleepy or having trouble sleeping.      •Feeling like you may vomit (nauseous).      •A fast heartbeat.      •A headache.      •Blurry vision.    •Mood changes, such as:   •Being grouchy.      •Feeling worried or nervous (anxious).        •Tingling or loss of feeling (numbness) around your mouth, lips, or tongue.      Moderate     •Confusion and poor judgment.       •Behavior changes.       •Weakness.       •Uneven heartbeat.      •Trouble with moving (coordination).      Very low     Very low blood sugar (severe hypoglycemia) is a medical emergency. It can cause:  •Fainting.      •Seizures.      •Loss of consciousness (coma).      •Death.        How is this treated?    Treating low blood sugar     Low blood sugar is often treated by eating or drinking something that has sugar in it right away. The food or drink should contain 15 grams of a fast-acting carb (carbohydrate). Options include:  •4 oz (120 mL) of fruit juice.      •4 oz (120 mL) of regular soda (not diet soda).      •A few pieces of hard candy. Check food labels to see how many pieces to eat for 15 grams.      •1 Tbsp (15 mL) of sugar or honey.      •4 glucose tablets.      •1 tube of glucose gel.        Treating low blood sugar if you have diabetes  Hands showing right hand using lancet pen on left ring finger, with glucometer in background.  If you can think clearly and swallow safely, follow the 15:15 rule:  •Take 15 grams of a fast-acting carb. Talk with your doctor about how much you should take.    •Always keep a source of fast-acting carb with you, such as:  •Glucose tablets (take 4 tablets).      •A few pieces of hard candy. Check food labels to see how many pieces to eat for 15 grams.      •4 oz (120 mL) of fruit juice.      •4 oz (120 mL) of regular soda (not diet soda).      •1 Tbsp (15 mL) of honey or sugar.      •1 tube of glucose gel.        •Check your blood sugar 15 minutes after you take the carb.      •If your blood sugar is still at or below 70 mg/dL (3.9 mmol/L), take 15 grams of a carb again.      •If your blood sugar does not go above 70 mg/dL (3.9 mmol/L) after 3 tries, get help right away.      •After your blood sugar goes back to normal, eat a meal or a snack within 1 hour.      Treating very low blood sugar    If your blood sugar is below 54 mg/dL (3 mmol/L), you have very low blood sugar, or severe hypoglycemia. This is an emergency. Get medical help right away.    If you have very low blood sugar and you cannot eat or drink, you will need to be given a hormone called glucagon. A family member or friend should learn how to check your blood sugar and how to give you glucagon. Ask your doctor if you need to have an emergency glucagon kit at home.    Very low blood sugar may also need to be treated in a hospital.      Follow these instructions at home:    General instructions     •Take over-the-counter and prescription medicines only as told by your doctor.      •Stay aware of your blood sugar as told by your doctor.    •If you drink alcohol: •Limit how much you have to:  •0–1 drink a day for women who are not pregnant.      •0–2 drinks a day for men.         •Know how much alcohol is in your drink. In the [place] one drink equals one 12 oz bottle of beer (355 mL), one 5 oz glass of wine (148 mL), or one 1½ oz glass of hard liquor (44 mL).      •Be sure to eat food when you drink alcohol.      •Know that your body absorbs alcohol quickly. This may lead to low blood sugar later. Be sure to keep checking your blood sugar.        •Keep all follow-up visits.        If you have diabetes:   Medical alert bracelet.   •Always have a fast-acting carb (15 grams) with you to treat low blood sugar.    •Follow your diabetes care plan as told by your doctor. Make sure you:  •Know the symptoms of low blood sugar.      •Check your blood sugar as often as told. Always check it before and after exercise.      •Always check your blood sugar before you drive.      •Take your medicines as told.      •Follow your meal plan.      •Eat on time. Do not skip meals.      •Share your diabetes care plan with:  •Your work or school.      •People you live with.        •Carry a card or wear jewelry that says you have diabetes.        Where to find more information    •American Diabetes Association: www.diabetes.org        Contact a doctor if:    •You have trouble keeping your blood sugar in your target range.      •You have low blood sugar often.        Get help right away if:    •You still have symptoms after you eat or drink something that contains 15 grams of fast-acting carb, and you cannot get your blood sugar above 70 mg/dL by following the 15:15 rule.      •Your blood sugar is below 54 mg/dL (3 mmol/L).      •You have a seizure.      •You faint.      These symptoms may be an emergency. Get help right away. Call your local emergency services (911 in the [place]   • Do not wait to see if the symptoms will go away.       • Do not drive yourself to the hospital.         Summary    •Hypoglycemia happens when the level of sugar (glucose) in your blood is too low.      •Low blood sugar can happen to people who have diabetes and people who do not have diabetes. Low blood sugar can happen quickly, and it can be an emergency.      •Make sure you know the symptoms of low blood sugar and know how to treat it.      •Always keep a source of sugar (fast-acting carb) with you to treat low blood sugar.      This information is not intended to replace advice given to you by your health care provider. Make sure you discuss any questions you have with your health care provider.

## 2023-03-20 NOTE — ED PROVIDER NOTE - PHYSICAL EXAMINATION
Gen: Well appearing in NAD.  AAOx3  ENT: oral mucosa moist   Head: atraumatic  Heart: s1/s2, RRR  Lung: CTA b/l,   Abd: soft, NT/ND, no rebound or guarding,  Msk: no pedal edema or calf pain,  Neuro: patient moving all extremity equally, no focal neuro deficits noted. Pt ambulatory in the ED   Skin: Normal for race.   Psych: Alert and oriented

## 2023-04-20 ENCOUNTER — APPOINTMENT (OUTPATIENT)
Dept: NEUROLOGY | Facility: CLINIC | Age: 58
End: 2023-04-20

## 2023-04-20 VITALS
HEIGHT: 62 IN | HEART RATE: 94 BPM | BODY MASS INDEX: 42.33 KG/M2 | WEIGHT: 230 LBS | TEMPERATURE: 98.4 F | SYSTOLIC BLOOD PRESSURE: 104 MMHG | RESPIRATION RATE: 16 BRPM | OXYGEN SATURATION: 97 % | DIASTOLIC BLOOD PRESSURE: 72 MMHG

## 2023-04-21 ENCOUNTER — APPOINTMENT (OUTPATIENT)
Dept: ULTRASOUND IMAGING | Facility: CLINIC | Age: 58
End: 2023-04-21
Payer: MEDICARE

## 2023-04-21 ENCOUNTER — RESULT REVIEW (OUTPATIENT)
Age: 58
End: 2023-04-21

## 2023-04-21 PROCEDURE — 76642 ULTRASOUND BREAST LIMITED: CPT | Mod: RT

## 2023-04-24 ENCOUNTER — NON-APPOINTMENT (OUTPATIENT)
Age: 58
End: 2023-04-24

## 2023-04-24 ENCOUNTER — APPOINTMENT (OUTPATIENT)
Dept: NEUROLOGY | Facility: CLINIC | Age: 58
End: 2023-04-24
Payer: MEDICARE

## 2023-04-24 VITALS
DIASTOLIC BLOOD PRESSURE: 80 MMHG | SYSTOLIC BLOOD PRESSURE: 113 MMHG | HEIGHT: 62 IN | BODY MASS INDEX: 42.33 KG/M2 | TEMPERATURE: 97.7 F | OXYGEN SATURATION: 98 % | HEART RATE: 104 BPM | RESPIRATION RATE: 16 BRPM | WEIGHT: 230 LBS

## 2023-04-24 PROCEDURE — 99214 OFFICE O/P EST MOD 30 MIN: CPT

## 2023-04-24 RX ORDER — RIZATRIPTAN BENZOATE 10 MG/1
10 TABLET, ORALLY DISINTEGRATING ORAL
Qty: 12 | Refills: 3 | Status: ACTIVE | COMMUNITY
Start: 2023-04-24 | End: 1900-01-01

## 2023-04-24 NOTE — HISTORY OF PRESENT ILLNESS
[FreeTextEntry1] : This patient is seen for an office visit.  She is followed for migraine.  She describes migraine headache on the average of 2-3 times a month.  She has been given Nurtec 75 mg samples with a excellent response and no side effects.  She may have low-grade milder headaches more frequently than 2-3 times a month.  The migraine headaches are usually bifrontal in location with sensitivity to light and sound and can be severe.  Unfortunately the patient's insurance company has not authorized the Nurtec medication for renewal.\par \par This patient has a significant past medical history including schizoaffective disorder diabetes hypertension hyperlipidemia sleep apnea and asthma.  She takes multiple medications for these conditions as detailed in the record.

## 2023-04-24 NOTE — PHYSICAL EXAM
[FreeTextEntry1] : Head:  Normocephalic Neck: Supple nontender no carotid bruits.\par \par Mental Status:  Alert Oriented X3 Speech normal and no aphasia or dysarthria.\par \par Cranial Nerves:  PERRL, Fundi not visualized.  Visual Fields full  EOMI no diplopia no ptosis no nystagmus, V through XII intact.\par \par Motor:  No drift, normal strength tone and coordination and no focal atrophy. No abnormal movements. No dysmetria.  Normal rapid alternating movements. \par \par DTRs: Symmetric and 2+.  Plantars flexor.  No Clonus.\par \par Sensory: Unremarkable to pin and touch.\par \par Gait: Regular.\par

## 2023-04-24 NOTE — ASSESSMENT
[FreeTextEntry1] : Impression: This 57-year-old female patient with schizoaffective disorder diabetes hyperlipidemia hypertension MARCO ANTONIO asthma has a chronic migraine headache disorder.  She has had an excellent response to Nurtec 75 mg ODT.  As an abortive medication for the occasional migraine.  Unfortunately her insurance initially did not authorize the medication.  She has been using samples.  Other over-the-counter medications have not been successful.\par \par Recommendations: We would asked the patient's insurance company to authorize Nurtec 75 mg ODT.  This patient has multiple medical comorbidities and multiple medical problems and Nurtec is known not to interact with the multiple drugs that she receives and is considered a safe alternative as an abortive medication for her migraine headache disorder.  Other medications used for migraine do have side effects and can potentially interact with her medications.  In the meantime she was given some additional Nurtec samples.  Also she will be given a trial of rizatriptan 10 mg ODT to be taken as directed and as needed.\par

## 2023-05-01 RX ORDER — ELETRIPTAN HYDROBROMIDE 40 MG/1
40 TABLET, FILM COATED ORAL
Qty: 12 | Refills: 5 | Status: ACTIVE | COMMUNITY
Start: 2023-05-01 | End: 1900-01-01

## 2023-06-06 ENCOUNTER — OUTPATIENT (OUTPATIENT)
Dept: OUTPATIENT SERVICES | Facility: HOSPITAL | Age: 58
LOS: 1 days | End: 2023-06-06
Payer: MEDICARE

## 2023-06-06 DIAGNOSIS — Z98.890 OTHER SPECIFIED POSTPROCEDURAL STATES: Chronic | ICD-10-CM

## 2023-06-06 DIAGNOSIS — K08.9 DISORDER OF TEETH AND SUPPORTING STRUCTURES, UNSPECIFIED: ICD-10-CM

## 2023-06-06 PROCEDURE — D0274: CPT

## 2023-06-06 PROCEDURE — D1110: CPT

## 2023-06-06 PROCEDURE — D0120: CPT

## 2023-06-08 DIAGNOSIS — Z01.20 ENCOUNTER FOR DENTAL EXAMINATION AND CLEANING WITHOUT ABNORMAL FINDINGS: ICD-10-CM

## 2023-06-26 ENCOUNTER — OUTPATIENT (OUTPATIENT)
Dept: OUTPATIENT SERVICES | Facility: HOSPITAL | Age: 58
LOS: 1 days | End: 2023-06-26
Payer: MEDICARE

## 2023-06-26 DIAGNOSIS — Z98.890 OTHER SPECIFIED POSTPROCEDURAL STATES: Chronic | ICD-10-CM

## 2023-06-26 PROCEDURE — D2392: CPT

## 2023-06-27 ENCOUNTER — NON-APPOINTMENT (OUTPATIENT)
Age: 58
End: 2023-06-27

## 2023-07-14 DIAGNOSIS — K02.9 DENTAL CARIES, UNSPECIFIED: ICD-10-CM

## 2023-07-14 DIAGNOSIS — K08.9 DISORDER OF TEETH AND SUPPORTING STRUCTURES, UNSPECIFIED: ICD-10-CM

## 2023-08-24 ENCOUNTER — APPOINTMENT (OUTPATIENT)
Dept: OBGYN | Facility: CLINIC | Age: 58
End: 2023-08-24
Payer: MEDICARE

## 2023-08-24 VITALS — SYSTOLIC BLOOD PRESSURE: 109 MMHG | DIASTOLIC BLOOD PRESSURE: 74 MMHG

## 2023-08-24 PROCEDURE — 58100 BIOPSY OF UTERUS LINING: CPT

## 2023-08-24 NOTE — PLAN
[FreeTextEntry1] : PM Bleeding: Advised to schedule pelvic sono Advised to schedule [facility] Endometrial Biopsy done I will call pt with biopsy results in 5-7 days She is advised to call me if she experiences heavy vaginal bleeding, fever, chills or severe pain  RTO for annual or prn

## 2023-08-24 NOTE — PROCEDURE
[Endometrial Biopsy] : Endometrial biopsy [Time out performed] : Pre-procedure time out performed.  Patient's name, date of birth and procedure confirmed. [Consent Obtained] : Consent obtained [Post-Menop. Bleeding] : post-menopausal bleeding [Risks] : risks [Benefits] : benefits [Alternatives] : alternatives [Patient] : patient [Infection] : infection [Bleeding] : bleeding [Allergic Reaction] : allergic reaction [Uterine Perforation] : uterine perforation [Pain] : pain [Negative] : negative pregnancy test [No Premedication] : No premedication [Betadine] : Betadine [None] : none [Tenaculum] : Tenaculum [Easy Passage] : Easy passage [Moderate] : moderate [Specimen Collected] : collected [Sent to Pathology] : placed in buffered formalin and sent for pathology [Tolerated Well] : Patient tolerated the procedure well [No Complications] : No complications

## 2023-08-24 NOTE — HISTORY OF PRESENT ILLNESS
[FreeTextEntry1] : 08/24/2023. ELVI MAYFIELD 57 year old female G0 LMP PM. PMHx of HTN, DM, reflux, asthma, anxiety, depression, fibroid, obesity, sleep apnea. She presents for a follow up exam.  She explains she had an episode of PM spotting about 1.5 months ago. Denies abdominal or pelvic pain. Denies vaginal discharge and vaginitis sxs. She has normal BMs. Denies bloody stool and urinary complaints.   TVS nl 2021

## 2023-09-12 ENCOUNTER — ASOB RESULT (OUTPATIENT)
Age: 58
End: 2023-09-12

## 2023-09-12 ENCOUNTER — APPOINTMENT (OUTPATIENT)
Dept: OBGYN | Facility: CLINIC | Age: 58
End: 2023-09-12
Payer: MEDICARE

## 2023-09-12 PROCEDURE — 76830 TRANSVAGINAL US NON-OB: CPT

## 2023-09-19 RX ORDER — RIMEGEPANT SULFATE 75 MG/75MG
75 TABLET, ORALLY DISINTEGRATING ORAL
Qty: 16 | Refills: 5 | Status: ACTIVE | COMMUNITY
Start: 2023-01-12 | End: 1900-01-01

## 2023-10-02 NOTE — BEHAVIORAL HEALTH ASSESSMENT NOTE - NSBHADMITCOORDCARE_PSY_A_CORE
Subjective:       Patient ID: Nori Pizarro is a 31 y.o. female.    Chief Complaint:  Annual Exam and Well Woman      History of Present Illness  HPI  No complaints   Desires std screening   Health Maintenance   Topic Date Due    TETANUS VACCINE  2030    Hepatitis C Screening  Completed    Lipid Panel  Completed     GYN & OB History  Patient's last menstrual period was 2023 (exact date).   Date of Last Pap: today     OB History    Para Term  AB Living   1       1     SAB IAB Ectopic Multiple Live Births   1              # Outcome Date GA Lbr Raghu/2nd Weight Sex Delivery Anes PTL Lv   1 SAB                Review of Systems  Review of Systems        Objective:   /70   Wt 57.5 kg (126 lb 12.2 oz)   LMP 2023 (Exact Date)   BMI 20.46 kg/m²    Physical Exam:   Constitutional: She is oriented to person, place, and time. She appears well-developed and well-nourished.        Pulmonary/Chest: Right breast exhibits no inverted nipple, no mass, no nipple discharge, no skin change, no tenderness and no swelling. Left breast exhibits no inverted nipple, no mass, no nipple discharge, no skin change, no tenderness and no swelling.        Abdominal: Soft.     Genitourinary:    Inguinal canal and vagina normal.      Pelvic exam was performed with patient supine.   The external female genitalia was normal.   Genitalia hair distrobution normal .   Labial bartholins normal.Cervix is normal. No erythema,  no vaginal discharge, bleeding, rectocele, cystocele or unspecified prolapse of vaginal walls in the vagina.    No foreign body in the vagina.      No signs of injury in the vagina.      pap smear completed              Neurological: She is alert and oriented to person, place, and time.    Skin: Skin is warm and dry.    Psychiatric: She has a normal mood and affect. Her behavior is normal. Judgment and thought content normal.      Assessment:        1. Routine gynecological  examination    2. Papanicolaou smear for cervical cancer screening    3. Screen for STD (sexually transmitted disease)               Plan:           Nori was seen today for annual exam and well woman.    Diagnoses and all orders for this visit:    Routine gynecological examination    Papanicolaou smear for cervical cancer screening  -     Liquid-Based Pap Smear, Screening  -     HPV High Risk Genotypes, PCR    Screen for STD (sexually transmitted disease)  -     C. trachomatis/N. gonorrhoeae by AMP DNA [facility]; Vagina  -     HIV 1/2 Ag/Ab (4th Gen); Future  -     RPR; Future  -     Hepatitis Panel, Acute; Future      Return to clinic in one year for WWE    yes

## 2023-10-05 ENCOUNTER — APPOINTMENT (OUTPATIENT)
Dept: MAMMOGRAPHY | Facility: CLINIC | Age: 58
End: 2023-10-05

## 2023-10-05 ENCOUNTER — RESULT REVIEW (OUTPATIENT)
Age: 58
End: 2023-10-05

## 2023-10-05 ENCOUNTER — APPOINTMENT (OUTPATIENT)
Dept: ULTRASOUND IMAGING | Facility: CLINIC | Age: 58
End: 2023-10-05

## 2023-10-05 DIAGNOSIS — N63.0 UNSPECIFIED LUMP IN UNSPECIFIED BREAST: ICD-10-CM

## 2023-10-18 ENCOUNTER — APPOINTMENT (OUTPATIENT)
Dept: OBGYN | Facility: CLINIC | Age: 58
End: 2023-10-18

## 2023-10-26 ENCOUNTER — APPOINTMENT (OUTPATIENT)
Dept: NEUROLOGY | Facility: CLINIC | Age: 58
End: 2023-10-26
Payer: MEDICARE

## 2023-10-26 VITALS
DIASTOLIC BLOOD PRESSURE: 72 MMHG | WEIGHT: 230 LBS | HEART RATE: 84 BPM | BODY MASS INDEX: 42.33 KG/M2 | SYSTOLIC BLOOD PRESSURE: 100 MMHG | HEIGHT: 62 IN

## 2023-10-26 PROCEDURE — 99214 OFFICE O/P EST MOD 30 MIN: CPT

## 2023-10-26 RX ORDER — GLUCAGON INJECTION, SOLUTION 1 MG/.2ML
1 INJECTION, SOLUTION SUBCUTANEOUS
Refills: 0 | Status: ACTIVE | COMMUNITY
Start: 2023-05-17

## 2023-10-26 RX ORDER — ALCOHOL ANTISEPTIC PADS
32G X 6 MM PADS, MEDICATED (EA) TOPICAL
Qty: 300 | Refills: 0 | Status: ACTIVE | COMMUNITY
Start: 2023-02-20

## 2023-10-26 RX ORDER — PRAZOSIN HYDROCHLORIDE 1 MG/1
1 CAPSULE ORAL
Qty: 30 | Refills: 0 | Status: ACTIVE | COMMUNITY
Start: 2023-08-30

## 2023-10-26 RX ORDER — LOSARTAN POTASSIUM 50 MG/1
50 TABLET, FILM COATED ORAL
Qty: 30 | Refills: 0 | Status: ACTIVE | COMMUNITY
Start: 2023-10-23

## 2023-11-07 ENCOUNTER — RESULT REVIEW (OUTPATIENT)
Age: 58
End: 2023-11-07

## 2023-11-07 ENCOUNTER — APPOINTMENT (OUTPATIENT)
Dept: MAMMOGRAPHY | Facility: CLINIC | Age: 58
End: 2023-11-07
Payer: MEDICARE

## 2023-11-07 ENCOUNTER — APPOINTMENT (OUTPATIENT)
Dept: ULTRASOUND IMAGING | Facility: CLINIC | Age: 58
End: 2023-11-07
Payer: MEDICARE

## 2023-11-07 PROCEDURE — 77067 SCR MAMMO BI INCL CAD: CPT

## 2023-11-07 PROCEDURE — 76641 ULTRASOUND BREAST COMPLETE: CPT | Mod: 50,GY

## 2023-11-07 PROCEDURE — 77063 BREAST TOMOSYNTHESIS BI: CPT

## 2023-11-08 ENCOUNTER — APPOINTMENT (OUTPATIENT)
Dept: OBGYN | Facility: CLINIC | Age: 58
End: 2023-11-08
Payer: MEDICARE

## 2023-11-08 VITALS
SYSTOLIC BLOOD PRESSURE: 100 MMHG | WEIGHT: 230 LBS | BODY MASS INDEX: 42.33 KG/M2 | DIASTOLIC BLOOD PRESSURE: 67 MMHG | HEIGHT: 62 IN

## 2023-11-08 DIAGNOSIS — Z01.419 ENCOUNTER FOR GYNECOLOGICAL EXAMINATION (GENERAL) (ROUTINE) W/OUT ABNORMAL FINDINGS: ICD-10-CM

## 2023-11-08 PROCEDURE — G0328 FECAL BLOOD SCRN IMMUNOASSAY: CPT | Mod: GA,QW

## 2023-11-08 PROCEDURE — G0101: CPT | Mod: GA

## 2023-11-09 ENCOUNTER — APPOINTMENT (OUTPATIENT)
Dept: OBGYN | Facility: CLINIC | Age: 58
End: 2023-11-09
Payer: MEDICARE

## 2023-11-09 ENCOUNTER — ASOB RESULT (OUTPATIENT)
Age: 58
End: 2023-11-09

## 2023-11-09 DIAGNOSIS — N95.0 POSTMENOPAUSAL BLEEDING: ICD-10-CM

## 2023-11-09 LAB — HPV HIGH+LOW RISK DNA PNL CVX: NOT DETECTED

## 2023-11-09 PROCEDURE — 58340 CATHETER FOR HYSTEROGRAPHY: CPT

## 2023-11-09 PROCEDURE — ZZZZZ: CPT

## 2023-11-09 PROCEDURE — 76831 ECHO EXAM UTERUS: CPT

## 2023-11-13 LAB — CYTOLOGY CVX/VAG DOC THIN PREP: ABNORMAL

## 2023-12-12 ENCOUNTER — APPOINTMENT (OUTPATIENT)
Age: 58
End: 2023-12-12
Payer: MEDICAID

## 2023-12-12 ENCOUNTER — APPOINTMENT (OUTPATIENT)
Age: 58
End: 2023-12-12

## 2023-12-12 PROCEDURE — ZZZZZ: CPT

## 2024-01-04 ENCOUNTER — EMERGENCY (EMERGENCY)
Facility: HOSPITAL | Age: 59
LOS: 1 days | Discharge: HOME | End: 2024-01-04
Attending: EMERGENCY MEDICINE | Admitting: EMERGENCY MEDICINE
Payer: MEDICARE

## 2024-01-04 VITALS
TEMPERATURE: 100 F | SYSTOLIC BLOOD PRESSURE: 110 MMHG | DIASTOLIC BLOOD PRESSURE: 76 MMHG | OXYGEN SATURATION: 94 % | HEART RATE: 115 BPM | RESPIRATION RATE: 22 BRPM

## 2024-01-04 VITALS
OXYGEN SATURATION: 98 % | HEART RATE: 93 BPM | RESPIRATION RATE: 20 BRPM | SYSTOLIC BLOOD PRESSURE: 99 MMHG | TEMPERATURE: 98 F | DIASTOLIC BLOOD PRESSURE: 73 MMHG

## 2024-01-04 DIAGNOSIS — Z98.890 OTHER SPECIFIED POSTPROCEDURAL STATES: Chronic | ICD-10-CM

## 2024-01-04 LAB
ALBUMIN SERPL ELPH-MCNC: 2.9 G/DL — LOW (ref 3.3–5)
ALBUMIN SERPL ELPH-MCNC: 2.9 G/DL — LOW (ref 3.3–5)
ALP SERPL-CCNC: 91 U/L — SIGNIFICANT CHANGE UP (ref 40–120)
ALP SERPL-CCNC: 91 U/L — SIGNIFICANT CHANGE UP (ref 40–120)
ALT FLD-CCNC: 42 U/L — SIGNIFICANT CHANGE UP (ref 10–45)
ALT FLD-CCNC: 42 U/L — SIGNIFICANT CHANGE UP (ref 10–45)
ANION GAP SERPL CALC-SCNC: 13 MMOL/L — SIGNIFICANT CHANGE UP (ref 5–17)
ANION GAP SERPL CALC-SCNC: 13 MMOL/L — SIGNIFICANT CHANGE UP (ref 5–17)
APPEARANCE UR: CLEAR — SIGNIFICANT CHANGE UP
APPEARANCE UR: CLEAR — SIGNIFICANT CHANGE UP
APTT BLD: 26.7 SEC — SIGNIFICANT CHANGE UP (ref 24.5–35.6)
APTT BLD: 26.7 SEC — SIGNIFICANT CHANGE UP (ref 24.5–35.6)
AST SERPL-CCNC: 16 U/L — SIGNIFICANT CHANGE UP (ref 10–40)
AST SERPL-CCNC: 16 U/L — SIGNIFICANT CHANGE UP (ref 10–40)
BASOPHILS # BLD AUTO: 0.03 K/UL — SIGNIFICANT CHANGE UP (ref 0–0.2)
BASOPHILS # BLD AUTO: 0.03 K/UL — SIGNIFICANT CHANGE UP (ref 0–0.2)
BASOPHILS NFR BLD AUTO: 0.3 % — SIGNIFICANT CHANGE UP (ref 0–2)
BASOPHILS NFR BLD AUTO: 0.3 % — SIGNIFICANT CHANGE UP (ref 0–2)
BILIRUB SERPL-MCNC: 0.5 MG/DL — SIGNIFICANT CHANGE UP (ref 0.2–1.2)
BILIRUB SERPL-MCNC: 0.5 MG/DL — SIGNIFICANT CHANGE UP (ref 0.2–1.2)
BILIRUB UR-MCNC: NEGATIVE — SIGNIFICANT CHANGE UP
BILIRUB UR-MCNC: NEGATIVE — SIGNIFICANT CHANGE UP
BUN SERPL-MCNC: 17 MG/DL — SIGNIFICANT CHANGE UP (ref 7–23)
BUN SERPL-MCNC: 17 MG/DL — SIGNIFICANT CHANGE UP (ref 7–23)
CALCIUM SERPL-MCNC: 9.4 MG/DL — SIGNIFICANT CHANGE UP (ref 8.4–10.5)
CALCIUM SERPL-MCNC: 9.4 MG/DL — SIGNIFICANT CHANGE UP (ref 8.4–10.5)
CHLORIDE SERPL-SCNC: 97 MMOL/L — SIGNIFICANT CHANGE UP (ref 96–108)
CHLORIDE SERPL-SCNC: 97 MMOL/L — SIGNIFICANT CHANGE UP (ref 96–108)
CO2 SERPL-SCNC: 25 MMOL/L — SIGNIFICANT CHANGE UP (ref 22–31)
CO2 SERPL-SCNC: 25 MMOL/L — SIGNIFICANT CHANGE UP (ref 22–31)
COLOR SPEC: YELLOW — SIGNIFICANT CHANGE UP
COLOR SPEC: YELLOW — SIGNIFICANT CHANGE UP
CREAT SERPL-MCNC: 1.3 MG/DL — SIGNIFICANT CHANGE UP (ref 0.5–1.3)
CREAT SERPL-MCNC: 1.3 MG/DL — SIGNIFICANT CHANGE UP (ref 0.5–1.3)
DIFF PNL FLD: NEGATIVE — SIGNIFICANT CHANGE UP
DIFF PNL FLD: NEGATIVE — SIGNIFICANT CHANGE UP
EGFR: 47 ML/MIN/1.73M2 — LOW
EGFR: 47 ML/MIN/1.73M2 — LOW
EOSINOPHIL # BLD AUTO: 0.01 K/UL — SIGNIFICANT CHANGE UP (ref 0–0.5)
EOSINOPHIL # BLD AUTO: 0.01 K/UL — SIGNIFICANT CHANGE UP (ref 0–0.5)
EOSINOPHIL NFR BLD AUTO: 0.1 % — SIGNIFICANT CHANGE UP (ref 0–6)
EOSINOPHIL NFR BLD AUTO: 0.1 % — SIGNIFICANT CHANGE UP (ref 0–6)
GLUCOSE SERPL-MCNC: 278 MG/DL — HIGH (ref 70–99)
GLUCOSE SERPL-MCNC: 278 MG/DL — HIGH (ref 70–99)
GLUCOSE UR QL: >=1000 MG/DL
GLUCOSE UR QL: >=1000 MG/DL
HADV DNA SPEC QL NAA+PROBE: DETECTED
HADV DNA SPEC QL NAA+PROBE: DETECTED
HCT VFR BLD CALC: 36.2 % — SIGNIFICANT CHANGE UP (ref 34.5–45)
HCT VFR BLD CALC: 36.2 % — SIGNIFICANT CHANGE UP (ref 34.5–45)
HGB BLD-MCNC: 12.2 G/DL — SIGNIFICANT CHANGE UP (ref 11.5–15.5)
HGB BLD-MCNC: 12.2 G/DL — SIGNIFICANT CHANGE UP (ref 11.5–15.5)
IMM GRANULOCYTES NFR BLD AUTO: 0.5 % — SIGNIFICANT CHANGE UP (ref 0–0.9)
IMM GRANULOCYTES NFR BLD AUTO: 0.5 % — SIGNIFICANT CHANGE UP (ref 0–0.9)
INR BLD: 1.16 RATIO — SIGNIFICANT CHANGE UP (ref 0.85–1.18)
INR BLD: 1.16 RATIO — SIGNIFICANT CHANGE UP (ref 0.85–1.18)
KETONES UR-MCNC: NEGATIVE MG/DL — SIGNIFICANT CHANGE UP
KETONES UR-MCNC: NEGATIVE MG/DL — SIGNIFICANT CHANGE UP
LACTATE SERPL-SCNC: 2.2 MMOL/L — HIGH (ref 0.7–2)
LACTATE SERPL-SCNC: 2.2 MMOL/L — HIGH (ref 0.7–2)
LEUKOCYTE ESTERASE UR-ACNC: NEGATIVE — SIGNIFICANT CHANGE UP
LEUKOCYTE ESTERASE UR-ACNC: NEGATIVE — SIGNIFICANT CHANGE UP
LYMPHOCYTES # BLD AUTO: 0.61 K/UL — LOW (ref 1–3.3)
LYMPHOCYTES # BLD AUTO: 0.61 K/UL — LOW (ref 1–3.3)
LYMPHOCYTES # BLD AUTO: 5.2 % — LOW (ref 13–44)
LYMPHOCYTES # BLD AUTO: 5.2 % — LOW (ref 13–44)
MCHC RBC-ENTMCNC: 29.2 PG — SIGNIFICANT CHANGE UP (ref 27–34)
MCHC RBC-ENTMCNC: 29.2 PG — SIGNIFICANT CHANGE UP (ref 27–34)
MCHC RBC-ENTMCNC: 33.7 GM/DL — SIGNIFICANT CHANGE UP (ref 32–36)
MCHC RBC-ENTMCNC: 33.7 GM/DL — SIGNIFICANT CHANGE UP (ref 32–36)
MCV RBC AUTO: 86.6 FL — SIGNIFICANT CHANGE UP (ref 80–100)
MCV RBC AUTO: 86.6 FL — SIGNIFICANT CHANGE UP (ref 80–100)
MONOCYTES # BLD AUTO: 0.72 K/UL — SIGNIFICANT CHANGE UP (ref 0–0.9)
MONOCYTES # BLD AUTO: 0.72 K/UL — SIGNIFICANT CHANGE UP (ref 0–0.9)
MONOCYTES NFR BLD AUTO: 6.2 % — SIGNIFICANT CHANGE UP (ref 2–14)
MONOCYTES NFR BLD AUTO: 6.2 % — SIGNIFICANT CHANGE UP (ref 2–14)
NEUTROPHILS # BLD AUTO: 10.27 K/UL — HIGH (ref 1.8–7.4)
NEUTROPHILS # BLD AUTO: 10.27 K/UL — HIGH (ref 1.8–7.4)
NEUTROPHILS NFR BLD AUTO: 87.7 % — HIGH (ref 43–77)
NEUTROPHILS NFR BLD AUTO: 87.7 % — HIGH (ref 43–77)
NITRITE UR-MCNC: NEGATIVE — SIGNIFICANT CHANGE UP
NITRITE UR-MCNC: NEGATIVE — SIGNIFICANT CHANGE UP
NRBC # BLD: 0 /100 WBCS — SIGNIFICANT CHANGE UP (ref 0–0)
NRBC # BLD: 0 /100 WBCS — SIGNIFICANT CHANGE UP (ref 0–0)
PH UR: 6 — SIGNIFICANT CHANGE UP (ref 5–8)
PH UR: 6 — SIGNIFICANT CHANGE UP (ref 5–8)
PLATELET # BLD AUTO: 270 K/UL — SIGNIFICANT CHANGE UP (ref 150–400)
PLATELET # BLD AUTO: 270 K/UL — SIGNIFICANT CHANGE UP (ref 150–400)
POTASSIUM SERPL-MCNC: 4.1 MMOL/L — SIGNIFICANT CHANGE UP (ref 3.5–5.3)
POTASSIUM SERPL-MCNC: 4.1 MMOL/L — SIGNIFICANT CHANGE UP (ref 3.5–5.3)
POTASSIUM SERPL-SCNC: 4.1 MMOL/L — SIGNIFICANT CHANGE UP (ref 3.5–5.3)
POTASSIUM SERPL-SCNC: 4.1 MMOL/L — SIGNIFICANT CHANGE UP (ref 3.5–5.3)
PROT SERPL-MCNC: 6.8 G/DL — SIGNIFICANT CHANGE UP (ref 6–8.3)
PROT SERPL-MCNC: 6.8 G/DL — SIGNIFICANT CHANGE UP (ref 6–8.3)
PROT UR-MCNC: NEGATIVE MG/DL — SIGNIFICANT CHANGE UP
PROT UR-MCNC: NEGATIVE MG/DL — SIGNIFICANT CHANGE UP
PROTHROM AB SERPL-ACNC: 13.2 SEC — HIGH (ref 9.5–13)
PROTHROM AB SERPL-ACNC: 13.2 SEC — HIGH (ref 9.5–13)
RAPID RVP RESULT: DETECTED
RAPID RVP RESULT: DETECTED
RBC # BLD: 4.18 M/UL — SIGNIFICANT CHANGE UP (ref 3.8–5.2)
RBC # BLD: 4.18 M/UL — SIGNIFICANT CHANGE UP (ref 3.8–5.2)
RBC # FLD: 12.8 % — SIGNIFICANT CHANGE UP (ref 10.3–14.5)
RBC # FLD: 12.8 % — SIGNIFICANT CHANGE UP (ref 10.3–14.5)
SARS-COV-2 RNA SPEC QL NAA+PROBE: SIGNIFICANT CHANGE UP
SARS-COV-2 RNA SPEC QL NAA+PROBE: SIGNIFICANT CHANGE UP
SODIUM SERPL-SCNC: 135 MMOL/L — SIGNIFICANT CHANGE UP (ref 135–145)
SODIUM SERPL-SCNC: 135 MMOL/L — SIGNIFICANT CHANGE UP (ref 135–145)
SP GR SPEC: 1.02 — SIGNIFICANT CHANGE UP (ref 1–1.03)
SP GR SPEC: 1.02 — SIGNIFICANT CHANGE UP (ref 1–1.03)
UROBILINOGEN FLD QL: 0.2 MG/DL — SIGNIFICANT CHANGE UP (ref 0.2–1)
UROBILINOGEN FLD QL: 0.2 MG/DL — SIGNIFICANT CHANGE UP (ref 0.2–1)
WBC # BLD: 11.7 K/UL — HIGH (ref 3.8–10.5)
WBC # BLD: 11.7 K/UL — HIGH (ref 3.8–10.5)
WBC # FLD AUTO: 11.7 K/UL — HIGH (ref 3.8–10.5)
WBC # FLD AUTO: 11.7 K/UL — HIGH (ref 3.8–10.5)

## 2024-01-04 PROCEDURE — 99285 EMERGENCY DEPT VISIT HI MDM: CPT

## 2024-01-04 PROCEDURE — 71045 X-RAY EXAM CHEST 1 VIEW: CPT | Mod: 26

## 2024-01-04 PROCEDURE — 93010 ELECTROCARDIOGRAM REPORT: CPT

## 2024-01-04 RX ORDER — ONDANSETRON 8 MG/1
4 TABLET, FILM COATED ORAL ONCE
Refills: 0 | Status: COMPLETED | OUTPATIENT
Start: 2024-01-04 | End: 2024-01-04

## 2024-01-04 RX ORDER — ACETAMINOPHEN 500 MG
1000 TABLET ORAL ONCE
Refills: 0 | Status: COMPLETED | OUTPATIENT
Start: 2024-01-04 | End: 2024-01-04

## 2024-01-04 RX ORDER — SODIUM CHLORIDE 9 MG/ML
1000 INJECTION INTRAMUSCULAR; INTRAVENOUS; SUBCUTANEOUS ONCE
Refills: 0 | Status: COMPLETED | OUTPATIENT
Start: 2024-01-04 | End: 2024-01-04

## 2024-01-04 RX ORDER — ONDANSETRON 8 MG/1
1 TABLET, FILM COATED ORAL
Qty: 15 | Refills: 0
Start: 2024-01-04 | End: 2024-01-08

## 2024-01-04 RX ADMIN — Medication 400 MILLIGRAM(S): at 11:40

## 2024-01-04 RX ADMIN — ONDANSETRON 4 MILLIGRAM(S): 8 TABLET, FILM COATED ORAL at 11:40

## 2024-01-04 RX ADMIN — SODIUM CHLORIDE 1000 MILLILITER(S): 9 INJECTION INTRAMUSCULAR; INTRAVENOUS; SUBCUTANEOUS at 11:40

## 2024-01-04 NOTE — ED ADULT NURSE NOTE - OBJECTIVE STATEMENT
Pt BIB EMS from home for fever since last night and vomiting 2 times this morning. Pt with hx DM, asthma and MARCO ANTONIO. Reports 103 F overnight. States that for the past week shes had a cough and nasal congestion. No sick contacts. Pt last took 1 gram tylenol 2 hrs PTA. Pt with oral temp= 99.9 F on arrival. In route, EMS placed rt ac #18 IV with NS infusing. negative...

## 2024-01-04 NOTE — ED ADULT TRIAGE NOTE - ARRIVAL INFO ADDITIONAL COMMENTS
Patient arrives to ED via EMS from home with complaints of fever to 103 overnight and vomiting 2 times this AM. Nonbloody emesis. Fever 99.9 upon arrival to ED, patient was able to take 1G tylenol two hours prior. Noted tachycardia upon arrival . 18G in R arm by EMS received 200cc NS. History of Schizoaffective disorder, anxiety depression, asthma and sleep apnea. EKG requested from triage

## 2024-01-04 NOTE — ED PROVIDER NOTE - PATIENT PORTAL LINK FT
You can access the FollowMyHealth Patient Portal offered by [facility] by registering at the following website: [email or web address]. By joining [employer]s FollowMyHealth portal, you will also be able to view your health information using other applications (apps) compatible with our system. You can access the FollowMyHealth Patient Portal offered by [facility] by registering at the following website: [email or web address]. By joining [employer]’s FollowMyHealth portal, you will also be able to view your health information using other applications (apps) compatible with our system.

## 2024-01-04 NOTE — ED PROVIDER NOTE - OBJECTIVE STATEMENT
Patient states that last night she had a fever of 103 with bodyaches mild cough and congestion.  This morning patient felt extremely weak and had another fever with 2 episodes of vomiting.  Patient denies any abdominal pain, chest pain, shortness of breath, dysuria.

## 2024-01-04 NOTE — ED ADULT NURSE NOTE - NSFALLHARMRISKINTERV_ED_ALL_ED
Assistance OOB with selected safe patient handling equipment if applicable/Assistance with ambulation/Communicate risk of Fall with Harm to all staff, patient, and family/Monitor gait and stability/Provide patient with walking aids/Provide visual cue: red socks, yellow wristband, yellow gown, etc/Reinforce activity limits and safety measures with patient and family/Bed in lowest position, wheels locked, appropriate side rails in place/Call bell, personal items and telephone in reach/Instruct patient to call for assistance before getting out of bed/chair/stretcher/Non-slip footwear applied when patient is off stretcher/[place] to call system/Physically safe environment - no spills, clutter or unnecessary equipment/Purposeful Proactive Rounding/Room/bathroom lighting operational, light cord in reach Assistance OOB with selected safe patient handling equipment if applicable/Assistance with ambulation/Communicate risk of Fall with Harm to all staff, patient, and family/Monitor gait and stability/Provide patient with walking aids/Provide visual cue: red socks, yellow wristband, yellow gown, etc/Reinforce activity limits and safety measures with patient and family/Bed in lowest position, wheels locked, appropriate side rails in place/Call bell, personal items and telephone in reach/Instruct patient to call for assistance before getting out of bed/chair/stretcher/Non-slip footwear applied when patient is off stretcher/[place] to call system/Physically safe environment - no spills, clutter or unnecessary equipment/Purposeful Proactive Rounding/Room/bathroom lighting operational, light cord in reach

## 2024-01-05 LAB
CULTURE RESULTS: SIGNIFICANT CHANGE UP
CULTURE RESULTS: SIGNIFICANT CHANGE UP
SPECIMEN SOURCE: SIGNIFICANT CHANGE UP
SPECIMEN SOURCE: SIGNIFICANT CHANGE UP

## 2024-01-06 ENCOUNTER — INPATIENT (INPATIENT)
Facility: HOSPITAL | Age: 59
LOS: 5 days | Discharge: HOME | DRG: 872 | End: 2024-01-12
Attending: INTERNAL MEDICINE | Admitting: STUDENT IN AN ORGANIZED HEALTH CARE EDUCATION/TRAINING PROGRAM
Payer: MEDICARE

## 2024-01-06 VITALS
OXYGEN SATURATION: 96 % | RESPIRATION RATE: 18 BRPM | DIASTOLIC BLOOD PRESSURE: 69 MMHG | WEIGHT: 154.32 LBS | TEMPERATURE: 98 F | HEART RATE: 73 BPM | SYSTOLIC BLOOD PRESSURE: 117 MMHG

## 2024-01-06 DIAGNOSIS — Z98.890 OTHER SPECIFIED POSTPROCEDURAL STATES: Chronic | ICD-10-CM

## 2024-01-06 DIAGNOSIS — A41.4 SEPSIS DUE TO ANAEROBES: ICD-10-CM

## 2024-01-06 LAB
ALBUMIN SERPL ELPH-MCNC: 2.8 G/DL — LOW (ref 3.3–5)
ALBUMIN SERPL ELPH-MCNC: 2.8 G/DL — LOW (ref 3.3–5)
ALP SERPL-CCNC: 88 U/L — SIGNIFICANT CHANGE UP (ref 40–120)
ALP SERPL-CCNC: 88 U/L — SIGNIFICANT CHANGE UP (ref 40–120)
ALT FLD-CCNC: 47 U/L — HIGH (ref 10–45)
ALT FLD-CCNC: 47 U/L — HIGH (ref 10–45)
ANION GAP SERPL CALC-SCNC: 5 MMOL/L — SIGNIFICANT CHANGE UP (ref 5–17)
ANION GAP SERPL CALC-SCNC: 5 MMOL/L — SIGNIFICANT CHANGE UP (ref 5–17)
APPEARANCE UR: CLEAR — SIGNIFICANT CHANGE UP
APPEARANCE UR: CLEAR — SIGNIFICANT CHANGE UP
APTT BLD: 27.7 SEC — SIGNIFICANT CHANGE UP (ref 24.5–35.6)
APTT BLD: 27.7 SEC — SIGNIFICANT CHANGE UP (ref 24.5–35.6)
AST SERPL-CCNC: 25 U/L — SIGNIFICANT CHANGE UP (ref 10–40)
AST SERPL-CCNC: 25 U/L — SIGNIFICANT CHANGE UP (ref 10–40)
BACTERIA # UR AUTO: NEGATIVE /HPF — SIGNIFICANT CHANGE UP
BACTERIA # UR AUTO: NEGATIVE /HPF — SIGNIFICANT CHANGE UP
BASOPHILS # BLD AUTO: 0.03 K/UL — SIGNIFICANT CHANGE UP (ref 0–0.2)
BASOPHILS # BLD AUTO: 0.03 K/UL — SIGNIFICANT CHANGE UP (ref 0–0.2)
BASOPHILS NFR BLD AUTO: 0.4 % — SIGNIFICANT CHANGE UP (ref 0–2)
BASOPHILS NFR BLD AUTO: 0.4 % — SIGNIFICANT CHANGE UP (ref 0–2)
BILIRUB SERPL-MCNC: 0.2 MG/DL — SIGNIFICANT CHANGE UP (ref 0.2–1.2)
BILIRUB SERPL-MCNC: 0.2 MG/DL — SIGNIFICANT CHANGE UP (ref 0.2–1.2)
BILIRUB UR-MCNC: NEGATIVE — SIGNIFICANT CHANGE UP
BILIRUB UR-MCNC: NEGATIVE — SIGNIFICANT CHANGE UP
BUN SERPL-MCNC: 15 MG/DL — SIGNIFICANT CHANGE UP (ref 7–23)
BUN SERPL-MCNC: 15 MG/DL — SIGNIFICANT CHANGE UP (ref 7–23)
CALCIUM SERPL-MCNC: 9.6 MG/DL — SIGNIFICANT CHANGE UP (ref 8.4–10.5)
CALCIUM SERPL-MCNC: 9.6 MG/DL — SIGNIFICANT CHANGE UP (ref 8.4–10.5)
CHLORIDE SERPL-SCNC: 101 MMOL/L — SIGNIFICANT CHANGE UP (ref 96–108)
CHLORIDE SERPL-SCNC: 101 MMOL/L — SIGNIFICANT CHANGE UP (ref 96–108)
CO2 SERPL-SCNC: 30 MMOL/L — SIGNIFICANT CHANGE UP (ref 22–31)
CO2 SERPL-SCNC: 30 MMOL/L — SIGNIFICANT CHANGE UP (ref 22–31)
COLOR SPEC: YELLOW — SIGNIFICANT CHANGE UP
COLOR SPEC: YELLOW — SIGNIFICANT CHANGE UP
CREAT SERPL-MCNC: 1.19 MG/DL — SIGNIFICANT CHANGE UP (ref 0.5–1.3)
CREAT SERPL-MCNC: 1.19 MG/DL — SIGNIFICANT CHANGE UP (ref 0.5–1.3)
DIFF PNL FLD: NEGATIVE — SIGNIFICANT CHANGE UP
DIFF PNL FLD: NEGATIVE — SIGNIFICANT CHANGE UP
E COLI DNA BLD POS QL NAA+NON-PROBE: SIGNIFICANT CHANGE UP
E COLI DNA BLD POS QL NAA+NON-PROBE: SIGNIFICANT CHANGE UP
EGFR: 53 ML/MIN/1.73M2 — LOW
EGFR: 53 ML/MIN/1.73M2 — LOW
EOSINOPHIL # BLD AUTO: 0.04 K/UL — SIGNIFICANT CHANGE UP (ref 0–0.5)
EOSINOPHIL # BLD AUTO: 0.04 K/UL — SIGNIFICANT CHANGE UP (ref 0–0.5)
EOSINOPHIL NFR BLD AUTO: 0.5 % — SIGNIFICANT CHANGE UP (ref 0–6)
EOSINOPHIL NFR BLD AUTO: 0.5 % — SIGNIFICANT CHANGE UP (ref 0–6)
EPI CELLS # UR: 0 — SIGNIFICANT CHANGE UP
EPI CELLS # UR: 0 — SIGNIFICANT CHANGE UP
GLUCOSE BLDC GLUCOMTR-MCNC: 174 MG/DL — HIGH (ref 70–99)
GLUCOSE BLDC GLUCOMTR-MCNC: 174 MG/DL — HIGH (ref 70–99)
GLUCOSE BLDC GLUCOMTR-MCNC: 181 MG/DL — HIGH (ref 70–99)
GLUCOSE BLDC GLUCOMTR-MCNC: 181 MG/DL — HIGH (ref 70–99)
GLUCOSE SERPL-MCNC: 269 MG/DL — HIGH (ref 70–99)
GLUCOSE SERPL-MCNC: 269 MG/DL — HIGH (ref 70–99)
GLUCOSE UR QL: >=1000 MG/DL
GLUCOSE UR QL: >=1000 MG/DL
GRAM STN FLD: ABNORMAL
GRAM STN FLD: ABNORMAL
HCT VFR BLD CALC: 34.3 % — LOW (ref 34.5–45)
HCT VFR BLD CALC: 34.3 % — LOW (ref 34.5–45)
HGB BLD-MCNC: 11.4 G/DL — LOW (ref 11.5–15.5)
HGB BLD-MCNC: 11.4 G/DL — LOW (ref 11.5–15.5)
IMM GRANULOCYTES NFR BLD AUTO: 0.5 % — SIGNIFICANT CHANGE UP (ref 0–0.9)
IMM GRANULOCYTES NFR BLD AUTO: 0.5 % — SIGNIFICANT CHANGE UP (ref 0–0.9)
INR BLD: 1.09 RATIO — SIGNIFICANT CHANGE UP (ref 0.85–1.18)
INR BLD: 1.09 RATIO — SIGNIFICANT CHANGE UP (ref 0.85–1.18)
KETONES UR-MCNC: NEGATIVE MG/DL — SIGNIFICANT CHANGE UP
KETONES UR-MCNC: NEGATIVE MG/DL — SIGNIFICANT CHANGE UP
LACTATE SERPL-SCNC: 1.1 MMOL/L — SIGNIFICANT CHANGE UP (ref 0.7–2)
LACTATE SERPL-SCNC: 1.1 MMOL/L — SIGNIFICANT CHANGE UP (ref 0.7–2)
LEUKOCYTE ESTERASE UR-ACNC: ABNORMAL
LEUKOCYTE ESTERASE UR-ACNC: ABNORMAL
LYMPHOCYTES # BLD AUTO: 1.35 K/UL — SIGNIFICANT CHANGE UP (ref 1–3.3)
LYMPHOCYTES # BLD AUTO: 1.35 K/UL — SIGNIFICANT CHANGE UP (ref 1–3.3)
LYMPHOCYTES # BLD AUTO: 18 % — SIGNIFICANT CHANGE UP (ref 13–44)
LYMPHOCYTES # BLD AUTO: 18 % — SIGNIFICANT CHANGE UP (ref 13–44)
MCHC RBC-ENTMCNC: 29.6 PG — SIGNIFICANT CHANGE UP (ref 27–34)
MCHC RBC-ENTMCNC: 29.6 PG — SIGNIFICANT CHANGE UP (ref 27–34)
MCHC RBC-ENTMCNC: 33.2 GM/DL — SIGNIFICANT CHANGE UP (ref 32–36)
MCHC RBC-ENTMCNC: 33.2 GM/DL — SIGNIFICANT CHANGE UP (ref 32–36)
MCV RBC AUTO: 89.1 FL — SIGNIFICANT CHANGE UP (ref 80–100)
MCV RBC AUTO: 89.1 FL — SIGNIFICANT CHANGE UP (ref 80–100)
METHOD TYPE: SIGNIFICANT CHANGE UP
METHOD TYPE: SIGNIFICANT CHANGE UP
MONOCYTES # BLD AUTO: 0.88 K/UL — SIGNIFICANT CHANGE UP (ref 0–0.9)
MONOCYTES # BLD AUTO: 0.88 K/UL — SIGNIFICANT CHANGE UP (ref 0–0.9)
MONOCYTES NFR BLD AUTO: 11.7 % — SIGNIFICANT CHANGE UP (ref 2–14)
MONOCYTES NFR BLD AUTO: 11.7 % — SIGNIFICANT CHANGE UP (ref 2–14)
NEUTROPHILS # BLD AUTO: 5.16 K/UL — SIGNIFICANT CHANGE UP (ref 1.8–7.4)
NEUTROPHILS # BLD AUTO: 5.16 K/UL — SIGNIFICANT CHANGE UP (ref 1.8–7.4)
NEUTROPHILS NFR BLD AUTO: 68.9 % — SIGNIFICANT CHANGE UP (ref 43–77)
NEUTROPHILS NFR BLD AUTO: 68.9 % — SIGNIFICANT CHANGE UP (ref 43–77)
NITRITE UR-MCNC: NEGATIVE — SIGNIFICANT CHANGE UP
NITRITE UR-MCNC: NEGATIVE — SIGNIFICANT CHANGE UP
NRBC # BLD: 0 /100 WBCS — SIGNIFICANT CHANGE UP (ref 0–0)
NRBC # BLD: 0 /100 WBCS — SIGNIFICANT CHANGE UP (ref 0–0)
PH UR: 7 — SIGNIFICANT CHANGE UP (ref 5–8)
PH UR: 7 — SIGNIFICANT CHANGE UP (ref 5–8)
PLATELET # BLD AUTO: 248 K/UL — SIGNIFICANT CHANGE UP (ref 150–400)
PLATELET # BLD AUTO: 248 K/UL — SIGNIFICANT CHANGE UP (ref 150–400)
POTASSIUM SERPL-MCNC: 4.1 MMOL/L — SIGNIFICANT CHANGE UP (ref 3.5–5.3)
POTASSIUM SERPL-MCNC: 4.1 MMOL/L — SIGNIFICANT CHANGE UP (ref 3.5–5.3)
POTASSIUM SERPL-SCNC: 4.1 MMOL/L — SIGNIFICANT CHANGE UP (ref 3.5–5.3)
POTASSIUM SERPL-SCNC: 4.1 MMOL/L — SIGNIFICANT CHANGE UP (ref 3.5–5.3)
PROT SERPL-MCNC: 6.9 G/DL — SIGNIFICANT CHANGE UP (ref 6–8.3)
PROT SERPL-MCNC: 6.9 G/DL — SIGNIFICANT CHANGE UP (ref 6–8.3)
PROT UR-MCNC: NEGATIVE MG/DL — SIGNIFICANT CHANGE UP
PROT UR-MCNC: NEGATIVE MG/DL — SIGNIFICANT CHANGE UP
PROTHROM AB SERPL-ACNC: 12.4 SEC — SIGNIFICANT CHANGE UP (ref 9.5–13)
PROTHROM AB SERPL-ACNC: 12.4 SEC — SIGNIFICANT CHANGE UP (ref 9.5–13)
RBC # BLD: 3.85 M/UL — SIGNIFICANT CHANGE UP (ref 3.8–5.2)
RBC # BLD: 3.85 M/UL — SIGNIFICANT CHANGE UP (ref 3.8–5.2)
RBC # FLD: 13 % — SIGNIFICANT CHANGE UP (ref 10.3–14.5)
RBC # FLD: 13 % — SIGNIFICANT CHANGE UP (ref 10.3–14.5)
RBC CASTS # UR COMP ASSIST: 0 /HPF — SIGNIFICANT CHANGE UP (ref 0–4)
RBC CASTS # UR COMP ASSIST: 0 /HPF — SIGNIFICANT CHANGE UP (ref 0–4)
SODIUM SERPL-SCNC: 136 MMOL/L — SIGNIFICANT CHANGE UP (ref 135–145)
SODIUM SERPL-SCNC: 136 MMOL/L — SIGNIFICANT CHANGE UP (ref 135–145)
SP GR SPEC: 1.01 — SIGNIFICANT CHANGE UP (ref 1–1.03)
SP GR SPEC: 1.01 — SIGNIFICANT CHANGE UP (ref 1–1.03)
UROBILINOGEN FLD QL: 0.2 MG/DL — SIGNIFICANT CHANGE UP (ref 0.2–1)
UROBILINOGEN FLD QL: 0.2 MG/DL — SIGNIFICANT CHANGE UP (ref 0.2–1)
WBC # BLD: 7.5 K/UL — SIGNIFICANT CHANGE UP (ref 3.8–10.5)
WBC # BLD: 7.5 K/UL — SIGNIFICANT CHANGE UP (ref 3.8–10.5)
WBC # FLD AUTO: 7.5 K/UL — SIGNIFICANT CHANGE UP (ref 3.8–10.5)
WBC # FLD AUTO: 7.5 K/UL — SIGNIFICANT CHANGE UP (ref 3.8–10.5)
WBC UR QL: 4 /HPF — SIGNIFICANT CHANGE UP (ref 0–5)
WBC UR QL: 4 /HPF — SIGNIFICANT CHANGE UP (ref 0–5)

## 2024-01-06 PROCEDURE — 99285 EMERGENCY DEPT VISIT HI MDM: CPT

## 2024-01-06 PROCEDURE — 99223 1ST HOSP IP/OBS HIGH 75: CPT

## 2024-01-06 PROCEDURE — 93010 ELECTROCARDIOGRAM REPORT: CPT

## 2024-01-06 RX ORDER — PRAZOSIN HCL 2 MG
1 CAPSULE ORAL DAILY
Refills: 0 | Status: DISCONTINUED | OUTPATIENT
Start: 2024-01-06 | End: 2024-01-12

## 2024-01-06 RX ORDER — DEXTROSE 50 % IN WATER 50 %
25 SYRINGE (ML) INTRAVENOUS ONCE
Refills: 0 | Status: DISCONTINUED | OUTPATIENT
Start: 2024-01-06 | End: 2024-01-12

## 2024-01-06 RX ORDER — DEXTROSE 50 % IN WATER 50 %
15 SYRINGE (ML) INTRAVENOUS ONCE
Refills: 0 | Status: DISCONTINUED | OUTPATIENT
Start: 2024-01-06 | End: 2024-01-12

## 2024-01-06 RX ORDER — GABAPENTIN 400 MG/1
100 CAPSULE ORAL
Refills: 0 | Status: DISCONTINUED | OUTPATIENT
Start: 2024-01-06 | End: 2024-01-12

## 2024-01-06 RX ORDER — GLUCAGON INJECTION, SOLUTION 0.5 MG/.1ML
1 INJECTION, SOLUTION SUBCUTANEOUS ONCE
Refills: 0 | Status: DISCONTINUED | OUTPATIENT
Start: 2024-01-06 | End: 2024-01-12

## 2024-01-06 RX ORDER — ALBUTEROL 90 UG/1
2 AEROSOL, METERED ORAL EVERY 6 HOURS
Refills: 0 | Status: DISCONTINUED | OUTPATIENT
Start: 2024-01-06 | End: 2024-01-12

## 2024-01-06 RX ORDER — LOSARTAN POTASSIUM 100 MG/1
100 TABLET, FILM COATED ORAL DAILY
Refills: 0 | Status: DISCONTINUED | OUTPATIENT
Start: 2024-01-06 | End: 2024-01-12

## 2024-01-06 RX ORDER — FLUOXETINE HCL 10 MG
20 CAPSULE ORAL AT BEDTIME
Refills: 0 | Status: DISCONTINUED | OUTPATIENT
Start: 2024-01-06 | End: 2024-01-12

## 2024-01-06 RX ORDER — SIMETHICONE 80 MG/1
80 TABLET, CHEWABLE ORAL THREE TIMES A DAY
Refills: 0 | Status: DISCONTINUED | OUTPATIENT
Start: 2024-01-06 | End: 2024-01-12

## 2024-01-06 RX ORDER — ASPIRIN/CALCIUM CARB/MAGNESIUM 324 MG
81 TABLET ORAL DAILY
Refills: 0 | Status: DISCONTINUED | OUTPATIENT
Start: 2024-01-06 | End: 2024-01-12

## 2024-01-06 RX ORDER — SODIUM CHLORIDE 9 MG/ML
1000 INJECTION, SOLUTION INTRAVENOUS
Refills: 0 | Status: DISCONTINUED | OUTPATIENT
Start: 2024-01-06 | End: 2024-01-12

## 2024-01-06 RX ORDER — ACETAMINOPHEN 500 MG
650 TABLET ORAL EVERY 6 HOURS
Refills: 0 | Status: DISCONTINUED | OUTPATIENT
Start: 2024-01-06 | End: 2024-01-12

## 2024-01-06 RX ORDER — PIPERACILLIN AND TAZOBACTAM 4; .5 G/20ML; G/20ML
3.38 INJECTION, POWDER, LYOPHILIZED, FOR SOLUTION INTRAVENOUS ONCE
Refills: 0 | Status: COMPLETED | OUTPATIENT
Start: 2024-01-06 | End: 2024-01-06

## 2024-01-06 RX ORDER — ENOXAPARIN SODIUM 100 MG/ML
40 INJECTION SUBCUTANEOUS EVERY 24 HOURS
Refills: 0 | Status: DISCONTINUED | OUTPATIENT
Start: 2024-01-06 | End: 2024-01-12

## 2024-01-06 RX ORDER — PIPERACILLIN AND TAZOBACTAM 4; .5 G/20ML; G/20ML
3.38 INJECTION, POWDER, LYOPHILIZED, FOR SOLUTION INTRAVENOUS EVERY 8 HOURS
Refills: 0 | Status: DISCONTINUED | OUTPATIENT
Start: 2024-01-06 | End: 2024-01-07

## 2024-01-06 RX ORDER — DEXTROSE 50 % IN WATER 50 %
12.5 SYRINGE (ML) INTRAVENOUS ONCE
Refills: 0 | Status: DISCONTINUED | OUTPATIENT
Start: 2024-01-06 | End: 2024-01-12

## 2024-01-06 RX ORDER — INSULIN GLARGINE 100 [IU]/ML
40 INJECTION, SOLUTION SUBCUTANEOUS AT BEDTIME
Refills: 0 | Status: DISCONTINUED | OUTPATIENT
Start: 2024-01-06 | End: 2024-01-12

## 2024-01-06 RX ORDER — FLUPHENAZINE HYDROCHLORIDE 1 MG/1
5 TABLET, FILM COATED ORAL AT BEDTIME
Refills: 0 | Status: DISCONTINUED | OUTPATIENT
Start: 2024-01-06 | End: 2024-01-12

## 2024-01-06 RX ORDER — BUDESONIDE AND FORMOTEROL FUMARATE DIHYDRATE 160; 4.5 UG/1; UG/1
2 AEROSOL RESPIRATORY (INHALATION)
Refills: 0 | Status: DISCONTINUED | OUTPATIENT
Start: 2024-01-06 | End: 2024-01-12

## 2024-01-06 RX ORDER — ONDANSETRON 8 MG/1
4 TABLET, FILM COATED ORAL EVERY 6 HOURS
Refills: 0 | Status: DISCONTINUED | OUTPATIENT
Start: 2024-01-06 | End: 2024-01-12

## 2024-01-06 RX ORDER — SENNA PLUS 8.6 MG/1
2 TABLET ORAL AT BEDTIME
Refills: 0 | Status: DISCONTINUED | OUTPATIENT
Start: 2024-01-06 | End: 2024-01-12

## 2024-01-06 RX ORDER — POLYETHYLENE GLYCOL 3350 17 G/17G
17 POWDER, FOR SOLUTION ORAL DAILY
Refills: 0 | Status: DISCONTINUED | OUTPATIENT
Start: 2024-01-06 | End: 2024-01-12

## 2024-01-06 RX ORDER — FLUTICASONE PROPIONATE 50 MCG
1 SPRAY, SUSPENSION NASAL
Refills: 0 | Status: DISCONTINUED | OUTPATIENT
Start: 2024-01-06 | End: 2024-01-12

## 2024-01-06 RX ORDER — PANTOPRAZOLE SODIUM 20 MG/1
40 TABLET, DELAYED RELEASE ORAL
Refills: 0 | Status: DISCONTINUED | OUTPATIENT
Start: 2024-01-06 | End: 2024-01-12

## 2024-01-06 RX ORDER — FLUOXETINE HCL 10 MG
20 CAPSULE ORAL
Refills: 0 | Status: DISCONTINUED | OUTPATIENT
Start: 2024-01-06 | End: 2024-01-12

## 2024-01-06 RX ORDER — SODIUM CHLORIDE 9 MG/ML
1000 INJECTION INTRAMUSCULAR; INTRAVENOUS; SUBCUTANEOUS
Refills: 0 | Status: DISCONTINUED | OUTPATIENT
Start: 2024-01-06 | End: 2024-01-09

## 2024-01-06 RX ORDER — ATORVASTATIN CALCIUM 80 MG/1
80 TABLET, FILM COATED ORAL AT BEDTIME
Refills: 0 | Status: DISCONTINUED | OUTPATIENT
Start: 2024-01-06 | End: 2024-01-12

## 2024-01-06 RX ORDER — INSULIN LISPRO 100/ML
VIAL (ML) SUBCUTANEOUS
Refills: 0 | Status: DISCONTINUED | OUTPATIENT
Start: 2024-01-06 | End: 2024-01-12

## 2024-01-06 RX ORDER — MONTELUKAST 4 MG/1
10 TABLET, CHEWABLE ORAL DAILY
Refills: 0 | Status: DISCONTINUED | OUTPATIENT
Start: 2024-01-06 | End: 2024-01-12

## 2024-01-06 RX ORDER — SODIUM CHLORIDE 9 MG/ML
1600 INJECTION INTRAMUSCULAR; INTRAVENOUS; SUBCUTANEOUS ONCE
Refills: 0 | Status: COMPLETED | OUTPATIENT
Start: 2024-01-06 | End: 2024-01-06

## 2024-01-06 RX ADMIN — Medication 1: at 16:55

## 2024-01-06 RX ADMIN — ATORVASTATIN CALCIUM 80 MILLIGRAM(S): 80 TABLET, FILM COATED ORAL at 22:44

## 2024-01-06 RX ADMIN — INSULIN GLARGINE 40 UNIT(S): 100 INJECTION, SOLUTION SUBCUTANEOUS at 22:45

## 2024-01-06 RX ADMIN — SODIUM CHLORIDE 75 MILLILITER(S): 9 INJECTION INTRAMUSCULAR; INTRAVENOUS; SUBCUTANEOUS at 22:45

## 2024-01-06 RX ADMIN — SODIUM CHLORIDE 75 MILLILITER(S): 9 INJECTION INTRAMUSCULAR; INTRAVENOUS; SUBCUTANEOUS at 16:43

## 2024-01-06 RX ADMIN — SENNA PLUS 2 TABLET(S): 8.6 TABLET ORAL at 22:44

## 2024-01-06 RX ADMIN — PIPERACILLIN AND TAZOBACTAM 200 GRAM(S): 4; .5 INJECTION, POWDER, LYOPHILIZED, FOR SOLUTION INTRAVENOUS at 11:15

## 2024-01-06 RX ADMIN — LOSARTAN POTASSIUM 100 MILLIGRAM(S): 100 TABLET, FILM COATED ORAL at 22:44

## 2024-01-06 RX ADMIN — Medication 1 MILLIGRAM(S): at 22:44

## 2024-01-06 RX ADMIN — SODIUM CHLORIDE 1600 MILLILITER(S): 9 INJECTION INTRAMUSCULAR; INTRAVENOUS; SUBCUTANEOUS at 13:10

## 2024-01-06 RX ADMIN — Medication 650 MILLIGRAM(S): at 22:44

## 2024-01-06 RX ADMIN — PIPERACILLIN AND TAZOBACTAM 3.38 GRAM(S): 4; .5 INJECTION, POWDER, LYOPHILIZED, FOR SOLUTION INTRAVENOUS at 13:10

## 2024-01-06 RX ADMIN — SODIUM CHLORIDE 1600 MILLILITER(S): 9 INJECTION INTRAMUSCULAR; INTRAVENOUS; SUBCUTANEOUS at 11:16

## 2024-01-06 RX ADMIN — BUDESONIDE AND FORMOTEROL FUMARATE DIHYDRATE 2 PUFF(S): 160; 4.5 AEROSOL RESPIRATORY (INHALATION) at 21:39

## 2024-01-06 RX ADMIN — PIPERACILLIN AND TAZOBACTAM 25 GRAM(S): 4; .5 INJECTION, POWDER, LYOPHILIZED, FOR SOLUTION INTRAVENOUS at 22:45

## 2024-01-06 RX ADMIN — Medication 20 MILLIGRAM(S): at 22:45

## 2024-01-06 RX ADMIN — FLUPHENAZINE HYDROCHLORIDE 5 MILLIGRAM(S): 1 TABLET, FILM COATED ORAL at 22:44

## 2024-01-06 NOTE — H&P ADULT - NSHPLABSRESULTS_GEN_ALL_CORE
LABS:                        11.4   7.50  )-----------( 248      ( 2024 11:01 )             34.3         136  |  101  |  15  ----------------------------<  269<H>  4.1   |  30  |  1.19    Ca    9.6      2024 11:01    TPro  6.9  /  Alb  2.8<L>  /  TBili  0.2  /  DBili  x   /  AST  25  /  ALT  47<H>  /  AlkPhos  88      PT/INR - ( 2024 11:01 )   PT: 12.4 sec;   INR: 1.09 ratio         PTT - ( 2024 11:01 )  PTT:27.7 sec  Urinalysis Basic - ( 2024 12:00 )    Color: Yellow / Appearance: Clear / S.011 / pH: x  Gluc: x / Ketone: Negative mg/dL  / Bili: Negative / Urobili: 0.2 mg/dL   Blood: x / Protein: Negative mg/dL / Nitrite: Negative   Leuk Esterase: Moderate / RBC: x / WBC x   Sq Epi: x / Non Sq Epi: x / Bacteria: x       CAPILLARY BLOOD GLUCOSE            Urinalysis Basic - ( 2024 12:00 )    Color: Yellow / Appearance: Clear / S.011 / pH: x  Gluc: x / Ketone: Negative mg/dL  / Bili: Negative / Urobili: 0.2 mg/dL   Blood: x / Protein: Negative mg/dL / Nitrite: Negative   Leuk Esterase: Moderate / RBC: x / WBC x   Sq Epi: x / Non Sq Epi: x / Bacteria: x        RADIOLOGY & ADDITIONAL TESTS:    < from: Xray Chest 1 View-PORTABLE IMMEDIATE (24 @ 11:41) >    INTERPRETATION:  An AP portable upright chest x-ray was performed for   sepsis.    There are no prior studies for comparison.    The lungs are clear bilaterally with no infiltrates on either side. There   is no pneumothorax. There are no pleural effusions. There is no hilar or   mediastinal widening. The cardiac silhouette may be magnified by   technique. There is no CHF. The bony thorax is grossly intact.    IMPRESSION: Clear lungs with no acute cardiopulmonary abnormalities.    --- End of Report ---    < end of copied text >        Consultant(s) Notes Reviewed:  [x] YES  [ ] NO  Care Discussed with Consultants/Other Providers [x] YES  [ ] NO  Imaging Personally Reviewed:  [x] YES  [ ] NO

## 2024-01-06 NOTE — ED PROVIDER NOTE - OBJECTIVE STATEMENT
58f pmhx of DM type 2, GERD, HTN, HLD, asthma, anxiety, migraines presenting with positive blood cultures taken on 1/4/23 at [facility]. Was seen here, for fever, sore throat, coughing, runny nose. Was found to be adenovirus positive, negative UA, CXR w/o infiltrates, and sent home for presumed viral illness. Otherwise, has associated generalized fatigue, nausea, decreased appetite, and subjective fevers. Denies any abdominal pain, chest pain, shortness of breath, falls, trauma, travel hx, dysuria, hematuria, urinary symptoms. 58f pmhx of DM type 2, GERD, HTN, HLD, asthma, anxiety, migraines presenting with positive blood cultures taken on 1/4/23 at [facility]. Was seen here, for fever, sore throat, coughing, runny nose. Was found to be adenovirus positive, negative UA, CXR w/o infiltrates, and sent home for presumed viral illness. Otherwise, has associated generalized fatigue, nausea, decreased appetite, and subjective fevers. Denies any abdominal pain, chest pain, shortness of breath, falls, trauma, travel hx, dysuria, hematuria, urinary symptoms.

## 2024-01-06 NOTE — ED ADULT TRIAGE NOTE - IDEAL BODY WEIGHT(KG)
Per [employer].       Jacoby Tejeda, STEPHANIEN  MRI of right hip w/o contrast approved -  #[id number]  7-31-20 - 8-29-20        Phoned Ni Redd at [facility] [phone]

## 2024-01-06 NOTE — H&P ADULT - HISTORY OF PRESENT ILLNESS
57 y/o F with PMH Type 2 DM, HTN, HLD, GERD, asthma, anxiety, migraines, presented to ED 1/4/24 c/o emesis, mild cough, nasalk congestion and weakness, found with adenovirus, medically optimized and discharged home. Returned to ED today for positive GNR in blood cultures from 1/4/24. Continues with fatigue, malaise, diminished PO and subjective fevers. denies headache, cp, sob, n/v, abd pain.   59 y/o F with PMH Type 2 DM, HTN, HLD, GERD, asthma, anxiety, migraines, presented to ED 1/4/24 c/o emesis, mild cough, nasalk congestion and weakness, found with adenovirus, medically optimized and discharged home. Returned to ED today for positive GNR in blood cultures from 1/4/24. Continues with fatigue, malaise, diminished PO and subjective fevers. denies headache, cp, sob, n/v, abd pain.   57 y/o F with PMH Type 2 DM, HTN, HLD, GERD, asthma, anxiety, migraines, presented to ED 1/4/24 c/o emesis, mild cough, nasal congestion and weakness, found with adenovirus, medically optimized and discharged home. Returned to ED today for positive GNR in blood cultures from 1/4/24. Continues with fatigue, malaise, diminished PO and subjective fevers. denies headache, cp, sob, n/v, abd pain.

## 2024-01-06 NOTE — H&P ADULT - ASSESSMENT
59 y/o F with PMH Type 2 DM, HTN, HLD, GERD, asthma, anxiety, migraines, presented to ED 1/4/24 c/o emesis, mild cough, nasalk congestion and weakness, found with adenovirus, medically optimized and discharged home. Returned to ED today for positive GNR in blood cultures from 1/4/24.    GNR bacteremia  -Admit to medicine  -Afebrile, hemodynamically stable  -RVP positive for adenovirus 1/4/24, continue supportive care  -Follow repeat urine, blood cx  -s/p zosyn x1 in ED, continue for now  -IVF    Type 2 DM  -FS, ISS  -Follow HbA1c    Vitals q8h  Diet: DASH  Activity: Bedrest   PT/OT as tolerated  DVT ppx: Lovenox  GI ppx: No indication for GI prophylaxis  Standard precautions: Aspiration, fall, safety, seizure, skin  Code Status  HCP  57 y/o F with PMH Type 2 DM, HTN, HLD, GERD, asthma, anxiety, migraines, presented to ED 1/4/24 c/o emesis, mild cough, nasalk congestion and weakness, found with adenovirus, medically optimized and discharged home. Returned to ED today for positive GNR in blood cultures from 1/4/24.    GNR bacteremia  -Admit to medicine  -Afebrile, hemodynamically stable  -RVP positive for adenovirus 1/4/24, continue supportive care  -Follow repeat urine, blood cx  -s/p zosyn x1 in ED, continue for now  -IVF    Type 2 DM  -FS, ISS  -Follow HbA1c    Vitals q8h  Diet: DASH  Activity: Bedrest   PT/OT as tolerated  DVT ppx: Lovenox  GI ppx: No indication for GI prophylaxis  Standard precautions: Aspiration, fall, safety, seizure, skin  Code Status  HCP  57 y/o F with PMH Type 2 DM, HTN, HLD, GERD, asthma, anxiety, migraines, presented to ED 1/4/24 c/o emesis, mild cough, nasalk congestion and weakness, found with adenovirus, medically optimized and discharged home. Returned to ED today for positive GNR in blood cultures from 1/4/24.    GNR bacteremia  -Admit to medicine  -Afebrile, hemodynamically stable  -RVP positive for adenovirus 1/4/24, continue supportive care  -Follow repeat urine, blood cx  -s/p zosyn x1 in ED, continue for now  -IVF    Type 2 DM  -FS, ISS  -Follow HbA1c    Vitals q8h  Diet: DASH  Activity: Bedrest   PT/OT as tolerated  DVT ppx: Lovenox  GI ppx: No indication for GI prophylaxis  Standard precautions: Aspiration, fall, safety, seizure, skin  Code Status - Full Code  HCP - sister Nevaeh  updated at bedside 59 y/o F with PMH Type 2 DM, HTN, HLD, GERD, asthma, anxiety, migraines, presented to ED 1/4/24 c/o emesis, mild cough, nasalk congestion and weakness, found with adenovirus, medically optimized and discharged home. Returned to ED today for positive GNR in blood cultures from 1/4/24.    GNR bacteremia  -Admit to medicine  -Afebrile, hemodynamically stable  -RVP positive for adenovirus 1/4/24, continue supportive care  -Follow repeat urine, blood cx  -s/p zosyn x1 in ED, continue for now  -IVF    Type 2 DM  -FS, ISS  -Follow HbA1c    Vitals q8h  Diet: DASH  Activity: Bedrest   PT/OT as tolerated  DVT ppx: Lovenox  GI ppx: No indication for GI prophylaxis  Standard precautions: Aspiration, fall, safety, seizure, skin  Code Status - Full Code  HCP - sister Nevaeh  updated at bedside 57 y/o F with PMH Type 2 DM, HTN, HLD, GERD, asthma, anxiety, sleep apnea, schizoaffective disorder, bipolar disorder, migraines, presented to ED 1/4/24 c/o emesis, mild cough, nasalk congestion and weakness, found with adenovirus, medically optimized and discharged home. Returned to ED today for positive GNR in blood cultures from 1/4/24.    GNR bacteremia  -Admit to medicine  -Afebrile, hemodynamically stable  -RVP positive for adenovirus 1/4/24, continue supportive care  -Follow repeat urine, blood cx  -s/p zosyn x1 in ED, continue for now  -IVF    Type 2 DM  -FS, ISS  -Follow HbA1c    Asthma, stable  -Continue albuterol prn, montelukast, symbicort    GERD  -Continue PPI    Schizoaffective disorder - chronic, stable  -Continue home psych meds    Vitals q8h  Diet: Carb consistent  Activity: Bedrest   PT/OT as tolerated  DVT ppx: Lovenox  Standard precautions: Aspiration, fall, safety, seizure, skin  Code Status - Full Code  HCP - sister Nevaeh  updated at bedside

## 2024-01-06 NOTE — ED ADULT NURSE NOTE - TEMPLATE LIST FOR HEAD TO TOE ASSESSMENT
"              After Visit Summary   5/31/2017    Tomasz Carlson    MRN: [id number]           Patient Information     Date Of Birth          2017        Visit Information        Provider Department      5/31/2017 5:00 PM Camila Velázquez APRN [facility]        Today's Diagnoses     Encounter for routine child health examination w/o abnormal findings    -  1      Care Instructions      Preventive Care at the 6 Month Visit  Growth Measurements & Percentiles  Head Circumference: 16.34\" (41.5 cm) (27 %, Source: WHO (Girls, 0-2 years)) 27 %ile based on WHO (Girls, 0-2 years) head circumference-for-age data using vitals from 5/31/2017.   Weight: 14 lbs 1.5 oz / 6.39 kg (actual weight) 12 %ile based on WHO (Girls, 0-2 years) weight-for-age data using vitals from 5/31/2017.   Length: 2' 3\" / 68.6 cm 87 %ile based on WHO (Girls, 0-2 years) length-for-age data using vitals from 5/31/2017.   Weight for length: <1 %ile based on WHO (Girls, 0-2 years) weight-for-recumbent length data using vitals from 5/31/2017.    Your baby s next Preventive Check-up will be at 9 months of age    Development  At this age, your baby may:    roll over    sit with support or lean forward on her hands in a sitting position    put some weight on her legs when held up    play with her feet    laugh, squeal, blow bubbles, imitate sounds like a cough or a  raspberry  and try to make sounds    show signs of anxiety around strangers or if a parent leaves    be upset if a toy is taken away or lost.    Feeding Tips    Give your baby breast milk or formula until her first birthday.    If you have not already, you may introduce solid baby foods: cereal, fruits, vegetables and meats.  Avoid added sugar and salt.  Infants do not need juice, however, if you provide juice, offer no more than 4 oz per day using a cup.    Avoid cow milk and honey until 12 months of age.    You may need to give your baby a fluoride supplement if you " have well water or a water softener.    To reduce your child's chance of developing peanut allergy, you can start introducing peanut-containing foods in small amounts around 6 months of age.  If your child has severe eczema, egg allergy or both, consult with your doctor first about possible allergy-testing and introduction of small amounts of peanut-containing foods at 4-6 months old.  Teething    While getting teeth, your baby may drool and chew a lot. A teething ring can give comfort.    Gently clean your baby s gums and teeth after meals. Use a soft toothbrush or cloth with water or small amount of fluoridated tooth and gum cleanser.    Stools    Your baby s bowel movements may change.  They may occur less often, have a strong odor or become a different color if she is eating solid foods.    Sleep    Your baby may sleep about 10-14 hours a day.    Put your baby to bed while awake. Give your baby the same safe toy or blanket. This is called a  transition object.  Do not play with or have a lot of contact with your baby at nighttime.    Continue to put your baby to sleep on her back, even if she is able to roll over on her own.    At this age, some, but not all, babies are sleeping for longer stretches at night (6-8 hours), awakening 0-2 times at night.    If you put your baby to sleep with a pacifier, take the pacifier out after your baby falls asleep.    Your goal is to help your child learn to fall asleep without your aid--both at the beginning of the night and if she wakes during the night.  Try to decrease and eliminate any sleep-associations your child might have (breast feeding for comfort when not hungry, rocking the child to sleep in your arms).  Put your child down drowsy, but awake, and work to leave her in the crib when she wakes during the night.  All children wake during night sleep.  She will eventually be able to fall back to sleep alone.    Safety    Keep your baby out of the sun. If your baby is  outside, use sunscreen with a SPF of more than 15. Try to put your baby under shade or an umbrella and put a hat on his or her head.    Do not use infant walkers. They can cause serious accidents and serve no useful purpose.    Childproof your house now, since your baby will soon scoot and crawl.  Put plugs in the outlets; cover any sharp furniture corners; take care of dangling cords (including window blinds), tablecloths and hot liquids; and put shepherd on all stairways.    Do not let your baby get small objects such as toys, nuts, coins, etc. These items may cause choking.    Never leave your baby alone, not even for a few seconds.    Use a playpen or crib to keep your baby safe.    Do not hold your child while you are drinking or cooking with hot liquids.    Turn your hot water heater to less than 120 degrees Fahrenheit.    Keep all medicines, cleaning supplies, and poisons out of your baby s reach.    Call the poison control center (1-526.329.3582) if your baby swallows poison.    What to Know About Television    The first two years of life are critical during the growth and development of your child s brain. Your child needs positive contact with other children and adults. Too much television can have a negative effect on your child s brain development. This is especially true when your child is learning to talk and play with others. The American Academy of Pediatrics recommends no television for children age 2 or younger.    What Your Baby Needs    Play games such as  peek-a-tejeda  and  so big  with your baby.    Talk to your baby and respond to her sounds. This will help stimulate speech.    Give your baby age-appropriate toys.    Read to your baby every night.    Your baby may have separation anxiety. This means she may get upset when a parent leaves. This is normal. Take some time to get out of the house occasionally.    Your baby does not understand the meaning of  no.  You will have to remove her from unsafe  "situations.    Babies fuss or cry because of a need or frustration. She is not crying to upset you or to be naughty.    Dental Care    Your pediatric provider will speak with you regarding the need for regular dental appointments for cleanings and check-ups after your child s first tooth appears.    Starting with the first tooth, you can brush with a small amount of fluoridated toothpaste (no more than pea size) once daily.    (Your child may need a fluoride supplement if you have well water.)                  Follow-ups after your visit        Who to contact     If you have questions or need follow up information about today's clinic visit or your schedule please contact [facility] directly at [phone].  Normal or non-critical lab and imaging results will be communicated to you by [employer], letter or phone within 4 business days after the clinic has received the results. If you do not hear from us within 7 days, please contact the clinic through [employer] or phone. If you have a critical or abnormal lab result, we will notify you by phone as soon as possible.  Submit refill requests through [employer] or call your pharmacy and they will forward the refill request to us. Please allow 3 business days for your refill to be completed.          Additional Information About Your Visit        [employer] Information     [employer] lets you send messages to your doctor, view your test results, renew your prescriptions, schedule appointments and more. To sign up, go to [email or web address], contact your [facility] clinic or call [phone] during business hours.            Care EveryWhere ID     This is your Care EveryWhere ID. This could be used by other organizations to access your [facility] medical records  [id number]        Your Vitals Were     Pulse Temperature Height Head Circumference BMI (Body Mass Index)       114 97.7  F (36.5  C) (Axillary) 2' 3\" (0.686 m) 16.34\" (41.5 cm) 13.59 kg/m2        Blood " Pressure from Last 3 Encounters:   No data found for BP    Weight from Last 3 Encounters:   05/31/17 14 lb 1.5 oz (6.393 kg) (12 %)*   03/29/17 12 lb 11.5 oz (5.769 kg) (18 %)*   01/26/17 9 lb 10 oz (4.366 kg) (11 %)*     * Growth percentiles are based on WHO (Girls, 0-2 years) data.              We Performed the Following     DEVELOPMENTAL TEST, [place]     DTAP - HIB - IPV VACCINE, IM USE (Pentacel) [07689]     HEPATITIS B VACCINE,PED/ADOL,IM [45911]     PNEUMOCOCCAL CONJ VACCINE 13 VALENT IM [61000]        Primary Care Provider    None Specified       No primary provider on file.        Thank you!     Thank you for choosing [facility]  for your care. Our goal is always to provide you with excellent care. Hearing back from our patients is one way we can continue to improve our services. Please take a few minutes to complete the written survey that you may receive in the mail after your visit with us. Thank you!             Your Updated Medication List - Protect others around you: Learn how to safely use, store and throw away your medicines at www.disposemymeds.org.      Notice  As of 5/31/2017  5:24 PM    You have not been prescribed any medications.       General

## 2024-01-06 NOTE — ED POST DISCHARGE NOTE - RESULT SUMMARY
Patient's blood culture positive for gram-negative rods patient informed to come back to the hospital

## 2024-01-06 NOTE — ED ADULT NURSE NOTE - CHIEF COMPLAINT QUOTE
Patient presents to ED from home for abnormal lab result. Lab reports gram negative rods in anaerobic bottle. Patient was see in ED 2 days ago with vomiting and diarrhea, tested positive for adenovirus, had a lactate of 2.2. Was discharged and states she is feeling a lot better, tolerating PO.

## 2024-01-06 NOTE — H&P ADULT - NSHPREVIEWOFSYSTEMS_GEN_ALL_CORE
REVIEW OF SYSTEMS:  CONSTITUTIONAL: +fever, chills, malaise, fatigue  EYES: No eye pain, visual disturbances, or discharge  ENMT:  No difficulty hearing, tinnitus, vertigo; No sinus or throat pain  NECK: No pain or stiffness  RESPIRATORY: No cough, wheezing, chills or hemoptysis; No shortness of breath  CARDIOVASCULAR: No chest pain, palpitations, dizziness, or leg swelling  GASTROINTESTINAL: No abdominal or epigastric pain. No nausea, vomiting, or hematemesis; No diarrhea or constipation. No melena or hematochezia.  GENITOURINARY: No dysuria, frequency, hematuria, or incontinence  NEUROLOGICAL: No headaches, memory loss, loss of strength, numbness, or tremors  SKIN: No itching, burning, rashes, or lesions   LYMPH NODES: No enlarged glands  ENDOCRINE: No heat or cold intolerance; No hair loss  MUSCULOSKELETAL: No joint pain or swelling; No muscle, back, or extremity pain  PSYCHIATRIC: No depression, anxiety, mood swings, or difficulty sleeping  HEME/LYMPH: No easy bruising, or bleeding gums  ALLERGY AND IMMUNOLOGIC: No hives or eczema    ALL ROS REVIEWED AND NORMAL EXCEPT AS STATED ABOVE

## 2024-01-06 NOTE — H&P ADULT - NSHPSOCIALHISTORY_GEN_ALL_CORE
Denies tobacco, EtOH, or illicit drug use. Denies tobacco, EtOH, or illicit drug use. Lives in home with roommate. PTA independent in ADLs, and IADLs.

## 2024-01-06 NOTE — H&P ADULT - NSHPPHYSICALEXAM_GEN_ALL_CORE
T(C): 36.8 (01-06-24 @ 10:07), Max: 36.8 (01-06-24 @ 10:07)  HR: 89 (01-06-24 @ 10:07) (89 - 89)  BP: 111/76 (01-06-24 @ 10:07) (111/76 - 111/76)  RR: 18 (01-06-24 @ 10:07) (18 - 18)  SpO2: 98% (01-06-24 @ 10:07) (98% - 98%)  Wt(kg): --Vital Signs Last 24 Hrs  T(C): 36.8 (06 Jan 2024 10:07), Max: 36.8 (06 Jan 2024 10:07)  T(F): 98.2 (06 Jan 2024 10:07), Max: 98.2 (06 Jan 2024 10:07)  HR: 89 (06 Jan 2024 10:07) (89 - 89)  BP: 111/76 (06 Jan 2024 10:07) (111/76 - 111/76)  BP(mean): --  RR: 18 (06 Jan 2024 10:07) (18 - 18)  SpO2: 98% (06 Jan 2024 10:07) (98% - 98%)    Parameters below as of 06 Jan 2024 10:07  Patient On (Oxygen Delivery Method): room air        PHYSICAL EXAM:  GENERAL: NAD, well-groomed, well-developed  HEAD:  Atraumatic, Normocephalic  EYES: EOMI, PERRLA, conjunctiva and sclera clear  ENMT: No tonsillar erythema, exudates, or enlargement; Moist mucous membranes, Good dentition, No lesions  NECK: Supple, No JVD, Normal thyroid  NERVOUS SYSTEM:  Alert & Oriented X3, Good concentration; Motor Strength 5/5 B/L upper and lower extremities; DTRs 2+ intact and symmetric  CHEST/LUNG: Clear to percussion bilaterally; No rales, rhonchi, wheezing, or rubs  HEART: Regular rate and rhythm; No murmurs, rubs, or gallops  ABDOMEN: Soft, Nontender, Nondistended; Bowel sounds present  EXTREMITIES:  2+ Peripheral Pulses, No clubbing, cyanosis, or edema  LYMPH: No lymphadenopathy noted  SKIN: No rashes or lesions T(C): 36.8 (01-06-24 @ 10:07), Max: 36.8 (01-06-24 @ 10:07)  HR: 89 (01-06-24 @ 10:07) (89 - 89)  BP: 111/76 (01-06-24 @ 10:07) (111/76 - 111/76)  RR: 18 (01-06-24 @ 10:07) (18 - 18)  SpO2: 98% (01-06-24 @ 10:07) (98% - 98%)  Wt(kg): --Vital Signs Last 24 Hrs  T(C): 36.8 (06 Jan 2024 10:07), Max: 36.8 (06 Jan 2024 10:07)  T(F): 98.2 (06 Jan 2024 10:07), Max: 98.2 (06 Jan 2024 10:07)  HR: 89 (06 Jan 2024 10:07) (89 - 89)  BP: 111/76 (06 Jan 2024 10:07) (111/76 - 111/76)  BP(mean): --  RR: 18 (06 Jan 2024 10:07) (18 - 18)  SpO2: 98% (06 Jan 2024 10:07) (98% - 98%)    Parameters below as of 06 Jan 2024 10:07  Patient On (Oxygen Delivery Method): room air        PHYSICAL EXAM:  GENERAL: NAD, well-groomed, well-developed, obese  HEAD:  Atraumatic, Normocephalic  EYES: EOMI, PERRLA, conjunctiva and sclera clear  ENMT: No tonsillar erythema, exudates, or enlargement; Moist mucous membranes, Good dentition, No lesions  NECK: Supple, No JVD, Normal thyroid  NERVOUS SYSTEM:  Alert & Oriented X3, Good concentration; Motor Strength 5/5 B/L upper and lower extremities  CHEST/LUNG: Clear to percussion bilaterally; No rales, rhonchi, wheezing, or rubs  HEART: Regular rate and rhythm; No murmurs, rubs, or gallops  ABDOMEN: Soft, Nontender, Nondistended; Bowel sounds present  EXTREMITIES:  2+ Peripheral Pulses, No clubbing, cyanosis, or edema  LYMPH: No lymphadenopathy noted  SKIN: No rashes or lesions

## 2024-01-06 NOTE — H&P ADULT - CONVERSATION DETAILS
Discussed with patient and sister Nevaeh at bedside diagnosis and treatment plan. Patient endorses that Nevaeh is her HCP. GOC, advanced directives reviewed - patient wishes to remain Full Code at this time. All questions/concerns addressed.

## 2024-01-06 NOTE — ED ADULT NURSE NOTE - NSFALLUNIVINTERV_ED_ALL_ED
Bed/Stretcher in lowest position, wheels locked, appropriate side rails in place/Call bell, personal items and telephone in reach/Instruct patient to call for assistance before getting out of bed/chair/stretcher/Non-slip footwear applied when patient is off stretcher/[place] to call system/Physically safe environment - no spills, clutter or unnecessary equipment/Purposeful proactive rounding/Room/bathroom lighting operational, light cord in reach Bed/Stretcher in lowest position, wheels locked, appropriate side rails in place/Call bell, personal items and telephone in reach/Instruct patient to call for assistance before getting out of bed/chair/stretcher/Non-slip footwear applied when patient is off stretcher/[place] to call system/Physically safe environment - no spills, clutter or unnecessary equipment/Purposeful proactive rounding/Room/bathroom lighting operational, light cord in reach

## 2024-01-06 NOTE — ED ADULT NURSE NOTE - OBJECTIVE STATEMENT
58 yr old female to ED for complaints of abnormal labs. Patient states she was here Thursday and was called back for abnormal. Patient denies fevers, complaints. Dr. Kim at bedside. Labs obtained and sent. Safety maintained.

## 2024-01-06 NOTE — ED PROVIDER NOTE - CLINICAL SUMMARY MEDICAL DECISION MAKING FREE TEXT BOX
58f pmhx of DM type 2, GERD, HTN, HLD, asthma, anxiety, migraines presenting with positive blood cultures taken on 1/4/23 at [facility]. Was seen here, for fever, sore throat, coughing, runny nose. Was found to be adenovirus positive, negative UA, CXR w/o infiltrates, and sent home for presumed viral illness. Otherwise, has associated generalized fatigue, nausea, decreased appetite, and subjective fevers. Denies any abdominal pain, chest pain, shortness of breath, falls, trauma, travel hx, dysuria, hematuria, urinary symptoms.     History obtained from independent historian: na  External note reviewed: prior ED VISIT   DDx: infectious source? Gneg rods Ecoli, pending sensitivities, however 2nd blood cx not growing s/p 24 hrs  Decision making, ED course, independent interpretation of imaging studies, and consults:   - labs normal lactate, 30cc/kg bolus ideal BW, zosyn for GNEG/ecoli. Known (+) adenovirus  - To be admitted  Consideration hospitalization vs de-escalation of care: Admit=  Disposition: admit 58f pmhx of DM type 2, GERD, HTN, HLD, asthma, anxiety, migraines presenting with positive blood cultures taken on 1/4/23 at [facility]. Was seen here, for fever, sore throat, coughing, runny nose. Was found to be adenovirus positive, negative UA, CXR w/o infiltrates, and sent home for presumed viral illness. Otherwise, has associated generalized fatigue, nausea, decreased appetite, and subjective fevers. Denies any abdominal pain, chest pain, shortness of breath, falls, trauma, travel hx, dysuria, hematuria, urinary symptoms.     History obtained from independent historian: na  External note reviewed: prior ED VISIT   DDx: infectious source? Gneg rods Ecoli, pending sensitivities, however 2nd blood cx not growing s/p 24 hrs  Decision making, ED course, independent interpretation of imaging studies, and consults:   - labs normal lactate, 30cc/kg bolus ideal BW, zosyn for GNEG/ecoli. Known (+) adenovirus  - To be admitted  Consideration hospitalization vs de-escalation of care: Admit=  Disposition: admit

## 2024-01-06 NOTE — ED PROVIDER NOTE - PHYSICAL EXAMINATION
VITAL SIGNS: I have reviewed nursing notes and confirm.   GEN: Well-developed; well-nourished; in no acute distress. Speaking full sentences.  SKIN: Warm, pink, no rash, no diaphoresis, no cyanosis, well perfused.   HEAD: Normocephalic; atraumatic. No scalp lacerations, no abrasions.  NECK: Supple; non tender.   EYES: Pupils 3mm equal, round, reactive to light and accomodation, conjunctiva and sclera clear.     ENT: No nasal discharge; airway clear. Trachea is midline. Normal dentition. (+) runny nose.  CV: RRR. S1, S2 normal; no murmurs, gallops, or rubs. Capillary refill < 2 seconds throughout. Distal pulses intact 2+ throughout.  RESP: CTA bilaterally. No wheezes, rales, or rhonchi.   ABD: Normal bowel sounds, soft, non-distended, non-tender, no rebound, no guarding, no rigidity,    MSK: Normal range of motion and movement of all 4 extremities. No apparent joint or muscular pain throughout.    BACK: No thoracolumbar midline or paravertebral tenderness.    NEURO: Alert & oriented x 3, Grossly unremarkable. Sensory and motor intact throughout. No focal deficits. Gait: Fluid. Normal speech and coordination.

## 2024-01-06 NOTE — ED ADULT TRIAGE NOTE - CHIEF COMPLAINT QUOTE
Patient presents to ED from home for abnormal lab result. Lab reports gram negative rods in anaerobic bottle. Patient was see in ED 2 days ago with vomiting and diarrhea, tested positive for adenovirus, had a lactate of 2.2. Was discharged and states she is feeling a lot better, tolerating PO.
Patient presents to ED from home complaining of urinary catheter leaking. Patient has chronic arreola, replaced on dec.19th. Wife noticed yesterday that it was leaking and not draining well. Wife would like to speak to  to help with arranging physical therapy.

## 2024-01-07 LAB
-  AMPICILLIN/SULBACTAM: SIGNIFICANT CHANGE UP
-  AMPICILLIN/SULBACTAM: SIGNIFICANT CHANGE UP
-  AMPICILLIN: SIGNIFICANT CHANGE UP
-  AMPICILLIN: SIGNIFICANT CHANGE UP
-  AZTREONAM: SIGNIFICANT CHANGE UP
-  AZTREONAM: SIGNIFICANT CHANGE UP
-  CEFAZOLIN: SIGNIFICANT CHANGE UP
-  CEFAZOLIN: SIGNIFICANT CHANGE UP
-  CEFEPIME: SIGNIFICANT CHANGE UP
-  CEFEPIME: SIGNIFICANT CHANGE UP
-  CEFOXITIN: SIGNIFICANT CHANGE UP
-  CEFOXITIN: SIGNIFICANT CHANGE UP
-  CEFTRIAXONE: SIGNIFICANT CHANGE UP
-  CEFTRIAXONE: SIGNIFICANT CHANGE UP
-  CIPROFLOXACIN: SIGNIFICANT CHANGE UP
-  CIPROFLOXACIN: SIGNIFICANT CHANGE UP
-  ERTAPENEM: SIGNIFICANT CHANGE UP
-  ERTAPENEM: SIGNIFICANT CHANGE UP
-  GENTAMICIN: SIGNIFICANT CHANGE UP
-  GENTAMICIN: SIGNIFICANT CHANGE UP
-  IMIPENEM: SIGNIFICANT CHANGE UP
-  IMIPENEM: SIGNIFICANT CHANGE UP
-  LEVOFLOXACIN: SIGNIFICANT CHANGE UP
-  LEVOFLOXACIN: SIGNIFICANT CHANGE UP
-  MEROPENEM: SIGNIFICANT CHANGE UP
-  MEROPENEM: SIGNIFICANT CHANGE UP
-  PIPERACILLIN/TAZOBACTAM: SIGNIFICANT CHANGE UP
-  PIPERACILLIN/TAZOBACTAM: SIGNIFICANT CHANGE UP
-  TOBRAMYCIN: SIGNIFICANT CHANGE UP
-  TOBRAMYCIN: SIGNIFICANT CHANGE UP
-  TRIMETHOPRIM/SULFAMETHOXAZOLE: SIGNIFICANT CHANGE UP
-  TRIMETHOPRIM/SULFAMETHOXAZOLE: SIGNIFICANT CHANGE UP
A1C WITH ESTIMATED AVERAGE GLUCOSE RESULT: 7.4 % — HIGH (ref 4–5.6)
A1C WITH ESTIMATED AVERAGE GLUCOSE RESULT: 7.4 % — HIGH (ref 4–5.6)
ALBUMIN SERPL ELPH-MCNC: 2.7 G/DL — LOW (ref 3.3–5)
ALBUMIN SERPL ELPH-MCNC: 2.7 G/DL — LOW (ref 3.3–5)
ALP SERPL-CCNC: 82 U/L — SIGNIFICANT CHANGE UP (ref 40–120)
ALP SERPL-CCNC: 82 U/L — SIGNIFICANT CHANGE UP (ref 40–120)
ALT FLD-CCNC: 47 U/L — HIGH (ref 10–45)
ALT FLD-CCNC: 47 U/L — HIGH (ref 10–45)
ANION GAP SERPL CALC-SCNC: 7 MMOL/L — SIGNIFICANT CHANGE UP (ref 5–17)
ANION GAP SERPL CALC-SCNC: 7 MMOL/L — SIGNIFICANT CHANGE UP (ref 5–17)
AST SERPL-CCNC: 24 U/L — SIGNIFICANT CHANGE UP (ref 10–40)
AST SERPL-CCNC: 24 U/L — SIGNIFICANT CHANGE UP (ref 10–40)
BILIRUB SERPL-MCNC: 0.3 MG/DL — SIGNIFICANT CHANGE UP (ref 0.2–1.2)
BILIRUB SERPL-MCNC: 0.3 MG/DL — SIGNIFICANT CHANGE UP (ref 0.2–1.2)
BUN SERPL-MCNC: 14 MG/DL — SIGNIFICANT CHANGE UP (ref 7–23)
BUN SERPL-MCNC: 14 MG/DL — SIGNIFICANT CHANGE UP (ref 7–23)
CALCIUM SERPL-MCNC: 9.3 MG/DL — SIGNIFICANT CHANGE UP (ref 8.4–10.5)
CALCIUM SERPL-MCNC: 9.3 MG/DL — SIGNIFICANT CHANGE UP (ref 8.4–10.5)
CHLORIDE SERPL-SCNC: 107 MMOL/L — SIGNIFICANT CHANGE UP (ref 96–108)
CHLORIDE SERPL-SCNC: 107 MMOL/L — SIGNIFICANT CHANGE UP (ref 96–108)
CO2 SERPL-SCNC: 27 MMOL/L — SIGNIFICANT CHANGE UP (ref 22–31)
CO2 SERPL-SCNC: 27 MMOL/L — SIGNIFICANT CHANGE UP (ref 22–31)
CREAT SERPL-MCNC: 1.07 MG/DL — SIGNIFICANT CHANGE UP (ref 0.5–1.3)
CREAT SERPL-MCNC: 1.07 MG/DL — SIGNIFICANT CHANGE UP (ref 0.5–1.3)
CULTURE RESULTS: ABNORMAL
CULTURE RESULTS: ABNORMAL
EGFR: 60 ML/MIN/1.73M2 — SIGNIFICANT CHANGE UP
EGFR: 60 ML/MIN/1.73M2 — SIGNIFICANT CHANGE UP
ESTIMATED AVERAGE GLUCOSE: 166 MG/DL — HIGH (ref 68–114)
ESTIMATED AVERAGE GLUCOSE: 166 MG/DL — HIGH (ref 68–114)
GLUCOSE BLDC GLUCOMTR-MCNC: 139 MG/DL — HIGH (ref 70–99)
GLUCOSE BLDC GLUCOMTR-MCNC: 139 MG/DL — HIGH (ref 70–99)
GLUCOSE BLDC GLUCOMTR-MCNC: 147 MG/DL — HIGH (ref 70–99)
GLUCOSE BLDC GLUCOMTR-MCNC: 147 MG/DL — HIGH (ref 70–99)
GLUCOSE BLDC GLUCOMTR-MCNC: 161 MG/DL — HIGH (ref 70–99)
GLUCOSE BLDC GLUCOMTR-MCNC: 161 MG/DL — HIGH (ref 70–99)
GLUCOSE BLDC GLUCOMTR-MCNC: 176 MG/DL — HIGH (ref 70–99)
GLUCOSE BLDC GLUCOMTR-MCNC: 176 MG/DL — HIGH (ref 70–99)
GLUCOSE SERPL-MCNC: 186 MG/DL — HIGH (ref 70–99)
GLUCOSE SERPL-MCNC: 186 MG/DL — HIGH (ref 70–99)
HCT VFR BLD CALC: 34.5 % — SIGNIFICANT CHANGE UP (ref 34.5–45)
HCT VFR BLD CALC: 34.5 % — SIGNIFICANT CHANGE UP (ref 34.5–45)
HGB BLD-MCNC: 11.3 G/DL — LOW (ref 11.5–15.5)
HGB BLD-MCNC: 11.3 G/DL — LOW (ref 11.5–15.5)
MCHC RBC-ENTMCNC: 29 PG — SIGNIFICANT CHANGE UP (ref 27–34)
MCHC RBC-ENTMCNC: 29 PG — SIGNIFICANT CHANGE UP (ref 27–34)
MCHC RBC-ENTMCNC: 32.8 GM/DL — SIGNIFICANT CHANGE UP (ref 32–36)
MCHC RBC-ENTMCNC: 32.8 GM/DL — SIGNIFICANT CHANGE UP (ref 32–36)
MCV RBC AUTO: 88.7 FL — SIGNIFICANT CHANGE UP (ref 80–100)
MCV RBC AUTO: 88.7 FL — SIGNIFICANT CHANGE UP (ref 80–100)
METHOD TYPE: SIGNIFICANT CHANGE UP
METHOD TYPE: SIGNIFICANT CHANGE UP
NRBC # BLD: 0 /100 WBCS — SIGNIFICANT CHANGE UP (ref 0–0)
NRBC # BLD: 0 /100 WBCS — SIGNIFICANT CHANGE UP (ref 0–0)
ORGANISM # SPEC MICROSCOPIC CNT: ABNORMAL
ORGANISM # SPEC MICROSCOPIC CNT: SIGNIFICANT CHANGE UP
ORGANISM # SPEC MICROSCOPIC CNT: SIGNIFICANT CHANGE UP
PLATELET # BLD AUTO: 283 K/UL — SIGNIFICANT CHANGE UP (ref 150–400)
PLATELET # BLD AUTO: 283 K/UL — SIGNIFICANT CHANGE UP (ref 150–400)
POTASSIUM SERPL-MCNC: 3.9 MMOL/L — SIGNIFICANT CHANGE UP (ref 3.5–5.3)
POTASSIUM SERPL-MCNC: 3.9 MMOL/L — SIGNIFICANT CHANGE UP (ref 3.5–5.3)
POTASSIUM SERPL-SCNC: 3.9 MMOL/L — SIGNIFICANT CHANGE UP (ref 3.5–5.3)
POTASSIUM SERPL-SCNC: 3.9 MMOL/L — SIGNIFICANT CHANGE UP (ref 3.5–5.3)
PROT SERPL-MCNC: 6.6 G/DL — SIGNIFICANT CHANGE UP (ref 6–8.3)
PROT SERPL-MCNC: 6.6 G/DL — SIGNIFICANT CHANGE UP (ref 6–8.3)
RBC # BLD: 3.89 M/UL — SIGNIFICANT CHANGE UP (ref 3.8–5.2)
RBC # BLD: 3.89 M/UL — SIGNIFICANT CHANGE UP (ref 3.8–5.2)
RBC # FLD: 13 % — SIGNIFICANT CHANGE UP (ref 10.3–14.5)
RBC # FLD: 13 % — SIGNIFICANT CHANGE UP (ref 10.3–14.5)
SODIUM SERPL-SCNC: 141 MMOL/L — SIGNIFICANT CHANGE UP (ref 135–145)
SODIUM SERPL-SCNC: 141 MMOL/L — SIGNIFICANT CHANGE UP (ref 135–145)
SPECIMEN SOURCE: SIGNIFICANT CHANGE UP
SPECIMEN SOURCE: SIGNIFICANT CHANGE UP
WBC # BLD: 7.66 K/UL — SIGNIFICANT CHANGE UP (ref 3.8–10.5)
WBC # BLD: 7.66 K/UL — SIGNIFICANT CHANGE UP (ref 3.8–10.5)
WBC # FLD AUTO: 7.66 K/UL — SIGNIFICANT CHANGE UP (ref 3.8–10.5)
WBC # FLD AUTO: 7.66 K/UL — SIGNIFICANT CHANGE UP (ref 3.8–10.5)

## 2024-01-07 PROCEDURE — 74177 CT ABD & PELVIS W/CONTRAST: CPT | Mod: 26

## 2024-01-07 PROCEDURE — 99232 SBSQ HOSP IP/OBS MODERATE 35: CPT

## 2024-01-07 RX ORDER — CEFTRIAXONE 500 MG/1
INJECTION, POWDER, FOR SOLUTION INTRAMUSCULAR; INTRAVENOUS
Refills: 0 | Status: DISCONTINUED | OUTPATIENT
Start: 2024-01-07 | End: 2024-01-12

## 2024-01-07 RX ORDER — CEFTRIAXONE 500 MG/1
2000 INJECTION, POWDER, FOR SOLUTION INTRAMUSCULAR; INTRAVENOUS EVERY 24 HOURS
Refills: 0 | Status: DISCONTINUED | OUTPATIENT
Start: 2024-01-08 | End: 2024-01-12

## 2024-01-07 RX ORDER — CEFTRIAXONE 500 MG/1
2000 INJECTION, POWDER, FOR SOLUTION INTRAMUSCULAR; INTRAVENOUS ONCE
Refills: 0 | Status: COMPLETED | OUTPATIENT
Start: 2024-01-07 | End: 2024-01-07

## 2024-01-07 RX ADMIN — LOSARTAN POTASSIUM 100 MILLIGRAM(S): 100 TABLET, FILM COATED ORAL at 21:33

## 2024-01-07 RX ADMIN — CEFTRIAXONE 2000 MILLIGRAM(S): 500 INJECTION, POWDER, FOR SOLUTION INTRAMUSCULAR; INTRAVENOUS at 09:22

## 2024-01-07 RX ADMIN — SODIUM CHLORIDE 75 MILLILITER(S): 9 INJECTION INTRAMUSCULAR; INTRAVENOUS; SUBCUTANEOUS at 06:59

## 2024-01-07 RX ADMIN — SODIUM CHLORIDE 75 MILLILITER(S): 9 INJECTION INTRAMUSCULAR; INTRAVENOUS; SUBCUTANEOUS at 09:22

## 2024-01-07 RX ADMIN — FLUPHENAZINE HYDROCHLORIDE 5 MILLIGRAM(S): 1 TABLET, FILM COATED ORAL at 21:33

## 2024-01-07 RX ADMIN — INSULIN GLARGINE 40 UNIT(S): 100 INJECTION, SOLUTION SUBCUTANEOUS at 21:33

## 2024-01-07 RX ADMIN — BUDESONIDE AND FORMOTEROL FUMARATE DIHYDRATE 2 PUFF(S): 160; 4.5 AEROSOL RESPIRATORY (INHALATION) at 08:53

## 2024-01-07 RX ADMIN — MONTELUKAST 10 MILLIGRAM(S): 4 TABLET, CHEWABLE ORAL at 11:47

## 2024-01-07 RX ADMIN — PANTOPRAZOLE SODIUM 40 MILLIGRAM(S): 20 TABLET, DELAYED RELEASE ORAL at 06:59

## 2024-01-07 RX ADMIN — Medication 1 SPRAY(S): at 21:33

## 2024-01-07 RX ADMIN — Medication 650 MILLIGRAM(S): at 16:41

## 2024-01-07 RX ADMIN — Medication 81 MILLIGRAM(S): at 11:47

## 2024-01-07 RX ADMIN — SENNA PLUS 2 TABLET(S): 8.6 TABLET ORAL at 21:33

## 2024-01-07 RX ADMIN — GABAPENTIN 100 MILLIGRAM(S): 400 CAPSULE ORAL at 07:04

## 2024-01-07 RX ADMIN — Medication 1: at 11:51

## 2024-01-07 RX ADMIN — BUDESONIDE AND FORMOTEROL FUMARATE DIHYDRATE 2 PUFF(S): 160; 4.5 AEROSOL RESPIRATORY (INHALATION) at 21:17

## 2024-01-07 RX ADMIN — ATORVASTATIN CALCIUM 80 MILLIGRAM(S): 80 TABLET, FILM COATED ORAL at 21:33

## 2024-01-07 RX ADMIN — Medication 650 MILLIGRAM(S): at 07:00

## 2024-01-07 RX ADMIN — ENOXAPARIN SODIUM 40 MILLIGRAM(S): 100 INJECTION SUBCUTANEOUS at 11:47

## 2024-01-07 RX ADMIN — Medication 1 SPRAY(S): at 06:59

## 2024-01-07 RX ADMIN — GABAPENTIN 100 MILLIGRAM(S): 400 CAPSULE ORAL at 16:42

## 2024-01-07 RX ADMIN — Medication 650 MILLIGRAM(S): at 08:00

## 2024-01-07 RX ADMIN — Medication 20 MILLIGRAM(S): at 06:59

## 2024-01-07 RX ADMIN — Medication 20 MILLIGRAM(S): at 16:42

## 2024-01-07 RX ADMIN — Medication 20 MILLIGRAM(S): at 21:33

## 2024-01-07 NOTE — DIETITIAN INITIAL EVALUATION ADULT - PERTINENT LABORATORY DATA
01-07    141  |  107  |  14  ----------------------------<  186<H>  3.9   |  27  |  1.07    Ca    9.3      07 Jan 2024 06:00    TPro  6.6  /  Alb  2.7<L>  /  TBili  0.3  /  DBili  x   /  AST  24  /  ALT  47<H>  /  AlkPhos  82  01-07  POCT Blood Glucose.: 139 mg/dL (01-07-24 @ 08:25)

## 2024-01-07 NOTE — PROGRESS NOTE ADULT - ASSESSMENT
57 y/o F with PMH Type 2 DM, HTN, HLD, GERD, asthma, anxiety, sleep apnea, schizoaffective disorder, bipolar disorder, migraines, presented to ED 1/4/24 c/o emesis, mild cough, nasalk congestion and weakness, found with adenovirus, medically optimized and discharged home. Returned to ED today for positive GNR in blood cultures from 1/4/24.    GNR bacteremia  -Afebrile, hemodynamically stable  -RVP positive for adenovirus 1/4/24, continue supportive care  -Follow repeat urine, blood cx  -Continue Zosyn      Morbid Obesity  -nutrition consult    Type 2 DM  -FS, ISS  -Follow HbA1c    Asthma, stable  -Continue albuterol prn, montelukast, symbicort  GERD  -Continue PPI    Sleep Apnea  -Confirm CPAP settings    Schizoaffective disorder - chronic, stable  -Continue home psych meds    Vitals q8h  Diet: Carb consistent  PT/OT as tolerated  DVT ppx: Lovenox  Standard precautions: Aspiration, fall, safety, seizure, skin  Code Status - Full Code    Plans to update family individually   HCP - sister Nevaeh   59 y/o F with PMH Type 2 DM, HTN, HLD, GERD, asthma, anxiety, sleep apnea, schizoaffective disorder, bipolar disorder, migraines, presented to ED 1/4/24 c/o emesis, mild cough, nasalk congestion and weakness, found with adenovirus, medically optimized and discharged home. Returned to ED today for positive GNR in blood cultures from 1/4/24.    GNR bacteremia  -Afebrile, hemodynamically stable  -RVP positive for adenovirus 1/4/24, continue supportive care  -Follow repeat urine, blood cx  -Continue Zosyn      Morbid Obesity  -nutrition consult    Type 2 DM  -FS, ISS  -Follow HbA1c    Asthma, stable  -Continue albuterol prn, montelukast, symbicort  GERD  -Continue PPI    Sleep Apnea  -Confirm CPAP settings    Schizoaffective disorder - chronic, stable  -Continue home psych meds    Vitals q8h  Diet: Carb consistent  PT/OT as tolerated  DVT ppx: Lovenox  Standard precautions: Aspiration, fall, safety, seizure, skin  Code Status - Full Code    Plans to update family individually   HCP - sister Nevaeh   59 y/o F with PMH Type 2 DM, HTN, HLD, GERD, asthma, anxiety, sleep apnea, schizoaffective disorder, bipolar disorder, migraines, presented to ED 1/4/24 c/o emesis, mild cough, nasalk congestion and weakness, found with adenovirus, medically optimized and discharged home. Returned to ED today for positive GNR in blood cultures from 1/4/24.    GNR bacteremia  -Afebrile, hemodynamically stable  -RVP positive for adenovirus 1/4/24, continue supportive care  -Follow repeat urine, blood cx  -Zosyn switched to Ceftriaxone      Morbid Obesity  -nutrition consult    Type 2 DM  -FS, ISS  -Follow HbA1c    Asthma, stable  -Continue albuterol prn, montelukast, symbicort  GERD  -Continue PPI    Sleep Apnea  -Confirm CPAP settings    Schizoaffective disorder - chronic, stable  -Continue home psych meds    Vitals q8h  Diet: Carb consistent  PT/OT as tolerated  DVT ppx: Lovenox  Standard precautions: Aspiration, fall, safety, seizure, skin  Code Status - Full Code    Plans to update family individually   HCP - sister Nevaeh

## 2024-01-07 NOTE — DIETITIAN INITIAL EVALUATION ADULT - NUTRITIONGOAL OUTCOME1
Pt will meet >75% estimated energy requirements to maintain weight or gradually trend down towards IBW

## 2024-01-07 NOTE — DIETITIAN INITIAL EVALUATION ADULT - OTHER INFO
59 y/o F with PMH Type 2 DM, HTN, HLD, GERD, asthma, anxiety, migraines, presented to ED 1/4/24 c/o emesis, mild cough, nasal congestion and weakness, found with adenovirus, medically optimized and discharged home. Returned to ED today for positive GNR in blood cultures from 1/4/24. Continues with fatigue, malaise, diminished PO and subjective fevers. denies headache, cp, sob, n/v, abd pain.   Pt tolerating diet with report of good appetite, observed with >75% intake of bfast. UBW 230lbs with recent intended weight loss of 5lbs per pt. No skin breakdown or edema. Insulin pump not in use during admission. Loose BM today, +abx. Encouraged increased oral hydration. Carbohydrate counting reviewed- pt receptive to 3-4-4 CHO regimen during inpatient stay. Discussed weight management tips. Menu provided and ordering procedures reviewed.  57 y/o F with PMH Type 2 DM, HTN, HLD, GERD, asthma, anxiety, migraines, presented to ED 1/4/24 c/o emesis, mild cough, nasal congestion and weakness, found with adenovirus, medically optimized and discharged home. Returned to ED today for positive GNR in blood cultures from 1/4/24. Continues with fatigue, malaise, diminished PO and subjective fevers. denies headache, cp, sob, n/v, abd pain.   Pt tolerating diet with report of good appetite, observed with >75% intake of bfast. UBW 230lbs with recent intended weight loss of 5lbs per pt. No skin breakdown or edema. Insulin pump not in use during admission. Loose BM today, +abx. Encouraged increased oral hydration. Carbohydrate counting reviewed- pt receptive to 3-4-4 CHO regimen during inpatient stay. Discussed weight management tips. Menu provided and ordering procedures reviewed.

## 2024-01-07 NOTE — DIETITIAN INITIAL EVALUATION ADULT - ORAL INTAKE PTA/DIET HISTORY
Pt endorses good appetite PTA, states that she has been making dietary changes in an attempt to lose weight (low carb, portion control, less snacking). No chewing/swallowing issues. Confirms allergy to walnuts. DMII managed with insulin pump (off during admission), lantus, [place] weekly, glimepiride. Follows with diabetes specialist. Familiar with carb counting. Reports most recent A1c ~6.7%.

## 2024-01-07 NOTE — DIETITIAN INITIAL EVALUATION ADULT - PERSON TAUGHT/METHOD
Weight management, CCHO, adequate oral hydration/verbal instruction/teach back - (Patient repeats in own words)/patient instructed

## 2024-01-07 NOTE — PATIENT PROFILE ADULT - FALL HARM RISK - RISK INTERVENTIONS
Assistance OOB with selected safe patient handling equipment/Communicate Fall Risk and Risk Factors to all staff, patient, and family/Discuss with provider need for PT consult/Monitor gait and stability/Provide patient with walking aids - walker, cane, crutches/Reinforce activity limits and safety measures with patient and family/Visual Cue: Yellow wristband/Bed in lowest position, wheels locked, appropriate side rails in place/Call bell, personal items and telephone in reach/Instruct patient to call for assistance before getting out of bed or chair/Non-slip footwear when patient is out of bed/[place] to call system/Physically safe environment - no spills, clutter or unnecessary equipment/Purposeful Proactive Rounding/Room/bathroom lighting operational, light cord in reach Assistance OOB with selected safe patient handling equipment/Communicate Fall Risk and Risk Factors to all staff, patient, and family/Discuss with provider need for PT consult/Monitor gait and stability/Provide patient with walking aids - walker, cane, crutches/Reinforce activity limits and safety measures with patient and family/Visual Cue: Yellow wristband/Bed in lowest position, wheels locked, appropriate side rails in place/Call bell, personal items and telephone in reach/Instruct patient to call for assistance before getting out of bed or chair/Non-slip footwear when patient is out of bed/[place] to call system/Physically safe environment - no spills, clutter or unnecessary equipment/Purposeful Proactive Rounding/Room/bathroom lighting operational, light cord in reach

## 2024-01-07 NOTE — PROGRESS NOTE ADULT - SUBJECTIVE AND OBJECTIVE BOX
Patient is a 58y old  Female who presents with a chief complaint of bacteremia       Patient seen and examined at bedside. states she feels general malaise otherwise denies complaints.     ALLERGIES:  walnut (Other)  No Known Drug Allergies    MEDICATIONS  (STANDING):  aspirin enteric coated 81 milliGRAM(s) Oral daily  atorvastatin 80 milliGRAM(s) Oral at bedtime  budesonide  80 MICROgram(s)/formoterol 4.5 MICROgram(s) Inhaler 2 Puff(s) Inhalation two times a day  cefTRIAXone   IVPB      dextrose 5%. 1000 milliLiter(s) (50 mL/Hr) IV Continuous <Continuous>  dextrose 5%. 1000 milliLiter(s) (100 mL/Hr) IV Continuous <Continuous>  dextrose 50% Injectable 12.5 Gram(s) IV Push once  dextrose 50% Injectable 25 Gram(s) IV Push once  dextrose 50% Injectable 25 Gram(s) IV Push once  enoxaparin Injectable 40 milliGRAM(s) SubCutaneous every 24 hours  FLUoxetine 20 milliGRAM(s) Oral two times a day  FLUoxetine 20 milliGRAM(s) Oral at bedtime  fluPHENAZine 5 milliGRAM(s) Oral at bedtime  fluticasone propionate 50 MICROgram(s)/spray Nasal Spray 1 Spray(s) Both Nostrils two times a day  gabapentin 100 milliGRAM(s) Oral two times a day  glucagon  Injectable 1 milliGRAM(s) IntraMuscular once  insulin glargine Injectable (LANTUS) 40 Unit(s) SubCutaneous at bedtime  insulin lispro (ADMELOG) corrective regimen sliding scale   SubCutaneous three times a day before meals  losartan 100 milliGRAM(s) Oral daily  montelukast 10 milliGRAM(s) Oral daily  pantoprazole    Tablet 40 milliGRAM(s) Oral before breakfast  polyethylene glycol 3350 17 Gram(s) Oral daily  prazosin. 1 milliGRAM(s) Oral daily  senna 2 Tablet(s) Oral at bedtime  sodium chloride 0.9%. 1000 milliLiter(s) (75 mL/Hr) IV Continuous <Continuous>    MEDICATIONS  (PRN):  acetaminophen     Tablet .. 650 milliGRAM(s) Oral every 6 hours PRN Temp greater or equal to 38C (100.4F), Mild Pain (1 - 3)  albuterol    90 MICROgram(s) HFA Inhaler 2 Puff(s) Inhalation every 6 hours PRN Shortness of Breath  dextrose Oral Gel 15 Gram(s) Oral once PRN Blood Glucose LESS THAN 70 milliGRAM(s)/deciliter  ondansetron   Disintegrating Tablet 4 milliGRAM(s) Oral every 6 hours PRN Nausea and/or Vomiting  simethicone 80 milliGRAM(s) Chew three times a day PRN Gas    Vital Signs Last 24 Hrs  T(F): 98.4 (07 Jan 2024 05:17), Max: 98.4 (07 Jan 2024 05:17)  HR: 85 (07 Jan 2024 05:17) (70 - 87)  BP: 122/76 (07 Jan 2024 05:17) (106/63 - 122/76)  RR: 17 (07 Jan 2024 05:17) (17 - 19)  SpO2: 93% (07 Jan 2024 05:17) (93% - 97%)  I&O's Summary    PHYSICAL EXAM:  General: NAD, obese, A/O x 3  ENT: MMM, no oral thrush   Neck: Supple, No JVD  Lungs: Clear to auscultation bilaterally, non labored breathing  Cardio: RRR, S1/S2, No murmurs  Abdomen: Soft, Nontender, Nondistended; Bowel sounds present  Extremities: No calf tenderness, No pitting edema    LABS:                        11.3   7.66  )-----------( 283      ( 07 Jan 2024 06:00 )             34.5     01-07    141  |  107  |  14  ----------------------------<  186  3.9   |  27  |  1.07    Ca    9.3      07 Jan 2024 06:00    TPro  6.6  /  Alb  2.7  /  TBili  0.3  /  DBili  x   /  AST  24  /  ALT  47  /  AlkPhos  82  01-07      PT/INR - ( 06 Jan 2024 11:01 )   PT: 12.4 sec;   INR: 1.09 ratio         PTT - ( 06 Jan 2024 11:01 )  PTT:27.7 sec  Lactate, Blood: 1.1 mmol/L (01-06 @ 10:34)  Lactate, Blood: 2.2 mmol/L (01-04 @ 11:50)                      POCT Blood Glucose.: 139 mg/dL (07 Jan 2024 08:25)  POCT Blood Glucose.: 174 mg/dL (06 Jan 2024 22:38)  POCT Blood Glucose.: 181 mg/dL (06 Jan 2024 16:47)      Urinalysis Basic - ( 07 Jan 2024 06:00 )    Color: x / Appearance: x / SG: x / pH: x  Gluc: 186 mg/dL / Ketone: x  / Bili: x / Urobili: x   Blood: x / Protein: x / Nitrite: x   Leuk Esterase: x / RBC: x / WBC x   Sq Epi: x / Non Sq Epi: x / Bacteria: x        Culture - Urine (collected 04 Jan 2024 12:30)  Source: Clean Catch Clean Catch (Midstream)  Final Report (05 Jan 2024 13:55):    <10,000 CFU/mL Normal Urogenital Allyson    Culture - Blood (collected 04 Jan 2024 11:55)  Source: .Blood Blood-Peripheral  Gram Stain (06 Jan 2024 07:59):    Growth in anaerobic bottle: Gram Negative Rods  Preliminary Report (06 Jan 2024 23:14):    Growth in anaerobic bottle: Escherichia coli    Direct identification is available within approximately 3-5    hours either by Blood Panel Multiplexed PCR or Direct    MALDI-TOF. Details: [email or web address]  Organism: Blood Culture PCR (06 Jan 2024 09:37)  Organism: Blood Culture PCR (06 Jan 2024 09:37)      Method Type: PCR      -  Escherichia coli: Detec    Culture - Blood (collected 04 Jan 2024 11:55)  Source: .Blood Blood-Peripheral  Preliminary Report (06 Jan 2024 17:01):    No growth at 48 Hours          RADIOLOGY & ADDITIONAL TESTS:  < from: Xray Chest 1 View-PORTABLE IMMEDIATE (01.04.24 @ 11:41) >  IMPRESSION: Clear lungs with no acute cardiopulmonary abnormalities.    --- End of Report ---            DARWIN VILLAGRAN MD; Attending Radiologist  This document has been electronically signed. Jan 4 2024  2:20PM    < end of copied text >    Care Discussed with Consultants/Other Providers:    Patient is a 58y old  Female who presents with a chief complaint of bacteremia       Patient seen and examined at bedside. states she feels general malaise otherwise denies complaints.     ALLERGIES:  walnut (Other)  No Known Drug Allergies    MEDICATIONS  (STANDING):  aspirin enteric coated 81 milliGRAM(s) Oral daily  atorvastatin 80 milliGRAM(s) Oral at bedtime  budesonide  80 MICROgram(s)/formoterol 4.5 MICROgram(s) Inhaler 2 Puff(s) Inhalation two times a day  cefTRIAXone   IVPB      dextrose 5%. 1000 milliLiter(s) (50 mL/Hr) IV Continuous <Continuous>  dextrose 5%. 1000 milliLiter(s) (100 mL/Hr) IV Continuous <Continuous>  dextrose 50% Injectable 12.5 Gram(s) IV Push once  dextrose 50% Injectable 25 Gram(s) IV Push once  dextrose 50% Injectable 25 Gram(s) IV Push once  enoxaparin Injectable 40 milliGRAM(s) SubCutaneous every 24 hours  FLUoxetine 20 milliGRAM(s) Oral two times a day  FLUoxetine 20 milliGRAM(s) Oral at bedtime  fluPHENAZine 5 milliGRAM(s) Oral at bedtime  fluticasone propionate 50 MICROgram(s)/spray Nasal Spray 1 Spray(s) Both Nostrils two times a day  gabapentin 100 milliGRAM(s) Oral two times a day  glucagon  Injectable 1 milliGRAM(s) IntraMuscular once  insulin glargine Injectable (LANTUS) 40 Unit(s) SubCutaneous at bedtime  insulin lispro (ADMELOG) corrective regimen sliding scale   SubCutaneous three times a day before meals  losartan 100 milliGRAM(s) Oral daily  montelukast 10 milliGRAM(s) Oral daily  pantoprazole    Tablet 40 milliGRAM(s) Oral before breakfast  polyethylene glycol 3350 17 Gram(s) Oral daily  prazosin. 1 milliGRAM(s) Oral daily  senna 2 Tablet(s) Oral at bedtime  sodium chloride 0.9%. 1000 milliLiter(s) (75 mL/Hr) IV Continuous <Continuous>    MEDICATIONS  (PRN):  acetaminophen     Tablet .. 650 milliGRAM(s) Oral every 6 hours PRN Temp greater or equal to 38C (100.4F), Mild Pain (1 - 3)  albuterol    90 MICROgram(s) HFA Inhaler 2 Puff(s) Inhalation every 6 hours PRN Shortness of Breath  dextrose Oral Gel 15 Gram(s) Oral once PRN Blood Glucose LESS THAN 70 milliGRAM(s)/deciliter  ondansetron   Disintegrating Tablet 4 milliGRAM(s) Oral every 6 hours PRN Nausea and/or Vomiting  simethicone 80 milliGRAM(s) Chew three times a day PRN Gas    Vital Signs Last 24 Hrs  T(F): 98.4 (07 Jan 2024 05:17), Max: 98.4 (07 Jan 2024 05:17)  HR: 85 (07 Jan 2024 05:17) (70 - 87)  BP: 122/76 (07 Jan 2024 05:17) (106/63 - 122/76)  RR: 17 (07 Jan 2024 05:17) (17 - 19)  SpO2: 93% (07 Jan 2024 05:17) (93% - 97%)  I&O's Summary    PHYSICAL EXAM:  General: NAD, obese, A/O x 3  ENT: MMM, no oral thrush   Neck: Supple, No JVD  Lungs: Clear to auscultation bilaterally, non labored breathing  Cardio: RRR, S1/S2, No murmurs  Abdomen: Soft, Nontender, Nondistended; Bowel sounds present  Extremities: No calf tenderness, No pitting edema    LABS:                        11.3   7.66  )-----------( 283      ( 07 Jan 2024 06:00 )             34.5     01-07    141  |  107  |  14  ----------------------------<  186  3.9   |  27  |  1.07    Ca    9.3      07 Jan 2024 06:00    TPro  6.6  /  Alb  2.7  /  TBili  0.3  /  DBili  x   /  AST  24  /  ALT  47  /  AlkPhos  82  01-07      PT/INR - ( 06 Jan 2024 11:01 )   PT: 12.4 sec;   INR: 1.09 ratio         PTT - ( 06 Jan 2024 11:01 )  PTT:27.7 sec  Lactate, Blood: 1.1 mmol/L (01-06 @ 10:34)  Lactate, Blood: 2.2 mmol/L (01-04 @ 11:50)                      POCT Blood Glucose.: 139 mg/dL (07 Jan 2024 08:25)  POCT Blood Glucose.: 174 mg/dL (06 Jan 2024 22:38)  POCT Blood Glucose.: 181 mg/dL (06 Jan 2024 16:47)      Urinalysis Basic - ( 07 Jan 2024 06:00 )    Color: x / Appearance: x / SG: x / pH: x  Gluc: 186 mg/dL / Ketone: x  / Bili: x / Urobili: x   Blood: x / Protein: x / Nitrite: x   Leuk Esterase: x / RBC: x / WBC x   Sq Epi: x / Non Sq Epi: x / Bacteria: x        Culture - Urine (collected 04 Jan 2024 12:30)  Source: Clean Catch Clean Catch (Midstream)  Final Report (05 Jan 2024 13:55):    <10,000 CFU/mL Normal Urogenital Allyson    Culture - Blood (collected 04 Jan 2024 11:55)  Source: .Blood Blood-Peripheral  Gram Stain (06 Jan 2024 07:59):    Growth in anaerobic bottle: Gram Negative Rods  Preliminary Report (06 Jan 2024 23:14):    Growth in anaerobic bottle: Escherichia coli    Direct identification is available within approximately 3-5    hours either by Blood Panel Multiplexed PCR or Direct    MALDI-TOF. Details: [email or web address]  Organism: Blood Culture PCR (06 Jan 2024 09:37)  Organism: Blood Culture PCR (06 Jan 2024 09:37)      Method Type: PCR      -  Escherichia coli: Detec    Culture - Blood (collected 04 Jan 2024 11:55)  Source: .Blood Blood-Peripheral  Preliminary Report (06 Jan 2024 17:01):    No growth at 48 Hours          RADIOLOGY & ADDITIONAL TESTS:  < from: Xray Chest 1 View-PORTABLE IMMEDIATE (01.04.24 @ 11:41) >  IMPRESSION: Clear lungs with no acute cardiopulmonary abnormalities.    --- End of Report ---            DARWIN VILLAGRAN MD; Attending Radiologist  This document has been electronically signed. Jan 4 2024  2:20PM    < end of copied text >    Care Discussed with Consultants/Other Providers:

## 2024-01-07 NOTE — DIETITIAN INITIAL EVALUATION ADULT - PERTINENT MEDS FT
MEDICATIONS  (STANDING):  aspirin enteric coated 81 milliGRAM(s) Oral daily  atorvastatin 80 milliGRAM(s) Oral at bedtime  budesonide  80 MICROgram(s)/formoterol 4.5 MICROgram(s) Inhaler 2 Puff(s) Inhalation two times a day  cefTRIAXone   IVPB      dextrose 5%. 1000 milliLiter(s) (100 mL/Hr) IV Continuous <Continuous>  dextrose 5%. 1000 milliLiter(s) (50 mL/Hr) IV Continuous <Continuous>  dextrose 50% Injectable 12.5 Gram(s) IV Push once  dextrose 50% Injectable 25 Gram(s) IV Push once  dextrose 50% Injectable 25 Gram(s) IV Push once  enoxaparin Injectable 40 milliGRAM(s) SubCutaneous every 24 hours  FLUoxetine 20 milliGRAM(s) Oral two times a day  FLUoxetine 20 milliGRAM(s) Oral at bedtime  fluPHENAZine 5 milliGRAM(s) Oral at bedtime  fluticasone propionate 50 MICROgram(s)/spray Nasal Spray 1 Spray(s) Both Nostrils two times a day  gabapentin 100 milliGRAM(s) Oral two times a day  glucagon  Injectable 1 milliGRAM(s) IntraMuscular once  insulin glargine Injectable (LANTUS) 40 Unit(s) SubCutaneous at bedtime  insulin lispro (ADMELOG) corrective regimen sliding scale   SubCutaneous three times a day before meals  losartan 100 milliGRAM(s) Oral daily  montelukast 10 milliGRAM(s) Oral daily  pantoprazole    Tablet 40 milliGRAM(s) Oral before breakfast  polyethylene glycol 3350 17 Gram(s) Oral daily  prazosin. 1 milliGRAM(s) Oral daily  senna 2 Tablet(s) Oral at bedtime  sodium chloride 0.9%. 1000 milliLiter(s) (75 mL/Hr) IV Continuous <Continuous>    MEDICATIONS  (PRN):  acetaminophen     Tablet .. 650 milliGRAM(s) Oral every 6 hours PRN Temp greater or equal to 38C (100.4F), Mild Pain (1 - 3)  albuterol    90 MICROgram(s) HFA Inhaler 2 Puff(s) Inhalation every 6 hours PRN Shortness of Breath  dextrose Oral Gel 15 Gram(s) Oral once PRN Blood Glucose LESS THAN 70 milliGRAM(s)/deciliter  ondansetron   Disintegrating Tablet 4 milliGRAM(s) Oral every 6 hours PRN Nausea and/or Vomiting  simethicone 80 milliGRAM(s) Chew three times a day PRN Gas

## 2024-01-08 LAB
CULTURE RESULTS: SIGNIFICANT CHANGE UP
CULTURE RESULTS: SIGNIFICANT CHANGE UP
GLUCOSE BLDC GLUCOMTR-MCNC: 164 MG/DL — HIGH (ref 70–99)
GLUCOSE BLDC GLUCOMTR-MCNC: 164 MG/DL — HIGH (ref 70–99)
GLUCOSE BLDC GLUCOMTR-MCNC: 168 MG/DL — HIGH (ref 70–99)
GLUCOSE BLDC GLUCOMTR-MCNC: 168 MG/DL — HIGH (ref 70–99)
GLUCOSE BLDC GLUCOMTR-MCNC: 189 MG/DL — HIGH (ref 70–99)
GLUCOSE BLDC GLUCOMTR-MCNC: 189 MG/DL — HIGH (ref 70–99)
GLUCOSE BLDC GLUCOMTR-MCNC: 207 MG/DL — HIGH (ref 70–99)
GLUCOSE BLDC GLUCOMTR-MCNC: 207 MG/DL — HIGH (ref 70–99)
SPECIMEN SOURCE: SIGNIFICANT CHANGE UP
SPECIMEN SOURCE: SIGNIFICANT CHANGE UP

## 2024-01-08 PROCEDURE — 99232 SBSQ HOSP IP/OBS MODERATE 35: CPT | Mod: FS

## 2024-01-08 RX ADMIN — BUDESONIDE AND FORMOTEROL FUMARATE DIHYDRATE 2 PUFF(S): 160; 4.5 AEROSOL RESPIRATORY (INHALATION) at 22:40

## 2024-01-08 RX ADMIN — Medication 1 SPRAY(S): at 06:40

## 2024-01-08 RX ADMIN — ATORVASTATIN CALCIUM 80 MILLIGRAM(S): 80 TABLET, FILM COATED ORAL at 22:25

## 2024-01-08 RX ADMIN — Medication 2: at 07:52

## 2024-01-08 RX ADMIN — Medication 20 MILLIGRAM(S): at 17:09

## 2024-01-08 RX ADMIN — ENOXAPARIN SODIUM 40 MILLIGRAM(S): 100 INJECTION SUBCUTANEOUS at 11:30

## 2024-01-08 RX ADMIN — Medication 20 MILLIGRAM(S): at 06:41

## 2024-01-08 RX ADMIN — CEFTRIAXONE 100 MILLIGRAM(S): 500 INJECTION, POWDER, FOR SOLUTION INTRAMUSCULAR; INTRAVENOUS at 07:52

## 2024-01-08 RX ADMIN — Medication 20 MILLIGRAM(S): at 22:26

## 2024-01-08 RX ADMIN — FLUPHENAZINE HYDROCHLORIDE 5 MILLIGRAM(S): 1 TABLET, FILM COATED ORAL at 22:25

## 2024-01-08 RX ADMIN — LOSARTAN POTASSIUM 100 MILLIGRAM(S): 100 TABLET, FILM COATED ORAL at 06:40

## 2024-01-08 RX ADMIN — Medication 1: at 11:31

## 2024-01-08 RX ADMIN — GABAPENTIN 100 MILLIGRAM(S): 400 CAPSULE ORAL at 06:39

## 2024-01-08 RX ADMIN — INSULIN GLARGINE 40 UNIT(S): 100 INJECTION, SOLUTION SUBCUTANEOUS at 22:26

## 2024-01-08 RX ADMIN — Medication 1 SPRAY(S): at 17:08

## 2024-01-08 RX ADMIN — SODIUM CHLORIDE 75 MILLILITER(S): 9 INJECTION INTRAMUSCULAR; INTRAVENOUS; SUBCUTANEOUS at 07:52

## 2024-01-08 RX ADMIN — Medication 81 MILLIGRAM(S): at 11:30

## 2024-01-08 RX ADMIN — MONTELUKAST 10 MILLIGRAM(S): 4 TABLET, CHEWABLE ORAL at 11:30

## 2024-01-08 RX ADMIN — GABAPENTIN 100 MILLIGRAM(S): 400 CAPSULE ORAL at 17:08

## 2024-01-08 RX ADMIN — SODIUM CHLORIDE 75 MILLILITER(S): 9 INJECTION INTRAMUSCULAR; INTRAVENOUS; SUBCUTANEOUS at 06:37

## 2024-01-08 RX ADMIN — BUDESONIDE AND FORMOTEROL FUMARATE DIHYDRATE 2 PUFF(S): 160; 4.5 AEROSOL RESPIRATORY (INHALATION) at 08:56

## 2024-01-08 RX ADMIN — PANTOPRAZOLE SODIUM 40 MILLIGRAM(S): 20 TABLET, DELAYED RELEASE ORAL at 06:40

## 2024-01-08 RX ADMIN — Medication 1 MILLIGRAM(S): at 06:41

## 2024-01-08 RX ADMIN — Medication 1: at 17:09

## 2024-01-08 RX ADMIN — Medication 650 MILLIGRAM(S): at 18:02

## 2024-01-08 RX ADMIN — SENNA PLUS 2 TABLET(S): 8.6 TABLET ORAL at 22:26

## 2024-01-08 RX ADMIN — Medication 650 MILLIGRAM(S): at 17:08

## 2024-01-08 NOTE — PROGRESS NOTE ADULT - SUBJECTIVE AND OBJECTIVE BOX
Patient is a 58y old Female who presents with a chief complaint of bacteremia       Patient seen and examined at bedside. Pt states they feel well, denies overnight events or current complaints including chest pain, shortness of breath, dizziness, nausea, vomiting, diarrhea, fever or chills.      ALLERGIES:  walnut (Other)  No Known Drug Allergies    MEDICATIONS  (STANDING):  aspirin enteric coated 81 milliGRAM(s) Oral daily  atorvastatin 80 milliGRAM(s) Oral at bedtime  budesonide  80 MICROgram(s)/formoterol 4.5 MICROgram(s) Inhaler 2 Puff(s) Inhalation two times a day  cefTRIAXone   IVPB      cefTRIAXone   IVPB 2000 milliGRAM(s) IV Intermittent every 24 hours  dextrose 5%. 1000 milliLiter(s) (100 mL/Hr) IV Continuous <Continuous>  dextrose 5%. 1000 milliLiter(s) (50 mL/Hr) IV Continuous <Continuous>  dextrose 50% Injectable 25 Gram(s) IV Push once  dextrose 50% Injectable 25 Gram(s) IV Push once  dextrose 50% Injectable 12.5 Gram(s) IV Push once  enoxaparin Injectable 40 milliGRAM(s) SubCutaneous every 24 hours  FLUoxetine 20 milliGRAM(s) Oral two times a day  FLUoxetine 20 milliGRAM(s) Oral at bedtime  fluPHENAZine 5 milliGRAM(s) Oral at bedtime  fluticasone propionate 50 MICROgram(s)/spray Nasal Spray 1 Spray(s) Both Nostrils two times a day  gabapentin 100 milliGRAM(s) Oral two times a day  glucagon  Injectable 1 milliGRAM(s) IntraMuscular once  insulin glargine Injectable (LANTUS) 40 Unit(s) SubCutaneous at bedtime  insulin lispro (ADMELOG) corrective regimen sliding scale   SubCutaneous three times a day before meals  losartan 100 milliGRAM(s) Oral daily  montelukast 10 milliGRAM(s) Oral daily  pantoprazole    Tablet 40 milliGRAM(s) Oral before breakfast  polyethylene glycol 3350 17 Gram(s) Oral daily  prazosin. 1 milliGRAM(s) Oral daily  senna 2 Tablet(s) Oral at bedtime  sodium chloride 0.9%. 1000 milliLiter(s) (75 mL/Hr) IV Continuous <Continuous>    MEDICATIONS  (PRN):  acetaminophen     Tablet .. 650 milliGRAM(s) Oral every 6 hours PRN Temp greater or equal to 38C (100.4F), Mild Pain (1 - 3)  albuterol    90 MICROgram(s) HFA Inhaler 2 Puff(s) Inhalation every 6 hours PRN Shortness of Breath  dextrose Oral Gel 15 Gram(s) Oral once PRN Blood Glucose LESS THAN 70 milliGRAM(s)/deciliter  ondansetron   Disintegrating Tablet 4 milliGRAM(s) Oral every 6 hours PRN Nausea and/or Vomiting  simethicone 80 milliGRAM(s) Chew three times a day PRN Gas    Vital Signs Last 24 Hrs  T(F): 97.8 (07 Jan 2024 21:30), Max: 98.4 (07 Jan 2024 16:46)  HR: 69 (07 Jan 2024 21:30) (69 - 76)  BP: 134/85 (07 Jan 2024 21:30) (134/85 - 140/83)  RR: 18 (07 Jan 2024 21:30) (12 - 18)  SpO2: 95% (07 Jan 2024 21:30) (94% - 95%)  I&O's Summary    07 Jan 2024 07:01  -  08 Jan 2024 07:00  --------------------------------------------------------  IN: 0 mL / OUT: 2000 mL / NET: -2000 mL    08 Jan 2024 07:01  -  08 Jan 2024 14:30  --------------------------------------------------------  IN: 525 mL / OUT: 0 mL / NET: 525 mL      PHYSICAL EXAM:  General: NAD, obese, A/O x 3  ENT: MMM, no oral thrush   Neck: Supple, No JVD  Lungs: Clear to auscultation bilaterally, non labored breathing  Cardio: RRR, S1/S2, No murmurs  Abdomen: Soft, Nontender, Nondistended; Bowel sounds present  Extremities: No calf tenderness, No pitting edema    LABS:                        11.3   7.66  )-----------( 283      ( 07 Jan 2024 06:00 )             34.5     01-07    141  |  107  |  14  ----------------------------<  186  3.9   |  27  |  1.07    Ca    9.3      07 Jan 2024 06:00    TPro  6.6  /  Alb  2.7  /  TBili  0.3  /  DBili  x   /  AST  24  /  ALT  47  /  AlkPhos  82  01-07      PT/INR - ( 06 Jan 2024 11:01 )   PT: 12.4 sec;   INR: 1.09 ratio         PTT - ( 06 Jan 2024 11:01 )  PTT:27.7 sec  Lactate, Blood: 1.1 mmol/L (01-06 @ 10:34)                      POCT Blood Glucose.: 189 mg/dL (08 Jan 2024 11:29)  POCT Blood Glucose.: 207 mg/dL (08 Jan 2024 07:46)  POCT Blood Glucose.: 176 mg/dL (07 Jan 2024 21:32)  POCT Blood Glucose.: 147 mg/dL (07 Jan 2024 16:50)      Urinalysis Basic - ( 07 Jan 2024 06:00 )    Color: x / Appearance: x / SG: x / pH: x  Gluc: 186 mg/dL / Ketone: x  / Bili: x / Urobili: x   Blood: x / Protein: x / Nitrite: x   Leuk Esterase: x / RBC: x / WBC x   Sq Epi: x / Non Sq Epi: x / Bacteria: x        Culture - Urine (collected 06 Jan 2024 12:00)  Source: Clean Catch Clean Catch (Midstream)  Final Report (08 Jan 2024 10:59):    >=3 organisms. Probable collection contamination.    Culture - Blood (collected 06 Jan 2024 11:05)  Source: .Blood Blood-Peripheral  Preliminary Report (07 Jan 2024 15:01):    No growth at 24 hours    Culture - Blood (collected 06 Jan 2024 11:01)  Source: .Blood Blood-Peripheral  Preliminary Report (07 Jan 2024 15:01):    No growth at 24 hours    Culture - Urine (collected 04 Jan 2024 12:30)  Source: Clean Catch Clean Catch (Midstream)  Final Report (05 Jan 2024 13:55):    <10,000 CFU/mL Normal Urogenital Allyson    Culture - Blood (collected 04 Jan 2024 11:55)  Source: .Blood Blood-Peripheral  Gram Stain (06 Jan 2024 07:59):    Growth in anaerobic bottle: Gram Negative Rods  Final Report (07 Jan 2024 17:05):    Growth in anaerobic bottle: Escherichia coli    Direct identification is available within approximately 3-5    hours either by Blood Panel Multiplexed PCR or Direct    MALDI-TOF. Details: [email or web address]  Organism: Blood Culture PCR  Escherichia coli (07 Jan 2024 17:05)  Organism: Escherichia coli (07 Jan 2024 17:05)      Method Type: LAMIN      -  Ampicillin: R >16 These ampicillin results predict results for amoxicillin      -  Ampicillin/Sulbactam: I 16/8      -  Aztreonam: S <=4      -  Cefazolin: S <=2      -  Cefepime: S <=2      -  Cefoxitin: S <=8      -  Ceftriaxone: S <=1      -  Ciprofloxacin: R >2      -  Ertapenem: S <=0.5      -  Gentamicin: S <=2      -  Imipenem: S <=1      -  Levofloxacin: R >4      -  Meropenem: S <=1      -  Piperacillin/Tazobactam: S <=8      -  Tobramycin: S <=2      -  Trimethoprim/Sulfamethoxazole: S <=0.5/9.5  Organism: Blood Culture PCR (07 Jan 2024 17:05)      Method Type: PCR      -  Escherichia coli: Detec    Culture - Blood (collected 04 Jan 2024 11:55)  Source: .Blood Blood-Peripheral  Preliminary Report (07 Jan 2024 17:01):    No growth at 72 Hours          RADIOLOGY & ADDITIONAL TESTS:    Care Discussed with Consultants/Other Providers:    Patient is a 58y old Female who presents with a chief complaint of bacteremia       Patient seen and examined at bedside. Pt states they feel well, denies overnight events or current complaints including chest pain, shortness of breath, dizziness, nausea, vomiting, diarrhea, fever or chills.      ALLERGIES:  walnut (Other)  No Known Drug Allergies    MEDICATIONS  (STANDING):  aspirin enteric coated 81 milliGRAM(s) Oral daily  atorvastatin 80 milliGRAM(s) Oral at bedtime  budesonide  80 MICROgram(s)/formoterol 4.5 MICROgram(s) Inhaler 2 Puff(s) Inhalation two times a day  cefTRIAXone   IVPB      cefTRIAXone   IVPB 2000 milliGRAM(s) IV Intermittent every 24 hours  dextrose 5%. 1000 milliLiter(s) (100 mL/Hr) IV Continuous <Continuous>  dextrose 5%. 1000 milliLiter(s) (50 mL/Hr) IV Continuous <Continuous>  dextrose 50% Injectable 25 Gram(s) IV Push once  dextrose 50% Injectable 25 Gram(s) IV Push once  dextrose 50% Injectable 12.5 Gram(s) IV Push once  enoxaparin Injectable 40 milliGRAM(s) SubCutaneous every 24 hours  FLUoxetine 20 milliGRAM(s) Oral two times a day  FLUoxetine 20 milliGRAM(s) Oral at bedtime  fluPHENAZine 5 milliGRAM(s) Oral at bedtime  fluticasone propionate 50 MICROgram(s)/spray Nasal Spray 1 Spray(s) Both Nostrils two times a day  gabapentin 100 milliGRAM(s) Oral two times a day  glucagon  Injectable 1 milliGRAM(s) IntraMuscular once  insulin glargine Injectable (LANTUS) 40 Unit(s) SubCutaneous at bedtime  insulin lispro (ADMELOG) corrective regimen sliding scale   SubCutaneous three times a day before meals  losartan 100 milliGRAM(s) Oral daily  montelukast 10 milliGRAM(s) Oral daily  pantoprazole    Tablet 40 milliGRAM(s) Oral before breakfast  polyethylene glycol 3350 17 Gram(s) Oral daily  prazosin. 1 milliGRAM(s) Oral daily  senna 2 Tablet(s) Oral at bedtime  sodium chloride 0.9%. 1000 milliLiter(s) (75 mL/Hr) IV Continuous <Continuous>    MEDICATIONS  (PRN):  acetaminophen     Tablet .. 650 milliGRAM(s) Oral every 6 hours PRN Temp greater or equal to 38C (100.4F), Mild Pain (1 - 3)  albuterol    90 MICROgram(s) HFA Inhaler 2 Puff(s) Inhalation every 6 hours PRN Shortness of Breath  dextrose Oral Gel 15 Gram(s) Oral once PRN Blood Glucose LESS THAN 70 milliGRAM(s)/deciliter  ondansetron   Disintegrating Tablet 4 milliGRAM(s) Oral every 6 hours PRN Nausea and/or Vomiting  simethicone 80 milliGRAM(s) Chew three times a day PRN Gas    Vital Signs Last 24 Hrs  T(F): 97.8 (07 Jan 2024 21:30), Max: 98.4 (07 Jan 2024 16:46)  HR: 69 (07 Jan 2024 21:30) (69 - 76)  BP: 134/85 (07 Jan 2024 21:30) (134/85 - 140/83)  RR: 18 (07 Jan 2024 21:30) (12 - 18)  SpO2: 95% (07 Jan 2024 21:30) (94% - 95%)  I&O's Summary    07 Jan 2024 07:01  -  08 Jan 2024 07:00  --------------------------------------------------------  IN: 0 mL / OUT: 2000 mL / NET: -2000 mL    08 Jan 2024 07:01  -  08 Jan 2024 14:30  --------------------------------------------------------  IN: 525 mL / OUT: 0 mL / NET: 525 mL      PHYSICAL EXAM:  General: NAD, obese, A/O x 3  ENT: MMM, no oral thrush   Neck: Supple, No JVD  Lungs: Clear to auscultation bilaterally, non labored breathing  Cardio: RRR, S1/S2, No murmurs  Abdomen: Soft, Nontender, Nondistended; Bowel sounds present  Extremities: No calf tenderness, No pitting edema    LABS:                        11.3   7.66  )-----------( 283      ( 07 Jan 2024 06:00 )             34.5     01-07    141  |  107  |  14  ----------------------------<  186  3.9   |  27  |  1.07    Ca    9.3      07 Jan 2024 06:00    TPro  6.6  /  Alb  2.7  /  TBili  0.3  /  DBili  x   /  AST  24  /  ALT  47  /  AlkPhos  82  01-07      PT/INR - ( 06 Jan 2024 11:01 )   PT: 12.4 sec;   INR: 1.09 ratio         PTT - ( 06 Jan 2024 11:01 )  PTT:27.7 sec  Lactate, Blood: 1.1 mmol/L (01-06 @ 10:34)                      POCT Blood Glucose.: 189 mg/dL (08 Jan 2024 11:29)  POCT Blood Glucose.: 207 mg/dL (08 Jan 2024 07:46)  POCT Blood Glucose.: 176 mg/dL (07 Jan 2024 21:32)  POCT Blood Glucose.: 147 mg/dL (07 Jan 2024 16:50)      Urinalysis Basic - ( 07 Jan 2024 06:00 )    Color: x / Appearance: x / SG: x / pH: x  Gluc: 186 mg/dL / Ketone: x  / Bili: x / Urobili: x   Blood: x / Protein: x / Nitrite: x   Leuk Esterase: x / RBC: x / WBC x   Sq Epi: x / Non Sq Epi: x / Bacteria: x        Culture - Urine (collected 06 Jan 2024 12:00)  Source: Clean Catch Clean Catch (Midstream)  Final Report (08 Jan 2024 10:59):    >=3 organisms. Probable collection contamination.    Culture - Blood (collected 06 Jan 2024 11:05)  Source: .Blood Blood-Peripheral  Preliminary Report (07 Jan 2024 15:01):    No growth at 24 hours    Culture - Blood (collected 06 Jan 2024 11:01)  Source: .Blood Blood-Peripheral  Preliminary Report (07 Jan 2024 15:01):    No growth at 24 hours    Culture - Urine (collected 04 Jan 2024 12:30)  Source: Clean Catch Clean Catch (Midstream)  Final Report (05 Jan 2024 13:55):    <10,000 CFU/mL Normal Urogenital Allyson    Culture - Blood (collected 04 Jan 2024 11:55)  Source: .Blood Blood-Peripheral  Gram Stain (06 Jan 2024 07:59):    Growth in anaerobic bottle: Gram Negative Rods  Final Report (07 Jan 2024 17:05):    Growth in anaerobic bottle: Escherichia coli    Direct identification is available within approximately 3-5    hours either by Blood Panel Multiplexed PCR or Direct    MALDI-TOF. Details: [email or web address]  Organism: Blood Culture PCR  Escherichia coli (07 Jan 2024 17:05)  Organism: Escherichia coli (07 Jan 2024 17:05)      Method Type: LAMIN      -  Ampicillin: R >16 These ampicillin results predict results for amoxicillin      -  Ampicillin/Sulbactam: I 16/8      -  Aztreonam: S <=4      -  Cefazolin: S <=2      -  Cefepime: S <=2      -  Cefoxitin: S <=8      -  Ceftriaxone: S <=1      -  Ciprofloxacin: R >2      -  Ertapenem: S <=0.5      -  Gentamicin: S <=2      -  Imipenem: S <=1      -  Levofloxacin: R >4      -  Meropenem: S <=1      -  Piperacillin/Tazobactam: S <=8      -  Tobramycin: S <=2      -  Trimethoprim/Sulfamethoxazole: S <=0.5/9.5  Organism: Blood Culture PCR (07 Jan 2024 17:05)      Method Type: PCR      -  Escherichia coli: Detec    Culture - Blood (collected 04 Jan 2024 11:55)  Source: .Blood Blood-Peripheral  Preliminary Report (07 Jan 2024 17:01):    No growth at 72 Hours          RADIOLOGY & ADDITIONAL TESTS:    Care Discussed with Consultants/Other Providers:

## 2024-01-08 NOTE — PROGRESS NOTE ADULT - ASSESSMENT
57 y/o F with PMH Type 2 DM, HTN, HLD, GERD, asthma, anxiety, sleep apnea, schizoaffective disorder, bipolar disorder, migraines, presented to ED 1/4/24 c/o emesis, mild cough, nasalk congestion and weakness, found with adenovirus, medically optimized and discharged home. Returned to ED today for positive GNR in blood cultures from 1/4/24.    GNR bacteremia  -Afebrile, hemodynamically stable  -RVP positive for adenovirus 1/4/24, continue supportive care  -repeat BC NGTD  - Ceftriaxone  -ID consult       Morbid Obesity  -nutrition consult    Type 2 DM  -FS, ISS      Asthma, stable  -Continue albuterol prn, montelukast, symbicort  GERD  -Continue PPI    Sleep Apnea  -CPAP settings    Schizoaffective disorder - chronic, stable  -Continue home psych meds    Vitals q8h  Diet: Carb consistent  PT/OT as tolerated  DVT ppx: Lovenox  Standard precautions: Aspiration, fall, safety, seizure, skin  Code Status - Full Code    Plans to update family individually   HCP - sister Nevaeh

## 2024-01-08 NOTE — CONSULT NOTE ADULT - SUBJECTIVE AND OBJECTIVE BOX
HPI:   Patient is a 58y female with obesity, dm , uti who came to ER on 1/2 for vomiting, fever, myalgia, found to have adenovirus and was sent home. [facility] was called back 2 d ago because her blood cx grew 1/4 ecoli. She has no further vomiting, had some diarrhea.     REVIEW OF SYSTEMS:  All other review of systems negative (Comprehensive ROS)    PAST MEDICAL & SURGICAL HISTORY:  Diabetes      HTN (hypertension)      HLD (hyperlipidemia)      Migraine      Depression      Anxiety      Asthma      H/O oral surgery          Allergies    walnut (Other)  No Known Drug Allergies    Intolerances        Antimicrobials Day #  :3  cefTRIAXone   IVPB      cefTRIAXone   IVPB 2000 milliGRAM(s) IV Intermittent every 24 hours    Other Medications:  acetaminophen     Tablet .. 650 milliGRAM(s) Oral every 6 hours PRN  albuterol    90 MICROgram(s) HFA Inhaler 2 Puff(s) Inhalation every 6 hours PRN  aspirin enteric coated 81 milliGRAM(s) Oral daily  atorvastatin 80 milliGRAM(s) Oral at bedtime  budesonide  80 MICROgram(s)/formoterol 4.5 MICROgram(s) Inhaler 2 Puff(s) Inhalation two times a day  dextrose 5%. 1000 milliLiter(s) IV Continuous <Continuous>  dextrose 5%. 1000 milliLiter(s) IV Continuous <Continuous>  dextrose 50% Injectable 25 Gram(s) IV Push once  dextrose 50% Injectable 25 Gram(s) IV Push once  dextrose 50% Injectable 12.5 Gram(s) IV Push once  dextrose Oral Gel 15 Gram(s) Oral once PRN  enoxaparin Injectable 40 milliGRAM(s) SubCutaneous every 24 hours  FLUoxetine 20 milliGRAM(s) Oral two times a day  FLUoxetine 20 milliGRAM(s) Oral at bedtime  fluPHENAZine 5 milliGRAM(s) Oral at bedtime  fluticasone propionate 50 MICROgram(s)/spray Nasal Spray 1 Spray(s) Both Nostrils two times a day  gabapentin 100 milliGRAM(s) Oral two times a day  glucagon  Injectable 1 milliGRAM(s) IntraMuscular once  insulin glargine Injectable (LANTUS) 40 Unit(s) SubCutaneous at bedtime  insulin lispro (ADMELOG) corrective regimen sliding scale   SubCutaneous three times a day before meals  losartan 100 milliGRAM(s) Oral daily  montelukast 10 milliGRAM(s) Oral daily  ondansetron   Disintegrating Tablet 4 milliGRAM(s) Oral every 6 hours PRN  pantoprazole    Tablet 40 milliGRAM(s) Oral before breakfast  polyethylene glycol 3350 17 Gram(s) Oral daily  prazosin. 1 milliGRAM(s) Oral daily  senna 2 Tablet(s) Oral at bedtime  simethicone 80 milliGRAM(s) Chew three times a day PRN  sodium chloride 0.9%. 1000 milliLiter(s) IV Continuous <Continuous>      FAMILY HISTORY:      SOCIAL HISTORY:  Smoking: [ ]Yes [ ]No  ETOH: [ ]Yes [ ]No  Drug Use: [ ]Yes [ ]No   [ ] Single[ ]    T(F): 97.8 (01-07-24 @ 21:30), Max: 98.4 (01-07-24 @ 16:46)  HR: 69 (01-07-24 @ 21:30)  BP: 134/85 (01-07-24 @ 21:30)  RR: 18 (01-07-24 @ 21:30)  SpO2: 95% (01-07-24 @ 21:30)  Wt(kg): --    PHYSICAL EXAM:  General: alert, no acute distress  Eyes:  anicteric, no conjunctival injection, no discharge  Oropharynx: no lesions or injection 	  Neck: supple, without adenopathy  Lungs: clear to auscultation  Heart: regular rate and rhythm; no murmur, rubs or gallops  Abdomen: soft, nondistended, nontender, without mass or organomegaly  Skin: no lesions  Extremities: no clubbing, cyanosis, or edema  Neurologic: alert, oriented, moves all extremities    LAB RESULTS:                        11.3   7.66  )-----------( 283      ( 07 Jan 2024 06:00 )             34.5     01-07    141  |  107  |  14  ----------------------------<  186<H>  3.9   |  27  |  1.07    Ca    9.3      07 Jan 2024 06:00    TPro  6.6  /  Alb  2.7<L>  /  TBili  0.3  /  DBili  x   /  AST  24  /  ALT  47<H>  /  AlkPhos  82  01-07    LIVER FUNCTIONS - ( 07 Jan 2024 06:00 )  Alb: 2.7 g/dL / Pro: 6.6 g/dL / ALK PHOS: 82 U/L / ALT: 47 U/L / AST: 24 U/L / GGT: x           Urinalysis Basic - ( 07 Jan 2024 06:00 )    Color: x / Appearance: x / SG: x / pH: x  Gluc: 186 mg/dL / Ketone: x  / Bili: x / Urobili: x   Blood: x / Protein: x / Nitrite: x   Leuk Esterase: x / RBC: x / WBC x   Sq Epi: x / Non Sq Epi: x / Bacteria: x        MICROBIOLOGY:  RECENT CULTURES:  01-06 @ 12:00 Clean Catch Clean Catch (Midstream)     >=3 organisms. Probable collection contamination.      01-06 @ 11:05 .Blood Blood-Peripheral     No growth at 48 Hours      01-06 @ 11:01 .Blood Blood-Peripheral     No growth at 48 Hours      01-04 @ 12:30 Clean Catch Clean Catch (Midstream)     <10,000 CFU/mL Normal Urogenital Allyson      01-04 @ 11:55 .Blood Blood-Peripheral Blood Culture PCR  Escherichia coli    No growth at 72 Hours    Growth in anaerobic bottle: Gram Negative Rods          RADIOLOGY REVIEWED:  < from: CT Abdomen and Pelvis w/ IV Cont (01.07.24 @ 12:16) >    ACC: [id number] EXAM:  CT ABDOMEN AND PELVIS IC   ORDERED BY: AMADOU ADAMES     PROCEDURE DATE:  01/07/2024          INTERPRETATION:  CLINICAL INFORMATION: Ecoli bacteremia.    COMPARISON: CT scan abdomen pelvis 04/26/2022.    CONTRAST/COMPLICATIONS:  IV Contrast: Omnipaque 350  90 cc administered   10 cc discarded  Oral Contrast: NONE  Complications: None reported at time of study completion    PROCEDURE:  CT of the Abdomen and Pelvis was performed.  Sagittal and coronal reformats were performed.    FINDINGS:    LOWER CHEST:  Small hiatal hernia.    LIVER: Within normal limits.  Small subcentimeter short axis periportal/portacaval lymph nodes.  BILE DUCTS: Normal caliber.  GALLBLADDER: Focal wall thickening near the fundus, may reflect   adenomyomatosis.  SPLEEN: Within normal limits.  PANCREAS: Within normal limits.  ADRENALS: Within normal limits.  KIDNEYS/URETERS:  2 cm indeterminate hypodense lesion upper pole right kidney is stable in   appearance.  Bilateral subcentimeter hypodensities too small to characterize, appears   stable.    No hydronephrosis.    BLADDER: Distended, correlate clinically with urine output.  REPRODUCTIVE ORGANS:  The uterus and adnexa appear within normal limits.    BOWEL:  No bowel obstruction.  Appendix isnormal.  PERITONEUM: No ascites.    VESSELS: Within normal limits.  RETROPERITONEUM/LYMPH NODES: No lymphadenopathy.    ABDOMINAL WALL:  Small bilateral fat-containing inguinal hernias.    BONES: Degenerative changes.    IMPRESSION:    Stable 2 cm indeterminate right upper pole renal lesion.  This can be further characterized by nonemergent renal ultrasonography if   this has not already been performed.    No acute intra-abdominal pathology.    Other findings as discussed above.    --- End of Report ---      < end of copied text >          Impression:  Patient with morbid obesity, dm, uti in the past who presented almost a week ago with fever, vomiting, aches, positive for adenovuris so sent home from the ED. [facility] was called back 2 d ago because blood cx grew 1/4 ecoli. Source is not clear. UA is neg, uc was neg, abdomen is benign. Maybe she aspiratred with the vomiting but not looking ill to suspect a gnr pneumonia. Her gb is a but abnormal so maybe she has satnam and symptoms are blunted due to dm.     Recommendations:  continue ceftriaxone  f/u final repeat cultures  ruq us HPI:   Patient is a 58y female with obesity, dm , uti who came to ER on 1/2 for vomiting, fever, myalgia, found to have adenovirus and was sent home. [facility] was called back 2 d ago because her blood cx grew 1/4 ecoli. She has no further vomiting, had some diarrhea.     REVIEW OF SYSTEMS:  All other review of systems negative (Comprehensive ROS)    PAST MEDICAL & SURGICAL HISTORY:  Diabetes      HTN (hypertension)      HLD (hyperlipidemia)      Migraine      Depression      Anxiety      Asthma      H/O oral surgery          Allergies    walnut (Other)  No Known Drug Allergies    Intolerances        Antimicrobials Day #  :3  cefTRIAXone   IVPB      cefTRIAXone   IVPB 2000 milliGRAM(s) IV Intermittent every 24 hours    Other Medications:  acetaminophen     Tablet .. 650 milliGRAM(s) Oral every 6 hours PRN  albuterol    90 MICROgram(s) HFA Inhaler 2 Puff(s) Inhalation every 6 hours PRN  aspirin enteric coated 81 milliGRAM(s) Oral daily  atorvastatin 80 milliGRAM(s) Oral at bedtime  budesonide  80 MICROgram(s)/formoterol 4.5 MICROgram(s) Inhaler 2 Puff(s) Inhalation two times a day  dextrose 5%. 1000 milliLiter(s) IV Continuous <Continuous>  dextrose 5%. 1000 milliLiter(s) IV Continuous <Continuous>  dextrose 50% Injectable 25 Gram(s) IV Push once  dextrose 50% Injectable 25 Gram(s) IV Push once  dextrose 50% Injectable 12.5 Gram(s) IV Push once  dextrose Oral Gel 15 Gram(s) Oral once PRN  enoxaparin Injectable 40 milliGRAM(s) SubCutaneous every 24 hours  FLUoxetine 20 milliGRAM(s) Oral two times a day  FLUoxetine 20 milliGRAM(s) Oral at bedtime  fluPHENAZine 5 milliGRAM(s) Oral at bedtime  fluticasone propionate 50 MICROgram(s)/spray Nasal Spray 1 Spray(s) Both Nostrils two times a day  gabapentin 100 milliGRAM(s) Oral two times a day  glucagon  Injectable 1 milliGRAM(s) IntraMuscular once  insulin glargine Injectable (LANTUS) 40 Unit(s) SubCutaneous at bedtime  insulin lispro (ADMELOG) corrective regimen sliding scale   SubCutaneous three times a day before meals  losartan 100 milliGRAM(s) Oral daily  montelukast 10 milliGRAM(s) Oral daily  ondansetron   Disintegrating Tablet 4 milliGRAM(s) Oral every 6 hours PRN  pantoprazole    Tablet 40 milliGRAM(s) Oral before breakfast  polyethylene glycol 3350 17 Gram(s) Oral daily  prazosin. 1 milliGRAM(s) Oral daily  senna 2 Tablet(s) Oral at bedtime  simethicone 80 milliGRAM(s) Chew three times a day PRN  sodium chloride 0.9%. 1000 milliLiter(s) IV Continuous <Continuous>      FAMILY HISTORY:      SOCIAL HISTORY:  Smoking: [ ]Yes [ ]No  ETOH: [ ]Yes [ ]No  Drug Use: [ ]Yes [ ]No   [ ] Single[ ]    T(F): 97.8 (01-07-24 @ 21:30), Max: 98.4 (01-07-24 @ 16:46)  HR: 69 (01-07-24 @ 21:30)  BP: 134/85 (01-07-24 @ 21:30)  RR: 18 (01-07-24 @ 21:30)  SpO2: 95% (01-07-24 @ 21:30)  Wt(kg): --    PHYSICAL EXAM:  General: alert, no acute distress  Eyes:  anicteric, no conjunctival injection, no discharge  Oropharynx: no lesions or injection 	  Neck: supple, without adenopathy  Lungs: clear to auscultation  Heart: regular rate and rhythm; no murmur, rubs or gallops  Abdomen: soft, nondistended, nontender, without mass or organomegaly  Skin: no lesions  Extremities: no clubbing, cyanosis, or edema  Neurologic: alert, oriented, moves all extremities    LAB RESULTS:                        11.3   7.66  )-----------( 283      ( 07 Jan 2024 06:00 )             34.5     01-07    141  |  107  |  14  ----------------------------<  186<H>  3.9   |  27  |  1.07    Ca    9.3      07 Jan 2024 06:00    TPro  6.6  /  Alb  2.7<L>  /  TBili  0.3  /  DBili  x   /  AST  24  /  ALT  47<H>  /  AlkPhos  82  01-07    LIVER FUNCTIONS - ( 07 Jan 2024 06:00 )  Alb: 2.7 g/dL / Pro: 6.6 g/dL / ALK PHOS: 82 U/L / ALT: 47 U/L / AST: 24 U/L / GGT: x           Urinalysis Basic - ( 07 Jan 2024 06:00 )    Color: x / Appearance: x / SG: x / pH: x  Gluc: 186 mg/dL / Ketone: x  / Bili: x / Urobili: x   Blood: x / Protein: x / Nitrite: x   Leuk Esterase: x / RBC: x / WBC x   Sq Epi: x / Non Sq Epi: x / Bacteria: x        MICROBIOLOGY:  RECENT CULTURES:  01-06 @ 12:00 Clean Catch Clean Catch (Midstream)     >=3 organisms. Probable collection contamination.      01-06 @ 11:05 .Blood Blood-Peripheral     No growth at 48 Hours      01-06 @ 11:01 .Blood Blood-Peripheral     No growth at 48 Hours      01-04 @ 12:30 Clean Catch Clean Catch (Midstream)     <10,000 CFU/mL Normal Urogenital Allyson      01-04 @ 11:55 .Blood Blood-Peripheral Blood Culture PCR  Escherichia coli    No growth at 72 Hours    Growth in anaerobic bottle: Gram Negative Rods          RADIOLOGY REVIEWED:  < from: CT Abdomen and Pelvis w/ IV Cont (01.07.24 @ 12:16) >    ACC: [id number] EXAM:  CT ABDOMEN AND PELVIS IC   ORDERED BY: AMADOU ADAMES     PROCEDURE DATE:  01/07/2024          INTERPRETATION:  CLINICAL INFORMATION: Ecoli bacteremia.    COMPARISON: CT scan abdomen pelvis 04/26/2022.    CONTRAST/COMPLICATIONS:  IV Contrast: Omnipaque 350  90 cc administered   10 cc discarded  Oral Contrast: NONE  Complications: None reported at time of study completion    PROCEDURE:  CT of the Abdomen and Pelvis was performed.  Sagittal and coronal reformats were performed.    FINDINGS:    LOWER CHEST:  Small hiatal hernia.    LIVER: Within normal limits.  Small subcentimeter short axis periportal/portacaval lymph nodes.  BILE DUCTS: Normal caliber.  GALLBLADDER: Focal wall thickening near the fundus, may reflect   adenomyomatosis.  SPLEEN: Within normal limits.  PANCREAS: Within normal limits.  ADRENALS: Within normal limits.  KIDNEYS/URETERS:  2 cm indeterminate hypodense lesion upper pole right kidney is stable in   appearance.  Bilateral subcentimeter hypodensities too small to characterize, appears   stable.    No hydronephrosis.    BLADDER: Distended, correlate clinically with urine output.  REPRODUCTIVE ORGANS:  The uterus and adnexa appear within normal limits.    BOWEL:  No bowel obstruction.  Appendix isnormal.  PERITONEUM: No ascites.    VESSELS: Within normal limits.  RETROPERITONEUM/LYMPH NODES: No lymphadenopathy.    ABDOMINAL WALL:  Small bilateral fat-containing inguinal hernias.    BONES: Degenerative changes.    IMPRESSION:    Stable 2 cm indeterminate right upper pole renal lesion.  This can be further characterized by nonemergent renal ultrasonography if   this has not already been performed.    No acute intra-abdominal pathology.    Other findings as discussed above.    --- End of Report ---      < end of copied text >          Impression:  Patient with morbid obesity, dm, uti in the past who presented almost a week ago with fever, vomiting, aches, positive for adenovuris so sent home from the ED. [facility] was called back 2 d ago because blood cx grew 1/4 ecoli. Source is not clear. UA is neg, uc was neg, abdomen is benign. Maybe she aspiratred with the vomiting but not looking ill to suspect a gnr pneumonia. Her gb is a but abnormal so maybe she has satnam and symptoms are blunted due to dm.     Recommendations:  continue ceftriaxone  f/u final repeat cultures  ruq us

## 2024-01-09 LAB
ANION GAP SERPL CALC-SCNC: 10 MMOL/L — SIGNIFICANT CHANGE UP (ref 5–17)
ANION GAP SERPL CALC-SCNC: 10 MMOL/L — SIGNIFICANT CHANGE UP (ref 5–17)
BUN SERPL-MCNC: 13 MG/DL — SIGNIFICANT CHANGE UP (ref 7–23)
BUN SERPL-MCNC: 13 MG/DL — SIGNIFICANT CHANGE UP (ref 7–23)
CALCIUM SERPL-MCNC: 9 MG/DL — SIGNIFICANT CHANGE UP (ref 8.4–10.5)
CALCIUM SERPL-MCNC: 9 MG/DL — SIGNIFICANT CHANGE UP (ref 8.4–10.5)
CHLORIDE SERPL-SCNC: 104 MMOL/L — SIGNIFICANT CHANGE UP (ref 96–108)
CHLORIDE SERPL-SCNC: 104 MMOL/L — SIGNIFICANT CHANGE UP (ref 96–108)
CO2 SERPL-SCNC: 25 MMOL/L — SIGNIFICANT CHANGE UP (ref 22–31)
CO2 SERPL-SCNC: 25 MMOL/L — SIGNIFICANT CHANGE UP (ref 22–31)
CREAT SERPL-MCNC: 0.89 MG/DL — SIGNIFICANT CHANGE UP (ref 0.5–1.3)
CREAT SERPL-MCNC: 0.89 MG/DL — SIGNIFICANT CHANGE UP (ref 0.5–1.3)
CULTURE RESULTS: SIGNIFICANT CHANGE UP
CULTURE RESULTS: SIGNIFICANT CHANGE UP
EGFR: 75 ML/MIN/1.73M2 — SIGNIFICANT CHANGE UP
EGFR: 75 ML/MIN/1.73M2 — SIGNIFICANT CHANGE UP
GLUCOSE BLDC GLUCOMTR-MCNC: 173 MG/DL — HIGH (ref 70–99)
GLUCOSE BLDC GLUCOMTR-MCNC: 173 MG/DL — HIGH (ref 70–99)
GLUCOSE BLDC GLUCOMTR-MCNC: 181 MG/DL — HIGH (ref 70–99)
GLUCOSE BLDC GLUCOMTR-MCNC: 181 MG/DL — HIGH (ref 70–99)
GLUCOSE BLDC GLUCOMTR-MCNC: 186 MG/DL — HIGH (ref 70–99)
GLUCOSE BLDC GLUCOMTR-MCNC: 186 MG/DL — HIGH (ref 70–99)
GLUCOSE BLDC GLUCOMTR-MCNC: 253 MG/DL — HIGH (ref 70–99)
GLUCOSE BLDC GLUCOMTR-MCNC: 253 MG/DL — HIGH (ref 70–99)
GLUCOSE SERPL-MCNC: 186 MG/DL — HIGH (ref 70–99)
GLUCOSE SERPL-MCNC: 186 MG/DL — HIGH (ref 70–99)
POTASSIUM SERPL-MCNC: 3.9 MMOL/L — SIGNIFICANT CHANGE UP (ref 3.5–5.3)
POTASSIUM SERPL-MCNC: 3.9 MMOL/L — SIGNIFICANT CHANGE UP (ref 3.5–5.3)
POTASSIUM SERPL-SCNC: 3.9 MMOL/L — SIGNIFICANT CHANGE UP (ref 3.5–5.3)
POTASSIUM SERPL-SCNC: 3.9 MMOL/L — SIGNIFICANT CHANGE UP (ref 3.5–5.3)
SODIUM SERPL-SCNC: 139 MMOL/L — SIGNIFICANT CHANGE UP (ref 135–145)
SODIUM SERPL-SCNC: 139 MMOL/L — SIGNIFICANT CHANGE UP (ref 135–145)
SPECIMEN SOURCE: SIGNIFICANT CHANGE UP
SPECIMEN SOURCE: SIGNIFICANT CHANGE UP

## 2024-01-09 PROCEDURE — 76705 ECHO EXAM OF ABDOMEN: CPT | Mod: 26

## 2024-01-09 RX ADMIN — BUDESONIDE AND FORMOTEROL FUMARATE DIHYDRATE 2 PUFF(S): 160; 4.5 AEROSOL RESPIRATORY (INHALATION) at 21:18

## 2024-01-09 RX ADMIN — Medication 20 MILLIGRAM(S): at 05:25

## 2024-01-09 RX ADMIN — Medication 81 MILLIGRAM(S): at 11:44

## 2024-01-09 RX ADMIN — Medication 1: at 07:57

## 2024-01-09 RX ADMIN — MONTELUKAST 10 MILLIGRAM(S): 4 TABLET, CHEWABLE ORAL at 11:43

## 2024-01-09 RX ADMIN — GABAPENTIN 100 MILLIGRAM(S): 400 CAPSULE ORAL at 05:24

## 2024-01-09 RX ADMIN — Medication 20 MILLIGRAM(S): at 16:59

## 2024-01-09 RX ADMIN — Medication 20 MILLIGRAM(S): at 21:57

## 2024-01-09 RX ADMIN — Medication 1 SPRAY(S): at 05:26

## 2024-01-09 RX ADMIN — Medication 3: at 11:44

## 2024-01-09 RX ADMIN — CEFTRIAXONE 100 MILLIGRAM(S): 500 INJECTION, POWDER, FOR SOLUTION INTRAMUSCULAR; INTRAVENOUS at 08:00

## 2024-01-09 RX ADMIN — INSULIN GLARGINE 40 UNIT(S): 100 INJECTION, SOLUTION SUBCUTANEOUS at 21:55

## 2024-01-09 RX ADMIN — Medication 650 MILLIGRAM(S): at 13:26

## 2024-01-09 RX ADMIN — ATORVASTATIN CALCIUM 80 MILLIGRAM(S): 80 TABLET, FILM COATED ORAL at 21:57

## 2024-01-09 RX ADMIN — SENNA PLUS 2 TABLET(S): 8.6 TABLET ORAL at 21:57

## 2024-01-09 RX ADMIN — Medication 1 MILLIGRAM(S): at 05:25

## 2024-01-09 RX ADMIN — BUDESONIDE AND FORMOTEROL FUMARATE DIHYDRATE 2 PUFF(S): 160; 4.5 AEROSOL RESPIRATORY (INHALATION) at 09:15

## 2024-01-09 RX ADMIN — Medication 650 MILLIGRAM(S): at 14:17

## 2024-01-09 RX ADMIN — LOSARTAN POTASSIUM 100 MILLIGRAM(S): 100 TABLET, FILM COATED ORAL at 05:25

## 2024-01-09 RX ADMIN — Medication 1 SPRAY(S): at 16:58

## 2024-01-09 RX ADMIN — PANTOPRAZOLE SODIUM 40 MILLIGRAM(S): 20 TABLET, DELAYED RELEASE ORAL at 05:25

## 2024-01-09 RX ADMIN — GABAPENTIN 100 MILLIGRAM(S): 400 CAPSULE ORAL at 16:58

## 2024-01-09 RX ADMIN — ENOXAPARIN SODIUM 40 MILLIGRAM(S): 100 INJECTION SUBCUTANEOUS at 11:44

## 2024-01-09 RX ADMIN — Medication 1: at 16:59

## 2024-01-09 RX ADMIN — FLUPHENAZINE HYDROCHLORIDE 5 MILLIGRAM(S): 1 TABLET, FILM COATED ORAL at 21:58

## 2024-01-09 NOTE — CONSULT NOTE ADULT - SUBJECTIVE AND OBJECTIVE BOX
INTERVAL HPI/OVERNIGHT EVENTS:  HPI:  59 y/o F with PMH Type 2 DM, HTN, HLD, GERD, asthma, anxiety, migraines, presented to ED 1/4/24 c/o emesis, mild cough, nasal congestion and weakness, found with adenovirus, medically optimized and discharged home. Returned to ED today for positive GNR in blood cultures from 1/4/24. Continues with fatigue, malaise, diminished PO and subjective fevers. denies headache, cp, sob, n/v, abd pain.      MEDICATIONS  (STANDING):  aspirin enteric coated 81 milliGRAM(s) Oral daily  atorvastatin 80 milliGRAM(s) Oral at bedtime  budesonide  80 MICROgram(s)/formoterol 4.5 MICROgram(s) Inhaler 2 Puff(s) Inhalation two times a day  cefTRIAXone   IVPB      cefTRIAXone   IVPB 2000 milliGRAM(s) IV Intermittent every 24 hours  dextrose 5%. 1000 milliLiter(s) (50 mL/Hr) IV Continuous <Continuous>  dextrose 5%. 1000 milliLiter(s) (100 mL/Hr) IV Continuous <Continuous>  dextrose 50% Injectable 25 Gram(s) IV Push once  dextrose 50% Injectable 25 Gram(s) IV Push once  dextrose 50% Injectable 12.5 Gram(s) IV Push once  enoxaparin Injectable 40 milliGRAM(s) SubCutaneous every 24 hours  FLUoxetine 20 milliGRAM(s) Oral two times a day  FLUoxetine 20 milliGRAM(s) Oral at bedtime  fluPHENAZine 5 milliGRAM(s) Oral at bedtime  fluticasone propionate 50 MICROgram(s)/spray Nasal Spray 1 Spray(s) Both Nostrils two times a day  gabapentin 100 milliGRAM(s) Oral two times a day  glucagon  Injectable 1 milliGRAM(s) IntraMuscular once  insulin glargine Injectable (LANTUS) 40 Unit(s) SubCutaneous at bedtime  insulin lispro (ADMELOG) corrective regimen sliding scale   SubCutaneous three times a day before meals  losartan 100 milliGRAM(s) Oral daily  montelukast 10 milliGRAM(s) Oral daily  pantoprazole    Tablet 40 milliGRAM(s) Oral before breakfast  polyethylene glycol 3350 17 Gram(s) Oral daily  prazosin. 1 milliGRAM(s) Oral daily  senna 2 Tablet(s) Oral at bedtime    MEDICATIONS  (PRN):  acetaminophen     Tablet .. 650 milliGRAM(s) Oral every 6 hours PRN Temp greater or equal to 38C (100.4F), Mild Pain (1 - 3)  albuterol    90 MICROgram(s) HFA Inhaler 2 Puff(s) Inhalation every 6 hours PRN Shortness of Breath  dextrose Oral Gel 15 Gram(s) Oral once PRN Blood Glucose LESS THAN 70 milliGRAM(s)/deciliter  ondansetron   Disintegrating Tablet 4 milliGRAM(s) Oral every 6 hours PRN Nausea and/or Vomiting  simethicone 80 milliGRAM(s) Chew three times a day PRN Gas      Allergies    walnut (Other)  No Known Drug Allergies    Intolerances        PAST MEDICAL & SURGICAL HISTORY:  Diabetes      HTN (hypertension)      HLD (hyperlipidemia)      Migraine      Depression      Anxiety      Asthma      H/O oral surgery          REVIEW OF SYSTEMS- See HPI    PHYSICAL EXAM:   Vital Signs:  Vital Signs Last 24 Hrs  T(C): 36.5 (09 Jan 2024 05:29), Max: 36.5 (08 Jan 2024 22:19)  T(F): 97.7 (09 Jan 2024 05:29), Max: 97.7 (08 Jan 2024 22:19)  HR: 66 (09 Jan 2024 05:29) (63 - 66)  BP: 109/75 (09 Jan 2024 05:29) (108/70 - 109/75)  BP(mean): --  RR: 18 (09 Jan 2024 05:29) (18 - 18)  SpO2: 98% (09 Jan 2024 09:24) (94% - 98%)    Parameters below as of 08 Jan 2024 22:19  Patient On (Oxygen Delivery Method): room air      Daily     Daily I&O's Summary    08 Jan 2024 07:01  -  09 Jan 2024 07:00  --------------------------------------------------------  IN: 1425 mL / OUT: 0 mL / NET: 1425 mL    09 Jan 2024 07:01  -  09 Jan 2024 13:37  --------------------------------------------------------  IN: 450 mL / OUT: 0 mL / NET: 450 mL        GENERAL:  Appears stated age, well-groomed, well-nourished, no distress  HEENT:  Moist and clear sclera and conjuctivae  CHEST:  Full & symmetric excursion, no increased effort, breath sounds clear  HEART:  Regular rhythm, S1, S2  ABDOMEN:  Soft, non-tender, non-distended, normoactive bowel sounds,  no masses   EXTREMITIES:  no edema  SKIN:  No rash  NEURO:  Alert, oriented, no tremor, no encephalopathy      LABS:    01-09    139  |  104  |  13  ----------------------------<  186<H>  3.9   |  25  |  0.89    Ca    9.0      09 Jan 2024 09:07        Urinalysis Basic - ( 09 Jan 2024 09:07 )    Color: x / Appearance: x / SG: x / pH: x  Gluc: 186 mg/dL / Ketone: x  / Bili: x / Urobili: x   Blood: x / Protein: x / Nitrite: x   Leuk Esterase: x / RBC: x / WBC x   Sq Epi: x / Non Sq Epi: x / Bacteria: x    RADIOLOGY & ADDITIONAL TESTS:

## 2024-01-09 NOTE — PROGRESS NOTE ADULT - SUBJECTIVE AND OBJECTIVE BOX
Patient is a 58y old  Female who presents with a chief complaint of bacteremia (08 Jan 2024 15:39)      Patient seen and examined at bedside.    ALLERGIES:  walnut (Other)  No Known Drug Allergies    MEDICATIONS  (STANDING):  aspirin enteric coated 81 milliGRAM(s) Oral daily  atorvastatin 80 milliGRAM(s) Oral at bedtime  budesonide  80 MICROgram(s)/formoterol 4.5 MICROgram(s) Inhaler 2 Puff(s) Inhalation two times a day  cefTRIAXone   IVPB 2000 milliGRAM(s) IV Intermittent every 24 hours  cefTRIAXone   IVPB      dextrose 5%. 1000 milliLiter(s) (50 mL/Hr) IV Continuous <Continuous>  dextrose 5%. 1000 milliLiter(s) (100 mL/Hr) IV Continuous <Continuous>  dextrose 50% Injectable 25 Gram(s) IV Push once  dextrose 50% Injectable 25 Gram(s) IV Push once  dextrose 50% Injectable 12.5 Gram(s) IV Push once  enoxaparin Injectable 40 milliGRAM(s) SubCutaneous every 24 hours  FLUoxetine 20 milliGRAM(s) Oral two times a day  FLUoxetine 20 milliGRAM(s) Oral at bedtime  fluPHENAZine 5 milliGRAM(s) Oral at bedtime  fluticasone propionate 50 MICROgram(s)/spray Nasal Spray 1 Spray(s) Both Nostrils two times a day  gabapentin 100 milliGRAM(s) Oral two times a day  glucagon  Injectable 1 milliGRAM(s) IntraMuscular once  insulin glargine Injectable (LANTUS) 40 Unit(s) SubCutaneous at bedtime  insulin lispro (ADMELOG) corrective regimen sliding scale   SubCutaneous three times a day before meals  losartan 100 milliGRAM(s) Oral daily  montelukast 10 milliGRAM(s) Oral daily  pantoprazole    Tablet 40 milliGRAM(s) Oral before breakfast  polyethylene glycol 3350 17 Gram(s) Oral daily  prazosin. 1 milliGRAM(s) Oral daily  senna 2 Tablet(s) Oral at bedtime  sodium chloride 0.9%. 1000 milliLiter(s) (75 mL/Hr) IV Continuous <Continuous>    MEDICATIONS  (PRN):  acetaminophen     Tablet .. 650 milliGRAM(s) Oral every 6 hours PRN Temp greater or equal to 38C (100.4F), Mild Pain (1 - 3)  albuterol    90 MICROgram(s) HFA Inhaler 2 Puff(s) Inhalation every 6 hours PRN Shortness of Breath  dextrose Oral Gel 15 Gram(s) Oral once PRN Blood Glucose LESS THAN 70 milliGRAM(s)/deciliter  ondansetron   Disintegrating Tablet 4 milliGRAM(s) Oral every 6 hours PRN Nausea and/or Vomiting  simethicone 80 milliGRAM(s) Chew three times a day PRN Gas    Vital Signs Last 24 Hrs  T(F): 97.7 (09 Jan 2024 05:29), Max: 97.7 (08 Jan 2024 22:19)  HR: 66 (09 Jan 2024 05:29) (63 - 66)  BP: 109/75 (09 Jan 2024 05:29) (108/70 - 109/75)  RR: 18 (09 Jan 2024 05:29) (18 - 18)  SpO2: 94% (09 Jan 2024 05:29) (94% - 96%)  I&O's Summary    08 Jan 2024 07:01  -  09 Jan 2024 07:00  --------------------------------------------------------  IN: 1425 mL / OUT: 0 mL / NET: 1425 mL      PHYSICAL EXAM:  General: NAD, A/O x 3  ENT: MMM  Neck: Supple, No JVD  Lungs: Clear to auscultation bilaterally, Non labored breathing   Cardio: RRR, S1/S2, No murmurs  Abdomen: Soft, Nontender, Nondistended; Bowel sounds present  Extremities: No calf tenderness, No pitting edema    LABS:                        11.3   7.66  )-----------( 283      ( 07 Jan 2024 06:00 )             34.5     01-07    141  |  107  |  14  ----------------------------<  186  3.9   |  27  |  1.07    Ca    9.3      07 Jan 2024 06:00    TPro  6.6  /  Alb  2.7  /  TBili  0.3  /  DBili  x   /  AST  24  /  ALT  47  /  AlkPhos  82  01-07      PT/INR - ( 06 Jan 2024 11:01 )   PT: 12.4 sec;   INR: 1.09 ratio         PTT - ( 06 Jan 2024 11:01 )  PTT:27.7 sec  Lactate, Blood: 1.1 mmol/L (01-06 @ 10:34)                      POCT Blood Glucose.: 168 mg/dL (08 Jan 2024 22:24)  POCT Blood Glucose.: 164 mg/dL (08 Jan 2024 17:04)  POCT Blood Glucose.: 189 mg/dL (08 Jan 2024 11:29)      Urinalysis Basic - ( 07 Jan 2024 06:00 )    Color: x / Appearance: x / SG: x / pH: x  Gluc: 186 mg/dL / Ketone: x  / Bili: x / Urobili: x   Blood: x / Protein: x / Nitrite: x   Leuk Esterase: x / RBC: x / WBC x   Sq Epi: x / Non Sq Epi: x / Bacteria: x        Culture - Urine (collected 06 Jan 2024 12:00)  Source: Clean Catch Clean Catch (Midstream)  Final Report (08 Jan 2024 10:59):    >=3 organisms. Probable collection contamination.    Culture - Blood (collected 06 Jan 2024 11:05)  Source: .Blood Blood-Peripheral  Preliminary Report (08 Jan 2024 15:02):    No growth at 48 Hours    Culture - Blood (collected 06 Jan 2024 11:01)  Source: .Blood Blood-Peripheral  Preliminary Report (08 Jan 2024 15:02):    No growth at 48 Hours    Culture - Urine (collected 04 Jan 2024 12:30)  Source: Clean Catch Clean Catch (Midstream)  Final Report (05 Jan 2024 13:55):    <10,000 CFU/mL Normal Urogenital Allyson    Culture - Blood (collected 04 Jan 2024 11:55)  Source: .Blood Blood-Peripheral  Gram Stain (06 Jan 2024 07:59):    Growth in anaerobic bottle: Gram Negative Rods  Final Report (07 Jan 2024 17:05):    Growth in anaerobic bottle: Escherichia coli    Direct identification is available within approximately 3-5    hours either by Blood Panel Multiplexed PCR or Direct    MALDI-TOF. Details: [email or web address]  Organism: Blood Culture PCR  Escherichia coli (07 Jan 2024 17:05)  Organism: Escherichia coli (07 Jan 2024 17:05)      Method Type: LAMIN      -  Ampicillin: R >16 These ampicillin results predict results for amoxicillin      -  Ampicillin/Sulbactam: I 16/8      -  Aztreonam: S <=4      -  Cefazolin: S <=2      -  Cefepime: S <=2      -  Cefoxitin: S <=8      -  Ceftriaxone: S <=1      -  Ciprofloxacin: R >2      -  Ertapenem: S <=0.5      -  Gentamicin: S <=2      -  Imipenem: S <=1      -  Levofloxacin: R >4      -  Meropenem: S <=1      -  Piperacillin/Tazobactam: S <=8      -  Tobramycin: S <=2      -  Trimethoprim/Sulfamethoxazole: S <=0.5/9.5  Organism: Blood Culture PCR (07 Jan 2024 17:05)      Method Type: PCR      -  Escherichia coli: Detec    Culture - Blood (collected 04 Jan 2024 11:55)  Source: .Blood Blood-Peripheral  Preliminary Report (08 Jan 2024 17:01):    No growth at 4 days        RADIOLOGY & ADDITIONAL TESTS:    Care Discussed with Consultants/Other Providers:    Patient is a 58y old  Female who presents with a chief complaint of bacteremia (08 Jan 2024 15:39)      Patient seen and examined at bedside.    ALLERGIES:  walnut (Other)  No Known Drug Allergies    MEDICATIONS  (STANDING):  aspirin enteric coated 81 milliGRAM(s) Oral daily  atorvastatin 80 milliGRAM(s) Oral at bedtime  budesonide  80 MICROgram(s)/formoterol 4.5 MICROgram(s) Inhaler 2 Puff(s) Inhalation two times a day  cefTRIAXone   IVPB 2000 milliGRAM(s) IV Intermittent every 24 hours  cefTRIAXone   IVPB      dextrose 5%. 1000 milliLiter(s) (50 mL/Hr) IV Continuous <Continuous>  dextrose 5%. 1000 milliLiter(s) (100 mL/Hr) IV Continuous <Continuous>  dextrose 50% Injectable 25 Gram(s) IV Push once  dextrose 50% Injectable 25 Gram(s) IV Push once  dextrose 50% Injectable 12.5 Gram(s) IV Push once  enoxaparin Injectable 40 milliGRAM(s) SubCutaneous every 24 hours  FLUoxetine 20 milliGRAM(s) Oral two times a day  FLUoxetine 20 milliGRAM(s) Oral at bedtime  fluPHENAZine 5 milliGRAM(s) Oral at bedtime  fluticasone propionate 50 MICROgram(s)/spray Nasal Spray 1 Spray(s) Both Nostrils two times a day  gabapentin 100 milliGRAM(s) Oral two times a day  glucagon  Injectable 1 milliGRAM(s) IntraMuscular once  insulin glargine Injectable (LANTUS) 40 Unit(s) SubCutaneous at bedtime  insulin lispro (ADMELOG) corrective regimen sliding scale   SubCutaneous three times a day before meals  losartan 100 milliGRAM(s) Oral daily  montelukast 10 milliGRAM(s) Oral daily  pantoprazole    Tablet 40 milliGRAM(s) Oral before breakfast  polyethylene glycol 3350 17 Gram(s) Oral daily  prazosin. 1 milliGRAM(s) Oral daily  senna 2 Tablet(s) Oral at bedtime  sodium chloride 0.9%. 1000 milliLiter(s) (75 mL/Hr) IV Continuous <Continuous>    MEDICATIONS  (PRN):  acetaminophen     Tablet .. 650 milliGRAM(s) Oral every 6 hours PRN Temp greater or equal to 38C (100.4F), Mild Pain (1 - 3)  albuterol    90 MICROgram(s) HFA Inhaler 2 Puff(s) Inhalation every 6 hours PRN Shortness of Breath  dextrose Oral Gel 15 Gram(s) Oral once PRN Blood Glucose LESS THAN 70 milliGRAM(s)/deciliter  ondansetron   Disintegrating Tablet 4 milliGRAM(s) Oral every 6 hours PRN Nausea and/or Vomiting  simethicone 80 milliGRAM(s) Chew three times a day PRN Gas    Vital Signs Last 24 Hrs  T(F): 97.7 (09 Jan 2024 05:29), Max: 97.7 (08 Jan 2024 22:19)  HR: 66 (09 Jan 2024 05:29) (63 - 66)  BP: 109/75 (09 Jan 2024 05:29) (108/70 - 109/75)  RR: 18 (09 Jan 2024 05:29) (18 - 18)  SpO2: 94% (09 Jan 2024 05:29) (94% - 96%)  I&O's Summary    08 Jan 2024 07:01  -  09 Jan 2024 07:00  --------------------------------------------------------  IN: 1425 mL / OUT: 0 mL / NET: 1425 mL      PHYSICAL EXAM:  General: NAD, A/O x 3  ENT: MMM  Neck: Supple, No JVD  Lungs: Clear to auscultation bilaterally, Non labored breathing   Cardio: RRR, S1/S2, No murmurs  Abdomen: Soft, Nontender, Nondistended; Bowel sounds present  Extremities: No calf tenderness, No pitting edema    LABS:                        11.3   7.66  )-----------( 283      ( 07 Jan 2024 06:00 )             34.5     01-07    141  |  107  |  14  ----------------------------<  186  3.9   |  27  |  1.07    Ca    9.3      07 Jan 2024 06:00    TPro  6.6  /  Alb  2.7  /  TBili  0.3  /  DBili  x   /  AST  24  /  ALT  47  /  AlkPhos  82  01-07      PT/INR - ( 06 Jan 2024 11:01 )   PT: 12.4 sec;   INR: 1.09 ratio         PTT - ( 06 Jan 2024 11:01 )  PTT:27.7 sec  Lactate, Blood: 1.1 mmol/L (01-06 @ 10:34)                      POCT Blood Glucose.: 168 mg/dL (08 Jan 2024 22:24)  POCT Blood Glucose.: 164 mg/dL (08 Jan 2024 17:04)  POCT Blood Glucose.: 189 mg/dL (08 Jan 2024 11:29)      Urinalysis Basic - ( 07 Jan 2024 06:00 )    Color: x / Appearance: x / SG: x / pH: x  Gluc: 186 mg/dL / Ketone: x  / Bili: x / Urobili: x   Blood: x / Protein: x / Nitrite: x   Leuk Esterase: x / RBC: x / WBC x   Sq Epi: x / Non Sq Epi: x / Bacteria: x        Culture - Urine (collected 06 Jan 2024 12:00)  Source: Clean Catch Clean Catch (Midstream)  Final Report (08 Jan 2024 10:59):    >=3 organisms. Probable collection contamination.    Culture - Blood (collected 06 Jan 2024 11:05)  Source: .Blood Blood-Peripheral  Preliminary Report (08 Jan 2024 15:02):    No growth at 48 Hours    Culture - Blood (collected 06 Jan 2024 11:01)  Source: .Blood Blood-Peripheral  Preliminary Report (08 Jan 2024 15:02):    No growth at 48 Hours    Culture - Urine (collected 04 Jan 2024 12:30)  Source: Clean Catch Clean Catch (Midstream)  Final Report (05 Jan 2024 13:55):    <10,000 CFU/mL Normal Urogenital Allyson    Culture - Blood (collected 04 Jan 2024 11:55)  Source: .Blood Blood-Peripheral  Gram Stain (06 Jan 2024 07:59):    Growth in anaerobic bottle: Gram Negative Rods  Final Report (07 Jan 2024 17:05):    Growth in anaerobic bottle: Escherichia coli    Direct identification is available within approximately 3-5    hours either by Blood Panel Multiplexed PCR or Direct    MALDI-TOF. Details: [email or web address]  Organism: Blood Culture PCR  Escherichia coli (07 Jan 2024 17:05)  Organism: Escherichia coli (07 Jan 2024 17:05)      Method Type: LAMIN      -  Ampicillin: R >16 These ampicillin results predict results for amoxicillin      -  Ampicillin/Sulbactam: I 16/8      -  Aztreonam: S <=4      -  Cefazolin: S <=2      -  Cefepime: S <=2      -  Cefoxitin: S <=8      -  Ceftriaxone: S <=1      -  Ciprofloxacin: R >2      -  Ertapenem: S <=0.5      -  Gentamicin: S <=2      -  Imipenem: S <=1      -  Levofloxacin: R >4      -  Meropenem: S <=1      -  Piperacillin/Tazobactam: S <=8      -  Tobramycin: S <=2      -  Trimethoprim/Sulfamethoxazole: S <=0.5/9.5  Organism: Blood Culture PCR (07 Jan 2024 17:05)      Method Type: PCR      -  Escherichia coli: Detec    Culture - Blood (collected 04 Jan 2024 11:55)  Source: .Blood Blood-Peripheral  Preliminary Report (08 Jan 2024 17:01):    No growth at 4 days        RADIOLOGY & ADDITIONAL TESTS:    Care Discussed with Consultants/Other Providers:    Patient is a 58y old  Female who presents with a chief complaint of bacteremia      Patient seen and examined at bedside.  Pt with c/o persistant abd pain also noting intermittent diarrhea.  No overnight events     ALLERGIES:  walnut (Other)  No Known Drug Allergies    MEDICATIONS  (STANDING):  aspirin enteric coated 81 milliGRAM(s) Oral daily  atorvastatin 80 milliGRAM(s) Oral at bedtime  budesonide  80 MICROgram(s)/formoterol 4.5 MICROgram(s) Inhaler 2 Puff(s) Inhalation two times a day  cefTRIAXone   IVPB 2000 milliGRAM(s) IV Intermittent every 24 hours  cefTRIAXone   IVPB      dextrose 5%. 1000 milliLiter(s) (50 mL/Hr) IV Continuous <Continuous>  dextrose 5%. 1000 milliLiter(s) (100 mL/Hr) IV Continuous <Continuous>  dextrose 50% Injectable 25 Gram(s) IV Push once  dextrose 50% Injectable 25 Gram(s) IV Push once  dextrose 50% Injectable 12.5 Gram(s) IV Push once  enoxaparin Injectable 40 milliGRAM(s) SubCutaneous every 24 hours  FLUoxetine 20 milliGRAM(s) Oral two times a day  FLUoxetine 20 milliGRAM(s) Oral at bedtime  fluPHENAZine 5 milliGRAM(s) Oral at bedtime  fluticasone propionate 50 MICROgram(s)/spray Nasal Spray 1 Spray(s) Both Nostrils two times a day  gabapentin 100 milliGRAM(s) Oral two times a day  glucagon  Injectable 1 milliGRAM(s) IntraMuscular once  insulin glargine Injectable (LANTUS) 40 Unit(s) SubCutaneous at bedtime  insulin lispro (ADMELOG) corrective regimen sliding scale   SubCutaneous three times a day before meals  losartan 100 milliGRAM(s) Oral daily  montelukast 10 milliGRAM(s) Oral daily  pantoprazole    Tablet 40 milliGRAM(s) Oral before breakfast  polyethylene glycol 3350 17 Gram(s) Oral daily  prazosin. 1 milliGRAM(s) Oral daily  senna 2 Tablet(s) Oral at bedtime  sodium chloride 0.9%. 1000 milliLiter(s) (75 mL/Hr) IV Continuous <Continuous>    MEDICATIONS  (PRN):  acetaminophen     Tablet .. 650 milliGRAM(s) Oral every 6 hours PRN Temp greater or equal to 38C (100.4F), Mild Pain (1 - 3)  albuterol    90 MICROgram(s) HFA Inhaler 2 Puff(s) Inhalation every 6 hours PRN Shortness of Breath  dextrose Oral Gel 15 Gram(s) Oral once PRN Blood Glucose LESS THAN 70 milliGRAM(s)/deciliter  ondansetron   Disintegrating Tablet 4 milliGRAM(s) Oral every 6 hours PRN Nausea and/or Vomiting  simethicone 80 milliGRAM(s) Chew three times a day PRN Gas    Vital Signs Last 24 Hrs  T(F): 97.7 (09 Jan 2024 05:29), Max: 97.7 (08 Jan 2024 22:19)  HR: 66 (09 Jan 2024 05:29) (63 - 66)  BP: 109/75 (09 Jan 2024 05:29) (108/70 - 109/75)  RR: 18 (09 Jan 2024 05:29) (18 - 18)  SpO2: 94% (09 Jan 2024 05:29) (94% - 96%)  I&O's Summary    08 Jan 2024 07:01  -  09 Jan 2024 07:00  --------------------------------------------------------  IN: 1425 mL / OUT: 0 mL / NET: 1425 mL      PHYSICAL EXAM:  General: 57 y/o morbidly obese  female in NAD, A/O x 3  ENT: MMM  Neck: Supple, No JVD  Lungs: Clear to auscultation bilaterally, Non labored breathing   Cardio: RRR, S1/S2, No murmurs  Abdomen: Soft, tender, distended; Bowel sounds present  Extremities: No calf tenderness, No pitting edema    LABS:                        11.3   7.66  )-----------( 283      ( 07 Jan 2024 06:00 )             34.5     01-07    141  |  107  |  14  ----------------------------<  186  3.9   |  27  |  1.07    Ca    9.3      07 Jan 2024 06:00    TPro  6.6  /  Alb  2.7  /  TBili  0.3  /  DBili  x   /  AST  24  /  ALT  47  /  AlkPhos  82  01-07      PT/INR - ( 06 Jan 2024 11:01 )   PT: 12.4 sec;   INR: 1.09 ratio         PTT - ( 06 Jan 2024 11:01 )  PTT:27.7 sec  Lactate, Blood: 1.1 mmol/L (01-06 @ 10:34)                      POCT Blood Glucose.: 168 mg/dL (08 Jan 2024 22:24)  POCT Blood Glucose.: 164 mg/dL (08 Jan 2024 17:04)  POCT Blood Glucose.: 189 mg/dL (08 Jan 2024 11:29)      Urinalysis Basic - ( 07 Jan 2024 06:00 )    Color: x / Appearance: x / SG: x / pH: x  Gluc: 186 mg/dL / Ketone: x  / Bili: x / Urobili: x   Blood: x / Protein: x / Nitrite: x   Leuk Esterase: x / RBC: x / WBC x   Sq Epi: x / Non Sq Epi: x / Bacteria: x        Culture - Urine (collected 06 Jan 2024 12:00)  Source: Clean Catch Clean Catch (Midstream)  Final Report (08 Jan 2024 10:59):    >=3 organisms. Probable collection contamination.    Culture - Blood (collected 06 Jan 2024 11:05)  Source: .Blood Blood-Peripheral  Preliminary Report (08 Jan 2024 15:02):    No growth at 48 Hours    Culture - Blood (collected 06 Jan 2024 11:01)  Source: .Blood Blood-Peripheral  Preliminary Report (08 Jan 2024 15:02):    No growth at 48 Hours    Culture - Urine (collected 04 Jan 2024 12:30)  Source: Clean Catch Clean Catch (Midstream)  Final Report (05 Jan 2024 13:55):    <10,000 CFU/mL Normal Urogenital Allyson    Culture - Blood (collected 04 Jan 2024 11:55)  Source: .Blood Blood-Peripheral  Gram Stain (06 Jan 2024 07:59):    Growth in anaerobic bottle: Gram Negative Rods  Final Report (07 Jan 2024 17:05):    Growth in anaerobic bottle: Escherichia coli    Direct identification is available within approximately 3-5    hours either by Blood Panel Multiplexed PCR or Direct    MALDI-TOF. Details: [email or web address]  Organism: Blood Culture PCR  Escherichia coli (07 Jan 2024 17:05)  Organism: Escherichia coli (07 Jan 2024 17:05)      Method Type: LAMIN      -  Ampicillin: R >16 These ampicillin results predict results for amoxicillin      -  Ampicillin/Sulbactam: I 16/8      -  Aztreonam: S <=4      -  Cefazolin: S <=2      -  Cefepime: S <=2      -  Cefoxitin: S <=8      -  Ceftriaxone: S <=1      -  Ciprofloxacin: R >2      -  Ertapenem: S <=0.5      -  Gentamicin: S <=2      -  Imipenem: S <=1      -  Levofloxacin: R >4      -  Meropenem: S <=1      -  Piperacillin/Tazobactam: S <=8      -  Tobramycin: S <=2      -  Trimethoprim/Sulfamethoxazole: S <=0.5/9.5  Organism: Blood Culture PCR (07 Jan 2024 17:05)      Method Type: PCR      -  Escherichia coli: Detec    Culture - Blood (collected 04 Jan 2024 11:55)  Source: .Blood Blood-Peripheral  Preliminary Report (08 Jan 2024 17:01):    No growth at 4 days        RADIOLOGY & ADDITIONAL TESTS:    Care Discussed with Consultants/Other Providers:    Patient is a 58y old  Female who presents with a chief complaint of bacteremia      Patient seen and examined at bedside.  Pt with c/o persistant abd pain also noting intermittent diarrhea.  No overnight events     ALLERGIES:  walnut (Other)  No Known Drug Allergies    MEDICATIONS  (STANDING):  aspirin enteric coated 81 milliGRAM(s) Oral daily  atorvastatin 80 milliGRAM(s) Oral at bedtime  budesonide  80 MICROgram(s)/formoterol 4.5 MICROgram(s) Inhaler 2 Puff(s) Inhalation two times a day  cefTRIAXone   IVPB 2000 milliGRAM(s) IV Intermittent every 24 hours  cefTRIAXone   IVPB      dextrose 5%. 1000 milliLiter(s) (50 mL/Hr) IV Continuous <Continuous>  dextrose 5%. 1000 milliLiter(s) (100 mL/Hr) IV Continuous <Continuous>  dextrose 50% Injectable 25 Gram(s) IV Push once  dextrose 50% Injectable 25 Gram(s) IV Push once  dextrose 50% Injectable 12.5 Gram(s) IV Push once  enoxaparin Injectable 40 milliGRAM(s) SubCutaneous every 24 hours  FLUoxetine 20 milliGRAM(s) Oral two times a day  FLUoxetine 20 milliGRAM(s) Oral at bedtime  fluPHENAZine 5 milliGRAM(s) Oral at bedtime  fluticasone propionate 50 MICROgram(s)/spray Nasal Spray 1 Spray(s) Both Nostrils two times a day  gabapentin 100 milliGRAM(s) Oral two times a day  glucagon  Injectable 1 milliGRAM(s) IntraMuscular once  insulin glargine Injectable (LANTUS) 40 Unit(s) SubCutaneous at bedtime  insulin lispro (ADMELOG) corrective regimen sliding scale   SubCutaneous three times a day before meals  losartan 100 milliGRAM(s) Oral daily  montelukast 10 milliGRAM(s) Oral daily  pantoprazole    Tablet 40 milliGRAM(s) Oral before breakfast  polyethylene glycol 3350 17 Gram(s) Oral daily  prazosin. 1 milliGRAM(s) Oral daily  senna 2 Tablet(s) Oral at bedtime  sodium chloride 0.9%. 1000 milliLiter(s) (75 mL/Hr) IV Continuous <Continuous>    MEDICATIONS  (PRN):  acetaminophen     Tablet .. 650 milliGRAM(s) Oral every 6 hours PRN Temp greater or equal to 38C (100.4F), Mild Pain (1 - 3)  albuterol    90 MICROgram(s) HFA Inhaler 2 Puff(s) Inhalation every 6 hours PRN Shortness of Breath  dextrose Oral Gel 15 Gram(s) Oral once PRN Blood Glucose LESS THAN 70 milliGRAM(s)/deciliter  ondansetron   Disintegrating Tablet 4 milliGRAM(s) Oral every 6 hours PRN Nausea and/or Vomiting  simethicone 80 milliGRAM(s) Chew three times a day PRN Gas    Vital Signs Last 24 Hrs  T(F): 97.7 (09 Jan 2024 05:29), Max: 97.7 (08 Jan 2024 22:19)  HR: 66 (09 Jan 2024 05:29) (63 - 66)  BP: 109/75 (09 Jan 2024 05:29) (108/70 - 109/75)  RR: 18 (09 Jan 2024 05:29) (18 - 18)  SpO2: 94% (09 Jan 2024 05:29) (94% - 96%)  I&O's Summary    08 Jan 2024 07:01  -  09 Jan 2024 07:00  --------------------------------------------------------  IN: 1425 mL / OUT: 0 mL / NET: 1425 mL      PHYSICAL EXAM:  General: 59 y/o morbidly obese  female in NAD, A/O x 3  ENT: MMM  Neck: Supple, No JVD  Lungs: Clear to auscultation bilaterally, Non labored breathing   Cardio: RRR, S1/S2, No murmurs  Abdomen: Soft, tender, distended; Bowel sounds present  Extremities: No calf tenderness, No pitting edema    LABS:                        11.3   7.66  )-----------( 283      ( 07 Jan 2024 06:00 )             34.5     01-07    141  |  107  |  14  ----------------------------<  186  3.9   |  27  |  1.07    Ca    9.3      07 Jan 2024 06:00    TPro  6.6  /  Alb  2.7  /  TBili  0.3  /  DBili  x   /  AST  24  /  ALT  47  /  AlkPhos  82  01-07      PT/INR - ( 06 Jan 2024 11:01 )   PT: 12.4 sec;   INR: 1.09 ratio         PTT - ( 06 Jan 2024 11:01 )  PTT:27.7 sec  Lactate, Blood: 1.1 mmol/L (01-06 @ 10:34)                      POCT Blood Glucose.: 168 mg/dL (08 Jan 2024 22:24)  POCT Blood Glucose.: 164 mg/dL (08 Jan 2024 17:04)  POCT Blood Glucose.: 189 mg/dL (08 Jan 2024 11:29)      Urinalysis Basic - ( 07 Jan 2024 06:00 )    Color: x / Appearance: x / SG: x / pH: x  Gluc: 186 mg/dL / Ketone: x  / Bili: x / Urobili: x   Blood: x / Protein: x / Nitrite: x   Leuk Esterase: x / RBC: x / WBC x   Sq Epi: x / Non Sq Epi: x / Bacteria: x        Culture - Urine (collected 06 Jan 2024 12:00)  Source: Clean Catch Clean Catch (Midstream)  Final Report (08 Jan 2024 10:59):    >=3 organisms. Probable collection contamination.    Culture - Blood (collected 06 Jan 2024 11:05)  Source: .Blood Blood-Peripheral  Preliminary Report (08 Jan 2024 15:02):    No growth at 48 Hours    Culture - Blood (collected 06 Jan 2024 11:01)  Source: .Blood Blood-Peripheral  Preliminary Report (08 Jan 2024 15:02):    No growth at 48 Hours    Culture - Urine (collected 04 Jan 2024 12:30)  Source: Clean Catch Clean Catch (Midstream)  Final Report (05 Jan 2024 13:55):    <10,000 CFU/mL Normal Urogenital Allyson    Culture - Blood (collected 04 Jan 2024 11:55)  Source: .Blood Blood-Peripheral  Gram Stain (06 Jan 2024 07:59):    Growth in anaerobic bottle: Gram Negative Rods  Final Report (07 Jan 2024 17:05):    Growth in anaerobic bottle: Escherichia coli    Direct identification is available within approximately 3-5    hours either by Blood Panel Multiplexed PCR or Direct    MALDI-TOF. Details: [email or web address]  Organism: Blood Culture PCR  Escherichia coli (07 Jan 2024 17:05)  Organism: Escherichia coli (07 Jan 2024 17:05)      Method Type: LAMIN      -  Ampicillin: R >16 These ampicillin results predict results for amoxicillin      -  Ampicillin/Sulbactam: I 16/8      -  Aztreonam: S <=4      -  Cefazolin: S <=2      -  Cefepime: S <=2      -  Cefoxitin: S <=8      -  Ceftriaxone: S <=1      -  Ciprofloxacin: R >2      -  Ertapenem: S <=0.5      -  Gentamicin: S <=2      -  Imipenem: S <=1      -  Levofloxacin: R >4      -  Meropenem: S <=1      -  Piperacillin/Tazobactam: S <=8      -  Tobramycin: S <=2      -  Trimethoprim/Sulfamethoxazole: S <=0.5/9.5  Organism: Blood Culture PCR (07 Jan 2024 17:05)      Method Type: PCR      -  Escherichia coli: Detec    Culture - Blood (collected 04 Jan 2024 11:55)  Source: .Blood Blood-Peripheral  Preliminary Report (08 Jan 2024 17:01):    No growth at 4 days        RADIOLOGY & ADDITIONAL TESTS:    Care Discussed with Consultants/Other Providers:

## 2024-01-09 NOTE — CONSULT NOTE ADULT - ASSESSMENT
58 year old female with morbid obesity, dm, uti in the past who presented almost a week ago with fever, vomiting, aches, positive for Adenovuris so sent home from the ED, blood cx grew 1/, E. coli, source is unclear. UA is neg at this time, UC was neg, No GI symptoms at this time    GI consult: E.coli bacteremia with unknown source   58 year old female with E.coli bacteremia with unknown source    Low suspicion for acute GI process though bacteremia source could be enteral.    Pending course can discuss Endoscopy / Colonoscopy to rule out GI lesion

## 2024-01-09 NOTE — PROGRESS NOTE ADULT - ASSESSMENT
59 y/o F with PMH Type 2 DM, HTN, HLD, GERD, asthma, anxiety, sleep apnea, schizoaffective disorder, bipolar disorder, migraines, presented to ED 1/4/24 c/o emesis, mild cough, nasalk congestion and weakness, found with adenovirus, medically optimized and discharged home. Returned to ED today for positive GNR in blood cultures from 1/4/24.    GNR bacteremia  -Afebrile, hemodynamically stable  -RVP positive for adenovirus 1/4/24, continue supportive care  -repeat BC NGTD  -Ceftriaxone 2 grams q24 as per ID   -ID consult recs appreciated and reviewed  -CT abdomen -Stable 2 cm indeterminate right upper pole renal lesion.No acute intra-abdominal pathology.   -abd ultrasound pending       Morbid Obesity  -nutrition consult    Type 2 DM  -FS, ISS  -Lantus 40 u nightly  HGBA1c 7.4  POCT Blood Glucose.: 168 mg/dL (08 Jan 2024 22:24)  POCT Blood Glucose.: 164 mg/dL (08 Jan 2024 17:04)  POCT Blood Glucose.: 189 mg/dL (08 Jan 2024 11:29)        Asthma, stable  -Continue albuterol prn, montelukast, symbicort  GERD  -Continue PPI    Sleep Apnea  -CPAP settings    Schizoaffective disorder - chronic, stable  -Continue home psych meds    Vitals q8h  Diet: Carb consistent  PT/OT as tolerated  DVT ppx: Lovenox  Standard precautions: Aspiration, fall, safety, seizure, skin  Code Status - Full Code    Plans to update family   HCP - sister Nevaeh   57 y/o F with PMH Type 2 DM, HTN, HLD, GERD, asthma, anxiety, sleep apnea, schizoaffective disorder, bipolar disorder, migraines, presented to ED 1/4/24 c/o emesis, mild cough, nasalk congestion and weakness, found with adenovirus, medically optimized and discharged home. Returned to ED today for positive GNR in blood cultures from 1/4/24.    GNR bacteremia- unknow source   -Afebrile, hemodynamically stable  -RVP positive for adenovirus 1/4/24, continue supportive care  -repeat BC NGTD  -Ceftriaxone 2 grams q24 as per ID   -ID consult recs appreciated and reviewed  -CT abdomen -Stable 2 cm indeterminate right upper pole renal lesion. No acute intra-abdominal pathology.   -abd ultrasound pending   -GI consult for hx of gastric ulcer HX       Morbid Obesity  -nutrition consult    Type 2 DM  -FS, ISS  -Lantus 40 u nightly  HGBA1c 7.4  POCT Blood Glucose.: 168 mg/dL (08 Jan 2024 22:24)  POCT Blood Glucose.: 164 mg/dL (08 Jan 2024 17:04)  POCT Blood Glucose.: 189 mg/dL (08 Jan 2024 11:29)        Asthma, stable  -Continue albuterol prn, montelukast, symbicort  GERD  -Continue PPI    Sleep Apnea  -CPAP settings    Schizoaffective disorder - chronic, stable  -Continue home psych meds    Vitals q8h  Diet: Carb consistent  PT/OT as tolerated  DVT ppx: Lovenox  Standard precautions: Aspiration, fall, safety, seizure, skin  Code Status - Full Code    Plans to update family herself   HCP - sister Nevaeh   59 y/o F with PMH Type 2 DM, HTN, HLD, GERD, asthma, anxiety, sleep apnea, schizoaffective disorder, bipolar disorder, migraines, presented to ED 1/4/24 c/o emesis, mild cough, nasalk congestion and weakness, found with adenovirus, medically optimized and discharged home. Returned to ED today for positive GNR in blood cultures from 1/4/24.    GNR bacteremia- unknow source   -Afebrile, hemodynamically stable  -RVP positive for adenovirus 1/4/24, continue supportive care  -repeat BC NGTD  -Ceftriaxone 2 grams q24 as per ID   -ID consult recs appreciated and reviewed  -CT abdomen -Stable 2 cm indeterminate right upper pole renal lesion. No acute intra-abdominal pathology.   -abd ultrasound pending   -GI consult for hx of gastric ulcer HX       Morbid Obesity  -nutrition consult    Type 2 DM  -FS, ISS  -Lantus 40 u nightly  HGBA1c 7.4  POCT Blood Glucose.: 168 mg/dL (08 Jan 2024 22:24)  POCT Blood Glucose.: 164 mg/dL (08 Jan 2024 17:04)  POCT Blood Glucose.: 189 mg/dL (08 Jan 2024 11:29)        Asthma, stable  -Continue albuterol prn, montelukast, symbicort  GERD  -Continue PPI    Sleep Apnea  -CPAP settings    Schizoaffective disorder - chronic, stable  -Continue home psych meds    Vitals q8h  Diet: Carb consistent  PT/OT as tolerated  DVT ppx: Lovenox  Standard precautions: Aspiration, fall, safety, seizure, skin  Code Status - Full Code    Plans to update family herself   HCP - sister Nevaeh   57 y/o F with PMH Type 2 DM, HTN, HLD, GERD, asthma, anxiety, sleep apnea, schizoaffective disorder, bipolar disorder, migraines, presented to ED 1/4/24 c/o emesis, mild cough, nasalk congestion and weakness, found with adenovirus, medically optimized and discharged home. Returned to ED today for positive GNR in blood cultures from 1/4/24.    GNR bacteremia- unknow source   -Afebrile, hemodynamically stable  -RVP positive for adenovirus 1/4/24, continue supportive care  -repeat BC NGTD  -Ceftriaxone 2 grams q24 as per ID   -ID consult recs appreciated and reviewed  -CT abdomen -Stable 2 cm indeterminate right upper pole renal lesion. No acute intra-abdominal pathology.   -abd ultrasound--- Focal wall thickening in the region of the gallbladder neck, corresponding to a region of nonspecific thickening noted on CT examination, possible adenomyomatosis. No gallstones or diffuse gallbladder wall thickening noted.   -GI consult for hx of gastric ulcer HX       Morbid Obesity  -nutrition consult    Type 2 DM  -FS, ISS  -Lantus 40 u nightly  HGBA1c 7.4  POCT Blood Glucose.: 168 mg/dL (08 Jan 2024 22:24)  POCT Blood Glucose.: 164 mg/dL (08 Jan 2024 17:04)  POCT Blood Glucose.: 189 mg/dL (08 Jan 2024 11:29)        Asthma, stable  -Continue albuterol prn, montelukast, symbicort  GERD  -Continue PPI    Sleep Apnea  -CPAP settings    Schizoaffective disorder - chronic, stable  -Continue home psych meds    Vitals q8h  Diet: Carb consistent  PT/OT as tolerated  DVT ppx: Lovenox  Standard precautions: Aspiration, fall, safety, seizure, skin  Code Status - Full Code    Plans to update family herself   HCP - sister Nevaeh   57 y/o F with PMH Type 2 DM, HTN, HLD, GERD, asthma, anxiety, sleep apnea, schizoaffective disorder, bipolar disorder, migraines, presented to ED 1/4/24 c/o emesis, mild cough, nasalk congestion and weakness, found with adenovirus, medically optimized and discharged home. Returned to ED today for positive GNR in blood cultures from 1/4/24.    GNR bacteremia- unknow source   -Afebrile, hemodynamically stable  -RVP positive for adenovirus 1/4/24, continue supportive care  -repeat BC NGTD  -Ceftriaxone 2 grams q24 as per ID   -ID consult recs appreciated and reviewed  -CT abdomen -Stable 2 cm indeterminate right upper pole renal lesion. No acute intra-abdominal pathology.   -abd ultrasound--- Focal wall thickening in the region of the gallbladder neck, corresponding to a region of nonspecific thickening noted on CT examination, possible adenomyomatosis. No gallstones or diffuse gallbladder wall thickening noted.   -GI consult for hx of gastric ulcer HX     Morbid Obesity  -nutrition consult    Type 2 DM  -FS, ISS  -Lantus 40 u nightly  HGBA1c 7.4  POCT Blood Glucose.: 168 mg/dL (08 Jan 2024 22:24)  POCT Blood Glucose.: 164 mg/dL (08 Jan 2024 17:04)  POCT Blood Glucose.: 189 mg/dL (08 Jan 2024 11:29)        Asthma, stable  -Continue albuterol prn, montelukast, symbicort  GERD  -Continue PPI    Sleep Apnea  -CPAP settings    Schizoaffective disorder - chronic, stable  -Continue home psych meds    Vitals q8h  Diet: Carb consistent  PT/OT as tolerated  DVT ppx: Lovenox  Standard precautions: Aspiration, fall, safety, seizure, skin  Code Status - Full Code    Plans to update family herself   HCP - sister Nevaeh

## 2024-01-09 NOTE — PROGRESS NOTE ADULT - SUBJECTIVE AND OBJECTIVE BOX
CC: f/u for  ecoli bacteremia  Patient reports feels ok    REVIEW OF SYSTEMS:  All other review of systems negative (Comprehensive ROS)    Antimicrobials Day #  :4  cefTRIAXone   IVPB      cefTRIAXone   IVPB 2000 milliGRAM(s) IV Intermittent every 24 hours    Other Medications Reviewed    T(F): 97.4 (01-09-24 @ 20:17), Max: 98.8 (01-09-24 @ 15:40)  HR: 81 (01-09-24 @ 20:17)  BP: 124/79 (01-09-24 @ 20:17)  RR: 18 (01-09-24 @ 20:17)  SpO2: 96% (01-09-24 @ 20:17)  Wt(kg): --    PHYSICAL EXAM:  General: alert, no acute distress  Eyes:  anicteric, no conjunctival injection, no discharge  Oropharynx: no lesions or injection 	  Neck: supple, without adenopathy  Lungs: clear to auscultation  Heart: regular rate and rhythm; no murmur, rubs or gallops  Abdomen: soft, nondistended, nontender, without mass or organomegaly  Skin: no lesions  Extremities: no clubbing, cyanosis, or edema  Neurologic: alert, oriented, moves all extremities    LAB RESULTS:    01-09    139  |  104  |  13  ----------------------------<  186<H>  3.9   |  25  |  0.89    Ca    9.0      09 Jan 2024 09:07        Urinalysis Basic - ( 09 Jan 2024 09:07 )    Color: x / Appearance: x / SG: x / pH: x  Gluc: 186 mg/dL / Ketone: x  / Bili: x / Urobili: x   Blood: x / Protein: x / Nitrite: x   Leuk Esterase: x / RBC: x / WBC x   Sq Epi: x / Non Sq Epi: x / Bacteria: x      MICROBIOLOGY:  RECENT CULTURES:  01-06 @ 12:00 Clean Catch Clean Catch (Midstream)     >=3 organisms. Probable collection contamination.      01-06 @ 11:05 .Blood Blood-Peripheral     No growth at 72 Hours      01-06 @ 11:01 .Blood Blood-Peripheral     No growth at 72 Hours          RADIOLOGY REVIEWED:      ACC: [id number] EXAM:  CT ABDOMEN AND PELVIS IC   ORDERED BY: AMADOU BRADLEY     PROCEDURE DATE:  01/07/2024          INTERPRETATION:  CLINICAL INFORMATION: Ecoli bacteremia.    COMPARISON: CT scan abdomen pelvis 04/26/2022.    CONTRAST/COMPLICATIONS:  IV Contrast: Omnipaque 350  90 cc administered   10 cc discarded  Oral Contrast: NONE  Complications: None reported at time of study completion    PROCEDURE:  CT of the Abdomen and Pelvis was performed.  Sagittal and coronal reformats were performed.    FINDINGS:    LOWER CHEST:  Small hiatal hernia.    LIVER: Within normal limits.  Small subcentimeter short axis periportal/portacaval lymph nodes.  BILE DUCTS: Normal caliber.  GALLBLADDER: Focal wall thickening near the fundus, may reflect   adenomyomatosis.  SPLEEN: Within normal limits.  PANCREAS: Within normal limits.  ADRENALS: Within normal limits.  KIDNEYS/URETERS:  2 cm indeterminate hypodense lesion upper pole right kidney is stable in   appearance.  Bilateral subcentimeter hypodensities too small to characterize, appears   stable.    No hydronephrosis.    BLADDER: Distended, correlate clinically with urine output.  REPRODUCTIVE ORGANS:  The uterus and adnexa appear within normal limits.    BOWEL:  No bowel obstruction.  Appendix isnormal.  PERITONEUM: No ascites.    VESSELS: Within normal limits.  RETROPERITONEUM/LYMPH NODES: No lymphadenopathy.    ABDOMINAL WALL:  Small bilateral fat-containing inguinal hernias.    BONES: Degenerative changes.    IMPRESSION:    Stable 2 cm indeterminate right upper pole renal lesion.  This can be further characterized by nonemergent renal ultrasonography if   this has not already been performed.    No acute intra-abdominal pathology.    Other findings as discussed above.    --- End of Report ---                < from: US Abdomen Upper Quadrant Right (01.09.24 @ 08:45) >  ACC: [id number] EXAM:  US ABDOMEN RT UPR QUADRANT   ORDERED BY: YOVANA CASTILLO     PROCEDURE DATE:  01/09/2024          INTERPRETATION:  CLINICAL INFORMATION: Escherichia coli bacteremia.    COMPARISON: CT abdomen/pelvis January 7, 2024.    TECHNIQUE: Sonography of the right upper quadrant.    FINDINGS:  Liver: Normal in size. Increased echotexture suggests hepatic steatosis.   No focal hepatic masses identified.  Bile ducts: Normal caliber. Common bile duct measures 4 mm.  Gallbladder: Focal wall thickening in the region of the gallbladder neck,   corresponding to a region of nonspecific thickening noted on CT   examination, possible adenomyomatosis. No gallstones or diffuse   gallbladder wall thickening noted. No pericholecystic fluid.  Pancreas: Visualized portions are within normal limits.  Right kidney: 10.7 cm. No hydronephrosis. 2.0 cm cyst upper pole region,   stable in appearance with comparison to April 2019.  Ascites: None.  IVC: Visualized portions are within normal limits.    IMPRESSION:    1. Focal wall thickening in the region of the gallbladder neck,   corresponding to a region of nonspecific thickening noted on CT   examination, possible adenomyomatosis. No gallstones or diffuse   gallbladder wall thickening noted. Follow-up abdominal sonography in 6   months for reevaluation to establish stability.  2. 2.0 cm cyst upper pole region, stable in appearance with comparison to   April 2019.          < end of copied text >    Assessment:  Patient with morbid obesity, dm, uti in the past who presented almost a week ago with fever, vomiting, aches, positive for adenovuris so sent home from the ED. Se was called back 2 d ago because blood cx grew 1/4 ecoli. Source is not clear. UA is neg, uc was neg, abdomen is benign. Maybe she aspiratred with the vomiting but not looking ill to suspect a gnr pneumonia. Her gb is a bit  abnormal so maybe she has satnam and symptoms are blunted due to dm. US not suggestive of acute satnam but has the focal thickening of the gb. Will do mrcp to better characterize the gb findings.     Recommendations:  continue ceftriaxone  f/u final repeat cultures  mrcp  gi consulted   r/w dr bradley     CC: f/u for  ecoli bacteremia  Patient reports feels ok    REVIEW OF SYSTEMS:  All other review of systems negative (Comprehensive ROS)    Antimicrobials Day #  :4  cefTRIAXone   IVPB      cefTRIAXone   IVPB 2000 milliGRAM(s) IV Intermittent every 24 hours    Other Medications Reviewed    T(F): 97.4 (01-09-24 @ 20:17), Max: 98.8 (01-09-24 @ 15:40)  HR: 81 (01-09-24 @ 20:17)  BP: 124/79 (01-09-24 @ 20:17)  RR: 18 (01-09-24 @ 20:17)  SpO2: 96% (01-09-24 @ 20:17)  Wt(kg): --    PHYSICAL EXAM:  General: alert, no acute distress  Eyes:  anicteric, no conjunctival injection, no discharge  Oropharynx: no lesions or injection 	  Neck: supple, without adenopathy  Lungs: clear to auscultation  Heart: regular rate and rhythm; no murmur, rubs or gallops  Abdomen: soft, nondistended, nontender, without mass or organomegaly  Skin: no lesions  Extremities: no clubbing, cyanosis, or edema  Neurologic: alert, oriented, moves all extremities    LAB RESULTS:    01-09    139  |  104  |  13  ----------------------------<  186<H>  3.9   |  25  |  0.89    Ca    9.0      09 Jan 2024 09:07        Urinalysis Basic - ( 09 Jan 2024 09:07 )    Color: x / Appearance: x / SG: x / pH: x  Gluc: 186 mg/dL / Ketone: x  / Bili: x / Urobili: x   Blood: x / Protein: x / Nitrite: x   Leuk Esterase: x / RBC: x / WBC x   Sq Epi: x / Non Sq Epi: x / Bacteria: x      MICROBIOLOGY:  RECENT CULTURES:  01-06 @ 12:00 Clean Catch Clean Catch (Midstream)     >=3 organisms. Probable collection contamination.      01-06 @ 11:05 .Blood Blood-Peripheral     No growth at 72 Hours      01-06 @ 11:01 .Blood Blood-Peripheral     No growth at 72 Hours          RADIOLOGY REVIEWED:      ACC: [id number] EXAM:  CT ABDOMEN AND PELVIS IC   ORDERED BY: AMADOU BRADLEY     PROCEDURE DATE:  01/07/2024          INTERPRETATION:  CLINICAL INFORMATION: Ecoli bacteremia.    COMPARISON: CT scan abdomen pelvis 04/26/2022.    CONTRAST/COMPLICATIONS:  IV Contrast: Omnipaque 350  90 cc administered   10 cc discarded  Oral Contrast: NONE  Complications: None reported at time of study completion    PROCEDURE:  CT of the Abdomen and Pelvis was performed.  Sagittal and coronal reformats were performed.    FINDINGS:    LOWER CHEST:  Small hiatal hernia.    LIVER: Within normal limits.  Small subcentimeter short axis periportal/portacaval lymph nodes.  BILE DUCTS: Normal caliber.  GALLBLADDER: Focal wall thickening near the fundus, may reflect   adenomyomatosis.  SPLEEN: Within normal limits.  PANCREAS: Within normal limits.  ADRENALS: Within normal limits.  KIDNEYS/URETERS:  2 cm indeterminate hypodense lesion upper pole right kidney is stable in   appearance.  Bilateral subcentimeter hypodensities too small to characterize, appears   stable.    No hydronephrosis.    BLADDER: Distended, correlate clinically with urine output.  REPRODUCTIVE ORGANS:  The uterus and adnexa appear within normal limits.    BOWEL:  No bowel obstruction.  Appendix isnormal.  PERITONEUM: No ascites.    VESSELS: Within normal limits.  RETROPERITONEUM/LYMPH NODES: No lymphadenopathy.    ABDOMINAL WALL:  Small bilateral fat-containing inguinal hernias.    BONES: Degenerative changes.    IMPRESSION:    Stable 2 cm indeterminate right upper pole renal lesion.  This can be further characterized by nonemergent renal ultrasonography if   this has not already been performed.    No acute intra-abdominal pathology.    Other findings as discussed above.    --- End of Report ---                < from: US Abdomen Upper Quadrant Right (01.09.24 @ 08:45) >  ACC: [id number] EXAM:  US ABDOMEN RT UPR QUADRANT   ORDERED BY: YOVANA CASTILLO     PROCEDURE DATE:  01/09/2024          INTERPRETATION:  CLINICAL INFORMATION: Escherichia coli bacteremia.    COMPARISON: CT abdomen/pelvis January 7, 2024.    TECHNIQUE: Sonography of the right upper quadrant.    FINDINGS:  Liver: Normal in size. Increased echotexture suggests hepatic steatosis.   No focal hepatic masses identified.  Bile ducts: Normal caliber. Common bile duct measures 4 mm.  Gallbladder: Focal wall thickening in the region of the gallbladder neck,   corresponding to a region of nonspecific thickening noted on CT   examination, possible adenomyomatosis. No gallstones or diffuse   gallbladder wall thickening noted. No pericholecystic fluid.  Pancreas: Visualized portions are within normal limits.  Right kidney: 10.7 cm. No hydronephrosis. 2.0 cm cyst upper pole region,   stable in appearance with comparison to April 2019.  Ascites: None.  IVC: Visualized portions are within normal limits.    IMPRESSION:    1. Focal wall thickening in the region of the gallbladder neck,   corresponding to a region of nonspecific thickening noted on CT   examination, possible adenomyomatosis. No gallstones or diffuse   gallbladder wall thickening noted. Follow-up abdominal sonography in 6   months for reevaluation to establish stability.  2. 2.0 cm cyst upper pole region, stable in appearance with comparison to   April 2019.          < end of copied text >    Assessment:  Patient with morbid obesity, dm, uti in the past who presented almost a week ago with fever, vomiting, aches, positive for adenovuris so sent home from the ED. Se was called back 2 d ago because blood cx grew 1/4 ecoli. Source is not clear. UA is neg, uc was neg, abdomen is benign. Maybe she aspiratred with the vomiting but not looking ill to suspect a gnr pneumonia. Her gb is a bit  abnormal so maybe she has satnam and symptoms are blunted due to dm. US not suggestive of acute satnam but has the focal thickening of the gb. Will do mrcp to better characterize the gb findings.     Recommendations:  continue ceftriaxone  f/u final repeat cultures  mrcp  gi consulted   r/w dr bradley

## 2024-01-10 DIAGNOSIS — F41.1 GENERALIZED ANXIETY DISORDER: ICD-10-CM

## 2024-01-10 LAB
GLUCOSE BLDC GLUCOMTR-MCNC: 166 MG/DL — HIGH (ref 70–99)
GLUCOSE BLDC GLUCOMTR-MCNC: 166 MG/DL — HIGH (ref 70–99)
GLUCOSE BLDC GLUCOMTR-MCNC: 177 MG/DL — HIGH (ref 70–99)
GLUCOSE BLDC GLUCOMTR-MCNC: 177 MG/DL — HIGH (ref 70–99)
GLUCOSE BLDC GLUCOMTR-MCNC: 211 MG/DL — HIGH (ref 70–99)
GLUCOSE BLDC GLUCOMTR-MCNC: 211 MG/DL — HIGH (ref 70–99)
GLUCOSE BLDC GLUCOMTR-MCNC: 272 MG/DL — HIGH (ref 70–99)
GLUCOSE BLDC GLUCOMTR-MCNC: 272 MG/DL — HIGH (ref 70–99)
HCT VFR BLD CALC: 37.6 % — SIGNIFICANT CHANGE UP (ref 34.5–45)
HCT VFR BLD CALC: 37.6 % — SIGNIFICANT CHANGE UP (ref 34.5–45)
HGB BLD-MCNC: 12.2 G/DL — SIGNIFICANT CHANGE UP (ref 11.5–15.5)
HGB BLD-MCNC: 12.2 G/DL — SIGNIFICANT CHANGE UP (ref 11.5–15.5)
MCHC RBC-ENTMCNC: 28.9 PG — SIGNIFICANT CHANGE UP (ref 27–34)
MCHC RBC-ENTMCNC: 28.9 PG — SIGNIFICANT CHANGE UP (ref 27–34)
MCHC RBC-ENTMCNC: 32.4 GM/DL — SIGNIFICANT CHANGE UP (ref 32–36)
MCHC RBC-ENTMCNC: 32.4 GM/DL — SIGNIFICANT CHANGE UP (ref 32–36)
MCV RBC AUTO: 89.1 FL — SIGNIFICANT CHANGE UP (ref 80–100)
MCV RBC AUTO: 89.1 FL — SIGNIFICANT CHANGE UP (ref 80–100)
NRBC # BLD: 0 /100 WBCS — SIGNIFICANT CHANGE UP (ref 0–0)
NRBC # BLD: 0 /100 WBCS — SIGNIFICANT CHANGE UP (ref 0–0)
PLATELET # BLD AUTO: 325 K/UL — SIGNIFICANT CHANGE UP (ref 150–400)
PLATELET # BLD AUTO: 325 K/UL — SIGNIFICANT CHANGE UP (ref 150–400)
RBC # BLD: 4.22 M/UL — SIGNIFICANT CHANGE UP (ref 3.8–5.2)
RBC # BLD: 4.22 M/UL — SIGNIFICANT CHANGE UP (ref 3.8–5.2)
RBC # FLD: 12.7 % — SIGNIFICANT CHANGE UP (ref 10.3–14.5)
RBC # FLD: 12.7 % — SIGNIFICANT CHANGE UP (ref 10.3–14.5)
WBC # BLD: 7.65 K/UL — SIGNIFICANT CHANGE UP (ref 3.8–10.5)
WBC # BLD: 7.65 K/UL — SIGNIFICANT CHANGE UP (ref 3.8–10.5)
WBC # FLD AUTO: 7.65 K/UL — SIGNIFICANT CHANGE UP (ref 3.8–10.5)
WBC # FLD AUTO: 7.65 K/UL — SIGNIFICANT CHANGE UP (ref 3.8–10.5)

## 2024-01-10 PROCEDURE — 90792 PSYCH DIAG EVAL W/MED SRVCS: CPT

## 2024-01-10 RX ORDER — ACETAMINOPHEN 500 MG
1000 TABLET ORAL ONCE
Refills: 0 | Status: DISCONTINUED | OUTPATIENT
Start: 2024-01-10 | End: 2024-01-10

## 2024-01-10 RX ORDER — ALPRAZOLAM 0.25 MG
0.5 TABLET ORAL ONCE
Refills: 0 | Status: DISCONTINUED | OUTPATIENT
Start: 2024-01-11 | End: 2024-01-11

## 2024-01-10 RX ORDER — TRAMADOL HYDROCHLORIDE 50 MG/1
25 TABLET ORAL ONCE
Refills: 0 | Status: DISCONTINUED | OUTPATIENT
Start: 2024-01-10 | End: 2024-01-10

## 2024-01-10 RX ADMIN — CEFTRIAXONE 100 MILLIGRAM(S): 500 INJECTION, POWDER, FOR SOLUTION INTRAMUSCULAR; INTRAVENOUS at 14:36

## 2024-01-10 RX ADMIN — Medication 20 MILLIGRAM(S): at 22:19

## 2024-01-10 RX ADMIN — INSULIN GLARGINE 40 UNIT(S): 100 INJECTION, SOLUTION SUBCUTANEOUS at 22:17

## 2024-01-10 RX ADMIN — Medication 20 MILLIGRAM(S): at 17:14

## 2024-01-10 RX ADMIN — Medication 1 MILLIGRAM(S): at 05:36

## 2024-01-10 RX ADMIN — Medication 1 SPRAY(S): at 05:38

## 2024-01-10 RX ADMIN — TRAMADOL HYDROCHLORIDE 25 MILLIGRAM(S): 50 TABLET ORAL at 17:15

## 2024-01-10 RX ADMIN — ATORVASTATIN CALCIUM 80 MILLIGRAM(S): 80 TABLET, FILM COATED ORAL at 22:18

## 2024-01-10 RX ADMIN — ENOXAPARIN SODIUM 40 MILLIGRAM(S): 100 INJECTION SUBCUTANEOUS at 12:11

## 2024-01-10 RX ADMIN — LOSARTAN POTASSIUM 100 MILLIGRAM(S): 100 TABLET, FILM COATED ORAL at 05:37

## 2024-01-10 RX ADMIN — GABAPENTIN 100 MILLIGRAM(S): 400 CAPSULE ORAL at 05:37

## 2024-01-10 RX ADMIN — GABAPENTIN 100 MILLIGRAM(S): 400 CAPSULE ORAL at 17:14

## 2024-01-10 RX ADMIN — TRAMADOL HYDROCHLORIDE 25 MILLIGRAM(S): 50 TABLET ORAL at 18:15

## 2024-01-10 RX ADMIN — FLUPHENAZINE HYDROCHLORIDE 5 MILLIGRAM(S): 1 TABLET, FILM COATED ORAL at 22:19

## 2024-01-10 RX ADMIN — Medication 20 MILLIGRAM(S): at 05:37

## 2024-01-10 RX ADMIN — Medication 1 SPRAY(S): at 17:16

## 2024-01-10 RX ADMIN — BUDESONIDE AND FORMOTEROL FUMARATE DIHYDRATE 2 PUFF(S): 160; 4.5 AEROSOL RESPIRATORY (INHALATION) at 21:14

## 2024-01-10 RX ADMIN — Medication 3: at 12:12

## 2024-01-10 RX ADMIN — PANTOPRAZOLE SODIUM 40 MILLIGRAM(S): 20 TABLET, DELAYED RELEASE ORAL at 05:36

## 2024-01-10 RX ADMIN — BUDESONIDE AND FORMOTEROL FUMARATE DIHYDRATE 2 PUFF(S): 160; 4.5 AEROSOL RESPIRATORY (INHALATION) at 09:46

## 2024-01-10 RX ADMIN — Medication 81 MILLIGRAM(S): at 12:11

## 2024-01-10 RX ADMIN — Medication 1: at 08:15

## 2024-01-10 RX ADMIN — SENNA PLUS 2 TABLET(S): 8.6 TABLET ORAL at 22:19

## 2024-01-10 RX ADMIN — Medication 1: at 17:16

## 2024-01-10 RX ADMIN — MONTELUKAST 10 MILLIGRAM(S): 4 TABLET, CHEWABLE ORAL at 12:11

## 2024-01-10 NOTE — BH CONSULTATION LIAISON ASSESSMENT NOTE - NSBHATTESTBILLING_PSY_A_CORE
[id number] diagnostic evaluation with medical services [id number] diagnostic evaluation with medical services

## 2024-01-10 NOTE — BH CONSULTATION LIAISON ASSESSMENT NOTE - SUMMARY
Patient is a 59 y/o F with PMH Type 2 DM, HTN, HLD, GERD, asthma, anxiety, sleep apnea, schizoaffective disorder, bipolar disorder, migraines, presented to ED 1/4/24 c/o emesis, mild cough, nasal congestion and weakness, found with adenovirus, medically optimized and discharged home. Returned to ED today for positive GNR in blood cultures from 1/4/24. +Adenovirus. Psychiatry consulted for psychosis.     Patient presents with auditory and visual hallucinations. Reports that they are decreasing when compared to admission, but finds it unsettling. Additionally reports ideas of reference. Patient reports increased depression and anxiety in the context of her medical illness. Denies suicidality. Patient with good insight and judgement.    Patient is a 57 y/o F with PMH Type 2 DM, HTN, HLD, GERD, asthma, anxiety, sleep apnea, schizoaffective disorder, bipolar disorder, migraines, presented to ED 1/4/24 c/o emesis, mild cough, nasal congestion and weakness, found with adenovirus, medically optimized and discharged home. Returned to ED today for positive GNR in blood cultures from 1/4/24. +Adenovirus. Psychiatry consulted for psychosis.     Patient presents with auditory and visual hallucinations. Reports that they are decreasing when compared to admission, but finds it unsettling. Additionally reports ideas of reference. Patient reports increased depression and anxiety in the context of her medical illness. Denies suicidality. Patient with good insight and judgement.

## 2024-01-10 NOTE — BH CONSULTATION LIAISON ASSESSMENT NOTE - DESCRIPTION
Patient reports being born in [place] and being raised in [place]. Went to [employer] and completed a Bachelors in arts & psychology. However, due to progression of her Schizoaffective disorder, she was unable to have a functional life.  Patient reports being born in [place] and being raised in [place]. Went to [employer] and completed a Bachelors in arts & psychology. However, due to progression of her Schizoaffective disorder, she was unable to have a functional life.

## 2024-01-10 NOTE — BH CONSULTATION LIAISON ASSESSMENT NOTE - NSBHCONSULTFOLLOWAFTERCARE_PSY_A_CORE FT
Patient can continue follow up with Dr. Hi at [facility]; will continue to follow patient.  Patient can continue follow up with Dr. Hi at [facility]; will continue to follow patient.

## 2024-01-10 NOTE — BH CONSULTATION LIAISON ASSESSMENT NOTE - OTHER
Reports history of command hallucinations to "push" other people, denies acting out on it.  not tested.  with roommate

## 2024-01-10 NOTE — BH CONSULTATION LIAISON ASSESSMENT NOTE - GENERAL APPEARANCE
Patient is an age-appropriate female, sitting out of bed to chair, short hair, and wearing a hospital gown./No deformities present

## 2024-01-10 NOTE — BH CONSULTATION LIAISON ASSESSMENT NOTE - NSBHCHARTREVIEWLAB_PSY_A_CORE FT
Complete Blood Count in AM (01.10.24 @ 07:43)   Nucleated RBC: 0 /100 WBCs  WBC Count: 7.65 K/uL  RBC Count: 4.22 M/uL  Hemoglobin: 12.2 g/dL  Hematocrit: 37.6 %  Mean Cell Volume: 89.1 fl  Mean Cell Hemoglobin: 28.9 pg  Mean Cell Hemoglobin Conc: 32.4 gm/dL  Red Cell Distrib Width: 12.7 %  Platelet Count - Automated: 325 K/uL    Basic Metabolic Panel - STAT (01.09.24 @ 09:07)   Sodium: 139 mmol/L  Potassium: 3.9 mmol/L  Chloride: 104 mmol/L  Carbon Dioxide: 25 mmol/L  Anion Gap: 10 mmol/L  Blood Urea Nitrogen: 13 mg/dL  Creatinine: 0.89 mg/dL  Glucose: 186 mg/dL  Calcium: 9.0 mg/dL  eGFR: 75

## 2024-01-10 NOTE — BH CONSULTATION LIAISON ASSESSMENT NOTE - CURRENT MEDICATION
MEDICATIONS  (STANDING):  aspirin enteric coated 81 milliGRAM(s) Oral daily  atorvastatin 80 milliGRAM(s) Oral at bedtime  budesonide  80 MICROgram(s)/formoterol 4.5 MICROgram(s) Inhaler 2 Puff(s) Inhalation two times a day  cefTRIAXone   IVPB 2000 milliGRAM(s) IV Intermittent every 24 hours  cefTRIAXone   IVPB      dextrose 5%. 1000 milliLiter(s) (100 mL/Hr) IV Continuous <Continuous>  dextrose 5%. 1000 milliLiter(s) (50 mL/Hr) IV Continuous <Continuous>  dextrose 50% Injectable 12.5 Gram(s) IV Push once  dextrose 50% Injectable 25 Gram(s) IV Push once  dextrose 50% Injectable 25 Gram(s) IV Push once  enoxaparin Injectable 40 milliGRAM(s) SubCutaneous every 24 hours  FLUoxetine 20 milliGRAM(s) Oral two times a day  FLUoxetine 20 milliGRAM(s) Oral at bedtime  fluPHENAZine 5 milliGRAM(s) Oral at bedtime  fluticasone propionate 50 MICROgram(s)/spray Nasal Spray 1 Spray(s) Both Nostrils two times a day  gabapentin 100 milliGRAM(s) Oral two times a day  glucagon  Injectable 1 milliGRAM(s) IntraMuscular once  insulin glargine Injectable (LANTUS) 40 Unit(s) SubCutaneous at bedtime  insulin lispro (ADMELOG) corrective regimen sliding scale   SubCutaneous three times a day before meals  losartan 100 milliGRAM(s) Oral daily  montelukast 10 milliGRAM(s) Oral daily  pantoprazole    Tablet 40 milliGRAM(s) Oral before breakfast  polyethylene glycol 3350 17 Gram(s) Oral daily  prazosin. 1 milliGRAM(s) Oral daily  senna 2 Tablet(s) Oral at bedtime    MEDICATIONS  (PRN):  acetaminophen     Tablet .. 650 milliGRAM(s) Oral every 6 hours PRN Temp greater or equal to 38C (100.4F), Mild Pain (1 - 3)  albuterol    90 MICROgram(s) HFA Inhaler 2 Puff(s) Inhalation every 6 hours PRN Shortness of Breath  dextrose Oral Gel 15 Gram(s) Oral once PRN Blood Glucose LESS THAN 70 milliGRAM(s)/deciliter  ondansetron   Disintegrating Tablet 4 milliGRAM(s) Oral every 6 hours PRN Nausea and/or Vomiting  simethicone 80 milliGRAM(s) Chew three times a day PRN Gas

## 2024-01-10 NOTE — BH CONSULTATION LIAISON ASSESSMENT NOTE - OTHER PAST PSYCHIATRIC HISTORY (INCLUDE DETAILS REGARDING ONSET, COURSE OF ILLNESS, INPATIENT/OUTPATIENT TREATMENT)
Patient reports being diagnosed with Schizoaffective disorder in adolescence, progressively getting worse and impairing functioning. Reports multiple psychiatric hospitalizations throughout her life. Reports that current medication regimen has provided a level of stability when compared to past. Reports her current outpatient psychiatrist is Dr. Hi at [facility].

## 2024-01-10 NOTE — BH CONSULTATION LIAISON ASSESSMENT NOTE - NSBHCONSULTRECOMMENDOTHER_PSY_A_CORE FT
- Continue with:  Fluoxetine 20mg three times a day  Prolixin 5mg before bedtime  Prazosin 1mg daily    Recommend:  Adding Caplyta 42mg daily.   *Caplyta is non-formulary. However, it is clinically warranted that patient can use her home medication while hospitalized. Can be stocked with pharmacy to be administered by RN. Upon discharge, medication will be given back to patient.

## 2024-01-10 NOTE — PROGRESS NOTE ADULT - SUBJECTIVE AND OBJECTIVE BOX
Patient is a 58y old Female who presents with a chief complaint of bacteremia (10 Blayne 2024 12:14)      Patient seen and examined at bedside. No overnight events reported. Patient states she is feeling ok, but her "psych symptoms are acting up." Patient states she has a history of schizoaffective disorder, and is seeing things and hearing voices. Patient denies SI, nausea, vomiting, chest pain, abdominal pain, diarrhea.     ALLERGIES:  walnut (Other)  No Known Drug Allergies    MEDICATIONS  (STANDING):  aspirin enteric coated 81 milliGRAM(s) Oral daily  atorvastatin 80 milliGRAM(s) Oral at bedtime  budesonide  80 MICROgram(s)/formoterol 4.5 MICROgram(s) Inhaler 2 Puff(s) Inhalation two times a day  cefTRIAXone   IVPB 2000 milliGRAM(s) IV Intermittent every 24 hours  cefTRIAXone   IVPB      dextrose 5%. 1000 milliLiter(s) (50 mL/Hr) IV Continuous <Continuous>  dextrose 5%. 1000 milliLiter(s) (100 mL/Hr) IV Continuous <Continuous>  dextrose 50% Injectable 25 Gram(s) IV Push once  dextrose 50% Injectable 25 Gram(s) IV Push once  dextrose 50% Injectable 12.5 Gram(s) IV Push once  enoxaparin Injectable 40 milliGRAM(s) SubCutaneous every 24 hours  FLUoxetine 20 milliGRAM(s) Oral at bedtime  FLUoxetine 20 milliGRAM(s) Oral two times a day  fluPHENAZine 5 milliGRAM(s) Oral at bedtime  fluticasone propionate 50 MICROgram(s)/spray Nasal Spray 1 Spray(s) Both Nostrils two times a day  gabapentin 100 milliGRAM(s) Oral two times a day  glucagon  Injectable 1 milliGRAM(s) IntraMuscular once  insulin glargine Injectable (LANTUS) 40 Unit(s) SubCutaneous at bedtime  insulin lispro (ADMELOG) corrective regimen sliding scale   SubCutaneous three times a day before meals  losartan 100 milliGRAM(s) Oral daily  montelukast 10 milliGRAM(s) Oral daily  pantoprazole    Tablet 40 milliGRAM(s) Oral before breakfast  polyethylene glycol 3350 17 Gram(s) Oral daily  prazosin. 1 milliGRAM(s) Oral daily  senna 2 Tablet(s) Oral at bedtime    MEDICATIONS  (PRN):  acetaminophen     Tablet .. 650 milliGRAM(s) Oral every 6 hours PRN Temp greater or equal to 38C (100.4F), Mild Pain (1 - 3)  albuterol    90 MICROgram(s) HFA Inhaler 2 Puff(s) Inhalation every 6 hours PRN Shortness of Breath  dextrose Oral Gel 15 Gram(s) Oral once PRN Blood Glucose LESS THAN 70 milliGRAM(s)/deciliter  ondansetron   Disintegrating Tablet 4 milliGRAM(s) Oral every 6 hours PRN Nausea and/or Vomiting  simethicone 80 milliGRAM(s) Chew three times a day PRN Gas    Vital Signs Last 24 Hrs  T(F): 98.2 (10 Blayne 2024 05:15), Max: 98.8 (09 Jan 2024 15:40)  HR: 76 (10 Blayne 2024 09:49) (76 - 81)  BP: 117/66 (10 Blayne 2024 05:15) (117/66 - 127/86)  RR: 16 (10 Blayne 2024 05:15) (16 - 19)  SpO2: 97% (10 Blayne 2024 09:49) (95% - 97%)  I&O's Summary    09 Jan 2024 07:01  -  10 Blayne 2024 07:00  --------------------------------------------------------  IN: 450 mL / OUT: 0 mL / NET: 450 mL      PHYSICAL EXAM:  General: NAD, A/O x 3, obese  ENT: No gross hearing impairment, Moist mucous membranes, no thrush  Neck: Supple, No JVD  Lungs: Clear to auscultation bilaterally, good air entry, non-labored breathing  Cardio: RRR, S1/S2, No murmur  Abdomen: Soft, Nontender, Nondistended; Bowel sounds present  Extremities: No calf tenderness, No cyanosis, No pitting edema    LABS:                        12.2   7.65  )-----------( 325      ( 10 Blayne 2024 07:43 )             37.6     01-09    139  |  104  |  13  ----------------------------<  186  3.9   |  25  |  0.89    Ca    9.0      09 Jan 2024 09:07                                    POCT Blood Glucose.: 272 mg/dL (10 Blayne 2024 12:10)  POCT Blood Glucose.: 177 mg/dL (10 Blayne 2024 08:13)  POCT Blood Glucose.: 186 mg/dL (09 Jan 2024 21:54)  POCT Blood Glucose.: 173 mg/dL (09 Jan 2024 16:55)      Urinalysis Basic - ( 09 Jan 2024 09:07 )    Color: x / Appearance: x / SG: x / pH: x  Gluc: 186 mg/dL / Ketone: x  / Bili: x / Urobili: x   Blood: x / Protein: x / Nitrite: x   Leuk Esterase: x / RBC: x / WBC x   Sq Epi: x / Non Sq Epi: x / Bacteria: x        Culture - Urine (collected 06 Jan 2024 12:00)  Source: Clean Catch Clean Catch (Midstream)  Final Report (08 Jan 2024 10:59):    >=3 organisms. Probable collection contamination.    Culture - Blood (collected 06 Jan 2024 11:05)  Source: .Blood Blood-Peripheral  Preliminary Report (09 Jan 2024 15:01):    No growth at 72 Hours    Culture - Blood (collected 06 Jan 2024 11:01)  Source: .Blood Blood-Peripheral  Preliminary Report (09 Jan 2024 15:01):    No growth at 72 Hours    Culture - Urine (collected 04 Jan 2024 12:30)  Source: Clean Catch Clean Catch (Midstream)  Final Report (05 Jan 2024 13:55):    <10,000 CFU/mL Normal Urogenital Allyson    Culture - Blood (collected 04 Jan 2024 11:55)  Source: .Blood Blood-Peripheral  Final Report (09 Jan 2024 17:00):    No growth at 5 days    Culture - Blood (collected 04 Jan 2024 11:55)  Source: .Blood Blood-Peripheral  Gram Stain (06 Jan 2024 07:59):    Growth in anaerobic bottle: Gram Negative Rods  Final Report (07 Jan 2024 17:05):    Growth in anaerobic bottle: Escherichia coli    Direct identification is available within approximately 3-5    hours either by Blood Panel Multiplexed PCR or Direct    MALDI-TOF. Details: [email or web address]  Organism: Blood Culture PCR  Escherichia coli (07 Jan 2024 17:05)  Organism: Escherichia coli (07 Jan 2024 17:05)      -  Levofloxacin: R >4      -  Tobramycin: S <=2      -  Aztreonam: S <=4      -  Gentamicin: S <=2      -  Cefazolin: S <=2      -  Cefepime: S <=2      -  Piperacillin/Tazobactam: S <=8      -  Ciprofloxacin: R >2      -  Imipenem: S <=1      -  Ceftriaxone: S <=1      -  Ampicillin: R >16 These ampicillin results predict results for amoxicillin      Method Type: LAMIN      -  Meropenem: S <=1      -  Ampicillin/Sulbactam: I 16/8      -  Cefoxitin: S <=8      -  Trimethoprim/Sulfamethoxazole: S <=0.5/9.5      -  Ertapenem: S <=0.5  Organism: Blood Culture PCR (07 Jan 2024 17:05)      -  Escherichia coli: Detec      Method Type: PCR        RADIOLOGY & ADDITIONAL TESTS:    Care Discussed with Consultants/Other Providers:    Patient is a 58y old Female who presents with a chief complaint of bacteremia (10 Blayne 2024 12:14)      Patient seen and examined at bedside. No overnight events reported. Patient states she is feeling ok, but her "psych symptoms are acting up." Patient states she has a history of schizoaffective disorder, and is seeing things and hearing voices. Patient denies SI, nausea, vomiting, chest pain, abdominal pain, diarrhea.     ALLERGIES:  walnut (Other)  No Known Drug Allergies    MEDICATIONS  (STANDING):  aspirin enteric coated 81 milliGRAM(s) Oral daily  atorvastatin 80 milliGRAM(s) Oral at bedtime  budesonide  80 MICROgram(s)/formoterol 4.5 MICROgram(s) Inhaler 2 Puff(s) Inhalation two times a day  cefTRIAXone   IVPB 2000 milliGRAM(s) IV Intermittent every 24 hours  cefTRIAXone   IVPB      dextrose 5%. 1000 milliLiter(s) (50 mL/Hr) IV Continuous <Continuous>  dextrose 5%. 1000 milliLiter(s) (100 mL/Hr) IV Continuous <Continuous>  dextrose 50% Injectable 25 Gram(s) IV Push once  dextrose 50% Injectable 25 Gram(s) IV Push once  dextrose 50% Injectable 12.5 Gram(s) IV Push once  enoxaparin Injectable 40 milliGRAM(s) SubCutaneous every 24 hours  FLUoxetine 20 milliGRAM(s) Oral at bedtime  FLUoxetine 20 milliGRAM(s) Oral two times a day  fluPHENAZine 5 milliGRAM(s) Oral at bedtime  fluticasone propionate 50 MICROgram(s)/spray Nasal Spray 1 Spray(s) Both Nostrils two times a day  gabapentin 100 milliGRAM(s) Oral two times a day  glucagon  Injectable 1 milliGRAM(s) IntraMuscular once  insulin glargine Injectable (LANTUS) 40 Unit(s) SubCutaneous at bedtime  insulin lispro (ADMELOG) corrective regimen sliding scale   SubCutaneous three times a day before meals  losartan 100 milliGRAM(s) Oral daily  montelukast 10 milliGRAM(s) Oral daily  pantoprazole    Tablet 40 milliGRAM(s) Oral before breakfast  polyethylene glycol 3350 17 Gram(s) Oral daily  prazosin. 1 milliGRAM(s) Oral daily  senna 2 Tablet(s) Oral at bedtime    MEDICATIONS  (PRN):  acetaminophen     Tablet .. 650 milliGRAM(s) Oral every 6 hours PRN Temp greater or equal to 38C (100.4F), Mild Pain (1 - 3)  albuterol    90 MICROgram(s) HFA Inhaler 2 Puff(s) Inhalation every 6 hours PRN Shortness of Breath  dextrose Oral Gel 15 Gram(s) Oral once PRN Blood Glucose LESS THAN 70 milliGRAM(s)/deciliter  ondansetron   Disintegrating Tablet 4 milliGRAM(s) Oral every 6 hours PRN Nausea and/or Vomiting  simethicone 80 milliGRAM(s) Chew three times a day PRN Gas    Vital Signs Last 24 Hrs  T(F): 98.2 (10 Blayne 2024 05:15), Max: 98.8 (09 Jan 2024 15:40)  HR: 76 (10 Blayne 2024 09:49) (76 - 81)  BP: 117/66 (10 Blayne 2024 05:15) (117/66 - 127/86)  RR: 16 (10 Blayne 2024 05:15) (16 - 19)  SpO2: 97% (10 Blayne 2024 09:49) (95% - 97%)  I&O's Summary    09 Jan 2024 07:01  -  10 Blayne 2024 07:00  --------------------------------------------------------  IN: 450 mL / OUT: 0 mL / NET: 450 mL      PHYSICAL EXAM:  General: NAD, A/O x 3, obese  ENT: No gross hearing impairment, Moist mucous membranes, no thrush  Neck: Supple, No JVD  Lungs: Clear to auscultation bilaterally, good air entry, non-labored breathing  Cardio: RRR, S1/S2, No murmur  Abdomen: Soft, Nontender, Nondistended; Bowel sounds present  Extremities: No calf tenderness, No cyanosis, No pitting edema    LABS:                        12.2   7.65  )-----------( 325      ( 10 Blayne 2024 07:43 )             37.6     01-09    139  |  104  |  13  ----------------------------<  186  3.9   |  25  |  0.89    Ca    9.0      09 Jan 2024 09:07                                    POCT Blood Glucose.: 272 mg/dL (10 Blayne 2024 12:10)  POCT Blood Glucose.: 177 mg/dL (10 Blayne 2024 08:13)  POCT Blood Glucose.: 186 mg/dL (09 Jan 2024 21:54)  POCT Blood Glucose.: 173 mg/dL (09 Jan 2024 16:55)      Urinalysis Basic - ( 09 Jan 2024 09:07 )    Color: x / Appearance: x / SG: x / pH: x  Gluc: 186 mg/dL / Ketone: x  / Bili: x / Urobili: x   Blood: x / Protein: x / Nitrite: x   Leuk Esterase: x / RBC: x / WBC x   Sq Epi: x / Non Sq Epi: x / Bacteria: x        Culture - Urine (collected 06 Jan 2024 12:00)  Source: Clean Catch Clean Catch (Midstream)  Final Report (08 Jan 2024 10:59):    >=3 organisms. Probable collection contamination.    Culture - Blood (collected 06 Jan 2024 11:05)  Source: .Blood Blood-Peripheral  Preliminary Report (09 Jan 2024 15:01):    No growth at 72 Hours    Culture - Blood (collected 06 Jan 2024 11:01)  Source: .Blood Blood-Peripheral  Preliminary Report (09 Jan 2024 15:01):    No growth at 72 Hours    Culture - Urine (collected 04 Jan 2024 12:30)  Source: Clean Catch Clean Catch (Midstream)  Final Report (05 Jan 2024 13:55):    <10,000 CFU/mL Normal Urogenital Allyson    Culture - Blood (collected 04 Jan 2024 11:55)  Source: .Blood Blood-Peripheral  Final Report (09 Jan 2024 17:00):    No growth at 5 days    Culture - Blood (collected 04 Jan 2024 11:55)  Source: .Blood Blood-Peripheral  Gram Stain (06 Jan 2024 07:59):    Growth in anaerobic bottle: Gram Negative Rods  Final Report (07 Jan 2024 17:05):    Growth in anaerobic bottle: Escherichia coli    Direct identification is available within approximately 3-5    hours either by Blood Panel Multiplexed PCR or Direct    MALDI-TOF. Details: [email or web address]  Organism: Blood Culture PCR  Escherichia coli (07 Jan 2024 17:05)  Organism: Escherichia coli (07 Jan 2024 17:05)      -  Levofloxacin: R >4      -  Tobramycin: S <=2      -  Aztreonam: S <=4      -  Gentamicin: S <=2      -  Cefazolin: S <=2      -  Cefepime: S <=2      -  Piperacillin/Tazobactam: S <=8      -  Ciprofloxacin: R >2      -  Imipenem: S <=1      -  Ceftriaxone: S <=1      -  Ampicillin: R >16 These ampicillin results predict results for amoxicillin      Method Type: LAMIN      -  Meropenem: S <=1      -  Ampicillin/Sulbactam: I 16/8      -  Cefoxitin: S <=8      -  Trimethoprim/Sulfamethoxazole: S <=0.5/9.5      -  Ertapenem: S <=0.5  Organism: Blood Culture PCR (07 Jan 2024 17:05)      -  Escherichia coli: Detec      Method Type: PCR        RADIOLOGY & ADDITIONAL TESTS:    Care Discussed with Consultants/Other Providers:

## 2024-01-10 NOTE — BH CONSULTATION LIAISON ASSESSMENT NOTE - ADDITIONAL DETAILS ALL
Patient reports history of suicide attempt about 7 years ago, where she tried to suffocate self with pillow. Aborted attempt and sought help.

## 2024-01-10 NOTE — BH CONSULTATION LIAISON ASSESSMENT NOTE - NSBHCHARTREVIEWVS_PSY_A_CORE FT
Vital Signs Last 24 Hrs  T(C): 36.8 (10 Blayne 2024 05:15), Max: 36.8 (10 Blayne 2024 05:15)  T(F): 98.2 (10 Blayne 2024 05:15), Max: 98.2 (10 Blayne 2024 05:15)  HR: 76 (10 Blayne 2024 09:49) (76 - 81)  BP: 117/66 (10 Blayne 2024 05:15) (117/66 - 124/79)  BP(mean): --  RR: 16 (10 Blayne 2024 05:15) (16 - 18)  SpO2: 97% (10 Blayne 2024 09:49) (96% - 97%)    Parameters below as of 10 Blayne 2024 09:49  Patient On (Oxygen Delivery Method): room air

## 2024-01-10 NOTE — BH CONSULTATION LIAISON ASSESSMENT NOTE - ADDITIONAL PSYCHIATRIC MEDICATIONS
Current outpatient med regimen:  Prolixin 5mg  Caplyta 42mg  Prazosin 1mg daily  Prozac 20mg three times a day  Gabapentin 100mg PRN twice a day.

## 2024-01-10 NOTE — PROGRESS NOTE ADULT - ASSESSMENT
59 y/o F with PMH Type 2 DM, HTN, HLD, GERD, asthma, anxiety, sleep apnea, schizoaffective disorder, bipolar disorder, migraines, presented to ED 1/4/24 c/o emesis, mild cough, nasalk congestion and weakness, found with adenovirus, medically optimized and discharged home. Returned to ED today for positive GNR in blood cultures from 1/4/24.    Ecoli bacteremia- unknown source   -Afebrile, hemodynamically stable  -RVP positive for adenovirus 1/4/24, continue supportive care  -repeat BC NGTD  -Ceftriaxone 2 grams q24 as per ID   -ID consult recs appreciated and reviewed  -CT abdomen -Stable 2 cm indeterminate right upper pole renal lesion. No acute intra-abdominal pathology.   -abd ultrasound--- Focal wall thickening in the region of the gallbladder neck, corresponding to a region of nonspecific thickening noted on CT examination, possible adenomyomatosis. No gallstones or diffuse gallbladder wall thickening noted.   -GI consult for hx of gastric ulcer HX   -Ordered MRCP    Schizoaffective disorder - chronic  -Continue home psych meds  -Psychiatry consult     Morbid Obesity  -nutrition consult    Type 2 DM  -FS, ISS  -Lantus 40 u nightly  HGBA1c 7.4    Asthma, stable  -Continue albuterol prn, montelukast, symbicort    GERD  -Continue PPI    Sleep Apnea  -CPAP settings      Vitals q8h  Diet: Carb consistent  PT/OT as tolerated  DVT ppx: Lovenox  Standard precautions: Aspiration, fall, safety, seizure, skin  Code Status - Full Code    Plans to update family herself   HCP - sister Nevaeh   59 y/o F with PMH Type 2 DM, HTN, HLD, GERD, asthma, anxiety, sleep apnea, schizoaffective disorder, bipolar disorder, migraines, presented to ED 1/4/24 c/o emesis, mild cough, nasalk congestion and weakness, found with adenovirus, medically optimized and discharged home. Returned to ED today for positive GNR in blood cultures from 1/4/24.    Ecoli bacteremia- unknown source   -Afebrile, hemodynamically stable  -RVP positive for adenovirus 1/4/24, continue supportive care  -repeat BC NGTD  -Ceftriaxone 2 grams q24 as per ID   -ID consult recs appreciated and reviewed  -CT abdomen -Stable 2 cm indeterminate right upper pole renal lesion. No acute intra-abdominal pathology.   -abd ultrasound--- Focal wall thickening in the region of the gallbladder neck, corresponding to a region of nonspecific thickening noted on CT examination, possible adenomyomatosis. No gallstones or diffuse gallbladder wall thickening noted.   -GI consult for hx of gastric ulcer HX   -Ordered MRCP    Schizoaffective disorder - chronic  -Continue home psych meds  -Psychiatry consult     Morbid Obesity  -nutrition consult    Type 2 DM  -FS, ISS  -Lantus 40 u nightly  HGBA1c 7.4    Asthma, stable  -Continue albuterol prn, montelukast, symbicort    GERD  -Continue PPI    Sleep Apnea  -CPAP settings      Vitals q8h  Diet: Carb consistent  PT/OT as tolerated  DVT ppx: Lovenox  Standard precautions: Aspiration, fall, safety, seizure, skin  Code Status - Full Code    HCP - sister Nevaeh  updated

## 2024-01-10 NOTE — BH CONSULTATION LIAISON ASSESSMENT NOTE - RISK ASSESSMENT
Risk factors include chronic psychiatric illness, depression, history of suicide attempt.    Protective factors include strong family support system, residential stability, and no current feelings of suicidality.

## 2024-01-10 NOTE — PROGRESS NOTE ADULT - SUBJECTIVE AND OBJECTIVE BOX
CC: f/u for    Patient reports    REVIEW OF SYSTEMS:  All other review of systems negative (Comprehensive ROS)    Antimicrobials Day #  :5  cefTRIAXone   IVPB      cefTRIAXone   IVPB 2000 milliGRAM(s) IV Intermittent every 24 hours    Other Medications Reviewed    T(F): 98.2 (01-10-24 @ 05:15), Max: 98.8 (01-09-24 @ 15:40)  HR: 76 (01-10-24 @ 09:49)  BP: 117/66 (01-10-24 @ 05:15)  RR: 16 (01-10-24 @ 05:15)  SpO2: 97% (01-10-24 @ 09:49)  Wt(kg): --    PHYSICAL EXAM:  General: alert, no acute distress  Eyes:  anicteric, no conjunctival injection, no discharge  Oropharynx: no lesions or injection 	  Neck: supple, without adenopathy  Lungs: clear to auscultation  Heart: regular rate and rhythm; no murmur, rubs or gallops  Abdomen: soft, nondistended, nontender, without mass or organomegaly  Skin: no lesions  Extremities: no clubbing, cyanosis, or edema  Neurologic: alert, oriented, moves all extremities    LAB RESULTS:                        12.2   7.65  )-----------( 325      ( 10 Blayne 2024 07:43 )             37.6     01-09    139  |  104  |  13  ----------------------------<  186<H>  3.9   |  25  |  0.89    Ca    9.0      09 Jan 2024 09:07        Urinalysis Basic - ( 09 Jan 2024 09:07 )    Color: x / Appearance: x / SG: x / pH: x  Gluc: 186 mg/dL / Ketone: x  / Bili: x / Urobili: x   Blood: x / Protein: x / Nitrite: x   Leuk Esterase: x / RBC: x / WBC x   Sq Epi: x / Non Sq Epi: x / Bacteria: x      MICROBIOLOGY:  RECENT CULTURES:  01-06 @ 12:00 Clean Catch Clean Catch (Midstream)     >=3 organisms. Probable collection contamination.      01-06 @ 11:05 .Blood Blood-Peripheral     No growth at 72 Hours      01-06 @ 11:01 .Blood Blood-Peripheral     No growth at 72 Hours          RADIOLOGY REVIEWED:              Assessment:  Patient with morbid obesity, dm, uti in the past who presented almost a week ago with fever, vomiting, aches, positive for adenovuris so sent home from the ED. She was called back 4 d ago because blood cx grew 1/4 ecoli. Source is not clear. UA is neg, uc was neg, abdomen is benign. Maybe she aspiratred with the vomiting from adenovirus and had a resultant transient bacteremia but not behaving like  a gnr pneumonia. Her gb is a bit  abnormal so maybe she has satnam and symptoms are blunted due to dm. US not suggestive of acute satnam but has the focal thickening of the gb. Will do mrcp to better characterize the gb findings. Patient also now reports some voices and would like to talk to psychiatry    Plan:  continue ceftriaxone, eventual po options for discharge  await mrcp  gi follows  psychiatry

## 2024-01-10 NOTE — BH CONSULTATION LIAISON ASSESSMENT NOTE - HPI (INCLUDE ILLNESS QUALITY, SEVERITY, DURATION, TIMING, CONTEXT, MODIFYING FACTORS, ASSOCIATED SIGNS AND SYMPTOMS)
Patient is a 57 y/o F with PMH Type 2 DM, HTN, HLD, GERD, asthma, anxiety, sleep apnea, schizoaffective disorder, bipolar disorder, migraines, presented to ED 1/4/24 c/o emesis, mild cough, nasal congestion and weakness, found with adenovirus, medically optimized and discharged home. Returned to ED today for positive GNR in blood cultures from 1/4/24. +Adenovirus. Psychiatry consulted for psychosis.     C/L Psychiatry Note:  Chart reviewed and patient evaluated. Patient presents sitting out of bed to chair and accompanied with her sister. Agreed for sister to remain and participate in interview. Upon evaluation, patient reports that she is distressed due to increase in both auditory and visual hallucinations. States that symptoms were much worse when she was first admitted, however, has noticed a decrease in both frequency, intensity, and duration. Reports that she has auditory hallucinations in the form of voices telling her negative things, such as, "you are stupid." However, denies voices making comments regarding suicidality or harming others. Denies any command auditory hallucinations but does report a history of it. Patient also reports visual hallucinations in the form of seeing "people that are not there." Describes these people to be both disturbing and intimidating, causing patient to feel upset. Reports that she last heard auditory hallucinations a few hours ago and saw visual hallucinations about 15 minutes ago. At baseline, patient rarely experiences any hallucinations. Patient reports that she feels "a little depressed," but is having more good days than bad days. Endorses that increase in depression may be due to acute illness. States that her concentration is poor, energy is not great but is improving, appetite is good, and she has hypersomnia. Patient finds her anxiety to be more troubling, feeling that a "rock is on my head squeezing on all sides." Denies any recent panic attacks, but does endorse a history of it. Patient also endorses ideas of references but is self aware of them. Denies any suicidal/homicidal ideation, intent, or plan.  Patient is a 59 y/o F with PMH Type 2 DM, HTN, HLD, GERD, asthma, anxiety, sleep apnea, schizoaffective disorder, bipolar disorder, migraines, presented to ED 1/4/24 c/o emesis, mild cough, nasal congestion and weakness, found with adenovirus, medically optimized and discharged home. Returned to ED today for positive GNR in blood cultures from 1/4/24. +Adenovirus. Psychiatry consulted for psychosis.     C/L Psychiatry Note:  Chart reviewed and patient evaluated. Patient presents sitting out of bed to chair and accompanied with her sister. Agreed for sister to remain and participate in interview. Upon evaluation, patient reports that she is distressed due to increase in both auditory and visual hallucinations. States that symptoms were much worse when she was first admitted, however, has noticed a decrease in both frequency, intensity, and duration. Reports that she has auditory hallucinations in the form of voices telling her negative things, such as, "you are stupid." However, denies voices making comments regarding suicidality or harming others. Denies any command auditory hallucinations but does report a history of it. Patient also reports visual hallucinations in the form of seeing "people that are not there." Describes these people to be both disturbing and intimidating, causing patient to feel upset. Reports that she last heard auditory hallucinations a few hours ago and saw visual hallucinations about 15 minutes ago. At baseline, patient rarely experiences any hallucinations. Patient reports that she feels "a little depressed," but is having more good days than bad days. Endorses that increase in depression may be due to acute illness. States that her concentration is poor, energy is not great but is improving, appetite is good, and she has hypersomnia. Patient finds her anxiety to be more troubling, feeling that a "rock is on my head squeezing on all sides." Denies any recent panic attacks, but does endorse a history of it. Patient also endorses ideas of references but is self aware of them. Denies any suicidal/homicidal ideation, intent, or plan.

## 2024-01-10 NOTE — BH CONSULTATION LIAISON ASSESSMENT NOTE - DIFFERENTIAL
- Schizoaffective disorder, possibly exacerbated by acute medical illness.   - Generalized anxiety disorder.

## 2024-01-10 NOTE — BH CONSULTATION LIAISON ASSESSMENT NOTE - NSBHCHARTREVIEWINVESTIGATE_PSY_A_CORE FT
< from: 12 Lead ECG (01.06.24 @ 10:42) >      Ventricular Rate 85 BPM    Atrial Rate 85 BPM    P-R Interval 130 ms    QRS Duration 88 ms    Q-T Interval 372 ms    QTC Calculation(Bazett) 442 ms    P Axis 54 degrees    R Axis 15 degrees    T Axis 39 degrees    Diagnosis Line Normal sinus rhythm  Low voltage QRS  Poor R wave progression  Abnormal ECG  Confirmed by MARGO MARTINEZ, MARY VELEZ ([id number]) on 1/7/2024 8:18:38 AM    < end of copied text >    < from: CT Abdomen and Pelvis w/ IV Cont (01.07.24 @ 12:16) >    ACC: [id number] EXAM:  CT ABDOMEN AND PELVIS IC   ORDERED BY: AMADOU ADAMES     PROCEDURE DATE:  01/07/2024          INTERPRETATION:  CLINICAL INFORMATION: Ecoli bacteremia.    COMPARISON: CT scan abdomen pelvis 04/26/2022.    CONTRAST/COMPLICATIONS:  IV Contrast: Omnipaque 350  90 cc administered   10 cc discarded  Oral Contrast: NONE  Complications: None reported at time of study completion    PROCEDURE:  CT of the Abdomen and Pelvis was performed.  Sagittal and coronal reformats were performed.    FINDINGS:    LOWER CHEST:  Small hiatal hernia.    LIVER: Within normal limits.  Small subcentimeter short axis periportal/portacaval lymph nodes.  BILE DUCTS: Normal caliber.  GALLBLADDER: Focal wall thickening near the fundus, may reflect   adenomyomatosis.  SPLEEN: Within normal limits.  PANCREAS: Within normal limits.  ADRENALS: Within normal limits.  KIDNEYS/URETERS:  2 cm indeterminate hypodense lesion upper pole right kidney is stable in   appearance.  Bilateral subcentimeter hypodensities too small to characterize, appears   stable.    No hydronephrosis.    BLADDER: Distended, correlate clinically with urine output.  REPRODUCTIVE ORGANS:  The uterus and adnexa appear within normal limits.    BOWEL:  No bowel obstruction.  Appendix isnormal.  PERITONEUM: No ascites.    VESSELS: Within normal limits.  RETROPERITONEUM/LYMPH NODES: No lymphadenopathy.    ABDOMINAL WALL:  Small bilateral fat-containing inguinal hernias.    BONES: Degenerative changes.    IMPRESSION:    Stable 2 cm indeterminate right upper pole renal lesion.  This can be further characterized by nonemergent renal ultrasonography if   this has not already been performed.    No acute intra-abdominal pathology.    Other findings as discussed above.    --- End of Report ---            VIDHYA HARDEN MD; Attending Radiologist  This document has been electronically signed. Jan 7 2024  2:44PM    < end of copied text >       < from: 12 Lead ECG (01.06.24 @ 10:42) >      Ventricular Rate 85 BPM    Atrial Rate 85 BPM    P-R Interval 130 ms    QRS Duration 88 ms    Q-T Interval 372 ms    QTC Calculation(Bazett) 442 ms    P Axis 54 degrees    R Axis 15 degrees    T Axis 39 degrees    Diagnosis Line Normal sinus rhythm  Low voltage QRS  Poor R wave progression  Abnormal ECG  Confirmed by MARGO MARTINEZ, MARY VELEZ ([id number]) on 1/7/2024 8:18:38 AM    < end of copied text >    < from: CT Abdomen and Pelvis w/ IV Cont (01.07.24 @ 12:16) >    ACC: [id number] EXAM:  CT ABDOMEN AND PELVIS IC   ORDERED BY: AMADOU ADAMES     PROCEDURE DATE:  01/07/2024          INTERPRETATION:  CLINICAL INFORMATION: Ecoli bacteremia.    COMPARISON: CT scan abdomen pelvis 04/26/2022.    CONTRAST/COMPLICATIONS:  IV Contrast: Omnipaque 350  90 cc administered   10 cc discarded  Oral Contrast: NONE  Complications: None reported at time of study completion    PROCEDURE:  CT of the Abdomen and Pelvis was performed.  Sagittal and coronal reformats were performed.    FINDINGS:    LOWER CHEST:  Small hiatal hernia.    LIVER: Within normal limits.  Small subcentimeter short axis periportal/portacaval lymph nodes.  BILE DUCTS: Normal caliber.  GALLBLADDER: Focal wall thickening near the fundus, may reflect   adenomyomatosis.  SPLEEN: Within normal limits.  PANCREAS: Within normal limits.  ADRENALS: Within normal limits.  KIDNEYS/URETERS:  2 cm indeterminate hypodense lesion upper pole right kidney is stable in   appearance.  Bilateral subcentimeter hypodensities too small to characterize, appears   stable.    No hydronephrosis.    BLADDER: Distended, correlate clinically with urine output.  REPRODUCTIVE ORGANS:  The uterus and adnexa appear within normal limits.    BOWEL:  No bowel obstruction.  Appendix isnormal.  PERITONEUM: No ascites.    VESSELS: Within normal limits.  RETROPERITONEUM/LYMPH NODES: No lymphadenopathy.    ABDOMINAL WALL:  Small bilateral fat-containing inguinal hernias.    BONES: Degenerative changes.    IMPRESSION:    Stable 2 cm indeterminate right upper pole renal lesion.  This can be further characterized by nonemergent renal ultrasonography if   this has not already been performed.    No acute intra-abdominal pathology.    Other findings as discussed above.    --- End of Report ---            VIDHYA HARDEN MD; Attending Radiologist  This document has been electronically signed. Jan 7 2024  2:44PM    < end of copied text >

## 2024-01-10 NOTE — BH CONSULTATION LIAISON ASSESSMENT NOTE - NSBHMSELANG_PSY_A_CORE
Prescription approved per [facility] Refill Protocol.    Aimee Delgado RN    No abnormalities noted

## 2024-01-11 ENCOUNTER — APPOINTMENT (OUTPATIENT)
Dept: MRI IMAGING | Facility: HOSPITAL | Age: 59
End: 2024-01-11

## 2024-01-11 DIAGNOSIS — R78.81 BACTEREMIA: ICD-10-CM

## 2024-01-11 LAB
ANION GAP SERPL CALC-SCNC: 9 MMOL/L — SIGNIFICANT CHANGE UP (ref 5–17)
ANION GAP SERPL CALC-SCNC: 9 MMOL/L — SIGNIFICANT CHANGE UP (ref 5–17)
BUN SERPL-MCNC: 12 MG/DL — SIGNIFICANT CHANGE UP (ref 7–23)
BUN SERPL-MCNC: 12 MG/DL — SIGNIFICANT CHANGE UP (ref 7–23)
CALCIUM SERPL-MCNC: 9.1 MG/DL — SIGNIFICANT CHANGE UP (ref 8.4–10.5)
CALCIUM SERPL-MCNC: 9.1 MG/DL — SIGNIFICANT CHANGE UP (ref 8.4–10.5)
CHLORIDE SERPL-SCNC: 106 MMOL/L — SIGNIFICANT CHANGE UP (ref 96–108)
CHLORIDE SERPL-SCNC: 106 MMOL/L — SIGNIFICANT CHANGE UP (ref 96–108)
CO2 SERPL-SCNC: 24 MMOL/L — SIGNIFICANT CHANGE UP (ref 22–31)
CO2 SERPL-SCNC: 24 MMOL/L — SIGNIFICANT CHANGE UP (ref 22–31)
CREAT SERPL-MCNC: 0.93 MG/DL — SIGNIFICANT CHANGE UP (ref 0.5–1.3)
CREAT SERPL-MCNC: 0.93 MG/DL — SIGNIFICANT CHANGE UP (ref 0.5–1.3)
CULTURE RESULTS: SIGNIFICANT CHANGE UP
EGFR: 71 ML/MIN/1.73M2 — SIGNIFICANT CHANGE UP
EGFR: 71 ML/MIN/1.73M2 — SIGNIFICANT CHANGE UP
GLUCOSE BLDC GLUCOMTR-MCNC: 129 MG/DL — HIGH (ref 70–99)
GLUCOSE BLDC GLUCOMTR-MCNC: 129 MG/DL — HIGH (ref 70–99)
GLUCOSE BLDC GLUCOMTR-MCNC: 144 MG/DL — HIGH (ref 70–99)
GLUCOSE BLDC GLUCOMTR-MCNC: 144 MG/DL — HIGH (ref 70–99)
GLUCOSE BLDC GLUCOMTR-MCNC: 154 MG/DL — HIGH (ref 70–99)
GLUCOSE BLDC GLUCOMTR-MCNC: 154 MG/DL — HIGH (ref 70–99)
GLUCOSE BLDC GLUCOMTR-MCNC: 281 MG/DL — HIGH (ref 70–99)
GLUCOSE BLDC GLUCOMTR-MCNC: 281 MG/DL — HIGH (ref 70–99)
GLUCOSE SERPL-MCNC: 170 MG/DL — HIGH (ref 70–99)
GLUCOSE SERPL-MCNC: 170 MG/DL — HIGH (ref 70–99)
POTASSIUM SERPL-MCNC: 3.9 MMOL/L — SIGNIFICANT CHANGE UP (ref 3.5–5.3)
POTASSIUM SERPL-MCNC: 3.9 MMOL/L — SIGNIFICANT CHANGE UP (ref 3.5–5.3)
POTASSIUM SERPL-SCNC: 3.9 MMOL/L — SIGNIFICANT CHANGE UP (ref 3.5–5.3)
POTASSIUM SERPL-SCNC: 3.9 MMOL/L — SIGNIFICANT CHANGE UP (ref 3.5–5.3)
SODIUM SERPL-SCNC: 139 MMOL/L — SIGNIFICANT CHANGE UP (ref 135–145)
SODIUM SERPL-SCNC: 139 MMOL/L — SIGNIFICANT CHANGE UP (ref 135–145)
SPECIMEN SOURCE: SIGNIFICANT CHANGE UP

## 2024-01-11 PROCEDURE — 74183 MRI ABD W/O CNTR FLWD CNTR: CPT | Mod: 26

## 2024-01-11 PROCEDURE — 99232 SBSQ HOSP IP/OBS MODERATE 35: CPT

## 2024-01-11 PROCEDURE — 99233 SBSQ HOSP IP/OBS HIGH 50: CPT | Mod: FS

## 2024-01-11 PROCEDURE — 99233 SBSQ HOSP IP/OBS HIGH 50: CPT

## 2024-01-11 RX ADMIN — Medication 1: at 12:46

## 2024-01-11 RX ADMIN — GABAPENTIN 100 MILLIGRAM(S): 400 CAPSULE ORAL at 17:50

## 2024-01-11 RX ADMIN — PANTOPRAZOLE SODIUM 40 MILLIGRAM(S): 20 TABLET, DELAYED RELEASE ORAL at 06:10

## 2024-01-11 RX ADMIN — ATORVASTATIN CALCIUM 80 MILLIGRAM(S): 80 TABLET, FILM COATED ORAL at 23:04

## 2024-01-11 RX ADMIN — MONTELUKAST 10 MILLIGRAM(S): 4 TABLET, CHEWABLE ORAL at 12:39

## 2024-01-11 RX ADMIN — FLUPHENAZINE HYDROCHLORIDE 5 MILLIGRAM(S): 1 TABLET, FILM COATED ORAL at 23:04

## 2024-01-11 RX ADMIN — GABAPENTIN 100 MILLIGRAM(S): 400 CAPSULE ORAL at 06:10

## 2024-01-11 RX ADMIN — Medication 1 SPRAY(S): at 06:11

## 2024-01-11 RX ADMIN — LOSARTAN POTASSIUM 100 MILLIGRAM(S): 100 TABLET, FILM COATED ORAL at 06:10

## 2024-01-11 RX ADMIN — ENOXAPARIN SODIUM 40 MILLIGRAM(S): 100 INJECTION SUBCUTANEOUS at 12:39

## 2024-01-11 RX ADMIN — Medication 3: at 17:54

## 2024-01-11 RX ADMIN — Medication 1 SPRAY(S): at 18:37

## 2024-01-11 RX ADMIN — Medication 81 MILLIGRAM(S): at 12:39

## 2024-01-11 RX ADMIN — Medication 1 MILLIGRAM(S): at 06:11

## 2024-01-11 RX ADMIN — SENNA PLUS 2 TABLET(S): 8.6 TABLET ORAL at 23:05

## 2024-01-11 RX ADMIN — Medication 20 MILLIGRAM(S): at 23:04

## 2024-01-11 RX ADMIN — INSULIN GLARGINE 40 UNIT(S): 100 INJECTION, SOLUTION SUBCUTANEOUS at 23:10

## 2024-01-11 RX ADMIN — Medication 0.5 MILLIGRAM(S): at 08:51

## 2024-01-11 RX ADMIN — Medication 20 MILLIGRAM(S): at 06:11

## 2024-01-11 RX ADMIN — CEFTRIAXONE 100 MILLIGRAM(S): 500 INJECTION, POWDER, FOR SOLUTION INTRAMUSCULAR; INTRAVENOUS at 12:39

## 2024-01-11 RX ADMIN — BUDESONIDE AND FORMOTEROL FUMARATE DIHYDRATE 2 PUFF(S): 160; 4.5 AEROSOL RESPIRATORY (INHALATION) at 21:23

## 2024-01-11 RX ADMIN — Medication 20 MILLIGRAM(S): at 17:50

## 2024-01-11 NOTE — BH CONSULTATION LIAISON PROGRESS NOTE - NSBHATTESTBILLING_PSY_A_CORE
[id number] OBS or IP - moderate complexity OR 35-49 mins 11935-Kxnntyrefk OBS or IP - moderate complexity OR 35-49 mins

## 2024-01-11 NOTE — BH CONSULTATION LIAISON PROGRESS NOTE - GENERAL APPEARANCE
Patient is an age-appropriate female, initially sleeping in bed, short hair, and wearing a hospital gown./No deformities present

## 2024-01-11 NOTE — BH CONSULTATION LIAISON PROGRESS NOTE - NSBHASSESSMENTFT_PSY_ALL_CORE
Patient is a 59 y/o F with PMH Type 2 DM, HTN, HLD, GERD, asthma, anxiety, sleep apnea, schizoaffective disorder, bipolar disorder, migraines, presented to ED 1/4/24 c/o emesis, mild cough, nasal congestion and weakness, found with adenovirus, medically optimized and discharged home. Returned to ED today for positive GNR in blood cultures from 1/4/24. +Adenovirus. Psychiatry consulted for psychosis.     Patient presents with improving mood and well controlled anxiety. Reports she received Ativan before her MRCP. Denies any recent A/V hallucinations. Discussed plan of care regarding Caplyta, home medication is non formulary. Unable to be stocked in pharmacy due to being in blisters. HCP agrees that patient should not be trialed on another antipsychotic at this time; would prefer to restart Caplyta s/p discharge.

## 2024-01-11 NOTE — PROGRESS NOTE ADULT - ASSESSMENT
58 year old female with E.coli bacteremia with unknown source    Low suspicion for acute GI process though bacteremia source could be enteral.  Last EGD/ Colonoscopy 3 years before with Dr Paula

## 2024-01-11 NOTE — PROGRESS NOTE ADULT - SUBJECTIVE AND OBJECTIVE BOX
Patient is a 58y old Female who presents with a chief complaint of bacteremia (11 Jan 2024 10:51)      Patient seen and examined at bedside. No overnight events reported. Patient denies abdominal pain, sob, chest pain, vomiting, nausea. Patient states "psych symptoms are improving."     ALLERGIES:  walnut (Other)  No Known Drug Allergies    MEDICATIONS  (STANDING):  aspirin enteric coated 81 milliGRAM(s) Oral daily  atorvastatin 80 milliGRAM(s) Oral at bedtime  budesonide  80 MICROgram(s)/formoterol 4.5 MICROgram(s) Inhaler 2 Puff(s) Inhalation two times a day  cefTRIAXone   IVPB      cefTRIAXone   IVPB 2000 milliGRAM(s) IV Intermittent every 24 hours  dextrose 5%. 1000 milliLiter(s) (50 mL/Hr) IV Continuous <Continuous>  dextrose 5%. 1000 milliLiter(s) (100 mL/Hr) IV Continuous <Continuous>  dextrose 50% Injectable 25 Gram(s) IV Push once  dextrose 50% Injectable 25 Gram(s) IV Push once  dextrose 50% Injectable 12.5 Gram(s) IV Push once  enoxaparin Injectable 40 milliGRAM(s) SubCutaneous every 24 hours  FLUoxetine 20 milliGRAM(s) Oral two times a day  FLUoxetine 20 milliGRAM(s) Oral at bedtime  fluPHENAZine 5 milliGRAM(s) Oral at bedtime  fluticasone propionate 50 MICROgram(s)/spray Nasal Spray 1 Spray(s) Both Nostrils two times a day  gabapentin 100 milliGRAM(s) Oral two times a day  glucagon  Injectable 1 milliGRAM(s) IntraMuscular once  insulin glargine Injectable (LANTUS) 40 Unit(s) SubCutaneous at bedtime  insulin lispro (ADMELOG) corrective regimen sliding scale   SubCutaneous three times a day before meals  losartan 100 milliGRAM(s) Oral daily  montelukast 10 milliGRAM(s) Oral daily  pantoprazole    Tablet 40 milliGRAM(s) Oral before breakfast  polyethylene glycol 3350 17 Gram(s) Oral daily  prazosin. 1 milliGRAM(s) Oral daily  senna 2 Tablet(s) Oral at bedtime    MEDICATIONS  (PRN):  acetaminophen     Tablet .. 650 milliGRAM(s) Oral every 6 hours PRN Temp greater or equal to 38C (100.4F), Mild Pain (1 - 3)  albuterol    90 MICROgram(s) HFA Inhaler 2 Puff(s) Inhalation every 6 hours PRN Shortness of Breath  dextrose Oral Gel 15 Gram(s) Oral once PRN Blood Glucose LESS THAN 70 milliGRAM(s)/deciliter  ondansetron   Disintegrating Tablet 4 milliGRAM(s) Oral every 6 hours PRN Nausea and/or Vomiting  simethicone 80 milliGRAM(s) Chew three times a day PRN Gas    Vital Signs Last 24 Hrs  T(F): 97.9 (11 Jan 2024 05:25), Max: 97.9 (11 Jan 2024 05:25)  HR: 74 (11 Jan 2024 05:25) (74 - 74)  BP: 94/59 (11 Jan 2024 05:25) (94/59 - 121/78)  RR: 18 (11 Jan 2024 05:25) (18 - 18)  SpO2: 93% (11 Jan 2024 05:25) (93% - 97%)  I&O's Summary    PHYSICAL EXAM:  General: NAD, A/O x 3, obese  ENT: No gross hearing impairment, Moist mucous membranes, no thrush  Neck: Supple, No JVD  Lungs: Clear to auscultation bilaterally, good air entry, non-labored breathing  Cardio: RRR, S1/S2, No murmur  Abdomen: Soft, Nontender, Nondistended; Bowel sounds present  Extremities: No calf tenderness, No cyanosis, No pitting edema    LABS:                        12.2   7.65  )-----------( 325      ( 10 Blayne 2024 07:43 )             37.6     01-11    139  |  106  |  12  ----------------------------<  170  3.9   |  24  |  0.93    Ca    9.1      11 Jan 2024 07:07                                    POCT Blood Glucose.: 154 mg/dL (11 Jan 2024 12:34)  POCT Blood Glucose.: 144 mg/dL (11 Jan 2024 06:09)  POCT Blood Glucose.: 211 mg/dL (10 Blayne 2024 22:16)  POCT Blood Glucose.: 166 mg/dL (10 Blayne 2024 17:12)      Urinalysis Basic - ( 11 Jan 2024 07:07 )    Color: x / Appearance: x / SG: x / pH: x  Gluc: 170 mg/dL / Ketone: x  / Bili: x / Urobili: x   Blood: x / Protein: x / Nitrite: x   Leuk Esterase: x / RBC: x / WBC x   Sq Epi: x / Non Sq Epi: x / Bacteria: x        Culture - Urine (collected 06 Jan 2024 12:00)  Source: Clean Catch Clean Catch (Midstream)  Final Report (08 Jan 2024 10:59):    >=3 organisms. Probable collection contamination.    Culture - Blood (collected 06 Jan 2024 11:05)  Source: .Blood Blood-Peripheral  Preliminary Report (10 Blayne 2024 15:10):    No growth at 4 days    Culture - Blood (collected 06 Jan 2024 11:01)  Source: .Blood Blood-Peripheral  Preliminary Report (10 Blayne 2024 15:10):    No growth at 4 days        RADIOLOGY & ADDITIONAL TESTS:    Care Discussed with Consultants/Other Providers:    Patient is a 58y old Female who presents with a chief complaint of bacteremia (11 Jan 2024 10:51)      Patient seen and examined at bedside. No overnight events reported. Patient denies abdominal pain, sob, chest pain, vomiting, nausea. Patient states "psych symptoms are improving."     ALLERGIES:  walnut (Other)  No Known Drug Allergies    MEDICATIONS  (STANDING):  aspirin enteric coated 81 milliGRAM(s) Oral daily  atorvastatin 80 milliGRAM(s) Oral at bedtime  budesonide  80 MICROgram(s)/formoterol 4.5 MICROgram(s) Inhaler 2 Puff(s) Inhalation two times a day  cefTRIAXone   IVPB      cefTRIAXone   IVPB 2000 milliGRAM(s) IV Intermittent every 24 hours  dextrose 5%. 1000 milliLiter(s) (50 mL/Hr) IV Continuous <Continuous>  dextrose 5%. 1000 milliLiter(s) (100 mL/Hr) IV Continuous <Continuous>  dextrose 50% Injectable 25 Gram(s) IV Push once  dextrose 50% Injectable 25 Gram(s) IV Push once  dextrose 50% Injectable 12.5 Gram(s) IV Push once  enoxaparin Injectable 40 milliGRAM(s) SubCutaneous every 24 hours  FLUoxetine 20 milliGRAM(s) Oral two times a day  FLUoxetine 20 milliGRAM(s) Oral at bedtime  fluPHENAZine 5 milliGRAM(s) Oral at bedtime  fluticasone propionate 50 MICROgram(s)/spray Nasal Spray 1 Spray(s) Both Nostrils two times a day  gabapentin 100 milliGRAM(s) Oral two times a day  glucagon  Injectable 1 milliGRAM(s) IntraMuscular once  insulin glargine Injectable (LANTUS) 40 Unit(s) SubCutaneous at bedtime  insulin lispro (ADMELOG) corrective regimen sliding scale   SubCutaneous three times a day before meals  losartan 100 milliGRAM(s) Oral daily  montelukast 10 milliGRAM(s) Oral daily  pantoprazole    Tablet 40 milliGRAM(s) Oral before breakfast  polyethylene glycol 3350 17 Gram(s) Oral daily  prazosin. 1 milliGRAM(s) Oral daily  senna 2 Tablet(s) Oral at bedtime    MEDICATIONS  (PRN):  acetaminophen     Tablet .. 650 milliGRAM(s) Oral every 6 hours PRN Temp greater or equal to 38C (100.4F), Mild Pain (1 - 3)  albuterol    90 MICROgram(s) HFA Inhaler 2 Puff(s) Inhalation every 6 hours PRN Shortness of Breath  dextrose Oral Gel 15 Gram(s) Oral once PRN Blood Glucose LESS THAN 70 milliGRAM(s)/deciliter  ondansetron   Disintegrating Tablet 4 milliGRAM(s) Oral every 6 hours PRN Nausea and/or Vomiting  simethicone 80 milliGRAM(s) Chew three times a day PRN Gas    Vital Signs Last 24 Hrs  T(F): 97.9 (11 Jan 2024 05:25), Max: 97.9 (11 Jan 2024 05:25)  HR: 74 (11 Jan 2024 05:25) (74 - 74)  BP: 94/59 (11 Jan 2024 05:25) (94/59 - 121/78)  RR: 18 (11 Jan 2024 05:25) (18 - 18)  SpO2: 93% (11 Jan 2024 05:25) (93% - 97%)  I&O's Summary    PHYSICAL EXAM:  General: NAD, A/O x 3, obese  ENT: No gross hearing impairment, Moist mucous membranes, no thrush  Neck: Supple, No JVD  Lungs: Clear to auscultation bilaterally, good air entry, non-labored breathing  Cardio: RRR, S1/S2, No murmur  Abdomen: Soft, Nontender, Nondistended; Bowel sounds present  Extremities: No calf tenderness, No cyanosis, No pitting edema    LABS:                        12.2   7.65  )-----------( 325      ( 10 Blayne 2024 07:43 )             37.6     01-11    139  |  106  |  12  ----------------------------<  170  3.9   |  24  |  0.93    Ca    9.1      11 Jan 2024 07:07    POCT Blood Glucose.: 154 mg/dL (11 Jan 2024 12:34)  POCT Blood Glucose.: 144 mg/dL (11 Jan 2024 06:09)  POCT Blood Glucose.: 211 mg/dL (10 Blayne 2024 22:16)  POCT Blood Glucose.: 166 mg/dL (10 Blayne 2024 17:12)      Urinalysis Basic - ( 11 Jan 2024 07:07 )    Color: x / Appearance: x / SG: x / pH: x  Gluc: 170 mg/dL / Ketone: x  / Bili: x / Urobili: x   Blood: x / Protein: x / Nitrite: x   Leuk Esterase: x / RBC: x / WBC x   Sq Epi: x / Non Sq Epi: x / Bacteria: x    Culture - Urine (collected 06 Jan 2024 12:00)  Source: Clean Catch Clean Catch (Midstream)  Final Report (08 Jan 2024 10:59):    >=3 organisms. Probable collection contamination.    Culture - Blood (collected 06 Jan 2024 11:05)  Source: .Blood Blood-Peripheral  Preliminary Report (10 Blayne 2024 15:10):    No growth at 4 days    Culture - Blood (collected 06 Jan 2024 11:01)  Source: .Blood Blood-Peripheral  Preliminary Report (10 Blayne 2024 15:10):    No growth at 4 days

## 2024-01-11 NOTE — PROGRESS NOTE ADULT - SUBJECTIVE AND OBJECTIVE BOX
CC: f/u for  ecoli bacteemia  Patient reports  she is hungry  REVIEW OF SYSTEMS:  All other review of systems negative (Comprehensive ROS)    Antimicrobials Day #  :6/10  cefTRIAXone   IVPB 2000 milliGRAM(s) IV Intermittent every 24 hours  cefTRIAXone   IVPB        Other Medications Reviewed    T(F): 97.3 (01-11-24 @ 15:19), Max: 97.9 (01-11-24 @ 05:25)  HR: 71 (01-11-24 @ 15:19)  BP: 108/71 (01-11-24 @ 15:19)  RR: 18 (01-11-24 @ 15:19)  SpO2: 95% (01-11-24 @ 15:19)  Wt(kg): --    PHYSICAL EXAM:  General: alert, no acute distress  Eyes:  anicteric, no conjunctival injection, no discharge  Oropharynx: no lesions or injection 	  Neck: supple, without adenopathy  Lungs: clear to auscultation  Heart: regular rate and rhythm; no murmur, rubs or gallops  Abdomen: soft, nondistended, nontender, without mass or organomegaly  Skin: no lesions  Extremities: no clubbing, cyanosis, or edema  Neurologic: alert, oriented, moves all extremities    LAB RESULTS:                        12.2   7.65  )-----------( 325      ( 10 Blayne 2024 07:43 )             37.6     01-11    139  |  106  |  12  ----------------------------<  170<H>  3.9   |  24  |  0.93    Ca    9.1      11 Jan 2024 07:07        Urinalysis Basic - ( 11 Jan 2024 07:07 )    Color: x / Appearance: x / SG: x / pH: x  Gluc: 170 mg/dL / Ketone: x  / Bili: x / Urobili: x   Blood: x / Protein: x / Nitrite: x   Leuk Esterase: x / RBC: x / WBC x   Sq Epi: x / Non Sq Epi: x / Bacteria: x      MICROBIOLOGY:  RECENT CULTURES:      RADIOLOGY REVIEWED:  < from:  MRCP w/wo IV Cont (01.11.24 @ 11:17) >    ACC: [id number] EXAM:   MRCP WAW IC   ORDERED BY:  MELISA LOPEZ     PROCEDURE DATE:  01/11/2024          INTERPRETATION:  CLINICAL INFORMATION: Gallbladder neck thickening on CT   and ultrasound.    COMPARISON: CT dated 01/07/2024 and ultrasound dated 01/09/2024.    CONTRAST/COMPLICATIONS:  IV Contrast: Gadavist  10 cc administered   0 cc discarded  Oral Contrast: NONE  Complications: None reported at time of study completion    PROCEDURE:  MRI of the abdomen was performed.  MRCP was performed.    FINDINGS:  LOWER CHEST: Within normal limits.    LIVER: Within normal limits.  BILE DUCTS: Normal caliber.  GALLBLADDER: Focal approximately 8 mm thickening involving the neck of   the gallbladder. A tiny cystic focus is identified in this region (27:53,   4:21). This appearance is suggestive of focal adenomyomatosis. A   follow-up may be considered to document stability.  SPLEEN: Within normal limits.  PANCREAS: Within normal limits.  ADRENALS: Within normal limits.  KIDNEYS/URETERS: 2 cm sized T1 hyperintense cyst upper pole of the right   kidney compatible with a hemorrhagic or proteinaceous cyst. Multiple   additional tiny cystic foci in both kidneys.    VISUALIZED PORTIONS:  BOWEL: Within normal limits.  PERITONEUM: No ascites.  VESSELS: Within normal limits.  RETROPERITONEUM/LYMPH NODES: No lymphadenopathy.  ABDOMINAL WALL: Within normal limits.  BONES: Within normal limits.    IMPRESSION:  Probable focal adenomyomatosis involving the neck of the gallbladder as   described above.          < end of copied text >              Assessment:  Patient with morbid obesity,schizoaffective d/o dm, uti in the past who presented to ED last week  with fever, vomiting, aches, positive for adenovirus  so sent home from the ED. She was called back 5 d ago because blood cx grew 1/4 ecoli. Source is not clear. UA is neg, uc was neg, abdomen is benign. Maybe she aspiratred with the vomiting from adenovirus and had a resultant transient bacteremia but not behaving like  a gnr pneumonia. Her gb is a bit  abnormal so had mrcp and just shows adenomyomatosis of the gb neck. GI follows and await further input regarding whether there is any further need to address the gb and egd and colonoscopy are to be discussed to finish source search. Psychiatry evaluation appreciated  Plan:  continue ceftriaxone, eventual po option of ceftin  for discharge , will not need a picc  await gi plans CC: f/u for  ecoli bacteemia  Patient reports  she is hungry  REVIEW OF SYSTEMS:  All other review of systems negative (Comprehensive ROS)    Antimicrobials Day #  :6/10  cefTRIAXone   IVPB 2000 milliGRAM(s) IV Intermittent every 24 hours  cefTRIAXone   IVPB        Other Medications Reviewed    T(F): 97.3 (01-11-24 @ 15:19), Max: 97.9 (01-11-24 @ 05:25)  HR: 71 (01-11-24 @ 15:19)  BP: 108/71 (01-11-24 @ 15:19)  RR: 18 (01-11-24 @ 15:19)  SpO2: 95% (01-11-24 @ 15:19)  Wt(kg): --    PHYSICAL EXAM:  General: alert, no acute distress  Eyes:  anicteric, no conjunctival injection, no discharge  Oropharynx: no lesions or injection 	  Neck: supple, without adenopathy  Lungs: clear to auscultation  Heart: regular rate and rhythm; no murmur, rubs or gallops  Abdomen: soft, nondistended, nontender, without mass or organomegaly  Skin: no lesions  Extremities: no clubbing, cyanosis, or edema  Neurologic: alert, oriented, moves all extremities    LAB RESULTS:                        12.2   7.65  )-----------( 325      ( 10 Blayne 2024 07:43 )             37.6     01-11    139  |  106  |  12  ----------------------------<  170<H>  3.9   |  24  |  0.93    Ca    9.1      11 Jan 2024 07:07        Urinalysis Basic - ( 11 Jan 2024 07:07 )    Color: x / Appearance: x / SG: x / pH: x  Gluc: 170 mg/dL / Ketone: x  / Bili: x / Urobili: x   Blood: x / Protein: x / Nitrite: x   Leuk Esterase: x / RBC: x / WBC x   Sq Epi: x / Non Sq Epi: x / Bacteria: x      MICROBIOLOGY:  RECENT CULTURES:      RADIOLOGY REVIEWED:  < from:  MRCP w/wo IV Cont (01.11.24 @ 11:17) >    ACC: [id number] EXAM:   MRCP WAW IC   ORDERED BY:  MELISA LOPEZ     PROCEDURE DATE:  01/11/2024          INTERPRETATION:  CLINICAL INFORMATION: Gallbladder neck thickening on CT   and ultrasound.    COMPARISON: CT dated 01/07/2024 and ultrasound dated 01/09/2024.    CONTRAST/COMPLICATIONS:  IV Contrast: Gadavist  10 cc administered   0 cc discarded  Oral Contrast: NONE  Complications: None reported at time of study completion    PROCEDURE:  MRI of the abdomen was performed.  MRCP was performed.    FINDINGS:  LOWER CHEST: Within normal limits.    LIVER: Within normal limits.  BILE DUCTS: Normal caliber.  GALLBLADDER: Focal approximately 8 mm thickening involving the neck of   the gallbladder. A tiny cystic focus is identified in this region (27:53,   4:21). This appearance is suggestive of focal adenomyomatosis. A   follow-up may be considered to document stability.  SPLEEN: Within normal limits.  PANCREAS: Within normal limits.  ADRENALS: Within normal limits.  KIDNEYS/URETERS: 2 cm sized T1 hyperintense cyst upper pole of the right   kidney compatible with a hemorrhagic or proteinaceous cyst. Multiple   additional tiny cystic foci in both kidneys.    VISUALIZED PORTIONS:  BOWEL: Within normal limits.  PERITONEUM: No ascites.  VESSELS: Within normal limits.  RETROPERITONEUM/LYMPH NODES: No lymphadenopathy.  ABDOMINAL WALL: Within normal limits.  BONES: Within normal limits.    IMPRESSION:  Probable focal adenomyomatosis involving the neck of the gallbladder as   described above.          < end of copied text >              Assessment:  Patient with morbid obesity,schizoaffective d/o dm, uti in the past who presented to ED last week  with fever, vomiting, aches, positive for adenovirus  so sent home from the ED. She was called back 5 d ago because blood cx grew 1/4 ecoli. Source is not clear. UA is neg, uc was neg, abdomen is benign. Maybe she aspiratred with the vomiting from adenovirus and had a resultant transient bacteremia but not behaving like  a gnr pneumonia. Her gb is a bit  abnormal so had mrcp and just shows adenomyomatosis of the gb neck. GI follows and await further input regarding whether there is any further need to address the gb and egd and colonoscopy are to be discussed to finish source search. Psychiatry evaluation appreciated  Plan:  continue ceftriaxone, eventual po option of ceftin  for discharge , will not need a picc  await gi plans

## 2024-01-11 NOTE — PROGRESS NOTE ADULT - SUBJECTIVE AND OBJECTIVE BOX
INTERVAL HPI/OVERNIGHT EVENTS:  Patient seen and examined at bed side. No event over night. She is going for MRCP today. Had 1 bowel movement this morning, denies hematochezia or melena. Denies abdominal pain, nausea, vomiting, diarrhea.     MEDICATIONS  (STANDING):  aspirin enteric coated 81 milliGRAM(s) Oral daily  atorvastatin 80 milliGRAM(s) Oral at bedtime  budesonide  80 MICROgram(s)/formoterol 4.5 MICROgram(s) Inhaler 2 Puff(s) Inhalation two times a day  cefTRIAXone   IVPB 2000 milliGRAM(s) IV Intermittent every 24 hours  cefTRIAXone   IVPB      dextrose 5%. 1000 milliLiter(s) (100 mL/Hr) IV Continuous <Continuous>  dextrose 5%. 1000 milliLiter(s) (50 mL/Hr) IV Continuous <Continuous>  dextrose 50% Injectable 12.5 Gram(s) IV Push once  dextrose 50% Injectable 25 Gram(s) IV Push once  dextrose 50% Injectable 25 Gram(s) IV Push once  enoxaparin Injectable 40 milliGRAM(s) SubCutaneous every 24 hours  FLUoxetine 20 milliGRAM(s) Oral two times a day  FLUoxetine 20 milliGRAM(s) Oral at bedtime  fluPHENAZine 5 milliGRAM(s) Oral at bedtime  fluticasone propionate 50 MICROgram(s)/spray Nasal Spray 1 Spray(s) Both Nostrils two times a day  gabapentin 100 milliGRAM(s) Oral two times a day  glucagon  Injectable 1 milliGRAM(s) IntraMuscular once  insulin glargine Injectable (LANTUS) 40 Unit(s) SubCutaneous at bedtime  insulin lispro (ADMELOG) corrective regimen sliding scale   SubCutaneous three times a day before meals  losartan 100 milliGRAM(s) Oral daily  montelukast 10 milliGRAM(s) Oral daily  pantoprazole    Tablet 40 milliGRAM(s) Oral before breakfast  polyethylene glycol 3350 17 Gram(s) Oral daily  prazosin. 1 milliGRAM(s) Oral daily  senna 2 Tablet(s) Oral at bedtime    MEDICATIONS  (PRN):  acetaminophen     Tablet .. 650 milliGRAM(s) Oral every 6 hours PRN Temp greater or equal to 38C (100.4F), Mild Pain (1 - 3)  albuterol    90 MICROgram(s) HFA Inhaler 2 Puff(s) Inhalation every 6 hours PRN Shortness of Breath  dextrose Oral Gel 15 Gram(s) Oral once PRN Blood Glucose LESS THAN 70 milliGRAM(s)/deciliter  ondansetron   Disintegrating Tablet 4 milliGRAM(s) Oral every 6 hours PRN Nausea and/or Vomiting  simethicone 80 milliGRAM(s) Chew three times a day PRN Gas      Allergies    walnut (Other)  No Known Drug Allergies    Intolerances    Vital Signs Last 24 Hrs  T(C): 36.6 (11 Jan 2024 05:25), Max: 36.6 (11 Jan 2024 05:25)  T(F): 97.9 (11 Jan 2024 05:25), Max: 97.9 (11 Jan 2024 05:25)  HR: 74 (11 Jan 2024 05:25) (74 - 74)  BP: 94/59 (11 Jan 2024 05:25) (94/59 - 121/78)  BP(mean): --  RR: 18 (11 Jan 2024 05:25) (18 - 18)  SpO2: 93% (11 Jan 2024 05:25) (93% - 97%)    Parameters below as of 10 Blayne 2024 22:13  Patient On (Oxygen Delivery Method): room air        PHYSICAL EXAM:    Constitutional: NAD, well-developed  Neck: No LAD, supple  Respiratory: No SOB No distress   Cardiovascular: S1 and S2  Gastrointestinal:Soft non tender, non distended + BS   Extremities: No peripheral edema, neg clubbing, cyanosis  Neurological: A/O x 3   Skin: No rashes      LABS:                        12.2   7.65  )-----------( 325      ( 10 Blayne 2024 07:43 )             37.6     01-11    139  |  106  |  12  ----------------------------<  170<H>  3.9   |  24  |  0.93    Ca    9.1      11 Jan 2024 07:07        Urinalysis Basic - ( 11 Jan 2024 07:07 )    Color: x / Appearance: x / SG: x / pH: x  Gluc: 170 mg/dL / Ketone: x  / Bili: x / Urobili: x   Blood: x / Protein: x / Nitrite: x   Leuk Esterase: x / RBC: x / WBC x   Sq Epi: x / Non Sq Epi: x / Bacteria: x      LIVER FUNCTIONS - ( 07 Jan 2024 06:00 )  Alb: 2.7 g/dL / Pro: 6.6 g/dL / ALK PHOS: 82 U/L / ALT: 47 U/L / AST: 24 U/L / GGT: x             RADIOLOGY & ADDITIONAL TESTS:

## 2024-01-11 NOTE — BH CONSULTATION LIAISON PROGRESS NOTE - NSBHFUPINTERVALHXFT_PSY_A_CORE
Patient is a 59 y/o F with PMH Type 2 DM, HTN, HLD, GERD, asthma, anxiety, sleep apnea, schizoaffective disorder, bipolar disorder, migraines, presented to ED 1/4/24 c/o emesis, mild cough, nasal congestion and weakness, found with adenovirus, medically optimized and discharged home. Returned to ED today for positive GNR in blood cultures from 1/4/24. +Adenovirus. Psychiatry consulted for psychosis.     C/L Psychiatry Note:  Chart reviewed and patient evaluated. Upon evaluation, patient reports that she has been feeling better and that she completed her MRCP earlier today and is awaiting results. Patient unsure of disposition status due to pending MRCP. Patient reports that her mood is okay today and her anxiety feels well controlled. Admits that anxiety may be better due to earlier benzodiazepine use. Patient reports that she last experienced auditory and visual hallucinations last night. Discussed plan of care with patient, agreed to restarting home medication of Caplyta, as medication is non formulary.     Discussed with patient's HCP Nevaeh and Clinical Pharmacist Nancy; because Caplyta is in blister packs, pharmacy will be unable to carry medication as temporary stock. Patient's HCP does not feel it is clinically appropriate to start another antipsychotic medication at this time, agree with plan. Pending disposition upon MRCP reading. Upon discharge, Caplyta can be restarted.  Patient is a 57 y/o F with PMH Type 2 DM, HTN, HLD, GERD, asthma, anxiety, sleep apnea, schizoaffective disorder, bipolar disorder, migraines, presented to ED 1/4/24 c/o emesis, mild cough, nasal congestion and weakness, found with adenovirus, medically optimized and discharged home. Returned to ED today for positive GNR in blood cultures from 1/4/24. +Adenovirus. Psychiatry consulted for psychosis.     C/L Psychiatry Note:  Chart reviewed and patient evaluated. Upon evaluation, patient reports that she has been feeling better and that she completed her MRCP earlier today and is awaiting results. Patient unsure of disposition status due to pending MRCP. Patient reports that her mood is okay today and her anxiety feels well controlled. Admits that anxiety may be better due to earlier benzodiazepine use. Patient reports that she last experienced auditory and visual hallucinations last night. Discussed plan of care with patient, agreed to restarting home medication of Caplyta, as medication is non formulary.     Discussed with patient's HCP Nevaeh and Clinical Pharmacist Nancy; because Caplyta is in blister packs, pharmacy will be unable to carry medication as temporary stock. Patient's HCP does not feel it is clinically appropriate to start another antipsychotic medication at this time, agree with plan. Pending disposition upon MRCP reading. Upon discharge, Caplyta can be restarted.

## 2024-01-11 NOTE — BH CONSULTATION LIAISON PROGRESS NOTE - NSBHCHARTREVIEWLAB_PSY_A_CORE FT
Complete Blood Count in AM (01.10.24 @ 07:43)   Nucleated RBC: 0 /100 WBCs  WBC Count: 7.65 K/uL  RBC Count: 4.22 M/uL  Hemoglobin: 12.2 g/dL  Hematocrit: 37.6 %  Mean Cell Volume: 89.1 fl  Mean Cell Hemoglobin: 28.9 pg  Mean Cell Hemoglobin Conc: 32.4 gm/dL  Red Cell Distrib Width: 12.7 %  Platelet Count - Automated: 325 K/uL    Basic Metabolic Panel in AM (01.11.24 @ 07:07)   Sodium: 139 mmol/L  Potassium: 3.9 mmol/L  Chloride: 106 mmol/L  Carbon Dioxide: 24 mmol/L  Anion Gap: 9 mmol/L  Blood Urea Nitrogen: 12 mg/dL  Creatinine: 0.93 mg/dL  Glucose: 170 mg/dL  Calcium: 9.1 mg/dL  eGFR: 71

## 2024-01-11 NOTE — BH CONSULTATION LIAISON PROGRESS NOTE - NSBHCHARTREVIEWVS_PSY_A_CORE FT
Vital Signs Last 24 Hrs  T(C): 36.6 (11 Jan 2024 05:25), Max: 36.6 (11 Jan 2024 05:25)  T(F): 97.9 (11 Jan 2024 05:25), Max: 97.9 (11 Jan 2024 05:25)  HR: 74 (11 Jan 2024 05:25) (74 - 74)  BP: 94/59 (11 Jan 2024 05:25) (94/59 - 121/78)  BP(mean): --  RR: 18 (11 Jan 2024 05:25) (18 - 18)  SpO2: 93% (11 Jan 2024 05:25) (93% - 97%)    Parameters below as of 10 Blayne 2024 22:13  Patient On (Oxygen Delivery Method): room air

## 2024-01-11 NOTE — PROGRESS NOTE ADULT - ASSESSMENT
59 y/o F with PMH Type 2 DM, HTN, HLD, GERD, asthma, anxiety, sleep apnea, schizoaffective disorder, bipolar disorder, migraines, presented to ED 1/4/24 c/o emesis, mild cough, nasalk congestion and weakness, found with adenovirus, medically optimized and discharged home. Returned to ED today for positive GNR in blood cultures from 1/4/24.    Ecoli bacteremia- unknown source   -Afebrile, hemodynamically stable  -RVP positive for adenovirus 1/4/24, continue supportive care  -repeat BC NGTD  -Ceftriaxone 2 grams q24 as per ID   -ID consult recs appreciated and reviewed  -CT abdomen -Stable 2 cm indeterminate right upper pole renal lesion. No acute intra-abdominal pathology.   -abd ultrasound--- Focal wall thickening in the region of the gallbladder neck, corresponding to a region of nonspecific thickening noted on CT examination, possible adenomyomatosis. No gallstones or diffuse gallbladder wall thickening noted.   -GI following, considering EGD colonoscopy  -F/U MRCP    Schizoaffective disorder - chronic  -Continue home psych meds  -Psychiatry consult appreciated    Morbid Obesity  -nutrition consult    Type 2 DM  -FS, ISS  -Lantus 40 u nightly  HGBA1c 7.4    Asthma, stable  -Continue albuterol prn, montelukast, symbicort    GERD  -Continue PPI    Sleep Apnea  -CPAP settings      Vitals q8h  Diet: Carb consistent  PT/OT as tolerated  DVT ppx: Lovenox  Standard precautions: Aspiration, fall, safety, seizure, skin  Code Status - Full Code    HCP - sister Nevaeh  updated 57 y/o F with PMH Type 2 DM, HTN, HLD, GERD, asthma, anxiety, sleep apnea, schizoaffective disorder, bipolar disorder, migraines, presented to ED 1/4/24 c/o emesis, mild cough, nasalk congestion and weakness, found with adenovirus, medically optimized and discharged home. Returned to ED today for positive GNR in blood cultures from 1/4/24.    Ecoli bacteremia- unknown source   -Afebrile, hemodynamically stable  -RVP positive for adenovirus 1/4/24, continue supportive care  -repeat BC NGTD  -Ceftriaxone 2 grams q24 as per ID   -ID consult recs appreciated and reviewed  -CT abdomen -Stable 2 cm indeterminate right upper pole renal lesion. No acute intra-abdominal pathology.   -abd ultrasound--- Focal wall thickening in the region of the gallbladder neck, corresponding to a region of nonspecific thickening noted on CT examination, possible adenomyomatosis. No gallstones or diffuse gallbladder wall thickening noted.   -GI following, considering EGD colonoscopy  -F/U MRCP    Schizoaffective disorder - chronic  -Continue home psych meds  -Psychiatry consult appreciated    Morbid Obesity  -nutrition consult    Type 2 DM  -FS, ISS  -Lantus 40 u nightly  HGBA1c 7.4    Asthma, stable  -Continue albuterol prn, montelukast, symbicort    GERD  -Continue PPI    Sleep Apnea  -CPAP settings      Vitals q8h  Diet: Carb consistent  PT/OT as tolerated  DVT ppx: Lovenox  Standard precautions: Aspiration, fall, safety, seizure, skin  Code Status - Full Code    HCP - sister Nevaeh  updated    Addendum____    MRCP results < from: MR MRCP w/wo IV Cont (01.11.24 @ 11:17) >    IMPRESSION:  Probable focal adenomyomatosis involving the neck of the gallbladder     MRCP results discussed with Dr. Concepcion, patient, and patient's sister Nevaeh     59 y/o F with PMH Type 2 DM, HTN, HLD, GERD, asthma, anxiety, sleep apnea, schizoaffective disorder, bipolar disorder, migraines, presented to ED 1/4/24 c/o emesis, mild cough, nasalk congestion and weakness, found with adenovirus, medically optimized and discharged home. Returned to ED today for positive GNR in blood cultures from 1/4/24.    Ecoli bacteremia- unknown source   -Afebrile, hemodynamically stable  -RVP positive for adenovirus 1/4/24, continue supportive care  -repeat BC NGTD  -Ceftriaxone 2 grams q24 as per ID   -ID consult recs appreciated and reviewed  -CT abdomen -Stable 2 cm indeterminate right upper pole renal lesion. No acute intra-abdominal pathology.   -abd ultrasound--- Focal wall thickening in the region of the gallbladder neck, corresponding to a region of nonspecific thickening noted on CT examination, possible adenomyomatosis. No gallstones or diffuse gallbladder wall thickening noted.   -GI following, considering EGD colonoscopy  -F/U MRCP    Schizoaffective disorder - chronic  -Continue home psych meds  -Psychiatry consult appreciated    Morbid Obesity  -nutrition consult    Type 2 DM  -FS, ISS  -Lantus 40 u nightly  HGBA1c 7.4    Asthma, stable  -Continue albuterol prn, montelukast, symbicort    GERD  -Continue PPI    Sleep Apnea  -CPAP settings      Vitals q8h  Diet: Carb consistent  PT/OT as tolerated  DVT ppx: Lovenox  Standard precautions: Aspiration, fall, safety, seizure, skin  Code Status - Full Code    HCP - sister Nevaeh  updated    Addendum____    MRCP results < from: MR MRCP w/wo IV Cont (01.11.24 @ 11:17) >    IMPRESSION:  Probable focal adenomyomatosis involving the neck of the gallbladder     MRCP results discussed with Dr. Concepcion, patient, and patient's sister Nevaeh

## 2024-01-11 NOTE — BH CONSULTATION LIAISON PROGRESS NOTE - NSBHCONSULTFOLLOWAFTERCARE_PSY_A_CORE FT
No psychiatric contraindications to discharge  Patient can continue follow up with Dr. Hi at [facility].    No psychiatric contraindications to discharge  Patient can continue follow up with Dr. Hi at [facility].

## 2024-01-12 ENCOUNTER — TRANSCRIPTION ENCOUNTER (OUTPATIENT)
Age: 59
End: 2024-01-12

## 2024-01-12 VITALS — OXYGEN SATURATION: 94 %

## 2024-01-12 LAB
GLUCOSE BLDC GLUCOMTR-MCNC: 112 MG/DL — HIGH (ref 70–99)
GLUCOSE BLDC GLUCOMTR-MCNC: 112 MG/DL — HIGH (ref 70–99)
GLUCOSE BLDC GLUCOMTR-MCNC: 154 MG/DL — HIGH (ref 70–99)
GLUCOSE BLDC GLUCOMTR-MCNC: 154 MG/DL — HIGH (ref 70–99)
GLUCOSE BLDC GLUCOMTR-MCNC: 178 MG/DL — HIGH (ref 70–99)
GLUCOSE BLDC GLUCOMTR-MCNC: 178 MG/DL — HIGH (ref 70–99)

## 2024-01-12 PROCEDURE — 85025 COMPLETE CBC W/AUTO DIFF WBC: CPT

## 2024-01-12 PROCEDURE — 87086 URINE CULTURE/COLONY COUNT: CPT

## 2024-01-12 PROCEDURE — 96365 THER/PROPH/DIAG IV INF INIT: CPT

## 2024-01-12 PROCEDURE — 94760 N-INVAS EAR/PLS OXIMETRY 1: CPT

## 2024-01-12 PROCEDURE — A9579: CPT

## 2024-01-12 PROCEDURE — 74183 MRI ABD W/O CNTR FLWD CNTR: CPT

## 2024-01-12 PROCEDURE — 36415 COLL VENOUS BLD VENIPUNCTURE: CPT

## 2024-01-12 PROCEDURE — 96375 TX/PRO/DX INJ NEW DRUG ADDON: CPT

## 2024-01-12 PROCEDURE — 85027 COMPLETE CBC AUTOMATED: CPT

## 2024-01-12 PROCEDURE — 80053 COMPREHEN METABOLIC PANEL: CPT

## 2024-01-12 PROCEDURE — 81001 URINALYSIS AUTO W/SCOPE: CPT

## 2024-01-12 PROCEDURE — 83036 HEMOGLOBIN GLYCOSYLATED A1C: CPT

## 2024-01-12 PROCEDURE — 71045 X-RAY EXAM CHEST 1 VIEW: CPT

## 2024-01-12 PROCEDURE — 0225U NFCT DS DNA&RNA 21 SARSCOV2: CPT

## 2024-01-12 PROCEDURE — 83605 ASSAY OF LACTIC ACID: CPT

## 2024-01-12 PROCEDURE — 96374 THER/PROPH/DIAG INJ IV PUSH: CPT

## 2024-01-12 PROCEDURE — 99239 HOSP IP/OBS DSCHRG MGMT >30: CPT

## 2024-01-12 PROCEDURE — 87077 CULTURE AEROBIC IDENTIFY: CPT

## 2024-01-12 PROCEDURE — 99285 EMERGENCY DEPT VISIT HI MDM: CPT | Mod: 25

## 2024-01-12 PROCEDURE — 99233 SBSQ HOSP IP/OBS HIGH 50: CPT | Mod: FS

## 2024-01-12 PROCEDURE — 94640 AIRWAY INHALATION TREATMENT: CPT

## 2024-01-12 PROCEDURE — 76705 ECHO EXAM OF ABDOMEN: CPT

## 2024-01-12 PROCEDURE — 93005 ELECTROCARDIOGRAM TRACING: CPT

## 2024-01-12 PROCEDURE — 82962 GLUCOSE BLOOD TEST: CPT

## 2024-01-12 PROCEDURE — 87186 SC STD MICRODIL/AGAR DIL: CPT

## 2024-01-12 PROCEDURE — 74177 CT ABD & PELVIS W/CONTRAST: CPT

## 2024-01-12 PROCEDURE — 87150 DNA/RNA AMPLIFIED PROBE: CPT

## 2024-01-12 PROCEDURE — 80048 BASIC METABOLIC PNL TOTAL CA: CPT

## 2024-01-12 PROCEDURE — 87040 BLOOD CULTURE FOR BACTERIA: CPT

## 2024-01-12 PROCEDURE — 85610 PROTHROMBIN TIME: CPT

## 2024-01-12 PROCEDURE — 96366 THER/PROPH/DIAG IV INF ADDON: CPT

## 2024-01-12 PROCEDURE — 85730 THROMBOPLASTIN TIME PARTIAL: CPT

## 2024-01-12 RX ORDER — CEFUROXIME AXETIL 250 MG
1 TABLET ORAL
Qty: 10 | Refills: 0
Start: 2024-01-12

## 2024-01-12 RX ORDER — CEFUROXIME AXETIL 250 MG
500 TABLET ORAL EVERY 12 HOURS
Refills: 0 | Status: DISCONTINUED | OUTPATIENT
Start: 2024-01-13 | End: 2024-01-12

## 2024-01-12 RX ORDER — CEFUROXIME AXETIL 250 MG
1 TABLET ORAL
Qty: 10 | Refills: 0
Start: 2024-01-12 | End: 2024-01-16

## 2024-01-12 RX ORDER — ENOXAPARIN SODIUM 100 MG/ML
40 INJECTION SUBCUTANEOUS EVERY 12 HOURS
Refills: 0 | Status: DISCONTINUED | OUTPATIENT
Start: 2024-01-12 | End: 2024-01-12

## 2024-01-12 RX ADMIN — GABAPENTIN 100 MILLIGRAM(S): 400 CAPSULE ORAL at 06:46

## 2024-01-12 RX ADMIN — Medication 1 MILLIGRAM(S): at 06:34

## 2024-01-12 RX ADMIN — GABAPENTIN 100 MILLIGRAM(S): 400 CAPSULE ORAL at 17:35

## 2024-01-12 RX ADMIN — PANTOPRAZOLE SODIUM 40 MILLIGRAM(S): 20 TABLET, DELAYED RELEASE ORAL at 06:33

## 2024-01-12 RX ADMIN — MONTELUKAST 10 MILLIGRAM(S): 4 TABLET, CHEWABLE ORAL at 12:46

## 2024-01-12 RX ADMIN — Medication 1 SPRAY(S): at 17:29

## 2024-01-12 RX ADMIN — Medication 1 SPRAY(S): at 06:46

## 2024-01-12 RX ADMIN — CEFTRIAXONE 100 MILLIGRAM(S): 500 INJECTION, POWDER, FOR SOLUTION INTRAMUSCULAR; INTRAVENOUS at 08:08

## 2024-01-12 RX ADMIN — ENOXAPARIN SODIUM 40 MILLIGRAM(S): 100 INJECTION SUBCUTANEOUS at 12:45

## 2024-01-12 RX ADMIN — Medication 81 MILLIGRAM(S): at 12:45

## 2024-01-12 RX ADMIN — Medication 20 MILLIGRAM(S): at 06:34

## 2024-01-12 RX ADMIN — Medication 1: at 08:09

## 2024-01-12 RX ADMIN — Medication 1: at 12:46

## 2024-01-12 RX ADMIN — BUDESONIDE AND FORMOTEROL FUMARATE DIHYDRATE 2 PUFF(S): 160; 4.5 AEROSOL RESPIRATORY (INHALATION) at 08:08

## 2024-01-12 NOTE — DISCHARGE NOTE NURSING/CASE MANAGEMENT/SOCIAL WORK - NSDCPEFALRISK_GEN_ALL_CORE
For information on Fall & Injury Prevention, visit: [email or web address] OR  [email or web address] OR  https://www.cdc.gov/steadi/patient.html For information on Fall & Injury Prevention, visit: [email or web address] OR  [email or web address] OR  https://www.cdc.gov/steadi/patient.html

## 2024-01-12 NOTE — PROGRESS NOTE ADULT - SUBJECTIVE AND OBJECTIVE BOX
INTERVAL HPI/OVERNIGHT EVENTS:  Patient seen and examined at bed side. No event over night. She had one bowel movement today.  Denies melena or hematochezia. Denies abdominal pain, nausea, vomiting, diarrhea, melena or hematochezia.     MEDICATIONS  (STANDING):  aspirin enteric coated 81 milliGRAM(s) Oral daily  atorvastatin 80 milliGRAM(s) Oral at bedtime  budesonide  80 MICROgram(s)/formoterol 4.5 MICROgram(s) Inhaler 2 Puff(s) Inhalation two times a day  cefuroxime   Tablet 500 milliGRAM(s) Oral every 12 hours  dextrose 5%. 1000 milliLiter(s) (50 mL/Hr) IV Continuous <Continuous>  dextrose 5%. 1000 milliLiter(s) (100 mL/Hr) IV Continuous <Continuous>  dextrose 50% Injectable 12.5 Gram(s) IV Push once  dextrose 50% Injectable 25 Gram(s) IV Push once  dextrose 50% Injectable 25 Gram(s) IV Push once  enoxaparin Injectable 40 milliGRAM(s) SubCutaneous every 12 hours  FLUoxetine 20 milliGRAM(s) Oral two times a day  FLUoxetine 20 milliGRAM(s) Oral at bedtime  fluPHENAZine 5 milliGRAM(s) Oral at bedtime  fluticasone propionate 50 MICROgram(s)/spray Nasal Spray 1 Spray(s) Both Nostrils two times a day  gabapentin 100 milliGRAM(s) Oral two times a day  glucagon  Injectable 1 milliGRAM(s) IntraMuscular once  insulin glargine Injectable (LANTUS) 40 Unit(s) SubCutaneous at bedtime  insulin lispro (ADMELOG) corrective regimen sliding scale   SubCutaneous three times a day before meals  losartan 100 milliGRAM(s) Oral daily  montelukast 10 milliGRAM(s) Oral daily  pantoprazole    Tablet 40 milliGRAM(s) Oral before breakfast  polyethylene glycol 3350 17 Gram(s) Oral daily  prazosin. 1 milliGRAM(s) Oral daily  senna 2 Tablet(s) Oral at bedtime    MEDICATIONS  (PRN):  acetaminophen     Tablet .. 650 milliGRAM(s) Oral every 6 hours PRN Temp greater or equal to 38C (100.4F), Mild Pain (1 - 3)  albuterol    90 MICROgram(s) HFA Inhaler 2 Puff(s) Inhalation every 6 hours PRN Shortness of Breath  dextrose Oral Gel 15 Gram(s) Oral once PRN Blood Glucose LESS THAN 70 milliGRAM(s)/deciliter  ondansetron   Disintegrating Tablet 4 milliGRAM(s) Oral every 6 hours PRN Nausea and/or Vomiting  simethicone 80 milliGRAM(s) Chew three times a day PRN Gas      Allergies    walnut (Other)  No Known Drug Allergies    Intolerances              Vital Signs Last 24 Hrs  T(C): 37.2 (12 Jan 2024 05:11), Max: 37.2 (12 Jan 2024 05:11)  T(F): 98.9 (12 Jan 2024 05:11), Max: 98.9 (12 Jan 2024 05:11)  HR: 67 (12 Jan 2024 08:04) (66 - 71)  BP: 109/77 (12 Jan 2024 05:11) (108/71 - 109/77)  BP(mean): --  RR: 18 (12 Jan 2024 05:11) (17 - 18)  SpO2: 94% (12 Jan 2024 08:04) (94% - 98%)    Parameters below as of 12 Jan 2024 08:04  Patient On (Oxygen Delivery Method): room air        PHYSICAL EXAM:    Constitutional: NAD, well-developed  Neck: No LAD, supple  Respiratory: No SOB No distress   Cardiovascular: S1 and S2  Gastrointestinal:Soft non tender, non distended + BS   Extremities: No peripheral edema, neg clubbing, cyanosis  Neurological: A/O x 3   Skin: No rashes      LABS:    01-11    139  |  106  |  12  ----------------------------<  170<H>  3.9   |  24  |  0.93    Ca    9.1      11 Jan 2024 07:07        Urinalysis Basic - ( 11 Jan 2024 07:07 )    Color: x / Appearance: x / SG: x / pH: x  Gluc: 170 mg/dL / Ketone: x  / Bili: x / Urobili: x   Blood: x / Protein: x / Nitrite: x   Leuk Esterase: x / RBC: x / WBC x   Sq Epi: x / Non Sq Epi: x / Bacteria: x          RADIOLOGY & ADDITIONAL TESTS:    ACC: [id number] EXAM:  MR MRCP WAW IC   ORDERED BY:  MELISA LOPEZ     PROCEDURE DATE:  01/11/2024          INTERPRETATION:  CLINICAL INFORMATION: Gallbladder neck thickening on CT   and ultrasound.    COMPARISON: CT dated 01/07/2024 and ultrasound dated 01/09/2024.    CONTRAST/COMPLICATIONS:  IV Contrast: Gadavist  10 cc administered   0 cc discarded  Oral Contrast: NONE  Complications: None reported at time of study completion    PROCEDURE:  MRI of the abdomen was performed.  MRCP was performed.    FINDINGS:  LOWER CHEST: Within normal limits.    LIVER: Within normal limits.  BILE DUCTS: Normal caliber.  GALLBLADDER: Focal approximately 8 mm thickening involving the neck of   the gallbladder. A tiny cystic focus is identified in this region (27:53,   4:21). This appearance is suggestive of focal adenomyomatosis. A   follow-up may be considered to document stability.  SPLEEN: Within normal limits.  PANCREAS: Within normal limits.  ADRENALS: Within normal limits.  KIDNEYS/URETERS: 2 cm sized T1 hyperintense cyst upper pole of the right   kidney compatible with a hemorrhagic or proteinaceous cyst. Multiple   additional tiny cystic foci in both kidneys.    VISUALIZED PORTIONS:  BOWEL: Within normal limits.  PERITONEUM: No ascites.  VESSELS: Within normal limits.  RETROPERITONEUM/LYMPH NODES: No lymphadenopathy.  ABDOMINAL WALL: Within normal limits.  BONES: Within normal limits.    IMPRESSION:  Probable focal adenomyomatosis involving the neck of the gallbladder as   described above.        --- End of Report ---      MELISSA COLE MD; Attending Radiologist  This document has been electronically signed. Jan 11 2024  3:42PM   INTERVAL HPI/OVERNIGHT EVENTS:  Patient seen and examined at bed side. No event over night. She had one bowel movement today.  Denies melena or hematochezia. Denies abdominal pain, nausea, vomiting, diarrhea, melena or hematochezia.     MEDICATIONS  (STANDING):  aspirin enteric coated 81 milliGRAM(s) Oral daily  atorvastatin 80 milliGRAM(s) Oral at bedtime  budesonide  80 MICROgram(s)/formoterol 4.5 MICROgram(s) Inhaler 2 Puff(s) Inhalation two times a day  cefuroxime   Tablet 500 milliGRAM(s) Oral every 12 hours  dextrose 5%. 1000 milliLiter(s) (50 mL/Hr) IV Continuous <Continuous>  dextrose 5%. 1000 milliLiter(s) (100 mL/Hr) IV Continuous <Continuous>  dextrose 50% Injectable 12.5 Gram(s) IV Push once  dextrose 50% Injectable 25 Gram(s) IV Push once  dextrose 50% Injectable 25 Gram(s) IV Push once  enoxaparin Injectable 40 milliGRAM(s) SubCutaneous every 12 hours  FLUoxetine 20 milliGRAM(s) Oral two times a day  FLUoxetine 20 milliGRAM(s) Oral at bedtime  fluPHENAZine 5 milliGRAM(s) Oral at bedtime  fluticasone propionate 50 MICROgram(s)/spray Nasal Spray 1 Spray(s) Both Nostrils two times a day  gabapentin 100 milliGRAM(s) Oral two times a day  glucagon  Injectable 1 milliGRAM(s) IntraMuscular once  insulin glargine Injectable (LANTUS) 40 Unit(s) SubCutaneous at bedtime  insulin lispro (ADMELOG) corrective regimen sliding scale   SubCutaneous three times a day before meals  losartan 100 milliGRAM(s) Oral daily  montelukast 10 milliGRAM(s) Oral daily  pantoprazole    Tablet 40 milliGRAM(s) Oral before breakfast  polyethylene glycol 3350 17 Gram(s) Oral daily  prazosin. 1 milliGRAM(s) Oral daily  senna 2 Tablet(s) Oral at bedtime    MEDICATIONS  (PRN):  acetaminophen     Tablet .. 650 milliGRAM(s) Oral every 6 hours PRN Temp greater or equal to 38C (100.4F), Mild Pain (1 - 3)  albuterol    90 MICROgram(s) HFA Inhaler 2 Puff(s) Inhalation every 6 hours PRN Shortness of Breath  dextrose Oral Gel 15 Gram(s) Oral once PRN Blood Glucose LESS THAN 70 milliGRAM(s)/deciliter  ondansetron   Disintegrating Tablet 4 milliGRAM(s) Oral every 6 hours PRN Nausea and/or Vomiting  simethicone 80 milliGRAM(s) Chew three times a day PRN Gas      Allergies    walnut (Other)  No Known Drug Allergies    Intolerances              Vital Signs Last 24 Hrs  T(C): 37.2 (12 Jan 2024 05:11), Max: 37.2 (12 Jan 2024 05:11)  T(F): 98.9 (12 Jan 2024 05:11), Max: 98.9 (12 Jan 2024 05:11)  HR: 67 (12 Jan 2024 08:04) (66 - 71)  BP: 109/77 (12 Jan 2024 05:11) (108/71 - 109/77)  BP(mean): --  RR: 18 (12 Jan 2024 05:11) (17 - 18)  SpO2: 94% (12 Jan 2024 08:04) (94% - 98%)    Parameters below as of 12 Jan 2024 08:04  Patient On (Oxygen Delivery Method): room air        PHYSICAL EXAM:    Constitutional: NAD, well-developed  Neck: No LAD, supple  Respiratory: No SOB No distress   Cardiovascular: S1 and S2  Gastrointestinal:Soft non tender, non distended + BS   Extremities: No peripheral edema, neg clubbing, cyanosis  Neurological: A/O x 3   Skin: No rashes      LABS:    01-11    139  |  106  |  12  ----------------------------<  170<H>  3.9   |  24  |  0.93    Ca    9.1      11 Jan 2024 07:07        Urinalysis Basic - ( 11 Jan 2024 07:07 )    Color: x / Appearance: x / SG: x / pH: x  Gluc: 170 mg/dL / Ketone: x  / Bili: x / Urobili: x   Blood: x / Protein: x / Nitrite: x   Leuk Esterase: x / RBC: x / WBC x   Sq Epi: x / Non Sq Epi: x / Bacteria: x          RADIOLOGY & ADDITIONAL TESTS:    ACC: [id number] EXAM:  MR MRCP WAW IC   ORDERED BY:  MELISA LOPEZ     PROCEDURE DATE:  01/11/2024          INTERPRETATION:  CLINICAL INFORMATION: Gallbladder neck thickening on CT   and ultrasound.    COMPARISON: CT dated 01/07/2024 and ultrasound dated 01/09/2024.    CONTRAST/COMPLICATIONS:  IV Contrast: Gadavist  10 cc administered   0 cc discarded  Oral Contrast: NONE  Complications: None reported at time of study completion    PROCEDURE:  MRI of the abdomen was performed.  MRCP was performed.    FINDINGS:  LOWER CHEST: Within normal limits.    LIVER: Within normal limits.  BILE DUCTS: Normal caliber.  GALLBLADDER: Focal approximately 8 mm thickening involving the neck of   the gallbladder. A tiny cystic focus is identified in this region (27:53,   4:21). This appearance is suggestive of focal adenomyomatosis. A   follow-up may be considered to document stability.  SPLEEN: Within normal limits.  PANCREAS: Within normal limits.  ADRENALS: Within normal limits.  KIDNEYS/URETERS: 2 cm sized T1 hyperintense cyst upper pole of the right   kidney compatible with a hemorrhagic or proteinaceous cyst. Multiple   additional tiny cystic foci in both kidneys.    VISUALIZED PORTIONS:  BOWEL: Within normal limits.  PERITONEUM: No ascites.  VESSELS: Within normal limits.  RETROPERITONEUM/LYMPH NODES: No lymphadenopathy.  ABDOMINAL WALL: Within normal limits.  BONES: Within normal limits.    IMPRESSION:  Probable focal adenomyomatosis involving the neck of the gallbladder as   described above.        --- End of Report ---      MELISSA COLE MD; Attending Radiologist  This document has been electronically signed. Jan 11 2024  3:42PM

## 2024-01-12 NOTE — PROGRESS NOTE ADULT - NUTRITIONAL ASSESSMENT
This patient has been assessed with a concern for Malnutrition and has been determined to have a diagnosis/diagnoses of Morbid obesity (BMI > 40).    This patient is being managed with:   Diet Consistent Carbohydrate/No Snacks-  Entered: Jan 6 2024 12:57PM  
This patient has been assessed with a concern for Malnutrition and has been determined to have a diagnosis/diagnoses of Morbid obesity (BMI > 40).    This patient is being managed with:   Diet Consistent Carbohydrate/No Snacks-  Entered: Jan 11 2024  2:32PM  
This patient has been assessed with a concern for Malnutrition and has been determined to have a diagnosis/diagnoses of Morbid obesity (BMI > 40).    This patient is being managed with:   Diet Clear Liquid-  Entered: Jan 11 2024  2:29PM    Diet NPO after Midnight-     NPO Start Date: 10-Blayne-2024   NPO Start Time: 23:59  Except Medications  With Ice Chips/Sips of Water  Entered: Blayne 10 2024  2:44PM  
This patient has been assessed with a concern for Malnutrition and has been determined to have a diagnosis/diagnoses of Morbid obesity (BMI > 40).    This patient is being managed with:   Diet Consistent Carbohydrate/No Snacks-  Entered: Jan 6 2024 12:57PM

## 2024-01-12 NOTE — DISCHARGE NOTE PROVIDER - NSDCCPCAREPLAN_GEN_ALL_CORE_FT
PRINCIPAL DISCHARGE DIAGNOSIS  Diagnosis: Sepsis due to anaerobes  Assessment and Plan of Treatment:

## 2024-01-12 NOTE — DISCHARGE NOTE NURSING/CASE MANAGEMENT/SOCIAL WORK - PATIENT PORTAL LINK FT
You can access the FollowMyHealth Patient Portal offered by [facility] by registering at the following website: [email or web address]. By joining [employer]’s FollowMyHealth portal, you will also be able to view your health information using other applications (apps) compatible with our system. You can access the FollowMyHealth Patient Portal offered by [facility] by registering at the following website: [email or web address]. By joining [employer]’s FollowMyHealth portal, you will also be able to view your health information using other applications (apps) compatible with our system.

## 2024-01-12 NOTE — DISCHARGE NOTE NURSING/CASE MANAGEMENT/SOCIAL WORK - NSDCFUADDAPPT_GEN_ALL_CORE_FT
Follow up appointment with Dr. KEVIN Pritchett [phone] on 1/18/2024 @ 1:15pm Follow up appointment with Dr. KEVIN Pritchett [phone] on 1/18/2024 @ 1:15pm Follow up appointment with Dr. KEVIN Pritchett [phone] on 1/18/2024 @ 1:15pm  Follow up with CN [facility] counsellor within a week Follow up appointment with Dr. KEVIN Pritchett [phone] on 1/18/2024 @ 1:15pm  Follow up with [facility] counsellor within a week

## 2024-01-12 NOTE — DISCHARGE NOTE PROVIDER - NSDCFUSCHEDAPPT_GEN_ALL_CORE_FT
Andres Yanez  [facility] Physician [facility]  NEUROLOGY [address]  Scheduled Appointment: 02/26/2024     Andres Yanez  [facility] Physician [facility]  NEUROLOGY [address]  Scheduled Appointment: 02/26/2024

## 2024-01-12 NOTE — PROGRESS NOTE ADULT - PROVIDER SPECIALTY LIST ADULT
Hospitalist
Hospitalist
Infectious Disease
Infectious Disease
Gastroenterology
Hospitalist
Infectious Disease
Infectious Disease
Hospitalist
Gastroenterology

## 2024-01-12 NOTE — DISCHARGE NOTE PROVIDER - NSDCMRMEDTOKEN_GEN_ALL_CORE_FT
aspirin 81 mg oral delayed release tablet: 1 tab(s) orally once a day x 30 days-Anti-Coag   atorvastatin 80 mg oral tablet: 1 tab(s) orally once a day (at bedtime) x 30 days-HLD   budesonide-formoterol 160 mcg-4.5 mcg/inh inhalation aerosol: 2 puff(s) inhaled 2 times a day-SOB   Caplyta 42 mg oral capsule: 1 cap(s) orally once a day  FLUoxetine 20 mg oral capsule: 1 cap(s) orally 3 times a day  fluPHENAZine 5 mg oral tablet: 1 tab(s) orally once a day (at bedtime) x 30 days-Psychosis   gabapentin 100 mg oral capsule: 1 cap(s) orally 2 times a day  Lantus Solostar Pen 100 units/mL subcutaneous solution: 15 unit(s) subcutaneous once a day (at bedtime)  losartan 100 mg oral tablet: 1 tab(s) orally once a day x 30 days-HTN   montelukast 10 mg oral tablet: 1 tab(s) orally once a day x 30 days-PPH   prazosin 1 mg oral capsule: 1 cap(s) orally once a day   aspirin 81 mg oral delayed release tablet: 1 tab(s) orally once a day x 30 days-Anti-Coag   atorvastatin 80 mg oral tablet: 1 tab(s) orally once a day (at bedtime) x 30 days-HLD   budesonide-formoterol 160 mcg-4.5 mcg/inh inhalation aerosol: 2 puff(s) inhaled 2 times a day-SOB   Caplyta 42 mg oral capsule: 1 cap(s) orally once a day  cefuroxime 500 mg oral tablet: 1 tab(s) orally every 12 hours  FLUoxetine 20 mg oral capsule: 1 cap(s) orally 3 times a day  fluPHENAZine 5 mg oral tablet: 1 tab(s) orally once a day (at bedtime) x 30 days-Psychosis   gabapentin 100 mg oral capsule: 1 cap(s) orally 2 times a day  Lantus Solostar Pen 100 units/mL subcutaneous solution: 15 unit(s) subcutaneous once a day (at bedtime)  losartan 100 mg oral tablet: 1 tab(s) orally once a day x 30 days-HTN   montelukast 10 mg oral tablet: 1 tab(s) orally once a day x 30 days-PPH   prazosin 1 mg oral capsule: 1 cap(s) orally once a day   aspirin 81 mg oral delayed release tablet: 1 tab(s) orally once a day x 30 days-Anti-Coag   atorvastatin 80 mg oral tablet: 1 tab(s) orally once a day (at bedtime) x 30 days-HLD   budesonide-formoterol 160 mcg-4.5 mcg/inh inhalation aerosol: 2 puff(s) inhaled 2 times a day-SOB   Caplyta 42 mg oral capsule: 1 cap(s) orally once a day  cefuroxime 500 mg oral tablet: 1 tab(s) orally every 12 hours  cefuroxime 500 mg oral tablet: 1 tab(s) orally every 12 hours  FLUoxetine 20 mg oral capsule: 1 cap(s) orally 3 times a day  fluPHENAZine 5 mg oral tablet: 1 tab(s) orally once a day (at bedtime) x 30 days-Psychosis   gabapentin 100 mg oral capsule: 1 cap(s) orally 2 times a day  Lantus Solostar Pen 100 units/mL subcutaneous solution: 15 unit(s) subcutaneous once a day (at bedtime)  losartan 100 mg oral tablet: 1 tab(s) orally once a day x 30 days-HTN   montelukast 10 mg oral tablet: 1 tab(s) orally once a day x 30 days-PPH   prazosin 1 mg oral capsule: 1 cap(s) orally once a day

## 2024-01-12 NOTE — PROGRESS NOTE ADULT - ASSESSMENT
59 yo female with obesity, schizoaffective  disorder, T2DM, HTN, and HLD who was evaluated in ER a few weeks ago for a febrile illness.  RVP positive for adenovirus, she was discharged and called back when E coli was isolated in 1/4 bottles of blood cultures.  She had a transient self limited bacteremia.  She had negative UA and urine culture and CT imaging of A/P, sonogram, and now MRCP without a clear GI focus.  No signs of ongoing infection.  Suggest:  1 Complete 10 days of antibiotics, can switch to ceftin 500 po BID at any point  2 Will defer to GI regarding any endoscopies or colonoscopy  3 Discharge planning per primary service  4 If she is discharged, I would consider OPD colonoscopy  although it will  low yield procedure.  5 57 yo female with obesity, schizoaffective  disorder, T2DM, HTN, and HLD who was evaluated in ER a few weeks ago for a febrile illness.  RVP positive for adenovirus, she was discharged and called back when E coli was isolated in 1/4 bottles of blood cultures.  She had a transient self limited bacteremia.  She had negative UA and urine culture and CT imaging of A/P, sonogram, and now MRCP without a clear GI focus.  No signs of ongoing infection.  Suggest:  1 Complete 10 days of antibiotics, can switch to ceftin 500 po BID at any point  2 Will defer to GI regarding any endoscopies or colonoscopy  3 Discharge planning per primary service  4 If she is discharged, I would consider OPD colonoscopy  although it will  low yield procedure.  5

## 2024-01-12 NOTE — PROGRESS NOTE ADULT - SUBJECTIVE AND OBJECTIVE BOX
CC: f/u for E coli bacteremia    Patient reports: she offers no complaints, no GI or  symptoms    REVIEW OF SYSTEMS:  All other review of systems negative (Comprehensive ROS)    Antimicrobials Day #  :day 7  cefTRIAXone   IVPB 2000 milliGRAM(s) IV Intermittent every 24 hours  cefTRIAXone   IVPB        Other Medications Reviewed  MEDICATIONS  (STANDING):  aspirin enteric coated 81 milliGRAM(s) Oral daily  atorvastatin 80 milliGRAM(s) Oral at bedtime  budesonide  80 MICROgram(s)/formoterol 4.5 MICROgram(s) Inhaler 2 Puff(s) Inhalation two times a day  cefTRIAXone   IVPB 2000 milliGRAM(s) IV Intermittent every 24 hours  cefTRIAXone   IVPB      dextrose 5%. 1000 milliLiter(s) (100 mL/Hr) IV Continuous <Continuous>  dextrose 5%. 1000 milliLiter(s) (50 mL/Hr) IV Continuous <Continuous>  dextrose 50% Injectable 12.5 Gram(s) IV Push once  dextrose 50% Injectable 25 Gram(s) IV Push once  dextrose 50% Injectable 25 Gram(s) IV Push once  enoxaparin Injectable 40 milliGRAM(s) SubCutaneous every 24 hours  FLUoxetine 20 milliGRAM(s) Oral two times a day  FLUoxetine 20 milliGRAM(s) Oral at bedtime  fluPHENAZine 5 milliGRAM(s) Oral at bedtime  fluticasone propionate 50 MICROgram(s)/spray Nasal Spray 1 Spray(s) Both Nostrils two times a day  gabapentin 100 milliGRAM(s) Oral two times a day  glucagon  Injectable 1 milliGRAM(s) IntraMuscular once  insulin glargine Injectable (LANTUS) 40 Unit(s) SubCutaneous at bedtime  insulin lispro (ADMELOG) corrective regimen sliding scale   SubCutaneous three times a day before meals  losartan 100 milliGRAM(s) Oral daily  montelukast 10 milliGRAM(s) Oral daily  pantoprazole    Tablet 40 milliGRAM(s) Oral before breakfast  polyethylene glycol 3350 17 Gram(s) Oral daily  prazosin. 1 milliGRAM(s) Oral daily  senna 2 Tablet(s) Oral at bedtime    T(F): 98.9 (01-12-24 @ 05:11), Max: 98.9 (01-12-24 @ 05:11)  HR: 67 (01-12-24 @ 08:04)  BP: 109/77 (01-12-24 @ 05:11)  RR: 18 (01-12-24 @ 05:11)  SpO2: 94% (01-12-24 @ 08:04)  Wt(kg): --    PHYSICAL EXAM:  General: alert, no acute distress  Eyes:  anicteric, no conjunctival injection, no discharge  Oropharynx: no lesions or injection 	  Neck: supple, without adenopathy  Lungs: clear to auscultation  Heart: regular rate and rhythm; no murmur, rubs or gallops  Abdomen: soft, nondistended, nontender, without mass or organomegaly  Skin: no lesions  Extremities: no clubbing, cyanosis, or edema  Neurologic: alert, oriented, moves all extremities    LAB RESULTS:    01-11    139  |  106  |  12  ----------------------------<  170<H>  3.9   |  24  |  0.93    Ca    9.1      11 Jan 2024 07:07              MICROBIOLOGY:  RECENT CULTURES:      RADIOLOGY REVIEWED:    < from: MR MRCP w/wo IV Cont (01.11.24 @ 11:17) >    CONTRAST/COMPLICATIONS:  IV Contrast: Gadavist  10 cc administered   0 cc discarded  Oral Contrast: NONE  Complications: None reported at time of study completion    PROCEDURE:  MRI of the abdomen was performed.  MRCP was performed.    FINDINGS:  LOWER CHEST: Within normal limits.    LIVER: Within normal limits.  BILE DUCTS: Normal caliber.  GALLBLADDER: Focal approximately 8 mm thickening involving the neck of   the gallbladder. A tiny cystic focus is identified in this region (27:53,   4:21). This appearance is suggestive of focal adenomyomatosis. A   follow-up may be considered to document stability.  SPLEEN: Within normal limits.  PANCREAS: Within normal limits.  ADRENALS: Within normal limits.  KIDNEYS/URETERS: 2 cm sized T1 hyperintense cyst upper pole of the right   kidney compatible with a hemorrhagic or proteinaceous cyst. Multiple   additional tiny cystic foci in both kidneys.    VISUALIZED PORTIONS:  BOWEL: Within normal limits.  PERITONEUM: No ascites.  VESSELS: Within normal limits.  RETROPERITONEUM/LYMPH NODES: No lymphadenopathy.  ABDOMINAL WALL: Within normal limits.  BONES: Within normal limits.    IMPRESSION:  Probable focal adenomyomatosis involving the neck of the gallbladder as   described above.    < end of copied text >

## 2024-01-12 NOTE — DISCHARGE NOTE NURSING/CASE MANAGEMENT/SOCIAL WORK - NSDCVIVACCINE_GEN_ALL_CORE_FT
influenza, injectable, quadrivalent, preservative free; 31-Oct-2017 11:48; Jaylon Aj (RN); [employer]; [id number]; IntraMuscular; Deltoid Left.; 0.5 milliLiter(s); VIS (VIS Published: 07-Aug-2015, VIS Presented: 31-Oct-2017);    influenza, injectable, quadrivalent, preservative free; 31-Oct-2017 11:48; Jaylon Aj (RN); [employer]; [id number]; IntraMuscular; Deltoid Left.; 0.5 milliLiter(s); VIS (VIS Published: 07-Aug-2015, VIS Presented: 31-Oct-2017);

## 2024-01-12 NOTE — DISCHARGE NOTE PROVIDER - CARE PROVIDERS DIRECT ADDRESSES
[email or web address]

## 2024-01-12 NOTE — DISCHARGE NOTE PROVIDER - CARE PROVIDER_API CALL
Niko Yu  Gastroenterology  [address] [place] [address], [address]  [place] [address]  Phone: [phone]  Fax: [phone]  Follow Up Time:    Niko Yu  Gastroenterology  [address] [place] [address], [address]  [place] [address]  Phone: [phone]  Fax: [phone]  Follow Up Time:

## 2024-01-12 NOTE — PROGRESS NOTE ADULT - REASON FOR ADMISSION
bacteremia

## 2024-01-12 NOTE — PROGRESS NOTE ADULT - PROBLEM SELECTOR PLAN 1
E coli  bacteremia unsure of GI source  Continue antibiotics as per ID  Continue PPI daily   Scheduled for MRCP today   Pending course can discuss Endoscopy / Colonoscopy to rule out GI lesion
E coli  bacteremia unsure of GI source  Continue antibiotics as per ID  Continue PPI daily   1/11/24 S/P MRCP Probable focal adenomyomatosis involving the 8mm thickness involving gallbladder neck.  Out patient GI F/U

## 2024-01-12 NOTE — PROGRESS NOTE ADULT - SUBJECTIVE AND OBJECTIVE BOX
Patient is a 58y old  Female who presents with a chief complaint of bacteremia (11 Jan 2024 18:25)    Denies N/V, abd pain  TOlerating diet  Ambulatory  Patient seen and examined at bedside.    ALLERGIES:  walnut (Other)  No Known Drug Allergies    MEDICATIONS  (STANDING):  aspirin enteric coated 81 milliGRAM(s) Oral daily  atorvastatin 80 milliGRAM(s) Oral at bedtime  budesonide  80 MICROgram(s)/formoterol 4.5 MICROgram(s) Inhaler 2 Puff(s) Inhalation two times a day  cefTRIAXone   IVPB      cefTRIAXone   IVPB 2000 milliGRAM(s) IV Intermittent every 24 hours  dextrose 5%. 1000 milliLiter(s) (50 mL/Hr) IV Continuous <Continuous>  dextrose 5%. 1000 milliLiter(s) (100 mL/Hr) IV Continuous <Continuous>  dextrose 50% Injectable 12.5 Gram(s) IV Push once  dextrose 50% Injectable 25 Gram(s) IV Push once  dextrose 50% Injectable 25 Gram(s) IV Push once  enoxaparin Injectable 40 milliGRAM(s) SubCutaneous every 24 hours  FLUoxetine 20 milliGRAM(s) Oral at bedtime  FLUoxetine 20 milliGRAM(s) Oral two times a day  fluPHENAZine 5 milliGRAM(s) Oral at bedtime  fluticasone propionate 50 MICROgram(s)/spray Nasal Spray 1 Spray(s) Both Nostrils two times a day  gabapentin 100 milliGRAM(s) Oral two times a day  glucagon  Injectable 1 milliGRAM(s) IntraMuscular once  insulin glargine Injectable (LANTUS) 40 Unit(s) SubCutaneous at bedtime  insulin lispro (ADMELOG) corrective regimen sliding scale   SubCutaneous three times a day before meals  losartan 100 milliGRAM(s) Oral daily  montelukast 10 milliGRAM(s) Oral daily  pantoprazole    Tablet 40 milliGRAM(s) Oral before breakfast  polyethylene glycol 3350 17 Gram(s) Oral daily  prazosin. 1 milliGRAM(s) Oral daily  senna 2 Tablet(s) Oral at bedtime    MEDICATIONS  (PRN):  acetaminophen     Tablet .. 650 milliGRAM(s) Oral every 6 hours PRN Temp greater or equal to 38C (100.4F), Mild Pain (1 - 3)  albuterol    90 MICROgram(s) HFA Inhaler 2 Puff(s) Inhalation every 6 hours PRN Shortness of Breath  dextrose Oral Gel 15 Gram(s) Oral once PRN Blood Glucose LESS THAN 70 milliGRAM(s)/deciliter  ondansetron   Disintegrating Tablet 4 milliGRAM(s) Oral every 6 hours PRN Nausea and/or Vomiting  simethicone 80 milliGRAM(s) Chew three times a day PRN Gas    Vital Signs Last 24 Hrs  T(F): 98.9 (12 Jan 2024 05:11), Max: 98.9 (12 Jan 2024 05:11)  HR: 67 (12 Jan 2024 08:04) (66 - 71)  BP: 109/77 (12 Jan 2024 05:11) (108/71 - 109/77)  RR: 18 (12 Jan 2024 05:11) (17 - 18)  SpO2: 94% (12 Jan 2024 08:04) (94% - 98%)  I&O's Summary    12 Jan 2024 07:01  -  12 Jan 2024 10:37  --------------------------------------------------------  IN: 400 mL / OUT: 0 mL / NET: 400 mL        PHYSICAL EXAM:  General: NAD, A/O x 3  ENT: MMM, no scleral icterus  Neck: Supple, No JVD, no thyroidomegaly  Lungs: Clear to auscultation bilaterally, no wheezes, no rales, no rhonchi, good inspiratory effort  Cardio: RRR, S1/S2, No murmurs  Abdomen: Soft, Nontender, Nondistended; Bowel sounds present  Extremities: No calf tenderness, No pitting edema, no skin changes    LABS:                        12.2   7.65  )-----------( 325      ( 10 Blayne 2024 07:43 )             37.6       01-11    139  |  106  |  12  ----------------------------<  170  3.9   |  24  |  0.93    Ca    9.1      11 Jan 2024 07:07    POCT Blood Glucose.: 154 mg/dL (12 Jan 2024 08:06)  POCT Blood Glucose.: 129 mg/dL (11 Jan 2024 23:09)  POCT Blood Glucose.: 281 mg/dL (11 Jan 2024 17:53)  POCT Blood Glucose.: 154 mg/dL (11 Jan 2024 12:34)      Urinalysis Basic - ( 11 Jan 2024 07:07 )    Color: x / Appearance: x / SG: x / pH: x  Gluc: 170 mg/dL / Ketone: x  / Bili: x / Urobili: x   Blood: x / Protein: x / Nitrite: x   Leuk Esterase: x / RBC: x / WBC x   Sq Epi: x / Non Sq Epi: x / Bacteria: x    Culture - Urine (collected 06 Jan 2024 12:00)  Source: Clean Catch Clean Catch (Midstream)  Final Report (08 Jan 2024 10:59):    >=3 organisms. Probable collection contamination.    Culture - Blood (collected 06 Jan 2024 11:05)  Source: .Blood Blood-Peripheral  Final Report (11 Jan 2024 15:01):    No growth at 5 days    Culture - Blood (collected 06 Jan 2024 11:01)  Source: .Blood Blood-Peripheral  Final Report (11 Jan 2024 15:01):    No growth at 5 days        COVID-19 PCR: Karley (04-26-22 @ 01:40)

## 2024-01-12 NOTE — PROGRESS NOTE ADULT - ASSESSMENT
57 y/o F with PMH Type 2 DM, HTN, HLD, GERD, asthma, anxiety, sleep apnea, schizoaffective disorder, bipolar disorder, migraines, presented to ED 1/4/24 c/o emesis, mild cough, nasalk congestion and weakness, found with adenovirus, medically optimized and discharged home. Returned to ED today for positive GNR in blood cultures from 1/4/24.    Ecoli bacteremia- unknown source   -Afebrile, hemodynamically stable  -RVP positive for adenovirus 1/4/24, continue supportive care  -repeat BC NGTD  -Ceftriaxone 2 grams q24 as per ID; can switch to oral once GI clears  -ID consult recs appreciated and reviewed  -CT abdomen, ABD uLT, MRCP reviewed  -GI following, considering EGD colonoscopy      Schizoaffective disorder - chronic  -Continue home psych meds  -Psychiatry consult appreciated    Morbid Obesity  -nutrition consult    Type 2 DM  -FS, ISS  -Lantus 40 u nightly  HGBA1c 7.4    Asthma, stable  -Continue albuterol prn, montelukast, symbicort    GERD  -Continue PPI    Sleep Apnea  -CPAP settings      Vitals q8h  Diet: Carb consistent  PT/OT as tolerated  DVT ppx: Lovenox  Standard precautions: Aspiration, fall, safety, seizure, skin  Code Status - Full Code    HCP - sister Nevaeh   Possible discharge today pending GI consult   59 y/o F with PMH Type 2 DM, HTN, HLD, GERD, asthma, anxiety, sleep apnea, schizoaffective disorder, bipolar disorder, migraines, presented to ED 1/4/24 c/o emesis, mild cough, nasalk congestion and weakness, found with adenovirus, medically optimized and discharged home. Returned to ED today for positive GNR in blood cultures from 1/4/24.    Ecoli bacteremia- unknown source   -Afebrile, hemodynamically stable  -RVP positive for adenovirus 1/4/24, continue supportive care  -repeat BC NGTD  -Ceftriaxone 2 grams q24 as per ID; can switch to oral once GI clears  -ID consult recs appreciated and reviewed  -CT abdomen, ABD uLT, MRCP reviewed  -GI following, considering EGD colonoscopy      Schizoaffective disorder - chronic  -Continue home psych meds  -Psychiatry consult appreciated    Morbid Obesity  -nutrition consult    Type 2 DM  -FS, ISS  -Lantus 40 u nightly  HGBA1c 7.4    Asthma, stable  -Continue albuterol prn, montelukast, symbicort    GERD  -Continue PPI    Sleep Apnea  -CPAP settings      Vitals q8h  Diet: Carb consistent  PT/OT as tolerated  DVT ppx: Lovenox  Standard precautions: Aspiration, fall, safety, seizure, skin  Code Status - Full Code    HCP - sister Nevaeh   Possible discharge today pending GI consult

## 2024-01-12 NOTE — PROGRESS NOTE ADULT - NS ATTEND AMEND GEN_ALL_CORE FT
Pt seen and examined at bedside.  No acute events overnight  Pt denies cp, palpitations, sob, abd pain, N/V, fever, chills    Pt w/ Adenovirus without any respiratory symptoms/compromise  Admitted due to E coli Bacteremia  -Change Zosyn to Ceftriaxone  -Follow up with sensitivities  -Unclear source as Urine cx negative  -Follow up with CT A/P to Rule out GI source  -ID consult
Pt seen and examined at bedside.  No acute events overnight  Pt denies cp, palpitations, sob, abd pain, N/V, fever, chills    Pt w/ Adenovirus without any respiratory symptoms/compromise  Admitted due to E coli Bacteremia  -Ceftriaxone  -Sensitivities noted   -Unclear source as Urine cx negative  -CT A/P negative for acute findings  -ID consulted
Agree
Pt seen and examined at bedside.  No acute events overnight  Pt denies cp, palpitations, sob, abd pain, N/V, fever, chills    Pt w/ Adenovirus without any respiratory symptoms/compromise  Admitted due to E coli Bacteremia  -ID on board  -UA negative, CT A/P negative, RUQ US negative for acute inflammatory process, however, concern for findings of possible adenomyomatosis  -Ceftriaxone  -Sensitivities noted  -MRCP in the AM
Pt seen and examined at bedside.  No acute events overnight  Pt denies cp, palpitations, sob, abd pain, N/V, fever, chills    Pt w/ Adenovirus without any respiratory symptoms/compromise  Admitted due to E coli Bacteremia  -ID on board  -UA negative, CT A/P negative, RUQ US negative for acute inflammatory process  -Ceftriaxone  -Sensitivities noted
Bacteremia, MRCP done, c/w ceftriaxone 2gm for now  Appreciate ID and GI  Possible EGD/Colon pending course  C/w current medications  Waiting on MRCP before we can start feeding  DVT PPX  AM labs
agree w above

## 2024-01-12 NOTE — DISCHARGE NOTE PROVIDER - HOSPITAL COURSE
Hospital Course  HPI:  57 y/o F with PMH Type 2 DM, HTN, HLD, GERD, asthma, anxiety, migraines, presented to ED 1/4/24 c/o emesis, mild cough, nasal congestion and weakness, found with adenovirus, medically optimized and discharged home. Returned to ED today for positive GNR in blood cultures from 1/4/24. Continues with fatigue, malaise, diminished PO and subjective fevers. denies headache, cp, sob, n/v, abd pain.    Patient admitted to medicine, GI, ID and psychiatry consulted  Patient had CTAP, ABD ULT and MRCP done  No clear source of the Ecoli Bacteremia found; was started on ceftriaxone 2gm IV daily  Repeat cultures negative    Patient stable for discharge  F/U PCP, GI in one week         Hospital Course  HPI:  59 y/o F with PMH Type 2 DM, HTN, HLD, GERD, asthma, anxiety, migraines, presented to ED 1/4/24 c/o emesis, mild cough, nasal congestion and weakness, found with adenovirus, medically optimized and discharged home. Returned to ED today for positive GNR in blood cultures from 1/4/24. Continues with fatigue, malaise, diminished PO and subjective fevers. denies headache, cp, sob, n/v, abd pain.    Patient admitted to medicine, GI, ID and psychiatry consulted  Patient had CTAP, ABD ULT and MRCP done  No clear source of the Ecoli Bacteremia found; was started on ceftriaxone 2gm IV daily  Repeat cultures negative    Patient stable for discharge  F/U PCP, GI in one week         Hospital Course  HPI:  59 y/o F with PMH Type 2 DM, HTN, HLD, GERD, asthma, anxiety, migraines, presented to ED 1/4/24 c/o emesis, mild cough, nasal congestion and weakness, found with adenovirus, medically optimized and discharged home. Returned to ED today for positive GNR in blood cultures from 1/4/24. Continues with fatigue, malaise, diminished PO and subjective fevers. denies headache, cp, sob, n/v, abd pain.    Patient admitted to medicine, GI, ID and psychiatry consulted  Patient had CTAP, ABD ULT and MRCP done  No clear source of the Ecoli Bacteremia found; was started on ceftriaxone 2gm IV daily  Repeat cultures negative    Patient stable for discharge  F/U PCP, GI in one week  Patient might need EGD/colonoscopy

## 2024-02-01 ENCOUNTER — OUTPATIENT (OUTPATIENT)
Dept: OUTPATIENT SERVICES | Facility: HOSPITAL | Age: 59
LOS: 1 days | End: 2024-02-01
Payer: MEDICARE

## 2024-02-01 DIAGNOSIS — Z01.20 ENCOUNTER FOR DENTAL EXAMINATION AND CLEANING WITHOUT ABNORMAL FINDINGS: ICD-10-CM

## 2024-02-01 DIAGNOSIS — Z98.890 OTHER SPECIFIED POSTPROCEDURAL STATES: Chronic | ICD-10-CM

## 2024-02-01 PROCEDURE — D1110: CPT

## 2024-02-01 PROCEDURE — D0120: CPT

## 2024-02-15 ENCOUNTER — NON-APPOINTMENT (OUTPATIENT)
Age: 59
End: 2024-02-15

## 2024-02-26 ENCOUNTER — APPOINTMENT (OUTPATIENT)
Dept: NEUROLOGY | Facility: CLINIC | Age: 59
End: 2024-02-26
Payer: MEDICARE

## 2024-02-26 VITALS
SYSTOLIC BLOOD PRESSURE: 103 MMHG | OXYGEN SATURATION: 95 % | BODY MASS INDEX: 40.75 KG/M2 | HEIGHT: 63 IN | HEART RATE: 78 BPM | TEMPERATURE: 98 F | DIASTOLIC BLOOD PRESSURE: 73 MMHG | WEIGHT: 230 LBS

## 2024-02-26 DIAGNOSIS — G43.909 MIGRAINE, UNSPECIFIED, NOT INTRACTABLE, W/OUT STATUS MIGRAINOSUS: ICD-10-CM

## 2024-02-26 PROCEDURE — G2211 COMPLEX E/M VISIT ADD ON: CPT

## 2024-02-26 PROCEDURE — 99213 OFFICE O/P EST LOW 20 MIN: CPT

## 2024-02-26 RX ORDER — LAMOTRIGINE 150 MG/1
150 TABLET ORAL
Qty: 60 | Refills: 0 | Status: COMPLETED | COMMUNITY
Start: 2022-03-23 | End: 2024-02-26

## 2024-02-26 RX ORDER — BENZTROPINE MESYLATE 2 MG/1
TABLET ORAL
Refills: 0 | Status: COMPLETED | COMMUNITY
End: 2024-02-26

## 2024-02-26 RX ORDER — TIRZEPATIDE 12.5 MG/.5ML
12.5 INJECTION, SOLUTION SUBCUTANEOUS
Qty: 2 | Refills: 0 | Status: COMPLETED | COMMUNITY
Start: 2023-09-23 | End: 2024-02-26

## 2024-02-26 RX ORDER — CLOTRIMAZOLE 10 MG/G
1 CREAM TOPICAL
Qty: 90 | Refills: 0 | Status: COMPLETED | COMMUNITY
Start: 2022-06-20 | End: 2024-02-26

## 2024-02-26 RX ORDER — DULAGLUTIDE 4.5 MG/.5ML
INJECTION, SOLUTION SUBCUTANEOUS
Refills: 0 | Status: COMPLETED | COMMUNITY
End: 2024-02-26

## 2024-02-26 RX ORDER — GLIMEPIRIDE 4 MG/1
TABLET ORAL
Refills: 0 | Status: COMPLETED | COMMUNITY
End: 2024-02-26

## 2024-02-26 RX ORDER — COVID-19 ANTIGEN TEST
KIT MISCELLANEOUS
Qty: 8 | Refills: 0 | Status: COMPLETED | COMMUNITY
Start: 2022-10-06 | End: 2024-02-26

## 2024-02-26 RX ORDER — CEFUROXIME AXETIL 500 MG/1
500 TABLET ORAL
Qty: 20 | Refills: 0 | Status: COMPLETED | COMMUNITY
Start: 2022-04-26 | End: 2024-02-26

## 2024-02-26 RX ORDER — INSULIN GLARGINE 100 [IU]/ML
INJECTION, SOLUTION SUBCUTANEOUS
Refills: 0 | Status: COMPLETED | COMMUNITY
End: 2024-02-26

## 2024-02-26 RX ORDER — CICLOPIROX OLAMINE 7.7 MG/G
0.77 CREAM TOPICAL
Qty: 180 | Refills: 0 | Status: COMPLETED | COMMUNITY
Start: 2023-08-04 | End: 2024-02-26

## 2024-02-26 RX ORDER — LUMATEPERONE 21 MG/1
CAPSULE ORAL
Refills: 0 | Status: ACTIVE | COMMUNITY

## 2024-02-26 RX ORDER — ARIPIPRAZOLE 2 MG/1
TABLET ORAL
Refills: 0 | Status: COMPLETED | COMMUNITY
End: 2024-02-26

## 2024-02-26 RX ORDER — ONDANSETRON 4 MG/1
4 TABLET ORAL
Qty: 20 | Refills: 0 | Status: COMPLETED | COMMUNITY
Start: 2022-04-26 | End: 2024-02-26

## 2024-02-26 RX ORDER — CANAGLIFLOZIN 300 MG/1
300 TABLET, FILM COATED ORAL
Qty: 30 | Refills: 0 | Status: COMPLETED | COMMUNITY
Start: 2022-09-12 | End: 2024-02-26

## 2024-02-26 RX ORDER — LOSARTAN POTASSIUM 100 MG/1
100 TABLET, FILM COATED ORAL
Qty: 30 | Refills: 0 | Status: COMPLETED | COMMUNITY
Start: 2022-08-29 | End: 2024-02-26

## 2024-02-26 RX ORDER — FLUPHENAZINE HYDROCHLORIDE 2.5 MG/1
2.5 TABLET, FILM COATED ORAL
Qty: 30 | Refills: 0 | Status: COMPLETED | COMMUNITY
Start: 2022-07-29 | End: 2024-02-26

## 2024-02-26 RX ORDER — ROSUVASTATIN CALCIUM 5 MG/1
TABLET, FILM COATED ORAL
Refills: 0 | Status: COMPLETED | COMMUNITY
End: 2024-02-26

## 2024-02-26 RX ORDER — ERYTHROMYCIN 5 MG/G
5 OINTMENT OPHTHALMIC
Qty: 4 | Refills: 0 | Status: COMPLETED | COMMUNITY
Start: 2022-10-03 | End: 2024-02-26

## 2024-02-26 RX ORDER — TIRZEPATIDE 7.5 MG/.5ML
7.5 INJECTION, SOLUTION SUBCUTANEOUS
Qty: 2 | Refills: 0 | Status: COMPLETED | COMMUNITY
Start: 2022-09-12 | End: 2024-02-26

## 2024-02-26 RX ORDER — CLOTRIMAZOLE AND BETAMETHASONE DIPROPIONATE 10; .5 MG/G; MG/G
1-0.05 CREAM TOPICAL
Qty: 1 | Refills: 0 | Status: COMPLETED | COMMUNITY
Start: 2021-10-20 | End: 2024-02-26

## 2024-02-26 RX ORDER — ASPIRIN 325 MG/1
TABLET, FILM COATED ORAL
Refills: 0 | Status: COMPLETED | COMMUNITY
End: 2024-02-26

## 2024-02-26 RX ORDER — FLUPHENAZINE HCL 5 MG
TABLET ORAL
Refills: 0 | Status: ACTIVE | COMMUNITY

## 2024-02-26 RX ORDER — NITROFURANTOIN (MONOHYDRATE/MACROCRYSTALS) 25; 75 MG/1; MG/1
100 CAPSULE ORAL
Qty: 14 | Refills: 0 | Status: COMPLETED | COMMUNITY
Start: 2022-06-07 | End: 2024-02-26

## 2024-02-26 RX ORDER — BREXPIPRAZOLE 0.5 MG/1
0.5 TABLET ORAL
Qty: 30 | Refills: 0 | Status: COMPLETED | COMMUNITY
Start: 2022-03-18 | End: 2024-02-26

## 2024-02-26 RX ORDER — BREXPIPRAZOLE 1 MG/1
1 TABLET ORAL
Qty: 30 | Refills: 0 | Status: COMPLETED | COMMUNITY
Start: 2022-04-08 | End: 2024-02-26

## 2024-02-26 RX ORDER — CLOTRIMAZOLE AND BETAMETHASONE DIPROPIONATE 10; .5 MG/G; MG/G
1-0.05 CREAM TOPICAL
Qty: 1 | Refills: 0 | Status: COMPLETED | COMMUNITY
Start: 2021-04-14 | End: 2024-02-26

## 2024-02-26 RX ORDER — LAMOTRIGINE 2 MG/1
TABLET, FOR SUSPENSION ORAL
Refills: 0 | Status: COMPLETED | COMMUNITY
End: 2024-02-26

## 2024-02-26 RX ORDER — FLUCONAZOLE 150 MG/1
150 TABLET ORAL DAILY
Qty: 3 | Refills: 0 | Status: COMPLETED | COMMUNITY
Start: 2021-04-14 | End: 2024-02-26

## 2024-02-26 RX ORDER — FLUPHENAZINE HYDROCHLORIDE 1 MG/1
1 TABLET, FILM COATED ORAL
Qty: 30 | Refills: 0 | Status: COMPLETED | COMMUNITY
Start: 2022-06-29 | End: 2024-02-26

## 2024-02-26 RX ORDER — ARIPIPRAZOLE 30 MG/1
30 TABLET ORAL
Qty: 30 | Refills: 0 | Status: COMPLETED | COMMUNITY
Start: 2022-03-23 | End: 2024-02-26

## 2024-02-26 NOTE — HISTORY OF PRESENT ILLNESS
[FreeTextEntry1] : This patient is seen for an office visit.  She is followed for migraine headache disorder.  She was last seen on 10/26/2023.  Her headaches have definitely moderated and she is improved with Nurtec 75 mg ODT which she now takes every other day as a preventative.  She occasionally has an occasional headache without migraine aura which she relates to her anxiety for which she will take gabapentin and/or Tylenol as needed.  She is taking medications for her psychiatric condition including Prolixin prazosin and Caplyta.

## 2024-02-26 NOTE — PHYSICAL EXAM
[FreeTextEntry1] : Head:  Normocephalic Neck: Supple.  Mental Status:  Alert Oriented X3 Speech normal and no aphasia or dysarthria.  Cranial Nerves:  PERRL, Visual Fields full  EOMI no diplopia no ptosis no nystagmus, V through XII intact.  Motor:  No drift, normal strength tone and coordination and no focal atrophy. No abnormal movements. No dysmetria.  Normal rapid alternating movements.   DTRs: Symmetric and 2+.  Plantars flexor.  No Clonus.  Sensory:  Normal testing with pin light touch.  Gait: Unremarkable.

## 2024-02-27 NOTE — ED PROVIDER NOTE - DISCHARGE DATE
04-Jan-2024

## 2024-03-04 RX ORDER — RIMEGEPANT SULFATE 75 MG/75MG
75 TABLET, ORALLY DISINTEGRATING ORAL
Qty: 16 | Refills: 5 | Status: ACTIVE | COMMUNITY
Start: 2022-05-06 | End: 1900-01-01

## 2024-03-08 ENCOUNTER — INPATIENT (INPATIENT)
Facility: HOSPITAL | Age: 59
LOS: 2 days | Discharge: HOME | DRG: 313 | End: 2024-03-11
Attending: STUDENT IN AN ORGANIZED HEALTH CARE EDUCATION/TRAINING PROGRAM | Admitting: STUDENT IN AN ORGANIZED HEALTH CARE EDUCATION/TRAINING PROGRAM
Payer: MEDICARE

## 2024-03-08 VITALS
DIASTOLIC BLOOD PRESSURE: 70 MMHG | RESPIRATION RATE: 18 BRPM | SYSTOLIC BLOOD PRESSURE: 125 MMHG | HEIGHT: 63 IN | OXYGEN SATURATION: 95 % | HEART RATE: 65 BPM | WEIGHT: 229.94 LBS | TEMPERATURE: 98 F

## 2024-03-08 DIAGNOSIS — Z98.890 OTHER SPECIFIED POSTPROCEDURAL STATES: Chronic | ICD-10-CM

## 2024-03-08 DIAGNOSIS — I24.9 ACUTE ISCHEMIC HEART DISEASE, UNSPECIFIED: ICD-10-CM

## 2024-03-08 LAB
ALBUMIN SERPL ELPH-MCNC: 3.4 G/DL — SIGNIFICANT CHANGE UP (ref 3.3–5)
ALP SERPL-CCNC: 101 U/L — SIGNIFICANT CHANGE UP (ref 40–120)
ALT FLD-CCNC: 41 U/L — SIGNIFICANT CHANGE UP (ref 10–45)
ANION GAP SERPL CALC-SCNC: 8 MMOL/L — SIGNIFICANT CHANGE UP (ref 5–17)
AST SERPL-CCNC: 26 U/L — SIGNIFICANT CHANGE UP (ref 10–40)
BASOPHILS # BLD AUTO: 0.04 K/UL — SIGNIFICANT CHANGE UP (ref 0–0.2)
BASOPHILS NFR BLD AUTO: 0.5 % — SIGNIFICANT CHANGE UP (ref 0–2)
BILIRUB SERPL-MCNC: 0.2 MG/DL — SIGNIFICANT CHANGE UP (ref 0.2–1.2)
BUN SERPL-MCNC: 21 MG/DL — SIGNIFICANT CHANGE UP (ref 7–23)
CALCIUM SERPL-MCNC: 9.1 MG/DL — SIGNIFICANT CHANGE UP (ref 8.4–10.5)
CHLORIDE SERPL-SCNC: 101 MMOL/L — SIGNIFICANT CHANGE UP (ref 96–108)
CO2 SERPL-SCNC: 28 MMOL/L — SIGNIFICANT CHANGE UP (ref 22–31)
CREAT SERPL-MCNC: 1.06 MG/DL — SIGNIFICANT CHANGE UP (ref 0.5–1.3)
D DIMER BLD IA.RAPID-MCNC: 155 NG/ML DDU — SIGNIFICANT CHANGE UP
EGFR: 60 ML/MIN/1.73M2 — SIGNIFICANT CHANGE UP
EOSINOPHIL # BLD AUTO: 0.18 K/UL — SIGNIFICANT CHANGE UP (ref 0–0.5)
EOSINOPHIL NFR BLD AUTO: 2.1 % — SIGNIFICANT CHANGE UP (ref 0–6)
GLUCOSE SERPL-MCNC: 182 MG/DL — HIGH (ref 70–99)
HCT VFR BLD CALC: 36 % — SIGNIFICANT CHANGE UP (ref 34.5–45)
HGB BLD-MCNC: 12.2 G/DL — SIGNIFICANT CHANGE UP (ref 11.5–15.5)
IMM GRANULOCYTES NFR BLD AUTO: 0.5 % — SIGNIFICANT CHANGE UP (ref 0–0.9)
LIDOCAIN IGE QN: 71 U/L — SIGNIFICANT CHANGE UP (ref 16–77)
LYMPHOCYTES # BLD AUTO: 2.97 K/UL — SIGNIFICANT CHANGE UP (ref 1–3.3)
LYMPHOCYTES # BLD AUTO: 34.1 % — SIGNIFICANT CHANGE UP (ref 13–44)
MAGNESIUM SERPL-MCNC: 2.2 MG/DL — SIGNIFICANT CHANGE UP (ref 1.6–2.6)
MCHC RBC-ENTMCNC: 29.5 PG — SIGNIFICANT CHANGE UP (ref 27–34)
MCHC RBC-ENTMCNC: 33.9 GM/DL — SIGNIFICANT CHANGE UP (ref 32–36)
MCV RBC AUTO: 87 FL — SIGNIFICANT CHANGE UP (ref 80–100)
MONOCYTES # BLD AUTO: 0.7 K/UL — SIGNIFICANT CHANGE UP (ref 0–0.9)
MONOCYTES NFR BLD AUTO: 8 % — SIGNIFICANT CHANGE UP (ref 2–14)
NEUTROPHILS # BLD AUTO: 4.79 K/UL — SIGNIFICANT CHANGE UP (ref 1.8–7.4)
NEUTROPHILS NFR BLD AUTO: 54.8 % — SIGNIFICANT CHANGE UP (ref 43–77)
NRBC # BLD: 0 /100 WBCS — SIGNIFICANT CHANGE UP (ref 0–0)
NT-PROBNP SERPL-SCNC: 20 PG/ML — SIGNIFICANT CHANGE UP (ref 0–300)
PLATELET # BLD AUTO: 317 K/UL — SIGNIFICANT CHANGE UP (ref 150–400)
POTASSIUM SERPL-MCNC: 4.1 MMOL/L — SIGNIFICANT CHANGE UP (ref 3.5–5.3)
POTASSIUM SERPL-SCNC: 4.1 MMOL/L — SIGNIFICANT CHANGE UP (ref 3.5–5.3)
PROT SERPL-MCNC: 7 G/DL — SIGNIFICANT CHANGE UP (ref 6–8.3)
RBC # BLD: 4.14 M/UL — SIGNIFICANT CHANGE UP (ref 3.8–5.2)
RBC # FLD: 12.8 % — SIGNIFICANT CHANGE UP (ref 10.3–14.5)
SODIUM SERPL-SCNC: 137 MMOL/L — SIGNIFICANT CHANGE UP (ref 135–145)
TROPONIN I, HIGH SENSITIVITY RESULT: 4.2 NG/L — SIGNIFICANT CHANGE UP
WBC # BLD: 8.72 K/UL — SIGNIFICANT CHANGE UP (ref 3.8–10.5)
WBC # FLD AUTO: 8.72 K/UL — SIGNIFICANT CHANGE UP (ref 3.8–10.5)

## 2024-03-08 PROCEDURE — 99285 EMERGENCY DEPT VISIT HI MDM: CPT

## 2024-03-08 PROCEDURE — 99223 1ST HOSP IP/OBS HIGH 75: CPT | Mod: GC

## 2024-03-08 PROCEDURE — 93010 ELECTROCARDIOGRAM REPORT: CPT

## 2024-03-08 PROCEDURE — 71045 X-RAY EXAM CHEST 1 VIEW: CPT | Mod: 26

## 2024-03-08 RX ORDER — INSULIN LISPRO 100/ML
VIAL (ML) SUBCUTANEOUS
Refills: 0 | Status: DISCONTINUED | OUTPATIENT
Start: 2024-03-08 | End: 2024-03-11

## 2024-03-08 RX ORDER — LANOLIN ALCOHOL/MO/W.PET/CERES
3 CREAM (GRAM) TOPICAL AT BEDTIME
Refills: 0 | Status: DISCONTINUED | OUTPATIENT
Start: 2024-03-08 | End: 2024-03-11

## 2024-03-08 RX ORDER — ONDANSETRON 8 MG/1
4 TABLET, FILM COATED ORAL ONCE
Refills: 0 | Status: DISCONTINUED | OUTPATIENT
Start: 2024-03-08 | End: 2024-03-08

## 2024-03-08 RX ORDER — DEXTROSE 50 % IN WATER 50 %
12.5 SYRINGE (ML) INTRAVENOUS ONCE
Refills: 0 | Status: DISCONTINUED | OUTPATIENT
Start: 2024-03-08 | End: 2024-03-11

## 2024-03-08 RX ORDER — INSULIN LISPRO 100/ML
VIAL (ML) SUBCUTANEOUS AT BEDTIME
Refills: 0 | Status: DISCONTINUED | OUTPATIENT
Start: 2024-03-08 | End: 2024-03-11

## 2024-03-08 RX ORDER — SODIUM CHLORIDE 9 MG/ML
1000 INJECTION, SOLUTION INTRAVENOUS
Refills: 0 | Status: DISCONTINUED | OUTPATIENT
Start: 2024-03-08 | End: 2024-03-11

## 2024-03-08 RX ORDER — ACETAMINOPHEN 500 MG
650 TABLET ORAL EVERY 6 HOURS
Refills: 0 | Status: DISCONTINUED | OUTPATIENT
Start: 2024-03-08 | End: 2024-03-11

## 2024-03-08 RX ORDER — DEXTROSE 50 % IN WATER 50 %
25 SYRINGE (ML) INTRAVENOUS ONCE
Refills: 0 | Status: DISCONTINUED | OUTPATIENT
Start: 2024-03-08 | End: 2024-03-11

## 2024-03-08 RX ORDER — ONDANSETRON 8 MG/1
4 TABLET, FILM COATED ORAL EVERY 8 HOURS
Refills: 0 | Status: DISCONTINUED | OUTPATIENT
Start: 2024-03-08 | End: 2024-03-11

## 2024-03-08 RX ORDER — NITROGLYCERIN 6.5 MG
0.4 CAPSULE, EXTENDED RELEASE ORAL ONCE
Refills: 0 | Status: COMPLETED | OUTPATIENT
Start: 2024-03-08 | End: 2024-03-08

## 2024-03-08 RX ORDER — DEXTROSE 50 % IN WATER 50 %
15 SYRINGE (ML) INTRAVENOUS ONCE
Refills: 0 | Status: DISCONTINUED | OUTPATIENT
Start: 2024-03-08 | End: 2024-03-11

## 2024-03-08 RX ORDER — GLUCAGON INJECTION, SOLUTION 0.5 MG/.1ML
1 INJECTION, SOLUTION SUBCUTANEOUS ONCE
Refills: 0 | Status: DISCONTINUED | OUTPATIENT
Start: 2024-03-08 | End: 2024-03-11

## 2024-03-08 RX ADMIN — Medication 0.4 MILLIGRAM(S): at 21:10

## 2024-03-08 NOTE — ED ADULT NURSE NOTE - NSFALLUNIVINTERV_ED_ALL_ED
Bed/Stretcher in lowest position, wheels locked, appropriate side rails in place/Call bell, personal items and telephone in reach/Instruct patient to call for assistance before getting out of bed/chair/stretcher/Non-slip footwear applied when patient is off stretcher/[place] to call system/Physically safe environment - no spills, clutter or unnecessary equipment/Purposeful proactive rounding/Room/bathroom lighting operational, light cord in reach

## 2024-03-08 NOTE — ED PROVIDER NOTE - OBJECTIVE STATEMENT
58 year old female with chest pain, pressure-like today. Reports dizziness , intermittent sob, Denies fever, NVD, dysuria, cough, trauma. Denies any other symptoms.

## 2024-03-08 NOTE — H&P ADULT - NSHPLABSRESULTS_GEN_ALL_CORE
12.2   8.72  )-----------( 317      ( 08 Mar 2024 18:42 )             36.0     08 Mar 2024 18:42    137    |  101    |  21     ----------------------------<  182    4.1     |  28     |  1.06     Ca    9.1        08 Mar 2024 18:42  Mg     2.2       08 Mar 2024 18:42    TPro  7.0    /  Alb  3.4    /  TBili  0.2    /  DBili  x      /  AST  26     /  ALT  41     /  AlkPhos  101    08 Mar 2024 18:42      CAPILLARY BLOOD GLUCOSE    LIVER FUNCTIONS - ( 08 Mar 2024 18:42 )  Alb: 3.4 g/dL / Pro: 7.0 g/dL / ALK PHOS: 101 U/L / ALT: 41 U/L / AST: 26 U/L / GGT: x           Urinalysis Basic - ( 08 Mar 2024 18:42 )    Color: x / Appearance: x / SG: x / pH: x  Gluc: 182 mg/dL / Ketone: x  / Bili: x / Urobili: x   Blood: x / Protein: x / Nitrite: x   Leuk Esterase: x / RBC: x / WBC x   Sq Epi: x / Non Sq Epi: x / Bacteria: x          ACC: [id number] EXAM:  XR CHEST PORTABLE URGENT 1V   ORDERED BY:  OSCAR MCCANN     PROCEDURE DATE:  03/08/2024        INTERPRETATION:  AP chest radiograph    COMPARISON: 1/4/2024 chest x-ray.    CLINICAL INFORMATION: Chest Pain.    FINDINGS:  CATHETERS AND TUBES: None    PULMONARY: The airway is midline.  There are no airspace consolidations or radiographic evidence of   pulmonary nodules..  No pleural effusion or pneumothorax.    HEART/VASCULAR: The heart size and mediastinum configuration are within   the limits of normal.    BONES: The visualized osseous thorax is intact.    IMPRESSION:    No radiographic evidence of active chest disease..    --- End of Report ---      AUGUSTA CASPER MD; Attending Radiologist  This document has been electronically signed. Mar  8 2024  7:48PM

## 2024-03-08 NOTE — H&P ADULT - NSHPPHYSICALEXAM_GEN_ALL_CORE
PHYSICAL EXAM:  Vital Signs Last 24 Hrs  T(C): 36.6 (08 Mar 2024 18:07), Max: 36.6 (08 Mar 2024 18:07)  T(F): 97.9 (08 Mar 2024 18:07), Max: 97.9 (08 Mar 2024 18:07)  HR: 65 (08 Mar 2024 18:07) (65 - 65)  BP: 125/70 (08 Mar 2024 18:07) (125/70 - 125/70)  RR: 18 (08 Mar 2024 18:07) (18 - 18)  SpO2: 95% (08 Mar 2024 18:07) (95% - 95%)    Parameters below as of 08 Mar 2024 18:07  Patient On (Oxygen Delivery Method): room air        PHYSICAL EXAM:  GENERAL: NAD, lying in bed comfortably  HEAD:  Atraumatic, Normocephalic  EYES: EOMI, PERRLA, conjunctiva and sclera clear  RESP: Clear to auscultation bilaterally, good air entry bilaterally; No wheezing, rales, or rhonchi. Unlabored respirations  CARDIAC: Regular rate and rhythm. S1 and S2. No murmurs, rubs, or gallops  GI:  Soft, Nontender, Nondistended. Bowel sounds present x4 quadrants; No hepatomegaly. No splenomegaly.  EXTREMITIES:  2+ Peripheral Pulses. Capillary refill <2 seconds. No clubbing, cyanosis, or edema  NERVOUS SYSTEM:  Alert & Oriented X3, speech clear. No deficits   MSK: FROM all 4 extremities, full and equal strength  SKIN: No rashes, bruises, or other lesions

## 2024-03-08 NOTE — H&P ADULT - NSHPREVIEWOFSYSTEMS_GEN_ALL_CORE
REVIEW OF SYSTEMS:  CONSTITUTIONAL: (-) weakness, (-) fevers, (-) chills  RESPIRATORY:  (-) shortness of breath, (-) cough,  (-) wheezing,  (-) hemoptysis   CARDIOVASCULAR:  (+) chest pain, (-) palpitations  GASTROINTESTINAL:  (-) abdominal or epigastric pain, (-) nausea, (-) vomiting, (-) diarrhea, (-) constipation, (-) melena,  (-) hematemesis,  (-) hematochezia  GENITOURINARY: (-) dysuria, (-) frequency, (-) hematuria  NEUROLOGICAL: (-) numbness, (-) weakness  SKIN: (-) itching, (-) rashes, (-) lesions

## 2024-03-08 NOTE — H&P ADULT - ASSESSMENT
Ms. Alex is a 57 y/o F with PMHx of Type 2 DM, HTN, HLD, GERD, asthma, anxiety, sleep apnea, schizoaffective disorder, bipolar disorder, migraines presenting for substernal chest pressure admitted for r/out ACS.      #Chest pain, r/out ACS  -Admit to telemetry  -S/P ASA and nitro in ER   -EKG: Sinus bradycardia  -Troponin: 0.32   -Serial cardiac enzymes and EKG  -Clopidogrel, loading dose 300mg now, continue at 75mg daily  -Aspirin 81mg daily   -Start Heparin gtt  -Continue Losartan 100mg daily, Metoprolol 25mg daily, Hydralazine 10mg q8hrs   -Atorvastatin 80mg po qhs   -F/u CXR, TTE   -F/u Troponin levels  -F/u AM labs, lipid profile & A1C      Type 2 DM  -FS, ISS  -Follow HbA1c    Asthma, stable  -Continue albuterol prn, montelukast, symbicort    GERD  -Continue PPI    Schizoaffective disorder - chronic, stable  -Continue home psych meds    Vitals q8h  Diet: Carb consistent  Activity: Bedrest   PT/OT as tolerated  DVT ppx: Lovenox  Standard precautions: Aspiration, fall, safety, seizure, skin  Code Status - Full Code    Case dw Dr. Kunz Ms. Alex is a 57 y/o F with PMHx of Type 2 DM, HTN, HLD, GERD, asthma, anxiety, sleep apnea, schizoaffective disorder, bipolar disorder, migraines presenting for substernal chest pressure admitted for r/out ACS.      #Chest pain, r/out ACS  -Admit to telemetry  -S/P ASA and nitro in ER   - Trend top/ekg q6hr  - Cchocardiogram  - A1c, Lipid, TSH  - Dr Sevilla notified in ED - said ok to admit   - Cardiology consult   - C/w aspirin   -Cw atorvastatin 80mg  -Cw losartan      Type 2 DM  -Home insulin pump closed   -Pt currently on glargine 40 units, sugar 182, will hold for now  -ISS  -Follow HbA1c  -Consistent carb diet    Asthma, stable  -Continue albuterol prn, montelukast, symbicort    GERD  -Continue PPI    Schizoaffective disorder - chronic, stable  -Cw fluoxetine   -Cw fluphenazine  -Pt currently taking Caplyta 42mg at night qd, not on formulary  -If pt still admitted tomorrow may ask to bring in      DVT ppx: Lovenox    Code Status - Full Code    Patient would like sisterNevaeh updated, [phone]    Case dw Dr. Kunz Ms. Alex is a 59 y/o F with PMHx of Type 2 DM, HTN, HLD, GERD, asthma, anxiety, sleep apnea, schizoaffective disorder, bipolar disorder, migraines presenting for substernal chest pressure admitted for r/out ACS.      #Chest pain, r/out ACS  -Admit to telemetry  -S/P ASA and nitro in ER   - Trend top/ekg q6hr  - Cchocardiogram  - A1c, Lipid, TSH  - Dr Sevilla notified in ED - said ok to admit   - Cardiology consult   - C/w aspirin   -Cw atorvastatin 80mg  -Cw losartan      #Type 2 DM  - Patient states she takes glargine 40 units at night  - Currently sugar level 182   - Will hold off on long acting insulin tonight  - AISS  - Resume glargine as tolerated  - Home insulin pump turned off  -A1c am    #Asthma, stable  -Continue albuterol prn, montelukast, symbicort    #GERD  -Continue PPI    #Schizoaffective disorder - chronic, stable  -Cw fluoxetine   -Cw fluphenazine  -Pt currently taking Caplyta 42mg at night qd, not on formulary  -If pt still admitted tomorrow may ask to bring in      #DVT ppx: Lovenox    Code Status - Full Code    Patient would like sisterNevaeh updated, [phone]    Case dw Dr. Kunz Ms. Alex is a 59 y/o F with PMHx of Type 2 DM, HTN, HLD, GERD, asthma, anxiety, sleep apnea, schizoaffective disorder, bipolar disorder, migraines presenting for substernal chest pressure admitted for r/out ACS.    Patient's sisterNevaeh updated on plan.      #Chest pain, r/out ACS  -Admit to telemetry  -S/P ASA and nitro in ER   - Trend top/ekg q6hr  - Cchocardiogram  - F/up A1c, Lipid, TSH  - Dr Sevilla notified in ED - said ok to admit   - Cardiology consult   - C/w aspirin   -Cw atorvastatin 80mg  -Cw losartan  -Cw Prazosin      #Type 2 DM  - Patient states she takes glargine 40 units at night  - Currently sugar level 182   - Will hold off on long acting insulin tonight  - AISS  - Resume glargine as tolerated  - Home insulin pump turned off  -Cw gabapentin 100mg bid  -A1c am    #Asthma, stable  -Continue albuterol prn, montelukast, symbicort    #GERD  -Continue PPI    #Schizoaffective disorder - chronic, stable  -Cw fluoxetine   -Cw fluphenazine  -Pt currently taking Caplyta 42mg at night qd, not on formulary  -If pt still admitted tomorrow may ask to bring in      #DVT ppx: Lovenox    Code Status - Full Code    Patient would like sisterNevaeh updated, [phone]    Case dw Dr. Kunz

## 2024-03-08 NOTE — H&P ADULT - HISTORY OF PRESENT ILLNESS
Ms. Alex is a 59 y/o F with PMHx of Type 2 DM, HTN, HLD, GERD, asthma, anxiety, sleep apnea, schizoaffective disorder, bipolar disorder, migraines presenting for substernal chest pressure which started at     The patient describes the pain as:     The patient received ASA prior to arrival and her pain improved from 8-1 however her pain then rebounded and she was given nitroglycerin in the ED.    In the ED vitals were notable for: temp 97.9, HR 65, /70, RR 18, spo2 95%. Labs were wnl. EKG showed:   CXR wnl.        Ms. Alex is a 57 y/o F with PMHx of Type 2 DM, HTN, HLD, GERD, asthma, anxiety, sleep apnea, schizoaffective disorder, bipolar disorder, migraines presenting for substernal chest pressure which started at     The patient describes the pain as:     The patient received ASA prior to arrival and her pain improved from 8-1 however her pain then rebounded and she was given nitroglycerin in the ED.    In the ED vitals were notable for: temp 97.9, HR 65, /70, RR 18, spo2 95%. Labs were wnl. EKG showed:   CXR wnl.     ED attending discussed case with cardiology, Dr. Sevilla. Will admit to medicine.   Ms. Alex is a 57 y/o F with PMHx of Type 2 DM, HTN, HLD, GERD, asthma, anxiety, sleep apnea, schizoaffective disorder, bipolar disorder, migraines presenting for substernal chest pressure which started this afternoon while the patient was sitting on her couch eating. Pt states that the pain was sharp and substernal. Non-radiating pain. States that she also had some nausea and dizziness at the time. The patient states that the pain was unlike previous asthma exacerbations and episodes of GERD, at which point she then called an ambulance. The patient received ASA prior to arrival and her pain improved from 8-1 however her pain then rebounded and she was given nitroglycerin in the ED. The patient states that early while she was in the ED her pain fluctuated but it is now a 0 out of 10 with no tenderness to palpation.     In the ED vitals were notable for: temp 97.9, HR 65, /70, RR 18, spo2 95%. Labs were wnl. EKG showed: NSR, no ST elevations. CXR wnl.     ED attending discussed case with cardiology, Dr. Sevilla, comfortable admitting to medicine.

## 2024-03-08 NOTE — ED PROVIDER NOTE - CLINICAL SUMMARY MEDICAL DECISION MAKING FREE TEXT BOX
58 year old female with chest pain today. labs reviewed- acceptable, CXR- no infiltrate, EKG no GUME, patient took ASA prior to arrival, pain improved from 8-1, then pain rebounded, will give SL nitro, and admit to r/o ACS

## 2024-03-08 NOTE — ED PROVIDER NOTE - CONSTITUTIONAL, MLM
normal... acutely ill appearing, awake, alert, oriented to person, place, time/situation and in no apparent distress.

## 2024-03-08 NOTE — ED ADULT NURSE NOTE - OBJECTIVE STATEMENT
Pt BIBA from home with c/o chest pressure.  Pt reports midsternal chest pressure starting around 4:30 pm.  Pressure was accompanied by diaphoresis with nausea and dizziness at that time.  Pain was localized, nonradiating, and rated 8/10 at that time.  Pt given ASA by EMS, reports relief of pain, now rated 1/10 at present.  Denies any SOB, palpitations.  EKG completed.  Continuous cardiac monitoring in place.

## 2024-03-09 ENCOUNTER — RESULT REVIEW (OUTPATIENT)
Age: 59
End: 2024-03-09

## 2024-03-09 DIAGNOSIS — R07.9 CHEST PAIN, UNSPECIFIED: ICD-10-CM

## 2024-03-09 DIAGNOSIS — Z78.9 OTHER SPECIFIED HEALTH STATUS: ICD-10-CM

## 2024-03-09 DIAGNOSIS — J45.909 UNSPECIFIED ASTHMA, UNCOMPLICATED: ICD-10-CM

## 2024-03-09 DIAGNOSIS — K21.9 GASTRO-ESOPHAGEAL REFLUX DISEASE WITHOUT ESOPHAGITIS: ICD-10-CM

## 2024-03-09 DIAGNOSIS — F25.9 SCHIZOAFFECTIVE DISORDER, UNSPECIFIED: ICD-10-CM

## 2024-03-09 DIAGNOSIS — Z29.9 ENCOUNTER FOR PROPHYLACTIC MEASURES, UNSPECIFIED: ICD-10-CM

## 2024-03-09 DIAGNOSIS — E11.9 TYPE 2 DIABETES MELLITUS WITHOUT COMPLICATIONS: ICD-10-CM

## 2024-03-09 LAB
A1C WITH ESTIMATED AVERAGE GLUCOSE RESULT: 8.3 % — HIGH (ref 4–5.6)
ALBUMIN SERPL ELPH-MCNC: 3.4 G/DL — SIGNIFICANT CHANGE UP (ref 3.3–5)
ALP SERPL-CCNC: 97 U/L — SIGNIFICANT CHANGE UP (ref 40–120)
ALT FLD-CCNC: 40 U/L — SIGNIFICANT CHANGE UP (ref 10–45)
ANION GAP SERPL CALC-SCNC: 12 MMOL/L — SIGNIFICANT CHANGE UP (ref 5–17)
AST SERPL-CCNC: 20 U/L — SIGNIFICANT CHANGE UP (ref 10–40)
BASOPHILS # BLD AUTO: 0.05 K/UL — SIGNIFICANT CHANGE UP (ref 0–0.2)
BASOPHILS NFR BLD AUTO: 0.6 % — SIGNIFICANT CHANGE UP (ref 0–2)
BILIRUB SERPL-MCNC: 0.3 MG/DL — SIGNIFICANT CHANGE UP (ref 0.2–1.2)
BUN SERPL-MCNC: 19 MG/DL — SIGNIFICANT CHANGE UP (ref 7–23)
CALCIUM SERPL-MCNC: 9.6 MG/DL — SIGNIFICANT CHANGE UP (ref 8.4–10.5)
CHLORIDE SERPL-SCNC: 102 MMOL/L — SIGNIFICANT CHANGE UP (ref 96–108)
CHOLEST SERPL-MCNC: 189 MG/DL — SIGNIFICANT CHANGE UP
CO2 SERPL-SCNC: 25 MMOL/L — SIGNIFICANT CHANGE UP (ref 22–31)
CREAT SERPL-MCNC: 0.95 MG/DL — SIGNIFICANT CHANGE UP (ref 0.5–1.3)
EGFR: 69 ML/MIN/1.73M2 — SIGNIFICANT CHANGE UP
EOSINOPHIL # BLD AUTO: 0.17 K/UL — SIGNIFICANT CHANGE UP (ref 0–0.5)
EOSINOPHIL NFR BLD AUTO: 2 % — SIGNIFICANT CHANGE UP (ref 0–6)
ESTIMATED AVERAGE GLUCOSE: 192 MG/DL — HIGH (ref 68–114)
GLUCOSE BLDC GLUCOMTR-MCNC: 178 MG/DL — HIGH (ref 70–99)
GLUCOSE BLDC GLUCOMTR-MCNC: 196 MG/DL — HIGH (ref 70–99)
GLUCOSE BLDC GLUCOMTR-MCNC: 207 MG/DL — HIGH (ref 70–99)
GLUCOSE BLDC GLUCOMTR-MCNC: 211 MG/DL — HIGH (ref 70–99)
GLUCOSE BLDC GLUCOMTR-MCNC: 317 MG/DL — HIGH (ref 70–99)
GLUCOSE SERPL-MCNC: 182 MG/DL — HIGH (ref 70–99)
HCT VFR BLD CALC: 38.2 % — SIGNIFICANT CHANGE UP (ref 34.5–45)
HDLC SERPL-MCNC: 59 MG/DL — SIGNIFICANT CHANGE UP
HGB BLD-MCNC: 12.5 G/DL — SIGNIFICANT CHANGE UP (ref 11.5–15.5)
IMM GRANULOCYTES NFR BLD AUTO: 0.2 % — SIGNIFICANT CHANGE UP (ref 0–0.9)
LIPID PNL WITH DIRECT LDL SERPL: 91 MG/DL — SIGNIFICANT CHANGE UP
LYMPHOCYTES # BLD AUTO: 2.98 K/UL — SIGNIFICANT CHANGE UP (ref 1–3.3)
LYMPHOCYTES # BLD AUTO: 34.8 % — SIGNIFICANT CHANGE UP (ref 13–44)
MAGNESIUM SERPL-MCNC: 2.3 MG/DL — SIGNIFICANT CHANGE UP (ref 1.6–2.6)
MCHC RBC-ENTMCNC: 29.1 PG — SIGNIFICANT CHANGE UP (ref 27–34)
MCHC RBC-ENTMCNC: 32.7 GM/DL — SIGNIFICANT CHANGE UP (ref 32–36)
MCV RBC AUTO: 89 FL — SIGNIFICANT CHANGE UP (ref 80–100)
MONOCYTES # BLD AUTO: 0.69 K/UL — SIGNIFICANT CHANGE UP (ref 0–0.9)
MONOCYTES NFR BLD AUTO: 8.1 % — SIGNIFICANT CHANGE UP (ref 2–14)
NEUTROPHILS # BLD AUTO: 4.65 K/UL — SIGNIFICANT CHANGE UP (ref 1.8–7.4)
NEUTROPHILS NFR BLD AUTO: 54.3 % — SIGNIFICANT CHANGE UP (ref 43–77)
NON HDL CHOLESTEROL: 129 MG/DL — SIGNIFICANT CHANGE UP
NRBC # BLD: 0 /100 WBCS — SIGNIFICANT CHANGE UP (ref 0–0)
PHOSPHATE SERPL-MCNC: 3.7 MG/DL — SIGNIFICANT CHANGE UP (ref 2.5–4.5)
PLATELET # BLD AUTO: 321 K/UL — SIGNIFICANT CHANGE UP (ref 150–400)
POTASSIUM SERPL-MCNC: 4.1 MMOL/L — SIGNIFICANT CHANGE UP (ref 3.5–5.3)
POTASSIUM SERPL-SCNC: 4.1 MMOL/L — SIGNIFICANT CHANGE UP (ref 3.5–5.3)
PROT SERPL-MCNC: 7.3 G/DL — SIGNIFICANT CHANGE UP (ref 6–8.3)
RBC # BLD: 4.29 M/UL — SIGNIFICANT CHANGE UP (ref 3.8–5.2)
RBC # FLD: 13 % — SIGNIFICANT CHANGE UP (ref 10.3–14.5)
SODIUM SERPL-SCNC: 139 MMOL/L — SIGNIFICANT CHANGE UP (ref 135–145)
TRIGL SERPL-MCNC: 229 MG/DL — HIGH
TROPONIN I, HIGH SENSITIVITY RESULT: <4 NG/L — SIGNIFICANT CHANGE UP
TROPONIN I, HIGH SENSITIVITY RESULT: <4 NG/L — SIGNIFICANT CHANGE UP
TSH SERPL-MCNC: 0.91 UIU/ML — SIGNIFICANT CHANGE UP (ref 0.36–3.74)
WBC # BLD: 8.56 K/UL — SIGNIFICANT CHANGE UP (ref 3.8–10.5)
WBC # FLD AUTO: 8.56 K/UL — SIGNIFICANT CHANGE UP (ref 3.8–10.5)

## 2024-03-09 PROCEDURE — 93010 ELECTROCARDIOGRAM REPORT: CPT | Mod: 76

## 2024-03-09 PROCEDURE — 93306 TTE W/DOPPLER COMPLETE: CPT | Mod: 26

## 2024-03-09 PROCEDURE — 99222 1ST HOSP IP/OBS MODERATE 55: CPT

## 2024-03-09 PROCEDURE — 99233 SBSQ HOSP IP/OBS HIGH 50: CPT

## 2024-03-09 RX ORDER — FLUPHENAZINE HYDROCHLORIDE 1 MG/1
5 TABLET, FILM COATED ORAL AT BEDTIME
Refills: 0 | Status: DISCONTINUED | OUTPATIENT
Start: 2024-03-09 | End: 2024-03-11

## 2024-03-09 RX ORDER — FLUOXETINE HCL 10 MG
20 CAPSULE ORAL
Refills: 0 | Status: DISCONTINUED | OUTPATIENT
Start: 2024-03-09 | End: 2024-03-11

## 2024-03-09 RX ORDER — DOXAZOSIN MESYLATE 4 MG
1 TABLET ORAL AT BEDTIME
Refills: 0 | Status: DISCONTINUED | OUTPATIENT
Start: 2024-03-09 | End: 2024-03-09

## 2024-03-09 RX ORDER — MONTELUKAST 4 MG/1
10 TABLET, CHEWABLE ORAL DAILY
Refills: 0 | Status: DISCONTINUED | OUTPATIENT
Start: 2024-03-09 | End: 2024-03-11

## 2024-03-09 RX ORDER — ATORVASTATIN CALCIUM 80 MG/1
80 TABLET, FILM COATED ORAL AT BEDTIME
Refills: 0 | Status: DISCONTINUED | OUTPATIENT
Start: 2024-03-09 | End: 2024-03-11

## 2024-03-09 RX ORDER — LOSARTAN POTASSIUM 100 MG/1
100 TABLET, FILM COATED ORAL DAILY
Refills: 0 | Status: DISCONTINUED | OUTPATIENT
Start: 2024-03-09 | End: 2024-03-11

## 2024-03-09 RX ORDER — PRAZOSIN HCL 2 MG
1 CAPSULE ORAL DAILY
Refills: 0 | Status: DISCONTINUED | OUTPATIENT
Start: 2024-03-09 | End: 2024-03-11

## 2024-03-09 RX ORDER — LOSARTAN POTASSIUM 100 MG/1
100 TABLET, FILM COATED ORAL DAILY
Refills: 0 | Status: DISCONTINUED | OUTPATIENT
Start: 2024-03-09 | End: 2024-03-09

## 2024-03-09 RX ORDER — LUMATEPERONE 10.5 MG/1
1 CAPSULE ORAL
Refills: 0 | DISCHARGE

## 2024-03-09 RX ORDER — PRAZOSIN HCL 2 MG
1 CAPSULE ORAL DAILY
Refills: 0 | Status: DISCONTINUED | OUTPATIENT
Start: 2024-03-09 | End: 2024-03-09

## 2024-03-09 RX ORDER — BUDESONIDE AND FORMOTEROL FUMARATE DIHYDRATE 160; 4.5 UG/1; UG/1
2 AEROSOL RESPIRATORY (INHALATION)
Refills: 0 | Status: DISCONTINUED | OUTPATIENT
Start: 2024-03-09 | End: 2024-03-11

## 2024-03-09 RX ORDER — GABAPENTIN 400 MG/1
100 CAPSULE ORAL
Refills: 0 | Status: DISCONTINUED | OUTPATIENT
Start: 2024-03-09 | End: 2024-03-11

## 2024-03-09 RX ORDER — ASPIRIN/CALCIUM CARB/MAGNESIUM 324 MG
81 TABLET ORAL DAILY
Refills: 0 | Status: DISCONTINUED | OUTPATIENT
Start: 2024-03-09 | End: 2024-03-11

## 2024-03-09 RX ORDER — INSULIN GLARGINE 100 [IU]/ML
40 INJECTION, SOLUTION SUBCUTANEOUS AT BEDTIME
Refills: 0 | Status: DISCONTINUED | OUTPATIENT
Start: 2024-03-09 | End: 2024-03-11

## 2024-03-09 RX ORDER — GABAPENTIN 400 MG/1
100 CAPSULE ORAL ONCE
Refills: 0 | Status: COMPLETED | OUTPATIENT
Start: 2024-03-09 | End: 2024-03-09

## 2024-03-09 RX ADMIN — INSULIN GLARGINE 40 UNIT(S): 100 INJECTION, SOLUTION SUBCUTANEOUS at 21:29

## 2024-03-09 RX ADMIN — LOSARTAN POTASSIUM 100 MILLIGRAM(S): 100 TABLET, FILM COATED ORAL at 06:21

## 2024-03-09 RX ADMIN — Medication 20 MILLIGRAM(S): at 20:19

## 2024-03-09 RX ADMIN — GABAPENTIN 100 MILLIGRAM(S): 400 CAPSULE ORAL at 17:34

## 2024-03-09 RX ADMIN — ATORVASTATIN CALCIUM 80 MILLIGRAM(S): 80 TABLET, FILM COATED ORAL at 21:31

## 2024-03-09 RX ADMIN — Medication 4: at 21:36

## 2024-03-09 RX ADMIN — Medication 4: at 08:08

## 2024-03-09 RX ADMIN — MONTELUKAST 10 MILLIGRAM(S): 4 TABLET, CHEWABLE ORAL at 12:28

## 2024-03-09 RX ADMIN — Medication 650 MILLIGRAM(S): at 13:07

## 2024-03-09 RX ADMIN — Medication 20 MILLIGRAM(S): at 12:28

## 2024-03-09 RX ADMIN — BUDESONIDE AND FORMOTEROL FUMARATE DIHYDRATE 2 PUFF(S): 160; 4.5 AEROSOL RESPIRATORY (INHALATION) at 09:19

## 2024-03-09 RX ADMIN — Medication 81 MILLIGRAM(S): at 12:28

## 2024-03-09 RX ADMIN — Medication 20 MILLIGRAM(S): at 06:20

## 2024-03-09 RX ADMIN — FLUPHENAZINE HYDROCHLORIDE 5 MILLIGRAM(S): 1 TABLET, FILM COATED ORAL at 21:32

## 2024-03-09 RX ADMIN — Medication 650 MILLIGRAM(S): at 14:07

## 2024-03-09 RX ADMIN — GABAPENTIN 100 MILLIGRAM(S): 400 CAPSULE ORAL at 00:57

## 2024-03-09 RX ADMIN — Medication 2: at 12:29

## 2024-03-09 RX ADMIN — BUDESONIDE AND FORMOTEROL FUMARATE DIHYDRATE 2 PUFF(S): 160; 4.5 AEROSOL RESPIRATORY (INHALATION) at 20:38

## 2024-03-09 RX ADMIN — Medication 1 MILLIGRAM(S): at 06:20

## 2024-03-09 RX ADMIN — Medication 2: at 17:34

## 2024-03-09 RX ADMIN — GABAPENTIN 100 MILLIGRAM(S): 400 CAPSULE ORAL at 06:20

## 2024-03-09 NOTE — PROGRESS NOTE ADULT - ASSESSMENT
Ms. Alex is a 59 y/o F with PMHx of Type 2 DM, HTN, HLD, GERD, asthma, anxiety, sleep apnea, schizoaffective disorder, bipolar disorder, migraines presenting for substernal chest pressure admitted for r/out ACS.      #Chest pain, r/out ACS  -Monitor on telemetry   -S/P ASA and nitro in ER   - Trend top/ekg q6hr, trop negative x 3   - F/U Echocardiogram   - D-dimer negative  - F/up A1c, Lipid  - TSH wnl  - Dr Sevilla notified in ED - said ok to admit   - Cardiology consult   - C/w aspirin   -Cw atorvastatin 80mg  -Cw losartan  -Cw Prazosin    #Type 2 DM  - Patient states she takes glargine 40 units at night  - Currently sugar level 207  - Last night long acting insulin was held  - AISS  - Resume glargine as tolerated  - Home insulin pump turned off  -Cw gabapentin 100mg bid  -F/U A1c    #Asthma, stable  -Continue albuterol prn, montelukast, symbicort    #GERD  -Continue PPI    #Schizoaffective disorder - chronic, stable  -Cw fluoxetine   -Cw fluphenazine  -Pt currently taking Caplyta 42mg at night qd, not on formulary, ask to bring in from home      #DVT ppx: Lovenox    Code Status - Full Code    Patient sisterNevaeh updated, [phone]

## 2024-03-09 NOTE — PROGRESS NOTE ADULT - SUBJECTIVE AND OBJECTIVE BOX
Patient is a 58y old  Female who presents with a chief complaint of r/out ACS (08 Mar 2024 22:25)      Patient seen and examined at bedside. No overnight events reported. Patient states she feels well this morning. Denies chest pain, palpitations, sob, nausea, vomiting.     ALLERGIES:  walnut (Other)  No Known Drug Allergies    MEDICATIONS  (STANDING):  aspirin enteric coated 81 milliGRAM(s) Oral daily  atorvastatin 80 milliGRAM(s) Oral at bedtime  budesonide 160 MICROgram(s)/formoterol 4.5 MICROgram(s) Inhaler 2 Puff(s) Inhalation two times a day  dextrose 5%. 1000 milliLiter(s) (50 mL/Hr) IV Continuous <Continuous>  dextrose 5%. 1000 milliLiter(s) (100 mL/Hr) IV Continuous <Continuous>  dextrose 50% Injectable 25 Gram(s) IV Push once  dextrose 50% Injectable 12.5 Gram(s) IV Push once  dextrose 50% Injectable 25 Gram(s) IV Push once  FLUoxetine 20 milliGRAM(s) Oral <User Schedule>  fluPHENAZine 5 milliGRAM(s) Oral at bedtime  gabapentin 100 milliGRAM(s) Oral two times a day  glucagon  Injectable 1 milliGRAM(s) IntraMuscular once  insulin lispro (ADMELOG) corrective regimen sliding scale   SubCutaneous three times a day before meals  insulin lispro (ADMELOG) corrective regimen sliding scale   SubCutaneous at bedtime  losartan 100 milliGRAM(s) Oral daily  montelukast 10 milliGRAM(s) Oral daily  prazosin 1 milliGRAM(s) Oral daily    MEDICATIONS  (PRN):  acetaminophen     Tablet .. 650 milliGRAM(s) Oral every 6 hours PRN Temp greater or equal to 38C (100.4F), Mild Pain (1 - 3)  aluminum hydroxide/magnesium hydroxide/simethicone Suspension 30 milliLiter(s) Oral every 4 hours PRN Dyspepsia  dextrose Oral Gel 15 Gram(s) Oral once PRN Blood Glucose LESS THAN 70 milliGRAM(s)/deciliter  melatonin 3 milliGRAM(s) Oral at bedtime PRN Insomnia  ondansetron Injectable 4 milliGRAM(s) IV Push every 8 hours PRN Nausea and/or Vomiting    Vital Signs Last 24 Hrs  T(F): 97.7 (09 Mar 2024 05:00), Max: 97.9 (08 Mar 2024 18:07)  HR: 64 (09 Mar 2024 05:00) (64 - 66)  BP: 122/78 (09 Mar 2024 05:00) (106/72 - 125/70)  RR: 16 (09 Mar 2024 05:00) (16 - 18)  SpO2: 96% (09 Mar 2024 05:00) (94% - 96%)  I&O's Summary    PHYSICAL EXAM:  General: NAD, A/O x 3  ENT: No gross hearing impairment, Moist mucous membranes, no thrush  Neck: Supple, No JVD  Lungs: Clear to auscultation bilaterally, good air entry, non-labored breathing  Cardio: RRR, S1/S2, No murmur  Abdomen: Soft, Nontender, Nondistended; Bowel sounds present  Extremities: No calf tenderness, No cyanosis, No pitting edema  Psych: Appropriate mood and affect    LABS:                        12.5   8.56  )-----------( 321      ( 09 Mar 2024 06:10 )             38.2     03-09    139  |  102  |  19  ----------------------------<  182  4.1   |  25  |  0.95    Ca    9.6      09 Mar 2024 06:10  Phos  3.7     03-09  Mg     2.3     03-09    TPro  7.3  /  Alb  3.4  /  TBili  0.3  /  DBili  x   /  AST  20  /  ALT  40  /  AlkPhos  97  03-09      Lipase: 71 U/L (03-08-24 @ 18:42)            CARDIAC MARKERS ( 09 Mar 2024 06:10 )  x     / <4.0 ng/L / x     / x     / x      CARDIAC MARKERS ( 09 Mar 2024 00:00 )  x     / <4.0 ng/L / x     / x     / x      CARDIAC MARKERS ( 08 Mar 2024 18:42 )  x     / 4.2 ng/L / x     / x     / x            TSH 0.910   TSH with FT4 reflex --  Total T3 --              POCT Blood Glucose.: 207 mg/dL (09 Mar 2024 08:07)  POCT Blood Glucose.: 211 mg/dL (09 Mar 2024 00:56)      Urinalysis Basic - ( 09 Mar 2024 06:10 )    Color: x / Appearance: x / SG: x / pH: x  Gluc: 182 mg/dL / Ketone: x  / Bili: x / Urobili: x   Blood: x / Protein: x / Nitrite: x   Leuk Esterase: x / RBC: x / WBC x   Sq Epi: x / Non Sq Epi: x / Bacteria: x          RADIOLOGY & ADDITIONAL TESTS:    Care Discussed with Consultants/Other Providers:

## 2024-03-09 NOTE — PATIENT PROFILE ADULT - FALL HARM RISK - UNIVERSAL INTERVENTIONS
Bed in lowest position, wheels locked, appropriate side rails in place/Call bell, personal items and telephone in reach/Instruct patient to call for assistance before getting out of bed or chair/Non-slip footwear when patient is out of bed/[place] to call system/Physically safe environment - no spills, clutter or unnecessary equipment/Purposeful Proactive Rounding/Room/bathroom lighting operational, light cord in reach

## 2024-03-09 NOTE — CONSULT NOTE ADULT - SUBJECTIVE AND OBJECTIVE BOX
Chief Complaint:     Ms. Alex is a 59 y/o F with PMHx of Type 2 DM, HTN, HLD, GERD, asthma, anxiety, sleep apnea, schizoaffective disorder, bipolar disorder, migraines presenting for substernal chest pressure which started this afternoon while the patient was sitting on her couch eating. Pt states that the pain was sharp and substernal. Non-radiating pain. States that she also had some nausea and dizziness at the time. The patient states that the pain was unlike previous asthma exacerbations and episodes of GERD, at which point she then called an ambulance. The patient received ASA prior to arrival and her pain improved from 8-1 however her pain then rebounded and she was given nitroglycerin in the ED. The patient states that early while she was in the ED her pain fluctuated but it is now a 0 out of 10 with no tenderness to palpation. In the ED vitals were notable for: temp 97.9, HR 65, /70, RR 18, spo2 95%. Labs were wnl. EKG showed: NSR, no ST elevations. CXR wnl.  ED attending discussed case with cardiology, Dr. Sevilla, comfortable admitting to medicine.     HPI:    PMH:   No pertinent past medical history    Diabetes    HTN (hypertension)    HLD (hyperlipidemia)    Migraine    Depression    Anxiety    Asthma      PSH:   No significant past surgical history    H/O oral surgery      Family History:  FAMILY HISTORY:  No pertinent family history in first degree relatives    Social History:  Smoking:  Alcohol:  Drugs:    Allergies:  walnut (Other)  No Known Drug Allergies      Medications:  acetaminophen     Tablet .. 650 milliGRAM(s) Oral every 6 hours PRN  aluminum hydroxide/magnesium hydroxide/simethicone Suspension 30 milliLiter(s) Oral every 4 hours PRN  aspirin enteric coated 81 milliGRAM(s) Oral daily  atorvastatin 80 milliGRAM(s) Oral at bedtime  budesonide 160 MICROgram(s)/formoterol 4.5 MICROgram(s) Inhaler 2 Puff(s) Inhalation two times a day  dextrose 5%. 1000 milliLiter(s) IV Continuous <Continuous>  dextrose 5%. 1000 milliLiter(s) IV Continuous <Continuous>  dextrose 50% Injectable 25 Gram(s) IV Push once  dextrose 50% Injectable 12.5 Gram(s) IV Push once  dextrose 50% Injectable 25 Gram(s) IV Push once  dextrose Oral Gel 15 Gram(s) Oral once PRN  FLUoxetine 20 milliGRAM(s) Oral <User Schedule>  fluPHENAZine 5 milliGRAM(s) Oral at bedtime  gabapentin 100 milliGRAM(s) Oral two times a day  glucagon  Injectable 1 milliGRAM(s) IntraMuscular once  insulin glargine Injectable (LANTUS) 40 Unit(s) SubCutaneous at bedtime  insulin lispro (ADMELOG) corrective regimen sliding scale   SubCutaneous three times a day before meals  insulin lispro (ADMELOG) corrective regimen sliding scale   SubCutaneous at bedtime  losartan 100 milliGRAM(s) Oral daily  melatonin 3 milliGRAM(s) Oral at bedtime PRN  montelukast 10 milliGRAM(s) Oral daily  ondansetron Injectable 4 milliGRAM(s) IV Push every 8 hours PRN  prazosin 1 milliGRAM(s) Oral daily      REVIEW OF SYSTEMS:  CONSTITUTIONAL: No fever, weight loss, or fatigue  EYES: No eye pain, visual disturbances, or discharge  ENMT:  No difficulty hearing, tinnitus, vertigo; No sinus or throat pain  NECK: No pain or stiffness  BREASTS: No pain, masses, or nipple discharge  RESPIRATORY: No cough, wheezing, chills or hemoptysis; No shortness of breath  CARDIOVASCULAR: No chest pain, palpitations, dizziness, or leg swelling  GASTROINTESTINAL: No abdominal or epigastric pain. No nausea, vomiting, or hematemesis; No diarrhea or constipation. No melena or hematochezia.  GENITOURINARY: No dysuria, frequency, hematuria, or incontinence  NEUROLOGICAL: No headaches, memory loss, loss of strength, numbness, or tremors  SKIN: No itching, burning, rashes, or lesions   LYMPH NODES: No enlarged glands  ENDOCRINE: No heat or cold intolerance; No hair loss  MUSCULOSKELETAL: No joint pain or swelling; No muscle, back, or extremity pain  PSYCHIATRIC: No depression, anxiety, mood swings, or difficulty sleeping  HEME/LYMPH: No easy bruising, or bleeding gums  ALLERY AND IMMUNOLOGIC: No hives or eczema    Physical Exam:  T(C): 36.7 (24 @ 13:00), Max: 36.7 (24 @ 13:00)  HR: 77 (24 @ 13:00) (64 - 77)  BP: 101/71 (24 @ 13:00) (101/71 - 125/70)  RR: 17 (24 @ 13:00) (16 - 18)  SpO2: 93% (24 @ 13:00) (93% - 96%)  Wt(kg): --    GENERAL: NAD, well-groomed, well-developed  HEAD:  Atraumatic, Normocephalic  EYES: EOMI, conjunctiva and sclera clear  ENT: Moist mucous membranes,  NECK: Supple, No JVD, no bruits  CHEST/LUNG: Clear to percussion bilaterally; No rales, rhonchi, wheezing, or rubs  HEART: Regular rate and rhythm; No murmurs, rubs, or gallops PMI non displaced.  ABDOMEN: Soft, Nontender, Nondistended; Bowel sounds present  EXTREMITIES:  2+ Peripheral Pulses, No clubbing, cyanosis, or edema  SKIN: No rashes or lesions  NERVOUS SYSTEM:  Cranial Nerves II-XII intact     Cardiovascular Diagnostic Testing:  ECG:    ECHO:    Labs:                        12.5   8.56  )-----------( 321      ( 09 Mar 2024 06:10 )             38.2         139  |  102  |  19  ----------------------------<  182<H>  4.1   |  25  |  0.95    Ca    9.6      09 Mar 2024 06:10  Phos  3.7       Mg     2.3         TPro  7.3  /  Alb  3.4  /  TBili  0.3  /  DBili  x   /  AST  20  /  ALT  40  /  AlkPhos  97          Total Cholesterol: 189  LDL: --  HDL: 59  T      Thyroid Stimulating Hormone, Serum: 0.910 uIU/mL ( @ 00:00)      Imaging:    impression  bs 207 cr .95 tn 4.2 <4 <4 monitor rsr.   59 yo with cp and headache disorder and psychiatric disorder now with chest pains. she is followed by endocrine service and GI service Sita Lorenz andalexandra ramirez pcp. she state she has undergone endoscopy and colonoscopy in the past and received Botox injection she is non smoker shes diabetes and hypertensions  diabetes confers increased cardio vascular risk.  would luana  and consider for pharm or treadmill [facility] if she is a candidate weight 230

## 2024-03-10 ENCOUNTER — TRANSCRIPTION ENCOUNTER (OUTPATIENT)
Age: 59
End: 2024-03-10

## 2024-03-10 LAB
A1C WITH ESTIMATED AVERAGE GLUCOSE RESULT: 8.2 % — HIGH (ref 4–5.6)
ESTIMATED AVERAGE GLUCOSE: 189 MG/DL — HIGH (ref 68–114)
GLUCOSE BLDC GLUCOMTR-MCNC: 201 MG/DL — HIGH (ref 70–99)
GLUCOSE BLDC GLUCOMTR-MCNC: 221 MG/DL — HIGH (ref 70–99)
GLUCOSE BLDC GLUCOMTR-MCNC: 226 MG/DL — HIGH (ref 70–99)
GLUCOSE BLDC GLUCOMTR-MCNC: 255 MG/DL — HIGH (ref 70–99)

## 2024-03-10 PROCEDURE — 99232 SBSQ HOSP IP/OBS MODERATE 35: CPT

## 2024-03-10 PROCEDURE — 99233 SBSQ HOSP IP/OBS HIGH 50: CPT

## 2024-03-10 RX ORDER — INSULIN LISPRO 100/ML
6 VIAL (ML) SUBCUTANEOUS
Refills: 0 | Status: DISCONTINUED | OUTPATIENT
Start: 2024-03-10 | End: 2024-03-11

## 2024-03-10 RX ORDER — INSULIN LISPRO 100/ML
10 VIAL (ML) SUBCUTANEOUS
Refills: 0 | Status: DISCONTINUED | OUTPATIENT
Start: 2024-03-10 | End: 2024-03-10

## 2024-03-10 RX ADMIN — Medication 6 UNIT(S): at 12:32

## 2024-03-10 RX ADMIN — Medication 6 UNIT(S): at 17:37

## 2024-03-10 RX ADMIN — Medication 20 MILLIGRAM(S): at 12:31

## 2024-03-10 RX ADMIN — Medication 81 MILLIGRAM(S): at 12:30

## 2024-03-10 RX ADMIN — Medication 4: at 17:37

## 2024-03-10 RX ADMIN — INSULIN GLARGINE 40 UNIT(S): 100 INJECTION, SOLUTION SUBCUTANEOUS at 21:20

## 2024-03-10 RX ADMIN — MONTELUKAST 10 MILLIGRAM(S): 4 TABLET, CHEWABLE ORAL at 12:30

## 2024-03-10 RX ADMIN — GABAPENTIN 100 MILLIGRAM(S): 400 CAPSULE ORAL at 17:38

## 2024-03-10 RX ADMIN — GABAPENTIN 100 MILLIGRAM(S): 400 CAPSULE ORAL at 05:17

## 2024-03-10 RX ADMIN — BUDESONIDE AND FORMOTEROL FUMARATE DIHYDRATE 2 PUFF(S): 160; 4.5 AEROSOL RESPIRATORY (INHALATION) at 08:41

## 2024-03-10 RX ADMIN — Medication 20 MILLIGRAM(S): at 05:18

## 2024-03-10 RX ADMIN — LOSARTAN POTASSIUM 100 MILLIGRAM(S): 100 TABLET, FILM COATED ORAL at 05:18

## 2024-03-10 RX ADMIN — Medication 4: at 08:47

## 2024-03-10 RX ADMIN — Medication 20 MILLIGRAM(S): at 21:19

## 2024-03-10 RX ADMIN — Medication 6: at 12:32

## 2024-03-10 RX ADMIN — ATORVASTATIN CALCIUM 80 MILLIGRAM(S): 80 TABLET, FILM COATED ORAL at 21:19

## 2024-03-10 RX ADMIN — Medication 1 MILLIGRAM(S): at 05:18

## 2024-03-10 RX ADMIN — FLUPHENAZINE HYDROCHLORIDE 5 MILLIGRAM(S): 1 TABLET, FILM COATED ORAL at 21:19

## 2024-03-10 NOTE — PROGRESS NOTE ADULT - ASSESSMENT
Ms. Alex is a 59 y/o F with PMHx of Type 2 DM, HTN, HLD, GERD, asthma, anxiety, sleep apnea, schizoaffective disorder, bipolar disorder, migraines presenting for substernal chest pressure admitted for r/out ACS.    #Chest pain, r/out ACS  -Monitor on telemetry   -S/P ASA and nitro in ER   - Trend top/ekg q6hr, trop negative x 3   - Echocardiogram reviewed  - D-dimer negative  - A1c 8.3, Lipid panel reviewed  - TSH wnl  - Cardiology consulted, plan for stress test tomorrow 3/11/24. Discussed with Dr. Sevilla, he prefers treadmill if patient able to tolerate as has asthma hx.  - C/w aspirin   -Cw atorvastatin 80mg  -Cw losartan  -Cw Prazosin    #Type 2 DM  - Patient states she takes glargine 40 units at night  - Restarted lantus 40 units last night  - Glucose over 300 this am, added 10 units premeal insulin   - AISS  - Home insulin pump turned off  - Cw gabapentin 100mg bid  - A1c 8.3    #Asthma, stable  -Continue albuterol prn, montelukast, symbicort    #GERD  -Continue PPI    #Schizoaffective disorder - chronic, stable  -Cw fluoxetine   -Cw fluphenazine  -Pt currently taking Caplyta 42mg at night qd, not on formulary, asked to bring in from home      #DVT ppx: Lovenox    Code Status - Full Code    Patient sisterNevaeh updated, [phone] Ms. Alex is a 57 y/o F with PMHx of Type 2 DM, HTN, HLD, GERD, asthma, anxiety, sleep apnea, schizoaffective disorder, bipolar disorder, migraines presenting for substernal chest pressure admitted for r/out ACS.    #Chest pain, r/out ACS  -Monitor on telemetry   -S/P ASA and nitro in ER   - Trend top/ekg q6hr, trop negative x 3   - Echocardiogram reviewed  - D-dimer negative  - A1c 8.3, Lipid panel reviewed  - TSH wnl  - Cardiology consulted, plan for stress test tomorrow 3/11/24. Discussed with Dr. Sevilla, he prefers treadmill if patient able to tolerate as has asthma hx.  - C/w aspirin   -Cw atorvastatin 80mg  -Cw losartan  -Cw Prazosin    #Type 2 DM  - Patient states she takes glargine 40 units at night  - Restarted lantus 40 units last night  - Glucose over 300 this am, added 6 units premeal insulin   - AISS  - Home insulin pump turned off  - Cw gabapentin 100mg bid  - A1c 8.3    #Asthma, stable  -Continue albuterol prn, montelukast, symbicort    #GERD  -Continue PPI    #Schizoaffective disorder - chronic, stable  -Cw fluoxetine   -Cw fluphenazine  -Pt currently taking Caplyta 42mg at night qd, not on formulary, asked to bring in from home      #DVT ppx: Lovenox    Code Status - Full Code    Patient sisterNevaeh updated, [phone]

## 2024-03-10 NOTE — DISCHARGE NOTE PROVIDER - NSDCMRMEDTOKEN_GEN_ALL_CORE_FT
aspirin 81 mg oral delayed release tablet: 1 tab(s) orally once a day x 30 days-Anti-Coag   atorvastatin 80 mg oral tablet: 1 tab(s) orally once a day (at bedtime) x 30 days-HLD   budesonide-formoterol 160 mcg-4.5 mcg/inh inhalation aerosol: 2 puff(s) inhaled 2 times a day-SOB   Caplyta 42 mg oral capsule: 1 cap(s) orally once a day  FLUoxetine 20 mg oral capsule: 1 cap(s) orally 3 times a day  fluPHENAZine 5 mg oral tablet: 1 tab(s) orally once a day (at bedtime) x 30 days-Psychosis   gabapentin 100 mg oral capsule: 1 cap(s) orally 2 times a day  Lantus Solostar Pen 100 units/mL subcutaneous solution: 15 unit(s) subcutaneous once a day (at bedtime)  losartan 100 mg oral tablet: 1 tab(s) orally once a day x 30 days-HTN   montelukast 10 mg oral tablet: 1 tab(s) orally once a day x 30 days-PPH   prazosin 1 mg oral capsule: 1 cap(s) orally once a day   aspirin 81 mg oral delayed release tablet: 1 tab(s) orally once a day  atorvastatin 80 mg oral tablet: 1 tab(s) orally once a day (at bedtime)  Caplyta 42 mg oral capsule: 1 cap(s) orally once a day  FLUoxetine 20 mg oral capsule: 1 cap(s) orally once a day  fluPHENAZine 5 mg oral tablet: 1 tab(s) orally once a day (at bedtime)  gabapentin 100 mg oral capsule: 1 cap(s) orally 2 times a day  insulin glargine 100 units/mL subcutaneous solution: 40 unit(s) subcutaneous once a day (at bedtime)  losartan 100 mg oral tablet: 1 tab(s) orally once a day  montelukast 10 mg oral tablet: 1 tab(s) orally once a day  prazosin 1 mg oral capsule: 1 cap(s) orally once a day

## 2024-03-10 NOTE — PROGRESS NOTE ADULT - SUBJECTIVE AND OBJECTIVE BOX
Patient is a 58y old  Female who presents with a chief complaint of r/out ACS (09 Mar 2024 15:17)      Patient seen and examined at bedside. No overnight events reported.     ALLERGIES:  walnut (Other)  No Known Drug Allergies    MEDICATIONS  (STANDING):  aspirin enteric coated 81 milliGRAM(s) Oral daily  atorvastatin 80 milliGRAM(s) Oral at bedtime  budesonide 160 MICROgram(s)/formoterol 4.5 MICROgram(s) Inhaler 2 Puff(s) Inhalation two times a day  dextrose 5%. 1000 milliLiter(s) (50 mL/Hr) IV Continuous <Continuous>  dextrose 5%. 1000 milliLiter(s) (100 mL/Hr) IV Continuous <Continuous>  dextrose 50% Injectable 25 Gram(s) IV Push once  dextrose 50% Injectable 12.5 Gram(s) IV Push once  dextrose 50% Injectable 25 Gram(s) IV Push once  FLUoxetine 20 milliGRAM(s) Oral <User Schedule>  fluPHENAZine 5 milliGRAM(s) Oral at bedtime  gabapentin 100 milliGRAM(s) Oral two times a day  glucagon  Injectable 1 milliGRAM(s) IntraMuscular once  insulin glargine Injectable (LANTUS) 40 Unit(s) SubCutaneous at bedtime  insulin lispro (ADMELOG) corrective regimen sliding scale   SubCutaneous three times a day before meals  insulin lispro (ADMELOG) corrective regimen sliding scale   SubCutaneous at bedtime  insulin lispro Injectable (ADMELOG) 10 Unit(s) SubCutaneous three times a day before meals  losartan 100 milliGRAM(s) Oral daily  montelukast 10 milliGRAM(s) Oral daily  prazosin 1 milliGRAM(s) Oral daily    MEDICATIONS  (PRN):  acetaminophen     Tablet .. 650 milliGRAM(s) Oral every 6 hours PRN Temp greater or equal to 38C (100.4F), Mild Pain (1 - 3)  aluminum hydroxide/magnesium hydroxide/simethicone Suspension 30 milliLiter(s) Oral every 4 hours PRN Dyspepsia  dextrose Oral Gel 15 Gram(s) Oral once PRN Blood Glucose LESS THAN 70 milliGRAM(s)/deciliter  melatonin 3 milliGRAM(s) Oral at bedtime PRN Insomnia  ondansetron Injectable 4 milliGRAM(s) IV Push every 8 hours PRN Nausea and/or Vomiting    Vital Signs Last 24 Hrs  T(F): 98.4 (10 Mar 2024 05:24), Max: 98.4 (10 Mar 2024 05:24)  HR: 70 (10 Mar 2024 08:42) (70 - 77)  BP: 125/76 (10 Mar 2024 05:24) (101/71 - 135/75)  RR: 16 (10 Mar 2024 05:24) (16 - 17)  SpO2: 95% (10 Mar 2024 08:42) (92% - 95%)  I&O's Summary    PHYSICAL EXAM:  General: NAD, A/O x 3  ENT: No gross hearing impairment, Moist mucous membranes, no thrush  Neck: Supple, No JVD  Lungs: Clear to auscultation bilaterally, good air entry, non-labored breathing  Cardio: RRR, S1/S2, No murmur  Abdomen: Soft, Nontender, Nondistended; Bowel sounds present  Extremities: No calf tenderness, No cyanosis, No pitting edema  Psych: Appropriate mood and affect    LABS:                        12.5   8.56  )-----------( 321      ( 09 Mar 2024 06:10 )             38.2     03-09    139  |  102  |  19  ----------------------------<  182  4.1   |  25  |  0.95    Ca    9.6      09 Mar 2024 06:10  Phos  3.7     03-09  Mg     2.3     03-09    TPro  7.3  /  Alb  3.4  /  TBili  0.3  /  DBili  x   /  AST  20  /  ALT  40  /  AlkPhos  97  03-09      Lipase: 71 U/L (03-08-24 @ 18:42)            CARDIAC MARKERS ( 09 Mar 2024 06:10 )  x     / <4.0 ng/L / x     / x     / x      CARDIAC MARKERS ( 09 Mar 2024 00:00 )  x     / <4.0 ng/L / x     / x     / x      CARDIAC MARKERS ( 08 Mar 2024 18:42 )  x     / 4.2 ng/L / x     / x     / x          03-09 Chol 189 mg/dL LDL -- HDL 59 mg/dL Trig 229 mg/dL  TSH 0.910   TSH with FT4 reflex --  Total T3 --              POCT Blood Glucose.: 201 mg/dL (10 Mar 2024 08:30)  POCT Blood Glucose.: 317 mg/dL (09 Mar 2024 21:28)  POCT Blood Glucose.: 178 mg/dL (09 Mar 2024 17:31)  POCT Blood Glucose.: 196 mg/dL (09 Mar 2024 12:27)      Urinalysis Basic - ( 09 Mar 2024 06:10 )    Color: x / Appearance: x / SG: x / pH: x  Gluc: 182 mg/dL / Ketone: x  / Bili: x / Urobili: x   Blood: x / Protein: x / Nitrite: x   Leuk Esterase: x / RBC: x / WBC x   Sq Epi: x / Non Sq Epi: x / Bacteria: x          RADIOLOGY & ADDITIONAL TESTS: < from: TTE Echo Complete w/o Contrast w/ Doppler (03.09.24 @ 11:06) >  Summary:   1. Left ventricular ejection fraction, by visual estimation, is 55 to   60%.   2. Technically fair study.   3. Mildly increased LV wall thickness.   4. Spectral Doppler shows impaired relaxation pattern of left   ventricular myocardial filling (Grade I diastolic dysfunction).   5. There is mild septal left ventricular hypertrophy.   6. Mildly enlarged left atrium.   7. Normal right atrial size.   8. Mild mitral valve regurgitation.   9. Mild tricuspid regurgitation.  10. Mild pulmonic valve regurgitation.  11. Pulmonary hypertension is absent.    < end of copied text >      Care Discussed with Consultants/Other Providers:    Patient is a 58y old  Female who presents with a chief complaint of r/out ACS (09 Mar 2024 15:17)      Patient seen and examined at bedside. No overnight events reported. Patient denies chest pain, sob, palpitations, nausea, vomiting.     ALLERGIES:  walnut (Other)  No Known Drug Allergies    MEDICATIONS  (STANDING):  aspirin enteric coated 81 milliGRAM(s) Oral daily  atorvastatin 80 milliGRAM(s) Oral at bedtime  budesonide 160 MICROgram(s)/formoterol 4.5 MICROgram(s) Inhaler 2 Puff(s) Inhalation two times a day  dextrose 5%. 1000 milliLiter(s) (50 mL/Hr) IV Continuous <Continuous>  dextrose 5%. 1000 milliLiter(s) (100 mL/Hr) IV Continuous <Continuous>  dextrose 50% Injectable 25 Gram(s) IV Push once  dextrose 50% Injectable 12.5 Gram(s) IV Push once  dextrose 50% Injectable 25 Gram(s) IV Push once  FLUoxetine 20 milliGRAM(s) Oral <User Schedule>  fluPHENAZine 5 milliGRAM(s) Oral at bedtime  gabapentin 100 milliGRAM(s) Oral two times a day  glucagon  Injectable 1 milliGRAM(s) IntraMuscular once  insulin glargine Injectable (LANTUS) 40 Unit(s) SubCutaneous at bedtime  insulin lispro (ADMELOG) corrective regimen sliding scale   SubCutaneous three times a day before meals  insulin lispro (ADMELOG) corrective regimen sliding scale   SubCutaneous at bedtime  insulin lispro Injectable (ADMELOG) 10 Unit(s) SubCutaneous three times a day before meals  losartan 100 milliGRAM(s) Oral daily  montelukast 10 milliGRAM(s) Oral daily  prazosin 1 milliGRAM(s) Oral daily    MEDICATIONS  (PRN):  acetaminophen     Tablet .. 650 milliGRAM(s) Oral every 6 hours PRN Temp greater or equal to 38C (100.4F), Mild Pain (1 - 3)  aluminum hydroxide/magnesium hydroxide/simethicone Suspension 30 milliLiter(s) Oral every 4 hours PRN Dyspepsia  dextrose Oral Gel 15 Gram(s) Oral once PRN Blood Glucose LESS THAN 70 milliGRAM(s)/deciliter  melatonin 3 milliGRAM(s) Oral at bedtime PRN Insomnia  ondansetron Injectable 4 milliGRAM(s) IV Push every 8 hours PRN Nausea and/or Vomiting    Vital Signs Last 24 Hrs  T(F): 98.4 (10 Mar 2024 05:24), Max: 98.4 (10 Mar 2024 05:24)  HR: 70 (10 Mar 2024 08:42) (70 - 77)  BP: 125/76 (10 Mar 2024 05:24) (101/71 - 135/75)  RR: 16 (10 Mar 2024 05:24) (16 - 17)  SpO2: 95% (10 Mar 2024 08:42) (92% - 95%)  I&O's Summary    PHYSICAL EXAM:  General: NAD, A/O x 3  ENT: No gross hearing impairment, Moist mucous membranes, no thrush  Neck: Supple, No JVD  Lungs: Clear to auscultation bilaterally, good air entry, non-labored breathing  Cardio: RRR, S1/S2, No murmur  Abdomen: Soft, Nontender, Nondistended; Bowel sounds present  Extremities: No calf tenderness, No cyanosis, No pitting edema  Psych: Appropriate mood and affect    LABS:                        12.5   8.56  )-----------( 321      ( 09 Mar 2024 06:10 )             38.2     03-09    139  |  102  |  19  ----------------------------<  182  4.1   |  25  |  0.95    Ca    9.6      09 Mar 2024 06:10  Phos  3.7     03-09  Mg     2.3     03-09    TPro  7.3  /  Alb  3.4  /  TBili  0.3  /  DBili  x   /  AST  20  /  ALT  40  /  AlkPhos  97  03-09      Lipase: 71 U/L (03-08-24 @ 18:42)            CARDIAC MARKERS ( 09 Mar 2024 06:10 )  x     / <4.0 ng/L / x     / x     / x      CARDIAC MARKERS ( 09 Mar 2024 00:00 )  x     / <4.0 ng/L / x     / x     / x      CARDIAC MARKERS ( 08 Mar 2024 18:42 )  x     / 4.2 ng/L / x     / x     / x          03-09 Chol 189 mg/dL LDL -- HDL 59 mg/dL Trig 229 mg/dL  TSH 0.910   TSH with FT4 reflex --  Total T3 --              POCT Blood Glucose.: 201 mg/dL (10 Mar 2024 08:30)  POCT Blood Glucose.: 317 mg/dL (09 Mar 2024 21:28)  POCT Blood Glucose.: 178 mg/dL (09 Mar 2024 17:31)  POCT Blood Glucose.: 196 mg/dL (09 Mar 2024 12:27)      Urinalysis Basic - ( 09 Mar 2024 06:10 )    Color: x / Appearance: x / SG: x / pH: x  Gluc: 182 mg/dL / Ketone: x  / Bili: x / Urobili: x   Blood: x / Protein: x / Nitrite: x   Leuk Esterase: x / RBC: x / WBC x   Sq Epi: x / Non Sq Epi: x / Bacteria: x          RADIOLOGY & ADDITIONAL TESTS: < from: TTE Echo Complete w/o Contrast w/ Doppler (03.09.24 @ 11:06) >  Summary:   1. Left ventricular ejection fraction, by visual estimation, is 55 to   60%.   2. Technically fair study.   3. Mildly increased LV wall thickness.   4. Spectral Doppler shows impaired relaxation pattern of left   ventricular myocardial filling (Grade I diastolic dysfunction).   5. There is mild septal left ventricular hypertrophy.   6. Mildly enlarged left atrium.   7. Normal right atrial size.   8. Mild mitral valve regurgitation.   9. Mild tricuspid regurgitation.  10. Mild pulmonic valve regurgitation.  11. Pulmonary hypertension is absent.    < end of copied text >      Care Discussed with Consultants/Other Providers:

## 2024-03-10 NOTE — DISCHARGE NOTE PROVIDER - HOSPITAL COURSE
Hospital Course  HPI:  Ms. Alex is a 59 y/o F with PMHx of Type 2 DM, HTN, HLD, GERD, asthma, anxiety, sleep apnea, schizoaffective disorder, bipolar disorder, migraines presenting for substernal chest pressure which started this afternoon while the patient was sitting on her couch eating. Pt states that the pain was sharp and substernal. Non-radiating pain. States that she also had some nausea and dizziness at the time. The patient states that the pain was unlike previous asthma exacerbations and episodes of GERD, at which point she then called an ambulance. The patient received ASA prior to arrival and her pain improved from 8-1 however her pain then rebounded and she was given nitroglycerin in the ED. The patient states that early while she was in the ED her pain fluctuated but it is now a 0 out of 10 with no tenderness to palpation.   In the ED vitals were notable for: temp 97.9, HR 65, /70, RR 18, spo2 95%. Labs were wnl. EKG showed: NSR, no ST elevations. CXR wnl.   ED attending discussed case with cardiology, Dr. Sevilla, comfortable admitting to medicine.    (08 Mar 2024 22:25)    Upon admission, troponins were trended and negative x 3. Patient was monitored on telemetry and cardiology was consulted. TTE performed with results shown below. Patient had a cardiac stress test in which results showed______. Patient should follow up with PCP within 1 week. Patient should follow up with cardiology.     < from: TTE Echo Complete w/o Contrast w/ Doppler (03.09.24 @ 11:06) >    Summary:   1. Left ventricular ejection fraction, by visual estimation, is 55 to   60%.   2. Technically fair study.   3. Mildly increased LV wall thickness.   4. Spectral Doppler shows impaired relaxation pattern of left   ventricular myocardial filling (Grade I diastolic dysfunction).   5. There is mild septal left ventricular hypertrophy.   6. Mildly enlarged left atrium.   7. Normal right atrial size.   8. Mild mitral valve regurgitation.   9. Mild tricuspid regurgitation.  10. Mild pulmonic valve regurgitation.  11. Pulmonary hypertension is absent.               You were admitted for chest pain  You were evaluated by cardiology and had a stress test  Follow up with primary care provider within 1 week  Follow up with cardiology outpatient    You will need to follow up with your primary care physician.      Discharging Provider:  OSCAR Casanova  Contact Info: [phone] - Please call with any questions or concerns.   Hospital Course    Ms. Alex is a 57 y/o F with PMHx of Type 2 DM, HTN, HLD, GERD, asthma, anxiety, sleep apnea, schizoaffective disorder, bipolar disorder, migraines presenting for substernal chest pressure which started this afternoon while the patient was sitting on her couch eating. Pt states that the pain was sharp and substernal. Non-radiating pain. States that she also had some nausea and dizziness at the time. The patient states that the pain was unlike previous asthma exacerbations and episodes of GERD, at which point she then called an ambulance. The patient received ASA prior to arrival and her pain improved from 8-1 however her pain then rebounded and she was given nitroglycerin in the ED. The patient states that early while she was in the ED her pain fluctuated but it is now a 0 out of 10 with no tenderness to palpation.     Upon admission, troponins were trended and negative x 3. Patient was monitored on telemetry and cardiology was consulted. TTE performed with results shown below. Patient had a cardiac stress test in which results showed:     Exercise SPECT Myocardial Perfusion Imaging consistent with   a small, very mild intensity, reversible perfusion defect of the mid   anterior wall which may be consistent with a small area of ischemia vs   breast attenuation artifact.     Normal left ventricular ejection fraction of 78  % (normal: 50% or   greater).     No regional wall motion abnormalities.      . Patient should follow up with PCP within 1 week. Patient should follow up with cardiology. Cardiology recommends outpatient CT coronaries, sister Nevaeh made aware and to follow up with Dr. Sevilla.      < from: TTE Echo Complete w/o Contrast w/ Doppler (03.09.24 @ 11:06) >    Summary:   1. Left ventricular ejection fraction, by visual estimation, is 55 to   60%.   2. Technically fair study.   3. Mildly increased LV wall thickness.   4. Spectral Doppler shows impaired relaxation pattern of left   ventricular myocardial filling (Grade I diastolic dysfunction).   5. There is mild septal left ventricular hypertrophy.   6. Mildly enlarged left atrium.   7. Normal right atrial size.   8. Mild mitral valve regurgitation.   9. Mild tricuspid regurgitation.  10. Mild pulmonic valve regurgitation.  11. Pulmonary hypertension is absent.               You were admitted for chest pain  You were evaluated by cardiology and had a stress test  Follow up with primary care provider within 1 week  Follow up with cardiology outpatient    You will need to follow up with your primary care physician.      Discharging Provider:  Peter Simmons NP   Contact Info: [phone] - Please call with any questions or concerns.   Hospital Course    Ms. Alex is a 59 y/o F with PMHx of Type 2 DM, HTN, HLD, GERD, asthma, anxiety, sleep apnea, schizoaffective disorder, bipolar disorder, migraines presenting for substernal chest pressure which started this afternoon while the patient was sitting on her couch eating. Pt states that the pain was sharp and substernal. Non-radiating pain. States that she also had some nausea and dizziness at the time. The patient states that the pain was unlike previous asthma exacerbations and episodes of GERD, at which point she then called an ambulance. The patient received ASA prior to arrival and her pain improved from 8-1 however her pain then rebounded and she was given nitroglycerin in the ED. The patient states that early while she was in the ED her pain fluctuated but it is now a 0 out of 10 with no tenderness to palpation.     Upon admission, troponins were trended and negative x 3. Patient was monitored on telemetry and cardiology was consulted. TTE performed with results shown below. Patient had a cardiac stress test in which results showed:     Exercise SPECT Myocardial Perfusion Imaging consistent with   a small, very mild intensity, reversible perfusion defect of the mid   anterior wall which may be consistent with a small area of ischemia vs   breast attenuation artifact.     Normal left ventricular ejection fraction of 78  % (normal: 50% or   greater).     No regional wall motion abnormalities.      . Patient should follow up with PCP within 1 week. Patient should follow up with cardiology. Cardiology recommends outpatient CT coronaries, sister Nevaeh made aware and to follow up with Dr. Sevilla.      < from: TTE Echo Complete w/o Contrast w/ Doppler (03.09.24 @ 11:06) >    Summary:   1. Left ventricular ejection fraction, by visual estimation, is 55 to   60%.   2. Technically fair study.   3. Mildly increased LV wall thickness.   4. Spectral Doppler shows impaired relaxation pattern of left   ventricular myocardial filling (Grade I diastolic dysfunction).   5. There is mild septal left ventricular hypertrophy.   6. Mildly enlarged left atrium.   7. Normal right atrial size.   8. Mild mitral valve regurgitation.   9. Mild tricuspid regurgitation.  10. Mild pulmonic valve regurgitation.  11. Pulmonary hypertension is absent.               You were admitted for chest pain  You were evaluated by cardiology and had a stress test  Follow up with primary care provider within 1 week  Follow up with cardiology outpatient    You will need to follow up with your primary care physician.      Discharging Provider:  Peter Simmons NP; Kenya Rodriguez DO  Contact Info: [phone] - Please call with any questions or concerns.    Time Spent: 45 minutes

## 2024-03-10 NOTE — DISCHARGE NOTE PROVIDER - CARE PROVIDERS DIRECT ADDRESSES
[email or web address]

## 2024-03-10 NOTE — PROGRESS NOTE ADULT - SUBJECTIVE AND OBJECTIVE BOX
Follow up for  SUBJ:    no complaints sister hcp at bedside    [facility]  No pertinent past medical history    Diabetes    HTN (hypertension)    HLD (hyperlipidemia)    Migraine    Depression    Anxiety    Asthma        MEDICATIONS  (STANDING):  aspirin enteric coated 81 milliGRAM(s) Oral daily  atorvastatin 80 milliGRAM(s) Oral at bedtime  budesonide 160 MICROgram(s)/formoterol 4.5 MICROgram(s) Inhaler 2 Puff(s) Inhalation two times a day  dextrose 5%. 1000 milliLiter(s) (50 mL/Hr) IV Continuous <Continuous>  dextrose 5%. 1000 milliLiter(s) (100 mL/Hr) IV Continuous <Continuous>  dextrose 50% Injectable 25 Gram(s) IV Push once  dextrose 50% Injectable 12.5 Gram(s) IV Push once  dextrose 50% Injectable 25 Gram(s) IV Push once  FLUoxetine 20 milliGRAM(s) Oral <User Schedule>  fluPHENAZine 5 milliGRAM(s) Oral at bedtime  gabapentin 100 milliGRAM(s) Oral two times a day  glucagon  Injectable 1 milliGRAM(s) IntraMuscular once  insulin glargine Injectable (LANTUS) 40 Unit(s) SubCutaneous at bedtime  insulin lispro (ADMELOG) corrective regimen sliding scale   SubCutaneous three times a day before meals  insulin lispro (ADMELOG) corrective regimen sliding scale   SubCutaneous at bedtime  insulin lispro Injectable (ADMELOG) 6 Unit(s) SubCutaneous three times a day before meals  losartan 100 milliGRAM(s) Oral daily  montelukast 10 milliGRAM(s) Oral daily  prazosin 1 milliGRAM(s) Oral daily    MEDICATIONS  (PRN):  acetaminophen     Tablet .. 650 milliGRAM(s) Oral every 6 hours PRN Temp greater or equal to 38C (100.4F), Mild Pain (1 - 3)  aluminum hydroxide/magnesium hydroxide/simethicone Suspension 30 milliLiter(s) Oral every 4 hours PRN Dyspepsia  dextrose Oral Gel 15 Gram(s) Oral once PRN Blood Glucose LESS THAN 70 milliGRAM(s)/deciliter  melatonin 3 milliGRAM(s) Oral at bedtime PRN Insomnia  ondansetron Injectable 4 milliGRAM(s) IV Push every 8 hours PRN Nausea and/or Vomiting        PHYSICAL EXAM:  Vital Signs Last 24 Hrs  T(C): 36.1 (10 Mar 2024 12:04), Max: 36.9 (10 Mar 2024 05:24)  T(F): 97 (10 Mar 2024 12:04), Max: 98.4 (10 Mar 2024 05:24)  HR: 77 (10 Mar 2024 12:04) (70 - 77)  BP: 107/72 (10 Mar 2024 12:04) (107/72 - 135/75)  BP(mean): --  RR: 17 (10 Mar 2024 12:04) (16 - 17)  SpO2: 96% (10 Mar 2024 12:04) (92% - 96%)    Parameters below as of 10 Mar 2024 12:04  Patient On (Oxygen Delivery Method): room air        GENERAL: NAD, well-groomed, well-developed  HEAD:  Atraumatic, Normocephalic  EYES: EOMI, PERRLA, conjunctiva and sclera clear  ENT: Moist mucous membranes,  NECK: Supple, No JVD, no bruits  CHEST/LUNG: Clear to percussion bilaterally; No rales, rhonchi, wheezing, or rubs  HEART: Regular rate and rhythm; No murmurs, rubs, or gallops PMI non displaced.  ABDOMEN: Soft, Nontender, Nondistended; Bowel sounds present  EXTREMITIES:  2+ Peripheral Pulses, No clubbing, cyanosis, or edema  SKIN: No rashes or lesions  NERVOUS SYSTEM:  Cranial Nerves II-XII intact      TELEMETRY:    ECG:  ECHO:    LABS:                        12.5   8.56  )-----------( 321      ( 09 Mar 2024 06:10 )             38.2     03-09    139  |  102  |  19  ----------------------------<  182<H>  4.1   |  25  |  0.95    Ca    9.6      09 Mar 2024 06:10  Phos  3.7     03-09  Mg     2.3     03-09    TPro  7.3  /  Alb  3.4  /  TBili  0.3  /  DBili  x   /  AST  20  /  ALT  40  /  AlkPhos  97  03-09            I&O's Summary    BNP    RADIOLOGY & ADDITIONAL STUDIES:    ECHO:    impression  chest Pains  tn <4/<4/4.2 BNP 20  I have asked Dorothy to undergo detailed cardiac testing in order to evaluate her overall cardiovascular risk. An assessment of both structural and functional heart disease was recommended to the patient.  In this regard, an echocardiogram and a stress test were advised to the patient. I await the upcoming noninvasive cardiac testing in order to assess her overall cardiovascular risk. We discussed the pros and cons of plain treadmill stress testing nuclear stress testing and angiography including a sensitivity analysis. The LDL cholesterol target is 70 and the blood pressure target is 130/80.     all questions were answered to their satisfaction. rba discussed

## 2024-03-10 NOTE — DISCHARGE NOTE PROVIDER - NSDCFUSCHEDAPPT_GEN_ALL_CORE_FT
Jose Sevilla  [facility] Physician [facility]  CARDIOLOGY [address]  Scheduled Appointment: 03/26/2024

## 2024-03-10 NOTE — DISCHARGE NOTE PROVIDER - CARE PROVIDER_API CALL
Jose Sevilla  Cardiology  [address], [address]  [place] [address]  Phone: [phone]  Fax: [phone]  Follow Up Time:     Kizzy Gibbs  [facility]  [address], [address]  [place] [address]  Phone: [phone]  Fax: [phone]  Follow Up Time:

## 2024-03-10 NOTE — DISCHARGE NOTE PROVIDER - NSDCCPCAREPLAN_GEN_ALL_CORE_FT
PRINCIPAL DISCHARGE DIAGNOSIS  Diagnosis: Chest pain  Assessment and Plan of Treatment: You were admitted for chest pain  You were evaluated by cardiology and had a stress test  Follow up with primary care provider within 1 week  Follow up with cardiology outpatient

## 2024-03-11 ENCOUNTER — TRANSCRIPTION ENCOUNTER (OUTPATIENT)
Age: 59
End: 2024-03-11

## 2024-03-11 VITALS
SYSTOLIC BLOOD PRESSURE: 120 MMHG | HEART RATE: 74 BPM | RESPIRATION RATE: 18 BRPM | TEMPERATURE: 99 F | OXYGEN SATURATION: 95 % | DIASTOLIC BLOOD PRESSURE: 76 MMHG

## 2024-03-11 LAB
GLUCOSE BLDC GLUCOMTR-MCNC: 207 MG/DL — HIGH (ref 70–99)
GLUCOSE BLDC GLUCOMTR-MCNC: 209 MG/DL — HIGH (ref 70–99)
GLUCOSE BLDC GLUCOMTR-MCNC: 239 MG/DL — HIGH (ref 70–99)

## 2024-03-11 PROCEDURE — 93005 ELECTROCARDIOGRAM TRACING: CPT

## 2024-03-11 PROCEDURE — 83880 ASSAY OF NATRIURETIC PEPTIDE: CPT

## 2024-03-11 PROCEDURE — 93018 CV STRESS TEST I&R ONLY: CPT

## 2024-03-11 PROCEDURE — 99285 EMERGENCY DEPT VISIT HI MDM: CPT | Mod: 25

## 2024-03-11 PROCEDURE — 78452 HT MUSCLE IMAGE SPECT MULT: CPT | Mod: 26

## 2024-03-11 PROCEDURE — 99239 HOSP IP/OBS DSCHRG MGMT >30: CPT

## 2024-03-11 PROCEDURE — 36415 COLL VENOUS BLD VENIPUNCTURE: CPT

## 2024-03-11 PROCEDURE — 94640 AIRWAY INHALATION TREATMENT: CPT

## 2024-03-11 PROCEDURE — 85379 FIBRIN DEGRADATION QUANT: CPT

## 2024-03-11 PROCEDURE — 83735 ASSAY OF MAGNESIUM: CPT

## 2024-03-11 PROCEDURE — 83690 ASSAY OF LIPASE: CPT

## 2024-03-11 PROCEDURE — 71045 X-RAY EXAM CHEST 1 VIEW: CPT

## 2024-03-11 PROCEDURE — 78452 HT MUSCLE IMAGE SPECT MULT: CPT | Mod: MC

## 2024-03-11 PROCEDURE — 80061 LIPID PANEL: CPT

## 2024-03-11 PROCEDURE — 93306 TTE W/DOPPLER COMPLETE: CPT

## 2024-03-11 PROCEDURE — 84484 ASSAY OF TROPONIN QUANT: CPT

## 2024-03-11 PROCEDURE — 83036 HEMOGLOBIN GLYCOSYLATED A1C: CPT

## 2024-03-11 PROCEDURE — 84100 ASSAY OF PHOSPHORUS: CPT

## 2024-03-11 PROCEDURE — 82962 GLUCOSE BLOOD TEST: CPT

## 2024-03-11 PROCEDURE — 93017 CV STRESS TEST TRACING ONLY: CPT

## 2024-03-11 PROCEDURE — G0378: CPT

## 2024-03-11 PROCEDURE — 80053 COMPREHEN METABOLIC PANEL: CPT

## 2024-03-11 PROCEDURE — 84443 ASSAY THYROID STIM HORMONE: CPT

## 2024-03-11 PROCEDURE — 85025 COMPLETE CBC W/AUTO DIFF WBC: CPT

## 2024-03-11 PROCEDURE — 99232 SBSQ HOSP IP/OBS MODERATE 35: CPT | Mod: 25

## 2024-03-11 PROCEDURE — 93016 CV STRESS TEST SUPVJ ONLY: CPT

## 2024-03-11 RX ORDER — GABAPENTIN 400 MG/1
1 CAPSULE ORAL
Qty: 0 | Refills: 0 | DISCHARGE
Start: 2024-03-11

## 2024-03-11 RX ORDER — PRAZOSIN HCL 2 MG
1 CAPSULE ORAL
Qty: 0 | Refills: 0 | DISCHARGE
Start: 2024-03-11

## 2024-03-11 RX ORDER — GABAPENTIN 400 MG/1
1 CAPSULE ORAL
Refills: 0 | DISCHARGE

## 2024-03-11 RX ORDER — ATORVASTATIN CALCIUM 80 MG/1
1 TABLET, FILM COATED ORAL
Qty: 0 | Refills: 0 | DISCHARGE
Start: 2024-03-11

## 2024-03-11 RX ORDER — PRAZOSIN HCL 2 MG
1 CAPSULE ORAL
Refills: 0 | DISCHARGE

## 2024-03-11 RX ORDER — ASPIRIN/CALCIUM CARB/MAGNESIUM 324 MG
1 TABLET ORAL
Qty: 0 | Refills: 0 | DISCHARGE
Start: 2024-03-11

## 2024-03-11 RX ORDER — MONTELUKAST 4 MG/1
1 TABLET, CHEWABLE ORAL
Qty: 0 | Refills: 0 | DISCHARGE
Start: 2024-03-11

## 2024-03-11 RX ORDER — FLUOXETINE HCL 10 MG
1 CAPSULE ORAL
Refills: 0 | DISCHARGE

## 2024-03-11 RX ORDER — INSULIN GLARGINE 100 [IU]/ML
40 INJECTION, SOLUTION SUBCUTANEOUS
Qty: 0 | Refills: 0 | DISCHARGE
Start: 2024-03-11

## 2024-03-11 RX ORDER — FLUPHENAZINE HYDROCHLORIDE 1 MG/1
1 TABLET, FILM COATED ORAL
Qty: 0 | Refills: 0 | DISCHARGE
Start: 2024-03-11

## 2024-03-11 RX ORDER — FLUOXETINE HCL 10 MG
1 CAPSULE ORAL
Qty: 0 | Refills: 0 | DISCHARGE
Start: 2024-03-11

## 2024-03-11 RX ORDER — LOSARTAN POTASSIUM 100 MG/1
1 TABLET, FILM COATED ORAL
Qty: 0 | Refills: 0 | DISCHARGE
Start: 2024-03-11

## 2024-03-11 RX ADMIN — MONTELUKAST 10 MILLIGRAM(S): 4 TABLET, CHEWABLE ORAL at 13:52

## 2024-03-11 RX ADMIN — Medication 20 MILLIGRAM(S): at 05:46

## 2024-03-11 RX ADMIN — Medication 20 MILLIGRAM(S): at 17:18

## 2024-03-11 RX ADMIN — Medication 81 MILLIGRAM(S): at 13:49

## 2024-03-11 RX ADMIN — Medication 6 UNIT(S): at 17:19

## 2024-03-11 RX ADMIN — GABAPENTIN 100 MILLIGRAM(S): 400 CAPSULE ORAL at 17:28

## 2024-03-11 RX ADMIN — GABAPENTIN 100 MILLIGRAM(S): 400 CAPSULE ORAL at 05:40

## 2024-03-11 RX ADMIN — Medication 6 UNIT(S): at 13:52

## 2024-03-11 RX ADMIN — Medication 4: at 13:52

## 2024-03-11 RX ADMIN — BUDESONIDE AND FORMOTEROL FUMARATE DIHYDRATE 2 PUFF(S): 160; 4.5 AEROSOL RESPIRATORY (INHALATION) at 09:09

## 2024-03-11 RX ADMIN — Medication 4: at 17:19

## 2024-03-11 RX ADMIN — Medication 4: at 08:18

## 2024-03-11 NOTE — DISCHARGE NOTE NURSING/CASE MANAGEMENT/SOCIAL WORK - PATIENT PORTAL LINK FT
You can access the FollowMyHealth Patient Portal offered by [facility] by registering at the following website: [email or web address]. By joining [employer]’s FollowMyHealth portal, you will also be able to view your health information using other applications (apps) compatible with our system.

## 2024-03-11 NOTE — PROGRESS NOTE ADULT - SUBJECTIVE AND OBJECTIVE BOX
ELVI CHAPARRO  [id number]      Chief Complaint: Atypical chest pain     Interval History: The patient recalls an episode of chest pressure after eating which has not recurred.    Tele: sinus rhythm 60s BPM      Current meds:   acetaminophen     Tablet .. 650 milliGRAM(s) Oral every 6 hours PRN  aluminum hydroxide/magnesium hydroxide/simethicone Suspension 30 milliLiter(s) Oral every 4 hours PRN  aspirin enteric coated 81 milliGRAM(s) Oral daily  atorvastatin 80 milliGRAM(s) Oral at bedtime  budesonide 160 MICROgram(s)/formoterol 4.5 MICROgram(s) Inhaler 2 Puff(s) Inhalation two times a day  dextrose 5%. 1000 milliLiter(s) IV Continuous <Continuous>  dextrose 5%. 1000 milliLiter(s) IV Continuous <Continuous>  dextrose 50% Injectable 25 Gram(s) IV Push once  dextrose 50% Injectable 12.5 Gram(s) IV Push once  dextrose 50% Injectable 25 Gram(s) IV Push once  dextrose Oral Gel 15 Gram(s) Oral once PRN  FLUoxetine 20 milliGRAM(s) Oral <User Schedule>  fluPHENAZine 5 milliGRAM(s) Oral at bedtime  gabapentin 100 milliGRAM(s) Oral two times a day  glucagon  Injectable 1 milliGRAM(s) IntraMuscular once  insulin glargine Injectable (LANTUS) 40 Unit(s) SubCutaneous at bedtime  insulin lispro (ADMELOG) corrective regimen sliding scale   SubCutaneous three times a day before meals  insulin lispro (ADMELOG) corrective regimen sliding scale   SubCutaneous at bedtime  insulin lispro Injectable (ADMELOG) 6 Unit(s) SubCutaneous three times a day before meals  losartan 100 milliGRAM(s) Oral daily  melatonin 3 milliGRAM(s) Oral at bedtime PRN  montelukast 10 milliGRAM(s) Oral daily  ondansetron Injectable 4 milliGRAM(s) IV Push every 8 hours PRN  prazosin 1 milliGRAM(s) Oral daily      Objective:     Vital Signs:   T(C): 36.7 (03-11-24 @ 05:27), Max: 36.7 (03-11-24 @ 05:27)  HR: 68 (03-11-24 @ 09:09) (68 - 77)  BP: 111/74 (03-11-24 @ 05:27) (107/69 - 111/74)  RR: 19 (03-11-24 @ 05:27) (17 - 19)  SpO2: 93% (03-11-24 @ 09:09) (93% - 96%)  Wt(kg): --    Physical Exam:   General: no acute distress  Neck: supple   CVS: JVP ~ 7 cm H20, RRR, s1, s2, no murmurs  Pulm: unlabored respirations, clear to ausculation   Abd: obese  Ext: no lower extremity edema b/l   Neuro: awake, alert and oriented         ECG (3/8/24): sinus rhythm, nonspecific ST abnormalities, poor R wave progression     TTE (3/2024):  LVEF 55-60%, mild septal LVH

## 2024-03-11 NOTE — PROGRESS NOTE ADULT - ASSESSMENT
Ms. Alex is a 59 y/o F with PMHx of Type 2 DM, HTN, HLD, GERD, asthma, anxiety, sleep apnea, schizoaffective disorder, bipolar disorder, migraines presenting for substernal chest pressure admitted for r/out ACS    #Chest pain, r/out ACS  - Monitor on telemetry   - S/P ASA and nitro in ER   - Trend top/ekg q6hr, trop negative x3   - Echocardiogram reviewed  - D-dimer negative  - TSH wnl  - Cardiology consulted, plan for stress test tomorrow 3/11/24. Per Dr. Sevilla, he prefers treadmill if patient able to tolerate as has asthma hx    #Type 2 DM  - A1C 8.3  - lantus 40 at night   - 6 units premeal insulin   - ISS   - Home insulin pump turned off  - Continue gabapentin 100mg bid    #HTN  #HLD  - continue aspirin   - continue atorvastatin 80mg  - continue losartan  - continue Prazosin    #Asthma, stable  - Continue albuterol prn, montelukast, symbicort    #GERD  - Continue PPI    #Schizoaffective disorder - chronic, stable  - continue fluoxetine   - continue fluphenazine  - Pt currently taking Caplyta 42mg at night qd, not on formulary, asked to bring in from home    #DVT ppx: Lovenox    Code Status - Full Code    Dispo: pending stress test today     Patient sister, Nevaeh updated, [phone] Ms. Alex is a 59 y/o F with PMHx of Type 2 DM, HTN, HLD, GERD, asthma, anxiety, sleep apnea, schizoaffective disorder, bipolar disorder, migraines presenting for substernal chest pressure admitted for r/out ACS    #Chest pain, r/out ACS  - Monitor on telemetry   - S/P ASA and nitro in ER   - trop negative x3   - Echocardiogram reviewed  - D-dimer negative  - TSH wnl  - Cardiology consulted, plan for stress test tomorrow 3/11/24 Per Dr. Sevilla    #Type 2 DM  - A1C 8.3  - lantus 40 at night   - 6 units premeal insulin   - ISS   - Home insulin pump turned off  - Continue gabapentin 100mg bid    #HTN  #HLD  - continue aspirin   - continue atorvastatin 80mg  - continue losartan  - continue Prazosin    #Asthma, stable  - Continue albuterol prn, montelukast, symbicort    #GERD  - Continue PPI    #Schizoaffective disorder - chronic, stable  - continue fluoxetine   - continue fluphenazine  - Pt currently taking Caplyta 42mg at night qd, not on formulary, asked to bring in from home    #DVT ppx: Lovenox    Code Status - Full Code    Dispo: pending stress test today     Patient sister, Nevaeh updated, [phone], Nevaeh wishes to be updated on all medical info as patient suffers from mental illness and ability to process  Ms. Alex is a 59 y/o F with PMHx of Type 2 DM, HTN, HLD, GERD, asthma, anxiety, sleep apnea, schizoaffective disorder, bipolar disorder, migraines presenting for substernal chest pressure admitted for r/out ACS    #Chest pain, r/out ACS  - Monitor on telemetry   - S/P ASA and nitro in ER   - trop negative x3   - Echocardiogram reviewed  - D-dimer negative  - TSH wnl  - Stress test - very mild intensity, reversible perfusion defect of the mid anterior wall which may be consistent with a small area of ischemia vs breast attenuation artifact. Normal left ventricular ejection fraction of 78%  - Cardiology recs noted    #Type 2 DM  - A1C 8.3  - lantus 40 at night   - 6 units premeal insulin   - ISS   - Home insulin pump turned off  - Continue gabapentin 100mg bid    #HTN  #HLD  - continue aspirin   - continue atorvastatin 80mg  - continue losartan  - continue Prazosin    #Asthma, stable  - Continue albuterol prn, montelukast, symbicort    #GERD  - Continue PPI    #Schizoaffective disorder - chronic, stable  - continue fluoxetine   - continue fluphenazine  - Pt currently taking Caplyta 42mg at night qd, not on formulary, asked to bring in from home    #DVT ppx: Lovenox    Code Status - Full Code    Dispo: pending stress test today     Patient sister, Nevaeh updated, [phone], Nevaeh wishes to be updated on all medical info as patient suffers from mental illness and ability to process  Ms. Alex is a 57 y/o F with PMHx of Type 2 DM, HTN, HLD, GERD, asthma, anxiety, sleep apnea, schizoaffective disorder, bipolar disorder, migraines presenting for substernal chest pressure admitted for r/out ACS    #Chest pain, r/out ACS  - Monitor on telemetry   - S/P ASA and nitro in ER   - trop negative x3   - Echocardiogram reviewed  - D-dimer negative  - TSH wnl  - Stress test - very mild intensity, reversible perfusion defect of the mid anterior wall which may be consistent with a small area of ischemia vs breast attenuation artifact. Normal left ventricular ejection fraction of 78%  - Cardiology recs noted - due to stress test findings rec outpatient ct coronaries     #Type 2 DM  - A1C 8.3  - lantus 40 at night   - 6 units premeal insulin   - ISS   - Home insulin pump turned off  - Continue gabapentin 100mg bid    #HTN  #HLD  - continue aspirin   - continue atorvastatin 80mg  - continue losartan  - continue Prazosin    #Asthma, stable  - Continue albuterol prn, montelukast, symbicort    #GERD  - Continue PPI    #Schizoaffective disorder - chronic, stable  - continue fluoxetine   - continue fluphenazine  - Pt currently taking Caplyta 42mg at night qd, not on formulary, asked to bring in from home    #DVT ppx: Lovenox    Code Status - Full Code    Dispo: pending stress test today     Patient sister, Nevaeh updated, [phone], Nevaeh wishes to be updated on all medical info as patient suffers from mental illness and ability to process  Ms. Alex is a 57 y/o F with PMHx of Type 2 DM, HTN, HLD, GERD, asthma, anxiety, sleep apnea, schizoaffective disorder, bipolar disorder, migraines presenting for substernal chest pressure admitted for r/out ACS    #Chest pain, r/out ACS  - Monitor on telemetry   - S/P ASA and nitro in ER   - trop negative x3   - Echocardiogram reviewed  - D-dimer negative  - TSH wnl  - Stress test - very mild intensity, reversible perfusion defect of the mid anterior wall which may be consistent with a small area of ischemia vs breast attenuation artifact. Normal left ventricular ejection fraction of 78%  - Cardiology recs noted - due to stress test findings rec outpatient ct coronaries     #Type 2 DM  - A1C 8.3  - lantus 40 at night   - 6 units premeal insulin   - ISS   - Home insulin pump turned off  - Continue gabapentin 100mg bid    #HTN  #HLD  - continue aspirin   - continue atorvastatin 80mg  - continue losartan  - continue Prazosin    #Asthma, stable  - Continue albuterol prn, montelukast, symbicort    #GERD  - Continue PPI    #Schizoaffective disorder - chronic, stable  - continue fluoxetine   - continue fluphenazine  - Pt currently taking Caplyta 42mg at night qd, not on formulary, asked to bring in from home    #DVT ppx: Lovenox    Code Status - Full Code    Dispo: spoke with cardiology, ok to d/c home - sister Nevaeh notified to follow up with patient for CT coronaries and Dr. Sevilla.     Patient sister, Nevaeh updated, [phone], Nevaeh wishes to be updated on all medical info as patient suffers from mental illness and ability to process

## 2024-03-11 NOTE — PROGRESS NOTE ADULT - ASSESSMENT
Assessment:  Dorothy Alex is a 58 year old woman with past medical history of Morbid obesity, Schizoaffective disorder, Hypertension, Hyperlipidemia, GERD and Type II Diabetes mellitus who presented with chest discomfort after eating.    ECG consistent with sinus rhythm, nonspecific ST abnormalities and poor R wave progression. Troponins negative x 3, symptoms do not appear to be an acute coronary syndrome, likely GERD in setting of eating. Echo report consistent with normal LVEF 55-60%. Telemetry with no arrhythmias. CXR unremarkable.    Recommendations:  [] Atypical chest pain: Discussed case with Dr. Sevilla who recommends exercise nuclear stress test, will follow up results.   [] Continue home CV meds    We will continue to follow along.    Carl Lemons MD  Cardiology  Assessment:  Dorothy Alex is a 58 year old woman with past medical history of Morbid obesity, Schizoaffective disorder, Hypertension, Hyperlipidemia, GERD and Type II Diabetes mellitus who presented with chest discomfort after eating.    ECG consistent with sinus rhythm, nonspecific ST abnormalities and poor R wave progression. Troponins negative x 3, symptoms do not appear to be an acute coronary syndrome, likely GERD in setting of eating. Echo report consistent with normal LVEF 55-60%. Telemetry with no arrhythmias. CXR unremarkable.    Recommendations:  [] Atypical chest pain: Discussed case with Dr. Sevilla who recommends exercise nuclear stress test, will follow up results.   [] Continue home CV meds    We will continue to follow along.    Carl Lemons MD  Cardiology    Assessment:  Dorothy Alex is a 58 year old woman with past medical history of Morbid obesity, Schizoaffective disorder, Hypertension, Hyperlipidemia, GERD and Type II Diabetes mellitus who presented with chest discomfort after eating.    ECG consistent with sinus rhythm, nonspecific ST abnormalities and poor R wave progression. Troponins negative x 3, symptoms do not appear to be an acute coronary syndrome, likely GERD in setting of eating. Echo report consistent with normal LVEF 55-60%. Telemetry with no arrhythmias. CXR unremarkable.    Recommendations:  [] Atypical chest pain: Discussed case with Dr. Sevilla who recommends exercise nuclear stress test, will follow up results.   [] Continue home CV meds    Addendum:    Nuclear stress test report:  IMPRESSION:  Exercise SPECT Myocardial Perfusion Imaging consistent with a small, very mild intensity, reversible perfusion defect of the mid anterior wall which may be consistent with a small area of ischemia vs breast attenuation artifact.  Normal left ventricular ejection fraction of 78  % (normal: 50% or   greater).  No regional wall motion abnormalities.    Nuclear stress test consistent with very mild perfusion defect in setting of breast attenuation artifact and so patient may follow up as outpatient for coronary CTA for definitive evaluation of coronaries. In meantime, continue medical management with aspirin, statin and ARB.     Carl Lemons MD  Cardiology

## 2024-03-11 NOTE — PROGRESS NOTE ADULT - NS ATTEND AMEND GEN_ALL_CORE FT
57 y/o F with PMHx of Type 2 DM, HTN, HLD, GERD, asthma, anxiety, sleep apnea, schizoaffective disorder, bipolar disorder, migraines presenting for substernal chest pressure admitted for r/o ACS. Chest pain subsided. Follow cardio consult, echo, trend trop, ecg. T2DM - Cont FS, ISS. dvt ppx - lovenox
59 y/o F with PMHx of Type 2 DM, HTN, HLD, GERD, asthma, anxiety, sleep apnea, schizoaffective disorder, bipolar disorder, migraines presenting for substernal chest pressure admitted for r/o ACS. Chest pain subsided. Cardio appreciated - follow stress test. recommend stress test. T2DM with hyperglycemia - cont lantus 40un qhs, admelog 6un TID AC, ISS. dvt ppx - lovenox. dispo pending stress test.
59 y/o F with PMHx of Type 2 DM, HTN, HLD, GERD, asthma, anxiety, sleep apnea, schizoaffective disorder, bipolar disorder, migraines presenting for substernal chest pressure admitted for r/o ACS. Chest pain subsided. Cardio appreciated - recommend stress test. T2DM with hyperglycemia - cont lantus 40un qhs, add admelog 6un TID AC, ISS. dvt ppx - lovenox.

## 2024-03-11 NOTE — DISCHARGE NOTE NURSING/CASE MANAGEMENT/SOCIAL WORK - NSDCVIVACCINE_GEN_ALL_CORE_FT
influenza, injectable, quadrivalent, preservative free; 31-Oct-2017 11:48; Jaylon Aj (RN); [employer]; [id number]; IntraMuscular; Deltoid Left.; 0.5 milliLiter(s); VIS (VIS Published: 07-Aug-2015, VIS Presented: 31-Oct-2017);

## 2024-03-11 NOTE — PROGRESS NOTE ADULT - SUBJECTIVE AND OBJECTIVE BOX
Patient is a 58y old  Female who presents with a chief complaint of r/out ACS (11 Mar 2024 07:43)      Patient seen and examined at bedside. No overnight events reported. Patient states she is feeling better     ALLERGIES:  walnut (Other)  No Known Drug Allergies    MEDICATIONS  (STANDING):  aspirin enteric coated 81 milliGRAM(s) Oral daily  atorvastatin 80 milliGRAM(s) Oral at bedtime  budesonide 160 MICROgram(s)/formoterol 4.5 MICROgram(s) Inhaler 2 Puff(s) Inhalation two times a day  dextrose 5%. 1000 milliLiter(s) (50 mL/Hr) IV Continuous <Continuous>  dextrose 5%. 1000 milliLiter(s) (100 mL/Hr) IV Continuous <Continuous>  dextrose 50% Injectable 25 Gram(s) IV Push once  dextrose 50% Injectable 25 Gram(s) IV Push once  dextrose 50% Injectable 12.5 Gram(s) IV Push once  FLUoxetine 20 milliGRAM(s) Oral <User Schedule>  fluPHENAZine 5 milliGRAM(s) Oral at bedtime  gabapentin 100 milliGRAM(s) Oral two times a day  glucagon  Injectable 1 milliGRAM(s) IntraMuscular once  insulin glargine Injectable (LANTUS) 40 Unit(s) SubCutaneous at bedtime  insulin lispro (ADMELOG) corrective regimen sliding scale   SubCutaneous at bedtime  insulin lispro (ADMELOG) corrective regimen sliding scale   SubCutaneous three times a day before meals  insulin lispro Injectable (ADMELOG) 6 Unit(s) SubCutaneous three times a day before meals  losartan 100 milliGRAM(s) Oral daily  montelukast 10 milliGRAM(s) Oral daily  prazosin 1 milliGRAM(s) Oral daily    MEDICATIONS  (PRN):  acetaminophen     Tablet .. 650 milliGRAM(s) Oral every 6 hours PRN Temp greater or equal to 38C (100.4F), Mild Pain (1 - 3)  aluminum hydroxide/magnesium hydroxide/simethicone Suspension 30 milliLiter(s) Oral every 4 hours PRN Dyspepsia  dextrose Oral Gel 15 Gram(s) Oral once PRN Blood Glucose LESS THAN 70 milliGRAM(s)/deciliter  melatonin 3 milliGRAM(s) Oral at bedtime PRN Insomnia  ondansetron Injectable 4 milliGRAM(s) IV Push every 8 hours PRN Nausea and/or Vomiting    Vital Signs Last 24 Hrs  T(F): 98 (11 Mar 2024 05:27), Max: 98 (11 Mar 2024 05:27)  HR: 68 (11 Mar 2024 05:27) (68 - 77)  BP: 111/74 (11 Mar 2024 05:27) (107/69 - 111/74)  RR: 19 (11 Mar 2024 05:27) (17 - 19)  SpO2: 93% (11 Mar 2024 05:27) (93% - 96%)  I&O's Summary    PHYSICAL EXAM:  General: NAD, A/O x 3  ENT: No gross hearing impairment, Moist mucous membranes, no thrush  Neck: Supple, No JVD  Lungs: Clear to auscultation bilaterally, good air entry, non-labored breathing  Cardio: RRR, S1/S2, No murmur  Abdomen: Soft, Nontender, Nondistended; Bowel sounds present  Extremities: No calf tenderness, No cyanosis, No pitting edema  Psych: Appropriate mood and affect    LABS:                        12.5   8.56  )-----------( 321      ( 09 Mar 2024 06:10 )             38.2     03-09    139  |  102  |  19  ----------------------------<  182  4.1   |  25  |  0.95    Ca    9.6      09 Mar 2024 06:10  Phos  3.7     03-09  Mg     2.3     03-09    TPro  7.3  /  Alb  3.4  /  TBili  0.3  /  DBili  x   /  AST  20  /  ALT  40  /  AlkPhos  97  03-09      Lipase: 71 U/L (03-08-24 @ 18:42)            CARDIAC MARKERS ( 09 Mar 2024 06:10 )  x     / <4.0 ng/L / x     / x     / x      CARDIAC MARKERS ( 09 Mar 2024 00:00 )  x     / <4.0 ng/L / x     / x     / x      CARDIAC MARKERS ( 08 Mar 2024 18:42 )  x     / 4.2 ng/L / x     / x     / x          03-09 Chol 189 mg/dL LDL -- HDL 59 mg/dL Trig 229 mg/dL  TSH 0.910   TSH with FT4 reflex --  Total T3 --              POCT Blood Glucose.: 209 mg/dL (11 Mar 2024 08:17)  POCT Blood Glucose.: 221 mg/dL (10 Mar 2024 21:18)  POCT Blood Glucose.: 226 mg/dL (10 Mar 2024 17:34)  POCT Blood Glucose.: 255 mg/dL (10 Mar 2024 12:28)      Urinalysis Basic - ( 09 Mar 2024 06:10 )    Color: x / Appearance: x / SG: x / pH: x  Gluc: 182 mg/dL / Ketone: x  / Bili: x / Urobili: x   Blood: x / Protein: x / Nitrite: x   Leuk Esterase: x / RBC: x / WBC x   Sq Epi: x / Non Sq Epi: x / Bacteria: x          RADIOLOGY & ADDITIONAL TESTS:    Care Discussed with Consultants/Other Providers:

## 2024-03-14 NOTE — ED ADULT NURSE NOTE - NS ED PATIENT SAFETY CONCERN
- patient with pain/n/v x 4-5d 2/2 known R UPJ stone found on CT on 3/12  - AFVSS  - CBC and CMP unremarkable  - UA infectious appearing with >100 RBCs  - s/p rocephin in ED, continue for now  - follow urine culture   - s/p IVF and dilaudid in ED   - continue gentle IVF  - continue prn analgesics for pain control   - urology consulted in ED:   - Recommend admission to medicine given patient's medical co morbidities   - I discussed with the patient that she has an obstructing ureteral stone and concern for infected urine. While she is not septic at the moment, she is at risk for developing obstructive pyelonephritis and could become septic.  - Plan for cystoscopy with ureteral stent placement today. The stent is not currently emergent, but if patient decompensates stent may become urgent.  - NPO  - IVF  - Rocephin  - Consent obtained  - Pain control  - Nausea control  - Flomax  - Strain all urine   No

## 2024-03-23 ENCOUNTER — EMERGENCY (EMERGENCY)
Facility: HOSPITAL | Age: 59
LOS: 1 days | Discharge: HOME | End: 2024-03-23
Attending: EMERGENCY MEDICINE | Admitting: EMERGENCY MEDICINE
Payer: MEDICARE

## 2024-03-23 VITALS
HEART RATE: 66 BPM | HEIGHT: 63 IN | SYSTOLIC BLOOD PRESSURE: 99 MMHG | TEMPERATURE: 98 F | WEIGHT: 229.94 LBS | RESPIRATION RATE: 18 BRPM | OXYGEN SATURATION: 94 % | DIASTOLIC BLOOD PRESSURE: 58 MMHG

## 2024-03-23 VITALS
OXYGEN SATURATION: 95 % | DIASTOLIC BLOOD PRESSURE: 74 MMHG | HEART RATE: 68 BPM | RESPIRATION RATE: 18 BRPM | SYSTOLIC BLOOD PRESSURE: 107 MMHG | TEMPERATURE: 98 F

## 2024-03-23 DIAGNOSIS — Z98.890 OTHER SPECIFIED POSTPROCEDURAL STATES: Chronic | ICD-10-CM

## 2024-03-23 LAB
ALBUMIN SERPL ELPH-MCNC: 3.1 G/DL — LOW (ref 3.3–5)
ALP SERPL-CCNC: 104 U/L — SIGNIFICANT CHANGE UP (ref 40–120)
ALT FLD-CCNC: 41 U/L — SIGNIFICANT CHANGE UP (ref 10–45)
ANION GAP SERPL CALC-SCNC: 8 MMOL/L — SIGNIFICANT CHANGE UP (ref 5–17)
APPEARANCE UR: CLEAR — SIGNIFICANT CHANGE UP
APTT BLD: 28.4 SEC — SIGNIFICANT CHANGE UP (ref 24.5–35.6)
AST SERPL-CCNC: 20 U/L — SIGNIFICANT CHANGE UP (ref 10–40)
BASE EXCESS BLDV CALC-SCNC: 6.2 MMOL/L — HIGH (ref -2–3)
BASOPHILS # BLD AUTO: 0.04 K/UL — SIGNIFICANT CHANGE UP (ref 0–0.2)
BASOPHILS NFR BLD AUTO: 0.5 % — SIGNIFICANT CHANGE UP (ref 0–2)
BILIRUB SERPL-MCNC: 0.2 MG/DL — SIGNIFICANT CHANGE UP (ref 0.2–1.2)
BILIRUB UR-MCNC: NEGATIVE — SIGNIFICANT CHANGE UP
BUN SERPL-MCNC: 18 MG/DL — SIGNIFICANT CHANGE UP (ref 7–23)
CALCIUM SERPL-MCNC: 9.2 MG/DL — SIGNIFICANT CHANGE UP (ref 8.4–10.5)
CHLORIDE SERPL-SCNC: 97 MMOL/L — SIGNIFICANT CHANGE UP (ref 96–108)
CO2 BLDV-SCNC: 32 MMOL/L — HIGH (ref 22–26)
CO2 SERPL-SCNC: 27 MMOL/L — SIGNIFICANT CHANGE UP (ref 22–31)
COLOR SPEC: YELLOW — SIGNIFICANT CHANGE UP
CREAT SERPL-MCNC: 1.1 MG/DL — SIGNIFICANT CHANGE UP (ref 0.5–1.3)
DIFF PNL FLD: NEGATIVE — SIGNIFICANT CHANGE UP
EGFR: 58 ML/MIN/1.73M2 — LOW
EOSINOPHIL # BLD AUTO: 0.14 K/UL — SIGNIFICANT CHANGE UP (ref 0–0.5)
EOSINOPHIL NFR BLD AUTO: 1.7 % — SIGNIFICANT CHANGE UP (ref 0–6)
GAS PNL BLDV: SIGNIFICANT CHANGE UP
GLUCOSE BLDC GLUCOMTR-MCNC: 169 MG/DL — HIGH (ref 70–99)
GLUCOSE BLDC GLUCOMTR-MCNC: 245 MG/DL — HIGH (ref 70–99)
GLUCOSE BLDC GLUCOMTR-MCNC: 279 MG/DL — HIGH (ref 70–99)
GLUCOSE SERPL-MCNC: 343 MG/DL — HIGH (ref 70–99)
GLUCOSE UR QL: >=1000 MG/DL
HCO3 BLDV-SCNC: 30 MMOL/L — HIGH (ref 22–29)
HCT VFR BLD CALC: 35.2 % — SIGNIFICANT CHANGE UP (ref 34.5–45)
HGB BLD-MCNC: 11.8 G/DL — SIGNIFICANT CHANGE UP (ref 11.5–15.5)
IMM GRANULOCYTES NFR BLD AUTO: 0.2 % — SIGNIFICANT CHANGE UP (ref 0–0.9)
INR BLD: 1.05 RATIO — SIGNIFICANT CHANGE UP (ref 0.85–1.18)
KETONES UR-MCNC: NEGATIVE MG/DL — SIGNIFICANT CHANGE UP
LACTATE SERPL-SCNC: 1.1 MMOL/L — SIGNIFICANT CHANGE UP (ref 0.7–2)
LEUKOCYTE ESTERASE UR-ACNC: NEGATIVE — SIGNIFICANT CHANGE UP
LIDOCAIN IGE QN: 84 U/L — HIGH (ref 16–77)
LYMPHOCYTES # BLD AUTO: 2.87 K/UL — SIGNIFICANT CHANGE UP (ref 1–3.3)
LYMPHOCYTES # BLD AUTO: 34.5 % — SIGNIFICANT CHANGE UP (ref 13–44)
MCHC RBC-ENTMCNC: 29.4 PG — SIGNIFICANT CHANGE UP (ref 27–34)
MCHC RBC-ENTMCNC: 33.5 GM/DL — SIGNIFICANT CHANGE UP (ref 32–36)
MCV RBC AUTO: 87.8 FL — SIGNIFICANT CHANGE UP (ref 80–100)
MONOCYTES # BLD AUTO: 0.71 K/UL — SIGNIFICANT CHANGE UP (ref 0–0.9)
MONOCYTES NFR BLD AUTO: 8.5 % — SIGNIFICANT CHANGE UP (ref 2–14)
NEUTROPHILS # BLD AUTO: 4.55 K/UL — SIGNIFICANT CHANGE UP (ref 1.8–7.4)
NEUTROPHILS NFR BLD AUTO: 54.6 % — SIGNIFICANT CHANGE UP (ref 43–77)
NITRITE UR-MCNC: NEGATIVE — SIGNIFICANT CHANGE UP
NRBC # BLD: 0 /100 WBCS — SIGNIFICANT CHANGE UP (ref 0–0)
NT-PROBNP SERPL-SCNC: 42 PG/ML — SIGNIFICANT CHANGE UP (ref 0–300)
PCO2 BLDV: 46 MMHG — HIGH (ref 39–42)
PH BLDV: 7.43 — SIGNIFICANT CHANGE UP (ref 7.32–7.43)
PH UR: 6 — SIGNIFICANT CHANGE UP (ref 5–8)
PLATELET # BLD AUTO: 312 K/UL — SIGNIFICANT CHANGE UP (ref 150–400)
PO2 BLDV: 60 MMHG — HIGH (ref 25–45)
POTASSIUM SERPL-MCNC: 4.2 MMOL/L — SIGNIFICANT CHANGE UP (ref 3.5–5.3)
POTASSIUM SERPL-SCNC: 4.2 MMOL/L — SIGNIFICANT CHANGE UP (ref 3.5–5.3)
PROT SERPL-MCNC: 6.7 G/DL — SIGNIFICANT CHANGE UP (ref 6–8.3)
PROT UR-MCNC: NEGATIVE MG/DL — SIGNIFICANT CHANGE UP
PROTHROM AB SERPL-ACNC: 12 SEC — SIGNIFICANT CHANGE UP (ref 9.5–13)
RBC # BLD: 4.01 M/UL — SIGNIFICANT CHANGE UP (ref 3.8–5.2)
RBC # FLD: 12.7 % — SIGNIFICANT CHANGE UP (ref 10.3–14.5)
SAO2 % BLDV: 91.7 % — HIGH (ref 67–88)
SODIUM SERPL-SCNC: 132 MMOL/L — LOW (ref 135–145)
SP GR SPEC: 1.02 — SIGNIFICANT CHANGE UP (ref 1–1.03)
TROPONIN I, HIGH SENSITIVITY RESULT: <4 NG/L — SIGNIFICANT CHANGE UP
UROBILINOGEN FLD QL: 0.2 MG/DL — SIGNIFICANT CHANGE UP (ref 0.2–1)
WBC # BLD: 8.33 K/UL — SIGNIFICANT CHANGE UP (ref 3.8–10.5)
WBC # FLD AUTO: 8.33 K/UL — SIGNIFICANT CHANGE UP (ref 3.8–10.5)

## 2024-03-23 PROCEDURE — 93010 ELECTROCARDIOGRAM REPORT: CPT

## 2024-03-23 PROCEDURE — 71045 X-RAY EXAM CHEST 1 VIEW: CPT | Mod: 26

## 2024-03-23 PROCEDURE — 99285 EMERGENCY DEPT VISIT HI MDM: CPT

## 2024-03-23 RX ORDER — INSULIN HUMAN 100 [IU]/ML
6 INJECTION, SOLUTION SUBCUTANEOUS ONCE
Refills: 0 | Status: COMPLETED | OUTPATIENT
Start: 2024-03-23 | End: 2024-03-23

## 2024-03-23 RX ORDER — SODIUM CHLORIDE 9 MG/ML
1000 INJECTION INTRAMUSCULAR; INTRAVENOUS; SUBCUTANEOUS ONCE
Refills: 0 | Status: COMPLETED | OUTPATIENT
Start: 2024-03-23 | End: 2024-03-23

## 2024-03-23 RX ADMIN — INSULIN HUMAN 6 UNIT(S): 100 INJECTION, SOLUTION SUBCUTANEOUS at 22:17

## 2024-03-23 RX ADMIN — SODIUM CHLORIDE 2000 MILLILITER(S): 9 INJECTION INTRAMUSCULAR; INTRAVENOUS; SUBCUTANEOUS at 19:55

## 2024-03-23 RX ADMIN — SODIUM CHLORIDE 1000 MILLILITER(S): 9 INJECTION INTRAMUSCULAR; INTRAVENOUS; SUBCUTANEOUS at 20:55

## 2024-03-23 NOTE — ED PROVIDER NOTE - OBJECTIVE STATEMENT
58 year old female with hyperglycemia. States that she ran out of her DM meds for a few weeks. Reports nausea, dizziness, denies VD, dysuria, cough, sob ,chest/abdominal/back/neck pain, HA, focal weakness/numbness. Denies any other symptoms.

## 2024-03-23 NOTE — ED ADULT NURSE NOTE - CAS TRG GEN SKIN CONDITION
Per Dr. Bermudez, please schedule patient to be seen in [facility] Friday morning with him (not this Friday).  Thank you.   Warm

## 2024-03-23 NOTE — ED PROVIDER NOTE - CLINICAL SUMMARY MEDICAL DECISION MAKING FREE TEXT BOX
Yifan 8PM: Received sign out that patient with hyperglycemia. Plan to manage glucose. F/U results. Likely simple hyperglycemia due to lack of medications. No ketones or gap. I paged Dr Sita Oliver, pt endocrinologist. Pending call back. Pt doesn't have a solid discharge plan of how to manage glucose hereforth. Offered Metformin however pt states that she had bad experience with that years ago. Cannot give specifics. Yifan 8PM: Received sign out that patient with hyperglycemia. Plan to manage glucose. F/U results. Likely simple hyperglycemia due to lack of medications. No ketones or gap. I paged Dr Sita Oliver, pt endocrinologist. Pending call back. Pt doesn't have a solid discharge plan of how to manage glucose hereforth. Offered Metformin however pt states that she had bad experience with that years ago. Cannot give specifics.    No callback from Dr Oliver. Called again at 9:30pm. No call back. Text via teams. Offline. Advised pt to f/u with her endocrinologist and/or PCP. Continue insulin at home as usual.     Per pt list, pt on Novolog 10 via insulin pump, invokana 300mg qday, lantus 40 daily and sat morning mounjaro insulin pen 15. Pt is wihtout mounjaro for 3 weeks. Yifan 8PM: Received sign out that patient with hyperglycemia. Plan to manage glucose. F/U results. Likely simple hyperglycemia due to lack of medications. No ketones or gap. I paged Dr Sita Oliver, pt endocrinologist. Pending call back. Pt doesn't have a solid discharge plan of how to manage glucose hereforth. Offered Metformin however pt states that she had bad experience with that years ago. Cannot give specifics.    No callback from Dr Oliver. Called again at 9:30pm. No call back. Text via teams. Offline. Advised pt to f/u with her endocrinologist and/or PCP. Continue insulin at home as usual.     Per pt list, pt on Novolog 10 via insulin pump, invokana 300mg qday, lantus 40 daily and sat morning mounjaro insulin pen 15. Pt is wihtout mounjaro for 3 weeks.    Covering provider called Dr Miller ; he advised that pt is on adequate meds at this time. There is a shortage and pt can try small pharmacies to get medicines. Ie: [facility] .

## 2024-03-23 NOTE — ED ADULT NURSE NOTE - OBJECTIVE STATEMENT
Pt A&Ox4 BIBEMS from home c/o Hyperglycemia;  at home. pt also c/o dizziness/ headache. Hx Type 2 diabetes, Asthma, GERD, and Bipolar. Was not able to obtain her DM medication Mounjara for a few weeks. Denies CP/ SOB/ Vomitting/ diarrhea/ numbess/ tingling. Upon Arrival . 1L of fluid started in route.

## 2024-03-23 NOTE — ED ADULT NURSE NOTE - NSFALLUNIVINTERV_ED_ALL_ED
Bed/Stretcher in lowest position, wheels locked, appropriate side rails in place/Call bell, personal items and telephone in reach/Instruct patient to call for assistance before getting out of bed/chair/stretcher/Non-slip footwear applied when patient is off stretcher/[place] to call system/Physically safe environment - no spills, clutter or unnecessary equipment/Purposeful proactive rounding/Room/bathroom lighting operational, light cord in reach

## 2024-03-23 NOTE — ED PROVIDER NOTE - NSFOLLOWUPINSTRUCTIONS_ED_ALL_ED_FT
Hyperglycemia    Hyperglycemia occurs when the glucose (sugar) level in your blood is too high. Symptoms include increased urination, increased thirst, a dry mouth, or changes in appetite. If started on a medication, take exactly as prescribed by your health care professional. Maintain a healthy lifestyle and follow up with your primary care physician.    SEEK IMMEDIATE MEDICAL CARE IF YOU HAVE ANY OF THE FOLLOWING SYMPTOMS: shortness of breath, change in mental status, nausea or vomiting, fruity smell to your breath, or any signs of dehydration. Hyperglycemia    Please continue all of your other medications per usual. Contact Dr Oliver or your primary doctor for further instruction.     1) Follow up with your doctor in 1-2 days  2) Return to the ER for worsening or concerning symptoms     Hyperglycemia occurs when the glucose (sugar) level in your blood is too high. Symptoms include increased urination, increased thirst, a dry mouth, or changes in appetite. If started on a medication, take exactly as prescribed by your health care professional. Maintain a healthy lifestyle and follow up with your primary care physician.    SEEK IMMEDIATE MEDICAL CARE IF YOU HAVE ANY OF THE FOLLOWING SYMPTOMS: shortness of breath, change in mental status, nausea or vomiting, fruity smell to your breath, or any signs of dehydration. Hyperglycemia    Please continue all of your other medications per usual. Contact Dr Oliver or your primary doctor for further instruction. Also try the smaller pharmacies and an example would be [facility] , [place] , etc.     1) Follow up with your doctor in 1-2 days  2) Return to the ER for worsening or concerning symptoms     Hyperglycemia occurs when the glucose (sugar) level in your blood is too high. Symptoms include increased urination, increased thirst, a dry mouth, or changes in appetite. If started on a medication, take exactly as prescribed by your health care professional. Maintain a healthy lifestyle and follow up with your primary care physician.    SEEK IMMEDIATE MEDICAL CARE IF YOU HAVE ANY OF THE FOLLOWING SYMPTOMS: shortness of breath, change in mental status, nausea or vomiting, fruity smell to your breath, or any signs of dehydration.

## 2024-03-23 NOTE — ED PROVIDER NOTE - PATIENT PORTAL LINK FT
You can access the FollowMyHealth Patient Portal offered by [facility] by registering at the following website: [email or web address]. By joining [employer]’s FollowMyHealth portal, you will also be able to view your health information using other applications (apps) compatible with our system.

## 2024-03-24 LAB
B-OH-BUTYR SERPL-SCNC: 0.2 MMOL/L — SIGNIFICANT CHANGE UP
OSMOLALITY SERPL: 295 MOSMOL/KG — SIGNIFICANT CHANGE UP (ref 275–300)

## 2024-03-24 PROCEDURE — 82803 BLOOD GASES ANY COMBINATION: CPT

## 2024-03-24 PROCEDURE — 99285 EMERGENCY DEPT VISIT HI MDM: CPT | Mod: 25

## 2024-03-24 PROCEDURE — 84484 ASSAY OF TROPONIN QUANT: CPT

## 2024-03-24 PROCEDURE — 80053 COMPREHEN METABOLIC PANEL: CPT

## 2024-03-24 PROCEDURE — 85025 COMPLETE CBC W/AUTO DIFF WBC: CPT

## 2024-03-24 PROCEDURE — 96361 HYDRATE IV INFUSION ADD-ON: CPT

## 2024-03-24 PROCEDURE — 36415 COLL VENOUS BLD VENIPUNCTURE: CPT

## 2024-03-24 PROCEDURE — 71045 X-RAY EXAM CHEST 1 VIEW: CPT

## 2024-03-24 PROCEDURE — 87040 BLOOD CULTURE FOR BACTERIA: CPT

## 2024-03-24 PROCEDURE — 93005 ELECTROCARDIOGRAM TRACING: CPT

## 2024-03-24 PROCEDURE — 85610 PROTHROMBIN TIME: CPT

## 2024-03-24 PROCEDURE — 83605 ASSAY OF LACTIC ACID: CPT

## 2024-03-24 PROCEDURE — 96374 THER/PROPH/DIAG INJ IV PUSH: CPT

## 2024-03-24 PROCEDURE — 83880 ASSAY OF NATRIURETIC PEPTIDE: CPT

## 2024-03-24 PROCEDURE — 83690 ASSAY OF LIPASE: CPT

## 2024-03-24 PROCEDURE — 82010 KETONE BODYS QUAN: CPT

## 2024-03-24 PROCEDURE — 87086 URINE CULTURE/COLONY COUNT: CPT

## 2024-03-24 PROCEDURE — 85730 THROMBOPLASTIN TIME PARTIAL: CPT

## 2024-03-24 PROCEDURE — 83930 ASSAY OF BLOOD OSMOLALITY: CPT

## 2024-03-24 PROCEDURE — 82962 GLUCOSE BLOOD TEST: CPT

## 2024-03-25 LAB
CULTURE RESULTS: SIGNIFICANT CHANGE UP
SPECIMEN SOURCE: SIGNIFICANT CHANGE UP

## 2024-03-26 ENCOUNTER — NON-APPOINTMENT (OUTPATIENT)
Age: 59
End: 2024-03-26

## 2024-03-26 ENCOUNTER — APPOINTMENT (OUTPATIENT)
Dept: CARDIOLOGY | Facility: CLINIC | Age: 59
End: 2024-03-26
Payer: MEDICARE

## 2024-03-26 VITALS
OXYGEN SATURATION: 96 % | BODY MASS INDEX: 40.75 KG/M2 | DIASTOLIC BLOOD PRESSURE: 69 MMHG | WEIGHT: 230 LBS | SYSTOLIC BLOOD PRESSURE: 98 MMHG | HEART RATE: 82 BPM | HEIGHT: 63 IN

## 2024-03-26 DIAGNOSIS — R07.9 CHEST PAIN, UNSPECIFIED: ICD-10-CM

## 2024-03-26 PROCEDURE — 99204 OFFICE O/P NEW MOD 45 MIN: CPT

## 2024-03-26 PROCEDURE — 99214 OFFICE O/P EST MOD 30 MIN: CPT

## 2024-03-26 PROCEDURE — 93000 ELECTROCARDIOGRAM COMPLETE: CPT

## 2024-03-26 RX ORDER — ATORVASTATIN CALCIUM 80 MG/1
80 TABLET, FILM COATED ORAL
Refills: 0 | Status: ACTIVE | COMMUNITY
Start: 2024-03-26

## 2024-03-26 RX ORDER — LOSARTAN POTASSIUM 100 MG/1
100 TABLET, FILM COATED ORAL
Refills: 0 | Status: ACTIVE | COMMUNITY
Start: 2024-03-26

## 2024-03-26 NOTE — CARDIOLOGY SUMMARY
[de-identified] : nsr [de-identified] : Exercise SPECT Myocardial Perfusion Imaging consistent with a small, very mild intensity, reversible perfusion defect of the mid anterior wall which may be consistent with a small area of ischemia vs breast attenuation artifact. Normal left ventricular ejection fraction of 78 % (normal: 50% or greater). No regional wall motion abnormalities. [de-identified] : < from: TTE Echo Complete w/o Contrast w/ Doppler (03.09.24 @ 11:06) >Summary:  1. Left ventricular ejection fraction, by visual estimation, is 55 to60%.  2. Technically fair study.  3. Mildly increased LV wall thickness.  4. Spectral Doppler shows impaired relaxation pattern of left ventricular myocardial filling (Grade I diastolic dysfunction).  5. There is mild septal left ventricular hypertrophy.  6. Mildly enlarged left atrium.  7. Normal right atrial size.  8. Mild mitral valve regurgitation.  9. Mild tricuspid regurgitation. 10. Mild pulmonic valve regurgitation. 11. Pulmonary hypertension is absent.

## 2024-03-26 NOTE — REASON FOR VISIT
[Symptom and Test Evaluation] : symptom and test evaluation [CV Risk Factors and Non-Cardiac Disease] : CV risk factors and non-cardiac disease [Coronary Artery Disease] : coronary artery disease [FreeTextEntry1] : Ms. Alex is a 57 y/o F with PMHx of Type 2 DM, HTN, HLD, GERD, asthma, anxiety, sleep apnea, schizoaffective disorder, bipolar disorder, migraines presenting for substernal chest pressure which started this afternoon while the patient was sitting on her couch eating. Pt states that the pain was sharp and substernal. Non-radiating pain. States that she also had some nausea and dizziness at the time. The patient states that the pain was unlike previous asthma exacerbations and episodes of GERD, at which point she then called an ambulance. The patient received ASA prior to arrival and her pain improved from 8-1 however her pain then rebounded, and she was given nitroglycerin in the ED. The patient states that early while she was in the ED her pain fluctuated but it is now a 0 out of 10 with no tenderness to palpation. Upon admission, troponins were trended and negative x 3. Patient was monitored on telemetry and cardiology was consulted.  Dorothy is seen today in cardiac follow-up with her sister Nevaeh.  Her most important risk factors are weight gain and diabetes with A1c 8                             [FreeTextEntry3] : Dr. Kizzy Gibbs

## 2024-03-26 NOTE — ASSESSMENT
[FreeTextEntry1] : I had a long discussion with the patient and her sister, and we discussed options including medical therapy with close follow-up of a mildly abnormal nuclear stress test versus definitive angiography versus a cardiac CTA.  They wish to understand her coronary anatomy better and a cardiac CTA was advised she will arrange this in the near future risk benefits alternatives discussed.  Medical necessity High risk encounter based upon evaluation of hospital records and recommendations for a cardiac CTA  EKG indicated for ischemic heart disease

## 2024-03-26 NOTE — PHYSICAL EXAM
[Well Developed] : well developed [Well Nourished] : well nourished [Normal Conjunctiva] : normal conjunctiva [No Acute Distress] : no acute distress [Normal Venous Pressure] : normal venous pressure [No Carotid Bruit] : no carotid bruit [Normal S1, S2] : normal S1, S2 [No Murmur] : no murmur [No Gallop] : no gallop [No Rub] : no rub [Good Air Entry] : good air entry [Clear Lung Fields] : clear lung fields [Non Tender] : non-tender [No Respiratory Distress] : no respiratory distress  [Soft] : abdomen soft [No Masses/organomegaly] : no masses/organomegaly [Normal Bowel Sounds] : normal bowel sounds [Normal Gait] : normal gait [No Cyanosis] : no cyanosis [No Edema] : no edema [No Clubbing] : no clubbing [No Varicosities] : no varicosities [No Skin Lesions] : no skin lesions [No Rash] : no rash [Moves all extremities] : moves all extremities [No Focal Deficits] : no focal deficits [Normal Speech] : normal speech [Alert and Oriented] : alert and oriented [Normal memory] : normal memory

## 2024-03-31 ENCOUNTER — NON-APPOINTMENT (OUTPATIENT)
Age: 59
End: 2024-03-31

## 2024-05-02 ENCOUNTER — APPOINTMENT (OUTPATIENT)
Dept: CT IMAGING | Facility: CLINIC | Age: 59
End: 2024-05-02
Payer: MEDICARE

## 2024-05-02 ENCOUNTER — OUTPATIENT (OUTPATIENT)
Dept: OUTPATIENT SERVICES | Facility: HOSPITAL | Age: 59
LOS: 1 days | End: 2024-05-02
Payer: MEDICARE

## 2024-05-02 DIAGNOSIS — R07.9 CHEST PAIN, UNSPECIFIED: ICD-10-CM

## 2024-05-02 DIAGNOSIS — Z98.890 OTHER SPECIFIED POSTPROCEDURAL STATES: Chronic | ICD-10-CM

## 2024-05-02 PROCEDURE — 75574 CT ANGIO HRT W/3D IMAGE: CPT | Mod: 26

## 2024-05-02 PROCEDURE — 75574 CT ANGIO HRT W/3D IMAGE: CPT

## 2024-05-03 ENCOUNTER — RESULT REVIEW (OUTPATIENT)
Age: 59
End: 2024-05-03

## 2024-05-03 ENCOUNTER — OUTPATIENT (OUTPATIENT)
Dept: OUTPATIENT SERVICES | Facility: HOSPITAL | Age: 59
LOS: 1 days | End: 2024-05-03
Payer: MEDICARE

## 2024-05-03 DIAGNOSIS — Z00.8 ENCOUNTER FOR OTHER GENERAL EXAMINATION: ICD-10-CM

## 2024-05-03 DIAGNOSIS — Z98.890 OTHER SPECIFIED POSTPROCEDURAL STATES: Chronic | ICD-10-CM

## 2024-05-03 PROCEDURE — 75580 N-INVAS EST C FFR SW ALY CTA: CPT | Mod: 26

## 2024-05-03 PROCEDURE — 75580 N-INVAS EST C FFR SW ALY CTA: CPT

## 2024-06-06 LAB
ALBUMIN SERPL ELPH-MCNC: 4.1 G/DL
ALP BLD-CCNC: 98 U/L
ALT SERPL-CCNC: 29 U/L
ANION GAP SERPL CALC-SCNC: 11 MMOL/L
AST SERPL-CCNC: 21 U/L
BILIRUB SERPL-MCNC: <0.2 MG/DL
BUN SERPL-MCNC: 14 MG/DL
CALCIUM SERPL-MCNC: 9.1 MG/DL
CHLORIDE SERPL-SCNC: 103 MMOL/L
CHOLEST SERPL-MCNC: 166 MG/DL
CO2 SERPL-SCNC: 22 MMOL/L
CREAT SERPL-MCNC: 0.95 MG/DL
EGFR: 69 ML/MIN/1.73M2
GLUCOSE SERPL-MCNC: 168 MG/DL
HDLC SERPL-MCNC: 56 MG/DL
LDLC SERPL CALC-MCNC: 75 MG/DL
NONHDLC SERPL-MCNC: 110 MG/DL
POTASSIUM SERPL-SCNC: 4.2 MMOL/L
PROT SERPL-MCNC: 6.4 G/DL
SODIUM SERPL-SCNC: 136 MMOL/L
TRIGL SERPL-MCNC: 213 MG/DL

## 2024-06-11 RX ORDER — ASPIRIN 81 MG/1
81 TABLET, COATED ORAL
Refills: 0 | Status: DISCONTINUED | COMMUNITY
Start: 2024-03-26 | End: 2024-06-11

## 2024-06-18 ENCOUNTER — APPOINTMENT (OUTPATIENT)
Age: 59
End: 2024-06-18

## 2024-07-16 ENCOUNTER — APPOINTMENT (OUTPATIENT)
Dept: CARDIOLOGY | Facility: CLINIC | Age: 59
End: 2024-07-16
Payer: MEDICARE

## 2024-07-16 ENCOUNTER — NON-APPOINTMENT (OUTPATIENT)
Age: 59
End: 2024-07-16

## 2024-07-16 VITALS
HEIGHT: 63 IN | OXYGEN SATURATION: 97 % | SYSTOLIC BLOOD PRESSURE: 109 MMHG | DIASTOLIC BLOOD PRESSURE: 73 MMHG | BODY MASS INDEX: 40.22 KG/M2 | WEIGHT: 227 LBS | HEART RATE: 78 BPM

## 2024-07-16 DIAGNOSIS — R07.9 CHEST PAIN, UNSPECIFIED: ICD-10-CM

## 2024-07-16 PROCEDURE — 93000 ELECTROCARDIOGRAM COMPLETE: CPT

## 2024-07-16 PROCEDURE — 99213 OFFICE O/P EST LOW 20 MIN: CPT

## 2024-08-11 ENCOUNTER — NON-APPOINTMENT (OUTPATIENT)
Age: 59
End: 2024-08-11

## 2024-08-12 ENCOUNTER — APPOINTMENT (OUTPATIENT)
Dept: NEUROLOGY | Facility: CLINIC | Age: 59
End: 2024-08-12
Payer: MEDICARE

## 2024-08-12 VITALS
WEIGHT: 221 LBS | SYSTOLIC BLOOD PRESSURE: 98 MMHG | OXYGEN SATURATION: 97 % | HEIGHT: 63 IN | BODY MASS INDEX: 39.16 KG/M2 | HEART RATE: 78 BPM | DIASTOLIC BLOOD PRESSURE: 65 MMHG | TEMPERATURE: 98.1 F

## 2024-08-12 VITALS
BODY MASS INDEX: 39.16 KG/M2 | SYSTOLIC BLOOD PRESSURE: 98 MMHG | TEMPERATURE: 98.1 F | WEIGHT: 221 LBS | HEIGHT: 63 IN | DIASTOLIC BLOOD PRESSURE: 65 MMHG

## 2024-08-12 DIAGNOSIS — R47.9 UNSPECIFIED SPEECH DISTURBANCES: ICD-10-CM

## 2024-08-12 DIAGNOSIS — G43.909 MIGRAINE, UNSPECIFIED, NOT INTRACTABLE, W/OUT STATUS MIGRAINOSUS: ICD-10-CM

## 2024-08-12 PROCEDURE — G2211 COMPLEX E/M VISIT ADD ON: CPT

## 2024-08-12 PROCEDURE — 99214 OFFICE O/P EST MOD 30 MIN: CPT

## 2024-08-12 RX ORDER — TIRZEPATIDE 12.5 MG/.5ML
12.5 INJECTION, SOLUTION SUBCUTANEOUS
Refills: 0 | Status: ACTIVE | COMMUNITY

## 2024-08-12 NOTE — HISTORY OF PRESENT ILLNESS
[FreeTextEntry1] : This patient is seen for an office visit.  Respect to her migraine headache disorder.  She remains improved with Nurtec 75 mg ODT which she takes every other day.  She occasionally takes Tylenol as needed.  She describes headaches on the average of 2-3 times a month which she takes Tylenol.    She takes multiple other medications especially for psychiatric condition.    The patient also describes recent slurring of speech.

## 2024-08-12 NOTE — PHYSICAL EXAM
[FreeTextEntry1] : Head:  Normocephalic Neck: Supple.  Mental Status:  Alert Oriented X3 Speech mild slurring/dysarthria intermittent.  Cranial Nerves:  PERRL, l Visual Fields full  EOMI no diplopia no ptosis no nystagmus, V through XII intact.  No definite abnormal perioral or lingual movements  Motor:  No drift, normal strength tone and coordination and no focal atrophy. No abnormal movements. No dysmetria.  Normal rapid alternating movements.   DTRs: Symmetric and 2+.  Plantars flexor.   Sensory: Unremarkable.  Gait: Regular.

## 2024-08-12 NOTE — ASSESSMENT
[FreeTextEntry1] : Impression: This 58-year-old female patient with schizoaffective disorder diabetes hyperlipidemia hypertension MARCO ANTONIO asthma has chronic migraine headache disorder with aura and she has had a response to Nurtec 75 mg ODT which she takes every other day.  She is also noted some issues with slurring of speech.  Rule out reaction to polypharmacy especially psychiatric medication.  Recommendations: Continue Nurtec 75 mg ODT every other day as a preventative.  I did suggest she speak to her psychiatrist regarding her complaints of slurring with respect to her psychiatric medication.  Office follow-up in 6 months.

## 2024-10-16 LAB — HBA1C MFR BLD HPLC: ABNORMAL

## 2024-10-19 NOTE — ASSESSMENT
[FreeTextEntry1] : Impression: This 57-year-old female patient with schizoaffective disorder diabetes hyperlipidemia hypertension MARCO ANTONIO asthma has a chronic migraine headache disorder with aura.  She continues to do well with Nurtec 75 mg ODT which she now takes every other day as a preventative.  Recommendations: Continue Nurtec 75 mg ODT every other day as a preventative.  Office follow-up as directed.
show

## 2024-10-30 ENCOUNTER — APPOINTMENT (OUTPATIENT)
Dept: CARDIOLOGY | Facility: CLINIC | Age: 59
End: 2024-10-30

## 2024-10-30 ENCOUNTER — NON-APPOINTMENT (OUTPATIENT)
Age: 59
End: 2024-10-30

## 2024-10-30 VITALS
SYSTOLIC BLOOD PRESSURE: 98 MMHG | HEIGHT: 63 IN | BODY MASS INDEX: 40.57 KG/M2 | WEIGHT: 229 LBS | HEART RATE: 64 BPM | OXYGEN SATURATION: 97 % | DIASTOLIC BLOOD PRESSURE: 73 MMHG

## 2024-10-30 DIAGNOSIS — R42 DIZZINESS AND GIDDINESS: ICD-10-CM

## 2024-10-30 PROCEDURE — 93000 ELECTROCARDIOGRAM COMPLETE: CPT | Mod: XU

## 2024-10-30 PROCEDURE — 99213 OFFICE O/P EST LOW 20 MIN: CPT

## 2024-10-30 PROCEDURE — 93246 EXT ECG>7D<15D RECORDING: CPT

## 2024-11-05 ENCOUNTER — NON-APPOINTMENT (OUTPATIENT)
Age: 59
End: 2024-11-05

## 2024-11-06 RX ORDER — UBROGEPANT 100 MG/1
100 TABLET ORAL
Qty: 30 | Refills: 3 | Status: ACTIVE | COMMUNITY
Start: 2024-11-05 | End: 1900-01-01

## 2024-11-12 NOTE — H&P PST ADULT - BSA (M2)
Patient call about getting a script for lab order for her Cholesterol and would like to try and get this done once more. Please call the patient when the script is in the system. Thank you    2.04

## 2024-11-19 ENCOUNTER — APPOINTMENT (OUTPATIENT)
Dept: CARDIOLOGY | Facility: CLINIC | Age: 59
End: 2024-11-19

## 2024-11-19 VITALS
HEART RATE: 63 BPM | OXYGEN SATURATION: 97 % | DIASTOLIC BLOOD PRESSURE: 60 MMHG | SYSTOLIC BLOOD PRESSURE: 100 MMHG | HEIGHT: 63 IN | BODY MASS INDEX: 40.75 KG/M2 | WEIGHT: 230 LBS

## 2024-11-19 DIAGNOSIS — R42 DIZZINESS AND GIDDINESS: ICD-10-CM

## 2024-11-19 PROCEDURE — 93000 ELECTROCARDIOGRAM COMPLETE: CPT | Mod: 59

## 2024-11-19 PROCEDURE — 93248 EXT ECG>7D<15D REV&INTERPJ: CPT

## 2024-11-19 PROCEDURE — 99214 OFFICE O/P EST MOD 30 MIN: CPT

## 2024-12-06 ENCOUNTER — NON-APPOINTMENT (OUTPATIENT)
Age: 59
End: 2024-12-06

## 2024-12-30 ENCOUNTER — NON-APPOINTMENT (OUTPATIENT)
Age: 59
End: 2024-12-30

## 2025-01-14 ENCOUNTER — APPOINTMENT (OUTPATIENT)
Dept: CARDIOLOGY | Facility: CLINIC | Age: 60
End: 2025-01-14

## 2025-01-21 ENCOUNTER — NON-APPOINTMENT (OUTPATIENT)
Age: 60
End: 2025-01-21

## 2025-01-21 ENCOUNTER — APPOINTMENT (OUTPATIENT)
Dept: CARDIOLOGY | Facility: CLINIC | Age: 60
End: 2025-01-21
Payer: MEDICARE

## 2025-01-21 VITALS
BODY MASS INDEX: 51.91 KG/M2 | DIASTOLIC BLOOD PRESSURE: 77 MMHG | HEIGHT: 63 IN | SYSTOLIC BLOOD PRESSURE: 114 MMHG | HEART RATE: 86 BPM | WEIGHT: 293 LBS

## 2025-01-21 DIAGNOSIS — I25.10 ATHEROSCLEROTIC HEART DISEASE OF NATIVE CORONARY ARTERY W/OUT ANGINA PECTORIS: ICD-10-CM

## 2025-01-21 PROCEDURE — 99214 OFFICE O/P EST MOD 30 MIN: CPT

## 2025-01-21 PROCEDURE — 93000 ELECTROCARDIOGRAM COMPLETE: CPT

## 2025-01-23 ENCOUNTER — NON-APPOINTMENT (OUTPATIENT)
Age: 60
End: 2025-01-23

## 2025-02-04 ENCOUNTER — NON-APPOINTMENT (OUTPATIENT)
Age: 60
End: 2025-02-04

## 2025-02-06 ENCOUNTER — APPOINTMENT (OUTPATIENT)
Dept: CARDIOLOGY | Facility: CLINIC | Age: 60
End: 2025-02-06

## 2025-02-07 ENCOUNTER — APPOINTMENT (OUTPATIENT)
Dept: CARDIOLOGY | Facility: CLINIC | Age: 60
End: 2025-02-07
Payer: MEDICARE

## 2025-02-07 ENCOUNTER — NON-APPOINTMENT (OUTPATIENT)
Age: 60
End: 2025-02-07

## 2025-02-07 VITALS
SYSTOLIC BLOOD PRESSURE: 113 MMHG | BODY MASS INDEX: 43.05 KG/M2 | WEIGHT: 243 LBS | HEART RATE: 81 BPM | OXYGEN SATURATION: 95 % | HEIGHT: 63 IN | DIASTOLIC BLOOD PRESSURE: 76 MMHG

## 2025-02-07 PROBLEM — R60.9 EDEMA: Status: ACTIVE | Noted: 2025-02-07

## 2025-02-07 PROCEDURE — 99214 OFFICE O/P EST MOD 30 MIN: CPT

## 2025-02-07 PROCEDURE — 93000 ELECTROCARDIOGRAM COMPLETE: CPT

## 2025-02-11 ENCOUNTER — LABORATORY RESULT (OUTPATIENT)
Age: 60
End: 2025-02-11

## 2025-02-11 ENCOUNTER — OUTPATIENT (OUTPATIENT)
Dept: OUTPATIENT SERVICES | Facility: HOSPITAL | Age: 60
LOS: 1 days | End: 2025-02-11
Payer: MEDICARE

## 2025-02-11 ENCOUNTER — APPOINTMENT (OUTPATIENT)
Dept: ULTRASOUND IMAGING | Facility: CLINIC | Age: 60
End: 2025-02-11

## 2025-02-11 DIAGNOSIS — Z98.890 OTHER SPECIFIED POSTPROCEDURAL STATES: Chronic | ICD-10-CM

## 2025-02-11 DIAGNOSIS — R60.9 EDEMA, UNSPECIFIED: ICD-10-CM

## 2025-02-11 PROCEDURE — 93970 EXTREMITY STUDY: CPT

## 2025-02-11 PROCEDURE — 93970 EXTREMITY STUDY: CPT | Mod: 26

## 2025-02-12 ENCOUNTER — APPOINTMENT (OUTPATIENT)
Dept: NEUROLOGY | Facility: CLINIC | Age: 60
End: 2025-02-12
Payer: MEDICARE

## 2025-02-12 ENCOUNTER — NON-APPOINTMENT (OUTPATIENT)
Age: 60
End: 2025-02-12

## 2025-02-12 VITALS
HEIGHT: 63 IN | TEMPERATURE: 98.2 F | WEIGHT: 243 LBS | DIASTOLIC BLOOD PRESSURE: 63 MMHG | OXYGEN SATURATION: 96 % | SYSTOLIC BLOOD PRESSURE: 91 MMHG | BODY MASS INDEX: 43.05 KG/M2 | HEART RATE: 81 BPM

## 2025-02-12 DIAGNOSIS — G43.909 MIGRAINE, UNSPECIFIED, NOT INTRACTABLE, W/OUT STATUS MIGRAINOSUS: ICD-10-CM

## 2025-02-12 PROCEDURE — 99214 OFFICE O/P EST MOD 30 MIN: CPT

## 2025-02-12 PROCEDURE — G2211 COMPLEX E/M VISIT ADD ON: CPT

## 2025-02-13 ENCOUNTER — APPOINTMENT (OUTPATIENT)
Dept: CARDIOLOGY | Facility: CLINIC | Age: 60
End: 2025-02-13
Payer: MEDICARE

## 2025-02-13 VITALS
RESPIRATION RATE: 17 BRPM | WEIGHT: 243 LBS | HEART RATE: 79 BPM | HEIGHT: 63 IN | DIASTOLIC BLOOD PRESSURE: 84 MMHG | SYSTOLIC BLOOD PRESSURE: 105 MMHG | OXYGEN SATURATION: 96 % | BODY MASS INDEX: 43.05 KG/M2

## 2025-02-13 DIAGNOSIS — R60.9 EDEMA, UNSPECIFIED: ICD-10-CM

## 2025-02-13 PROCEDURE — 99213 OFFICE O/P EST LOW 20 MIN: CPT

## 2025-02-13 PROCEDURE — 93000 ELECTROCARDIOGRAM COMPLETE: CPT

## 2025-02-14 RX ORDER — GALCANEZUMAB 120 MG/ML
120 INJECTION, SOLUTION SUBCUTANEOUS
Qty: 2 | Refills: 6 | Status: ACTIVE | COMMUNITY
Start: 2025-02-13 | End: 1900-01-01

## 2025-02-20 NOTE — ED PROVIDER NOTE - IV ALTEPLASE EXCL ABS HIDDEN
Ascencion You is a 10 year old male presenting for   Chief Complaint   Patient presents with    Fever     Denies Latex allergy or sensitivity.    Medication verified, no changes  Refills needed today: No       Health Maintenance       Influenza Vaccine (1)  Overdue since 9/1/2024    COVID-19 Vaccine (4 - Pediatric 2024-25 season)  Overdue since 9/1/2024    Meningococcal Vaccine (1 - 2-dose series)  Never done    HPV Vaccine (1 - Male 2-dose series)  Never done           Following review of the above:      Note: Refer to final orders and clinician documentation.                                Last lab results:   No results found for: \"HGBA1C\"  No results found for: \"CHOLESTEROL\"  No results found for: \"HDL\"  No results found for: \"TRIGLYCERIDE\"  No results found for: \"CALCLDL\"  No results found for: \"URMIC\", \"UCR\", \"MALBCR\"  No results found for: \"IFOB\"                 Depression Screening:  Review Flowsheet           No data to display                 Advance Directives:  not discussed   show

## 2025-04-22 ENCOUNTER — APPOINTMENT (OUTPATIENT)
Dept: CARDIOLOGY | Facility: CLINIC | Age: 60
End: 2025-04-22
Payer: MEDICARE

## 2025-04-22 VITALS
HEART RATE: 89 BPM | WEIGHT: 238 LBS | OXYGEN SATURATION: 94 % | DIASTOLIC BLOOD PRESSURE: 68 MMHG | SYSTOLIC BLOOD PRESSURE: 90 MMHG | HEIGHT: 63 IN | BODY MASS INDEX: 42.17 KG/M2

## 2025-04-22 DIAGNOSIS — I25.10 ATHEROSCLEROTIC HEART DISEASE OF NATIVE CORONARY ARTERY W/OUT ANGINA PECTORIS: ICD-10-CM

## 2025-04-22 PROCEDURE — 93000 ELECTROCARDIOGRAM COMPLETE: CPT

## 2025-04-22 PROCEDURE — 99213 OFFICE O/P EST LOW 20 MIN: CPT

## 2025-04-23 ENCOUNTER — NON-APPOINTMENT (OUTPATIENT)
Age: 60
End: 2025-04-23

## 2025-05-22 ENCOUNTER — EMERGENCY (EMERGENCY)
Facility: HOSPITAL | Age: 60
LOS: 1 days | End: 2025-05-22
Attending: EMERGENCY MEDICINE | Admitting: EMERGENCY MEDICINE
Payer: MEDICARE

## 2025-05-22 VITALS
DIASTOLIC BLOOD PRESSURE: 74 MMHG | SYSTOLIC BLOOD PRESSURE: 128 MMHG | HEART RATE: 80 BPM | WEIGHT: 237 LBS | TEMPERATURE: 98 F | HEIGHT: 62 IN | RESPIRATION RATE: 17 BRPM | OXYGEN SATURATION: 99 %

## 2025-05-22 DIAGNOSIS — Z98.890 OTHER SPECIFIED POSTPROCEDURAL STATES: Chronic | ICD-10-CM

## 2025-05-22 LAB
ACETONE SERPL-MCNC: NEGATIVE — SIGNIFICANT CHANGE UP
ALBUMIN SERPL ELPH-MCNC: 3.1 G/DL — LOW (ref 3.3–5)
ALP SERPL-CCNC: 165 U/L — HIGH (ref 40–120)
ALT FLD-CCNC: 32 U/L — SIGNIFICANT CHANGE UP (ref 10–45)
ANION GAP SERPL CALC-SCNC: 9 MMOL/L — SIGNIFICANT CHANGE UP (ref 5–17)
APPEARANCE UR: CLEAR — SIGNIFICANT CHANGE UP
AST SERPL-CCNC: 15 U/L — SIGNIFICANT CHANGE UP (ref 10–40)
BASE EXCESS BLDV CALC-SCNC: 2.3 MMOL/L — SIGNIFICANT CHANGE UP (ref -2–3)
BASOPHILS # BLD AUTO: 0.03 K/UL — SIGNIFICANT CHANGE UP (ref 0–0.2)
BASOPHILS NFR BLD AUTO: 0.5 % — SIGNIFICANT CHANGE UP (ref 0–2)
BILIRUB SERPL-MCNC: 0.2 MG/DL — SIGNIFICANT CHANGE UP (ref 0.2–1.2)
BILIRUB UR-MCNC: NEGATIVE — SIGNIFICANT CHANGE UP
BUN SERPL-MCNC: 13 MG/DL — SIGNIFICANT CHANGE UP (ref 7–23)
CALCIUM SERPL-MCNC: 8.7 MG/DL — SIGNIFICANT CHANGE UP (ref 8.4–10.5)
CHLORIDE SERPL-SCNC: 99 MMOL/L — SIGNIFICANT CHANGE UP (ref 96–108)
CO2 BLDV-SCNC: 29 MMOL/L — HIGH (ref 22–26)
CO2 SERPL-SCNC: 28 MMOL/L — SIGNIFICANT CHANGE UP (ref 22–31)
COLOR SPEC: YELLOW — SIGNIFICANT CHANGE UP
CREAT SERPL-MCNC: 1.22 MG/DL — SIGNIFICANT CHANGE UP (ref 0.5–1.3)
DIFF PNL FLD: NEGATIVE — SIGNIFICANT CHANGE UP
EGFR: 51 ML/MIN/1.73M2 — LOW
EGFR: 51 ML/MIN/1.73M2 — LOW
EOSINOPHIL # BLD AUTO: 0.06 K/UL — SIGNIFICANT CHANGE UP (ref 0–0.5)
EOSINOPHIL NFR BLD AUTO: 0.9 % — SIGNIFICANT CHANGE UP (ref 0–6)
GAS PNL BLDV: SIGNIFICANT CHANGE UP
GLUCOSE BLDC GLUCOMTR-MCNC: 272 MG/DL — HIGH (ref 70–99)
GLUCOSE BLDC GLUCOMTR-MCNC: 384 MG/DL — HIGH (ref 70–99)
GLUCOSE SERPL-MCNC: 510 MG/DL — CRITICAL HIGH (ref 70–99)
GLUCOSE UR QL: >=1000 MG/DL
HCO3 BLDV-SCNC: 28 MMOL/L — SIGNIFICANT CHANGE UP (ref 22–29)
HCT VFR BLD CALC: 34.2 % — LOW (ref 34.5–45)
HGB BLD-MCNC: 11.2 G/DL — LOW (ref 11.5–15.5)
IMM GRANULOCYTES NFR BLD AUTO: 0.2 % — SIGNIFICANT CHANGE UP (ref 0–0.9)
KETONES UR QL: NEGATIVE MG/DL — SIGNIFICANT CHANGE UP
LEUKOCYTE ESTERASE UR-ACNC: NEGATIVE — SIGNIFICANT CHANGE UP
LIDOCAIN IGE QN: 82 U/L — HIGH (ref 16–77)
LYMPHOCYTES # BLD AUTO: 2.45 K/UL — SIGNIFICANT CHANGE UP (ref 1–3.3)
LYMPHOCYTES # BLD AUTO: 37.8 % — SIGNIFICANT CHANGE UP (ref 13–44)
MCHC RBC-ENTMCNC: 28.5 PG — SIGNIFICANT CHANGE UP (ref 27–34)
MCHC RBC-ENTMCNC: 32.7 G/DL — SIGNIFICANT CHANGE UP (ref 32–36)
MCV RBC AUTO: 87 FL — SIGNIFICANT CHANGE UP (ref 80–100)
MONOCYTES # BLD AUTO: 0.55 K/UL — SIGNIFICANT CHANGE UP (ref 0–0.9)
MONOCYTES NFR BLD AUTO: 8.5 % — SIGNIFICANT CHANGE UP (ref 2–14)
NEUTROPHILS # BLD AUTO: 3.39 K/UL — SIGNIFICANT CHANGE UP (ref 1.8–7.4)
NEUTROPHILS NFR BLD AUTO: 52.1 % — SIGNIFICANT CHANGE UP (ref 43–77)
NITRITE UR-MCNC: NEGATIVE — SIGNIFICANT CHANGE UP
NRBC BLD AUTO-RTO: 0 /100 WBCS — SIGNIFICANT CHANGE UP (ref 0–0)
PCO2 BLDV: 49 MMHG — HIGH (ref 39–42)
PH BLDV: 7.36 — SIGNIFICANT CHANGE UP (ref 7.32–7.43)
PH UR: 5.5 — SIGNIFICANT CHANGE UP (ref 5–8)
PLATELET # BLD AUTO: 258 K/UL — SIGNIFICANT CHANGE UP (ref 150–400)
PO2 BLDV: <35 MMHG — LOW (ref 25–45)
POTASSIUM SERPL-MCNC: 3.8 MMOL/L — SIGNIFICANT CHANGE UP (ref 3.5–5.3)
POTASSIUM SERPL-SCNC: 3.8 MMOL/L — SIGNIFICANT CHANGE UP (ref 3.5–5.3)
PROT SERPL-MCNC: 6.4 G/DL — SIGNIFICANT CHANGE UP (ref 6–8.3)
PROT UR-MCNC: NEGATIVE MG/DL — SIGNIFICANT CHANGE UP
RBC # BLD: 3.93 M/UL — SIGNIFICANT CHANGE UP (ref 3.8–5.2)
RBC # FLD: 13.1 % — SIGNIFICANT CHANGE UP (ref 10.3–14.5)
SAO2 % BLDV: 37.3 % — LOW (ref 67–88)
SODIUM SERPL-SCNC: 136 MMOL/L — SIGNIFICANT CHANGE UP (ref 135–145)
SP GR SPEC: 1.02 — SIGNIFICANT CHANGE UP (ref 1–1.03)
UROBILINOGEN FLD QL: 0.2 MG/DL — SIGNIFICANT CHANGE UP (ref 0.2–1)
WBC # BLD: 6.49 K/UL — SIGNIFICANT CHANGE UP (ref 3.8–10.5)
WBC # FLD AUTO: 6.49 K/UL — SIGNIFICANT CHANGE UP (ref 3.8–10.5)

## 2025-05-22 PROCEDURE — 82009 KETONE BODYS QUAL: CPT

## 2025-05-22 PROCEDURE — 99283 EMERGENCY DEPT VISIT LOW MDM: CPT | Mod: 25

## 2025-05-22 PROCEDURE — 85025 COMPLETE CBC W/AUTO DIFF WBC: CPT

## 2025-05-22 PROCEDURE — 82962 GLUCOSE BLOOD TEST: CPT

## 2025-05-22 PROCEDURE — 83690 ASSAY OF LIPASE: CPT

## 2025-05-22 PROCEDURE — 80053 COMPREHEN METABOLIC PANEL: CPT

## 2025-05-22 PROCEDURE — 36415 COLL VENOUS BLD VENIPUNCTURE: CPT

## 2025-05-22 PROCEDURE — 99284 EMERGENCY DEPT VISIT MOD MDM: CPT

## 2025-05-22 PROCEDURE — 82803 BLOOD GASES ANY COMBINATION: CPT

## 2025-05-22 PROCEDURE — 81003 URINALYSIS AUTO W/O SCOPE: CPT

## 2025-05-22 PROCEDURE — 96360 HYDRATION IV INFUSION INIT: CPT

## 2025-05-22 RX ADMIN — Medication 1000 MILLILITER(S): at 21:41

## 2025-05-22 RX ADMIN — Medication 1000 MILLILITER(S): at 22:43

## 2025-05-22 NOTE — ED PROVIDER NOTE - PATIENT PORTAL LINK FT
You can access the FollowMyHealth Patient Portal offered by [facility] by registering at the following website: [email or web address]. By joining [employer]’s FollowMyHealth portal, you will also be able to view your health information using other applications (apps) compatible with our system.

## 2025-05-22 NOTE — ED ADULT NURSE NOTE - OBJECTIVE STATEMENT
Patient presents to ED via EMS for FS of 555. Awake alert and follows commands. Received 500 ML bolus by EMS through R AC IV 16G., pt. has insuline pump

## 2025-05-22 NOTE — ED ADULT NURSE NOTE - SUICIDE SCREENING DEPRESSION
no throat pain/no dysphagia/no sinus symptoms/no vertigo/no nasal congestion/no hearing difficulty/no ear pain/no tinnitus
Negative

## 2025-05-22 NOTE — ED ADULT TRIAGE NOTE - CHIEF COMPLAINT QUOTE
Patient presents to ED via EMS for FS of 555. Awake alert and follows commands. Received 500 ML bolus by EMS through R AC IV 16G.

## 2025-05-22 NOTE — ED PROVIDER NOTE - CLINICAL SUMMARY MEDICAL DECISION MAKING FREE TEXT BOX
Patient presenting with hyperglycemia in the setting of an insulin pump.  She did manipulate the pump today by changing the reservoir.  It is possible this is not working properly because she did give herself 15 units at approximately 5 PM when she got to the hospital her sugar was still in the high 400s.  I will have the patient give herself 15 units of insulin now and recheck her sugar without any other interventions in the emergency department.  While I am here I will look for other causes of hypoglycemia with regards to infection.  I will look for other complications such as DKA or hyperosmotic state.  Disposition will depend on results of laboratory studies.

## 2025-05-22 NOTE — ED PROVIDER NOTE - OBJECTIVE STATEMENT
59-year-old female past medical history of diabetes hypercholesterolemia presenting with hyperglycemia.  Patient states that she has an insulin pump as well as taking oral medications.  She said this morning she changed the reservoir on her pump for the first time.  Over the course of their glucose have gotten progressively higher despite giving herself 15 units at 5:00 this afternoon her blood sugar went up into the 500 range.  She called EMS when he started to feel somewhat lightheaded.  There were no signs of infection no nausea vomiting diarrhea has not had this problem before.

## 2025-05-22 NOTE — ED PROVIDER NOTE - PROGRESS NOTE DETAILS
Patient's glucose did improve however not as much as I would expect from 15 units.  We gave her a liter of saline and also continued to improve.  I believe this is a problem with the insulin pump.  She was in the 500s for a period of time and there are no signs currently of DKA or honk.  I believe the patient is to be stable to go home over the course of the evening.  She can follow-up with her endocrinologist in the morning for either a another pump check or a reevaluation of the reservoir.  She will be given strict return precautions

## 2025-05-22 NOTE — ED PROVIDER NOTE - NSFOLLOWUPINSTRUCTIONS_ED_ALL_ED_FT
Please make sure you follow-up with your endocrinologist today.  Have them evaluate your pump.  You may also consider replacing the reservoir 1 more time to make sure is done properly.    Hyperglycemia  Hyperglycemia is when the amount of sugar, or glucose, in your blood is too high. High blood sugar can happen if you have diabetes or if you don't have diabetes. It may be an emergency.    What are the causes?  If you have diabetes, high blood sugar may be caused by:  Medicines that increase blood sugar.  Not giving yourself enough insulin (if you take it).  Being less active than normal.  Eating more than planned.  Illness, an injury, or an infection.  Having surgery.  Stress.  If you don't have diabetes, high blood sugar may be caused by:  Certain medicines, such as steroids or thiazide diuretics.  Stress.  A bad illness or infection.  Having surgery.  Diseases of the pancreas.  What increases the risk?  You're more likely to have high blood sugar if:  Someone in your family has diabetes.  You're overweight.  You aren't active.  You have or have had:  Prediabetes.  Diabetes when pregnant.  Polycystic ovarian syndrome (PCOS).  What are the signs or symptoms?  High blood sugar may not cause symptoms. If you do have symptoms, they may include:  Feeling more thirsty than normal.  Needing to pee more often than normal.  Hunger.  Feeling very tired.  Blurry eyesight.  You may have other symptoms if your high blood sugar isn't treated. These may include:  Pain in your belly.  A headache.  Weakness.  Weight loss that's not planned.  A tingling or numb feeling in your hands or feet.  Cuts or bruises that heal slowly.  How is this diagnosed?  A person taking blood from a finger to check blood sugar levels.  High blood sugar is diagnosed with a blood test. This test tells you how much sugar is in your blood. It's done while you're having symptoms.    Your health care provider may also do a physical exam, look at your medical history, and do more blood tests. These tests may include:  A fasting blood glucose (FBG) test. You can't eat for at least 8 hours before this test.  An A1C test.  A glucose tolerance test.  How is this treated?  Treatment may include:  Taking medicine to control your blood sugar levels.  Changing your medicine or how much you take if you take insulin or other diabetes medicines.  Checking your blood sugar more often.  Making changes to your daily life. These may include:  Being more active.  Eating healthier foods.  Losing weight.  Treating an illness or infection.  Stopping or taking less steroids.  If your high blood sugar stays high, you may need to be treated in the hospital.    Follow these instructions at home:  If you have diabetes:    A plate with healthy, colorful foods.  Know the symptoms of high blood sugar.  Follow your diabetes care plan. Make sure you:  Take insulin and medicines as told.  Check your blood sugar as often as told.  Eat on time. Do not skip meals.  Check your blood sugar before and after you exercise. If you exercise longer or harder than normal, check your blood sugar more often.  Follow your sick day plan when you can't eat or drink like normal. Make this plan ahead of time with your provider.  Share your diabetes care plan with:  Your work or school.  The people you live with.  Wear an alert bracelet or carry a card that says you have diabetes.  General instructions    Take medicines only as told by your provider.  Drink enough fluid to keep your pee (urine) pale yellow. Make sure you drink enough when you:  Exercise.  Get sick.  Are in hot places.  If you drink alcohol:  Limit how much you have to:  0–1 drink a day if you're female.  0–2 drinks a day if you're male.  Know how much alcohol is in your drink. In the [place] one drink is one 12 oz bottle of beer (355 mL), one 5 oz glass of wine (148 mL), or one 1½ oz glass of hard liquor (44 mL).  Manage stress. If you need help with this, ask your provider.  Exercise as told. Try to stay at a healthy weight.  Keep all follow-up visits. Your provider will want to make sure your high blood sugar is treated.  Where to find more information  American Diabetes Association (ADA): diabetes.org  Contact a health care provider if:  You have diabetes and have trouble keeping your blood sugar in the right range.  Your blood sugar is at or above 240 mg/dL (13.3 mmol/L) for 2 days in a row.  You have high blood sugar often.  You have signs of illness, such as:  Nausea or vomiting.  A headache.  A fever.  You can't stop vomiting.  Get help right away if:  Your blood sugar monitor reads "high" even when you're taking insulin.  You have trouble breathing.  These symptoms may be an emergency. Call 911 right away.  Do not wait to see if the symptoms will go away.  Do not drive yourself to the hospital.

## 2025-06-05 ENCOUNTER — APPOINTMENT (OUTPATIENT)
Dept: CARDIOLOGY | Facility: CLINIC | Age: 60
End: 2025-06-05
Payer: MEDICARE

## 2025-06-05 ENCOUNTER — NON-APPOINTMENT (OUTPATIENT)
Age: 60
End: 2025-06-05

## 2025-06-05 VITALS
BODY MASS INDEX: 41.99 KG/M2 | HEIGHT: 63 IN | HEART RATE: 98 BPM | DIASTOLIC BLOOD PRESSURE: 57 MMHG | SYSTOLIC BLOOD PRESSURE: 87 MMHG | OXYGEN SATURATION: 96 % | WEIGHT: 237 LBS | RESPIRATION RATE: 19 BRPM

## 2025-06-05 PROCEDURE — 93000 ELECTROCARDIOGRAM COMPLETE: CPT

## 2025-06-05 PROCEDURE — 99213 OFFICE O/P EST LOW 20 MIN: CPT

## 2025-06-12 ENCOUNTER — APPOINTMENT (OUTPATIENT)
Dept: NEUROLOGY | Facility: CLINIC | Age: 60
End: 2025-06-12
Payer: MEDICARE

## 2025-06-12 VITALS
HEART RATE: 83 BPM | DIASTOLIC BLOOD PRESSURE: 69 MMHG | WEIGHT: 237 LBS | BODY MASS INDEX: 41.99 KG/M2 | SYSTOLIC BLOOD PRESSURE: 102 MMHG | HEIGHT: 63 IN | OXYGEN SATURATION: 97 % | TEMPERATURE: 97.9 F

## 2025-06-12 PROCEDURE — G2211 COMPLEX E/M VISIT ADD ON: CPT

## 2025-06-12 PROCEDURE — 99214 OFFICE O/P EST MOD 30 MIN: CPT

## 2025-06-12 RX ORDER — ILOPERIDONE 10 MG/1
10 TABLET ORAL
Refills: 0 | Status: ACTIVE | COMMUNITY

## 2025-07-28 ENCOUNTER — APPOINTMENT (OUTPATIENT)
Dept: FAMILY MEDICINE | Facility: CLINIC | Age: 60
End: 2025-07-28
Payer: MEDICARE

## 2025-07-28 VITALS
BODY MASS INDEX: 42.34 KG/M2 | TEMPERATURE: 96.9 F | RESPIRATION RATE: 18 BRPM | SYSTOLIC BLOOD PRESSURE: 106 MMHG | HEART RATE: 83 BPM | OXYGEN SATURATION: 96 % | DIASTOLIC BLOOD PRESSURE: 65 MMHG | WEIGHT: 239 LBS

## 2025-07-28 DIAGNOSIS — R35.0 FREQUENCY OF MICTURITION: ICD-10-CM

## 2025-07-28 DIAGNOSIS — G47.33 OBSTRUCTIVE SLEEP APNEA (ADULT) (PEDIATRIC): ICD-10-CM

## 2025-07-28 DIAGNOSIS — E66.01 MORBID (SEVERE) OBESITY DUE TO EXCESS CALORIES: ICD-10-CM

## 2025-07-28 DIAGNOSIS — Z12.39 ENCOUNTER FOR OTHER SCREENING FOR MALIGNANT NEOPLASM OF BREAST: ICD-10-CM

## 2025-07-28 DIAGNOSIS — Z13.820 ENCOUNTER FOR SCREENING FOR OSTEOPOROSIS: ICD-10-CM

## 2025-07-28 DIAGNOSIS — F25.9 SCHIZOAFFECTIVE DISORDER, UNSPECIFIED: ICD-10-CM

## 2025-07-28 PROCEDURE — G0438: CPT

## 2025-08-04 ENCOUNTER — APPOINTMENT (OUTPATIENT)
Dept: ENDOCRINOLOGY | Facility: CLINIC | Age: 60
End: 2025-08-04
Payer: MEDICARE

## 2025-08-04 VITALS
BODY MASS INDEX: 41.99 KG/M2 | HEART RATE: 86 BPM | SYSTOLIC BLOOD PRESSURE: 110 MMHG | RESPIRATION RATE: 18 BRPM | TEMPERATURE: 97.4 F | OXYGEN SATURATION: 98 % | HEIGHT: 63 IN | DIASTOLIC BLOOD PRESSURE: 68 MMHG | WEIGHT: 237 LBS

## 2025-08-04 DIAGNOSIS — E78.5 HYPERLIPIDEMIA, UNSPECIFIED: ICD-10-CM

## 2025-08-04 DIAGNOSIS — Z86.39 PERSONAL HISTORY OF OTHER ENDOCRINE, NUTRITIONAL AND METABOLIC DISEASE: ICD-10-CM

## 2025-08-04 DIAGNOSIS — E11.9 TYPE 2 DIABETES MELLITUS W/OUT COMPLICATIONS: ICD-10-CM

## 2025-08-04 LAB
25(OH)D3 SERPL-MCNC: 35.9 NG/ML
ALBUMIN SERPL ELPH-MCNC: 4.1 G/DL
ALBUMIN, RANDOM URINE: <1.2 MG/DL
ALP BLD-CCNC: 112 U/L
ALT SERPL-CCNC: 44 U/L
ANION GAP SERPL CALC-SCNC: 16 MMOL/L
APPEARANCE: CLEAR
AST SERPL-CCNC: 29 U/L
BACTERIA UR CULT: NORMAL
BACTERIA: NEGATIVE /HPF
BASOPHILS # BLD AUTO: 0.04 K/UL
BASOPHILS NFR BLD AUTO: 0.6 %
BILIRUB SERPL-MCNC: 0.2 MG/DL
BILIRUBIN URINE: NEGATIVE
BLOOD URINE: NEGATIVE
BUN SERPL-MCNC: 13 MG/DL
CALCIUM SERPL-MCNC: 9.8 MG/DL
CAST: 0 /LPF
CHLORIDE SERPL-SCNC: 100 MMOL/L
CHOLEST SERPL-MCNC: 168 MG/DL
CO2 SERPL-SCNC: 22 MMOL/L
COLOR: YELLOW
CREAT SERPL-MCNC: 0.9 MG/DL
CREAT SPEC-SCNC: 34 MG/DL
EGFRCR SERPLBLD CKD-EPI 2021: 73 ML/MIN/1.73M2
EOSINOPHIL # BLD AUTO: 0.07 K/UL
EOSINOPHIL NFR BLD AUTO: 1.1 %
EPITHELIAL CELLS: 1 /HPF
ESTIMATED AVERAGE GLUCOSE: 217 MG/DL
GLUCOSE QUALITATIVE U: >=1000 MG/DL
GLUCOSE SERPL-MCNC: 256 MG/DL
HBA1C MFR BLD HPLC: 9.2 %
HCT VFR BLD CALC: 39 %
HDLC SERPL-MCNC: 64 MG/DL
HGB BLD-MCNC: 12.3 G/DL
IMM GRANULOCYTES NFR BLD AUTO: 0.3 %
KETONES URINE: NEGATIVE MG/DL
LDLC SERPL-MCNC: 70 MG/DL
LEUKOCYTE ESTERASE URINE: ABNORMAL
LYMPHOCYTES # BLD AUTO: 2.21 K/UL
LYMPHOCYTES NFR BLD AUTO: 33.3 %
MAN DIFF?: NORMAL
MCHC RBC-ENTMCNC: 27.6 PG
MCHC RBC-ENTMCNC: 31.5 G/DL
MCV RBC AUTO: 87.6 FL
MICROALBUMIN/CREAT 24H UR-RTO: NORMAL MG/G
MICROSCOPIC-UA: NORMAL
MONOCYTES # BLD AUTO: 0.49 K/UL
MONOCYTES NFR BLD AUTO: 7.4 %
NEUTROPHILS # BLD AUTO: 3.81 K/UL
NEUTROPHILS NFR BLD AUTO: 57.3 %
NITRITE URINE: NEGATIVE
NONHDLC SERPL-MCNC: 104 MG/DL
PH URINE: 6.5
PLATELET # BLD AUTO: 316 K/UL
POTASSIUM SERPL-SCNC: 4.7 MMOL/L
PROT SERPL-MCNC: 6.4 G/DL
PROTEIN URINE: NEGATIVE MG/DL
RBC # BLD: 4.45 M/UL
RBC # FLD: 13.7 %
RED BLOOD CELLS URINE: 0 /HPF
SODIUM SERPL-SCNC: 138 MMOL/L
SPECIFIC GRAVITY URINE: 1.02
TRIGL SERPL-MCNC: 210 MG/DL
TSH SERPL-ACNC: 0.94 UIU/ML
UROBILINOGEN URINE: 0.2 MG/DL
WBC # FLD AUTO: 6.64 K/UL
WHITE BLOOD CELLS URINE: 11 /HPF

## 2025-08-04 PROCEDURE — 99204 OFFICE O/P NEW MOD 45 MIN: CPT

## 2025-08-04 PROCEDURE — G2211 COMPLEX E/M VISIT ADD ON: CPT

## 2025-08-04 RX ORDER — LANCETS 33 GAUGE
EACH MISCELLANEOUS
Qty: 400 | Refills: 3 | Status: ACTIVE | COMMUNITY
Start: 2025-08-04 | End: 1900-01-01

## 2025-08-04 RX ORDER — BLOOD SUGAR DIAGNOSTIC
STRIP MISCELLANEOUS
Qty: 400 | Refills: 3 | Status: ACTIVE | COMMUNITY
Start: 2025-08-04 | End: 1900-01-01

## 2025-08-04 RX ORDER — ISOPROPYL ALCOHOL 0.7 ML/ML
SWAB TOPICAL
Qty: 500 | Refills: 3 | Status: ACTIVE | COMMUNITY
Start: 2025-08-04 | End: 1900-01-01

## 2025-08-04 RX ORDER — TIRZEPATIDE 15 MG/.5ML
15 INJECTION, SOLUTION SUBCUTANEOUS
Qty: 12 | Refills: 3 | Status: ACTIVE | COMMUNITY
Start: 2025-08-04 | End: 1900-01-01

## 2025-08-07 ENCOUNTER — APPOINTMENT (OUTPATIENT)
Dept: CARDIOLOGY | Facility: CLINIC | Age: 60
End: 2025-08-07
Payer: MEDICARE

## 2025-08-07 VITALS
WEIGHT: 235 LBS | HEART RATE: 87 BPM | OXYGEN SATURATION: 97 % | HEIGHT: 63 IN | SYSTOLIC BLOOD PRESSURE: 130 MMHG | BODY MASS INDEX: 41.64 KG/M2 | DIASTOLIC BLOOD PRESSURE: 88 MMHG

## 2025-08-07 DIAGNOSIS — I25.10 ATHEROSCLEROTIC HEART DISEASE OF NATIVE CORONARY ARTERY W/OUT ANGINA PECTORIS: ICD-10-CM

## 2025-08-07 PROCEDURE — 99214 OFFICE O/P EST MOD 30 MIN: CPT

## 2025-08-07 PROCEDURE — 93000 ELECTROCARDIOGRAM COMPLETE: CPT

## 2025-08-11 ENCOUNTER — APPOINTMENT (OUTPATIENT)
Dept: ENDOCRINOLOGY | Facility: CLINIC | Age: 60
End: 2025-08-11
Payer: MEDICARE

## 2025-08-11 PROCEDURE — G0108 DIAB MANAGE TRN  PER INDIV: CPT

## 2025-08-12 RX ORDER — INSULIN ASPART 100 [IU]/ML
100 INJECTION, SOLUTION INTRAVENOUS; SUBCUTANEOUS
Qty: 12 | Refills: 3 | Status: ACTIVE | COMMUNITY
Start: 2025-08-12 | End: 1900-01-01

## 2025-08-12 RX ORDER — INSULIN PMP CART,AUT,G6/7,CNTR
EACH SUBCUTANEOUS
Qty: 12 | Refills: 3 | Status: ACTIVE | COMMUNITY
Start: 2025-08-12 | End: 1900-01-01

## 2025-08-12 RX ORDER — INSULIN PMP CART,AUT,G6/7,CNTR
EACH SUBCUTANEOUS
Qty: 1 | Refills: 0 | Status: ACTIVE | COMMUNITY
Start: 2025-08-12 | End: 1900-01-01

## 2025-08-19 RX ORDER — INSULIN PMP CART,AUT,G6/7,CNTR
EACH SUBCUTANEOUS
Qty: 1 | Refills: 0 | Status: ACTIVE | COMMUNITY
Start: 2025-08-19 | End: 1900-01-01

## 2025-08-21 ENCOUNTER — APPOINTMENT (OUTPATIENT)
Dept: FAMILY MEDICINE | Facility: CLINIC | Age: 60
End: 2025-08-21
Payer: MEDICARE

## 2025-08-21 VITALS
HEART RATE: 96 BPM | HEIGHT: 63 IN | SYSTOLIC BLOOD PRESSURE: 110 MMHG | DIASTOLIC BLOOD PRESSURE: 76 MMHG | BODY MASS INDEX: 41.64 KG/M2 | OXYGEN SATURATION: 96 % | WEIGHT: 235 LBS | RESPIRATION RATE: 18 BRPM | TEMPERATURE: 98.4 F

## 2025-08-21 DIAGNOSIS — B97.89 OTHER SPECIFIED RESPIRATORY DISORDERS: ICD-10-CM

## 2025-08-21 DIAGNOSIS — J98.8 OTHER SPECIFIED RESPIRATORY DISORDERS: ICD-10-CM

## 2025-08-21 PROCEDURE — G2211 COMPLEX E/M VISIT ADD ON: CPT

## 2025-08-21 PROCEDURE — 99213 OFFICE O/P EST LOW 20 MIN: CPT

## 2025-08-21 RX ORDER — FLUTICASONE PROPIONATE 50 UG/1
50 SPRAY NASAL TWICE DAILY
Qty: 1 | Refills: 1 | Status: ACTIVE | COMMUNITY
Start: 2025-08-21 | End: 1900-01-01

## 2025-08-25 RX ORDER — INSULIN PMP CART,AUT,G6/7,CNTR
EACH SUBCUTANEOUS
Qty: 9 | Refills: 3 | Status: ACTIVE | COMMUNITY
Start: 2025-08-19 | End: 1900-01-01

## 2025-09-04 ENCOUNTER — APPOINTMENT (OUTPATIENT)
Dept: CARDIOLOGY | Facility: CLINIC | Age: 60
End: 2025-09-04

## 2025-09-11 ENCOUNTER — APPOINTMENT (OUTPATIENT)
Dept: ENDOCRINOLOGY | Facility: CLINIC | Age: 60
End: 2025-09-11